# Patient Record
Sex: MALE | Race: WHITE | NOT HISPANIC OR LATINO | Employment: OTHER | URBAN - NONMETROPOLITAN AREA
[De-identification: names, ages, dates, MRNs, and addresses within clinical notes are randomized per-mention and may not be internally consistent; named-entity substitution may affect disease eponyms.]

---

## 2022-07-14 PROBLEM — I10 ESSENTIAL HYPERTENSION: Status: ACTIVE | Noted: 2022-07-14

## 2022-07-14 PROBLEM — R01.1 MURMUR, CARDIAC: Status: ACTIVE | Noted: 2022-07-14

## 2022-07-14 PROBLEM — K21.9 GASTROESOPHAGEAL REFLUX DISEASE WITHOUT ESOPHAGITIS: Status: ACTIVE | Noted: 2022-07-14

## 2022-07-14 PROBLEM — G47.33 OBSTRUCTIVE SLEEP APNEA: Status: ACTIVE | Noted: 2022-07-14

## 2022-07-14 PROBLEM — E78.5 DYSLIPIDEMIA: Status: ACTIVE | Noted: 2022-07-14

## 2022-08-11 PROBLEM — R35.0 URINARY FREQUENCY: Status: ACTIVE | Noted: 2022-08-11

## 2022-08-30 PROBLEM — H91.93 DECREASED HEARING OF BOTH EARS: Status: ACTIVE | Noted: 2022-08-30

## 2022-08-31 ENCOUNTER — APPOINTMENT (OUTPATIENT)
Dept: LAB | Facility: MEDICAL CENTER | Age: 67
End: 2022-08-31
Payer: COMMERCIAL

## 2022-08-31 DIAGNOSIS — Z12.11 SCREENING FOR COLON CANCER: ICD-10-CM

## 2022-08-31 LAB — HEMOCCULT STL QL IA: NEGATIVE

## 2022-08-31 PROCEDURE — G0328 FECAL BLOOD SCRN IMMUNOASSAY: HCPCS

## 2022-09-06 ENCOUNTER — PATIENT OUTREACH (OUTPATIENT)
Dept: FAMILY MEDICINE CLINIC | Facility: CLINIC | Age: 67
End: 2022-09-06

## 2022-09-06 ENCOUNTER — OFFICE VISIT (OUTPATIENT)
Dept: AUDIOLOGY | Facility: CLINIC | Age: 67
End: 2022-09-06
Payer: COMMERCIAL

## 2022-09-06 DIAGNOSIS — H90.3 SENSORY HEARING LOSS, BILATERAL: Primary | ICD-10-CM

## 2022-09-06 DIAGNOSIS — H91.93 DECREASED HEARING OF BOTH EARS: ICD-10-CM

## 2022-09-06 PROCEDURE — 92557 COMPREHENSIVE HEARING TEST: CPT | Performed by: AUDIOLOGIST-HEARING AID FITTER

## 2022-09-06 PROCEDURE — 92567 TYMPANOMETRY: CPT | Performed by: AUDIOLOGIST-HEARING AID FITTER

## 2022-09-06 NOTE — PROGRESS NOTES
HEARING EVALUATION    Name:  Kevan Maxwell  :  1955  Age:  78 y o  Date of Evaluation: 22     History: Known Hearing Loss binaurally  Reason for visit: Kevan Maxwell is being seen today at the request of Dr Markel Roberts for an evaluation of hearing  Patient reports he has a longstanding hearing loss  He currently wears O[employer]on More 1 miniRITE-R binaurally  EVALUATION:    Otoscopic Evaluation:   Right Ear: Clear and healthy ear canal and tympanic membrane   Left Ear: Clear and healthy ear canal and tympanic membrane    Tympanometry:   Right: Type A - normal middle ear pressure and compliance   Left: Type A - normal middle ear pressure and compliance    Audiogram Results:  Pure tone testing revealed a moderate sloping to profound sensorineural hearing loss bilaterally  SRT and PTA are in agreement indicating good test reliability  Word recognition scores were poor bilaterally  *see attached audiogram      RECOMMENDATIONS:  Annual hearing eval, Return to [facility]  for F/U and Copy to Patient/Caregiver    PATIENT EDUCATION:   Discussed results and recommendations with Pepper [place]  Questions were addressed and the patient was encouraged to contact our department should concerns arise        Iza Johnson   Clinical Audiologist

## 2022-09-06 NOTE — PROGRESS NOTES
Hearing Aid Visit:    Name:  Urban Murray  :  [id number]  Age:  78 y o  Date of Evaluation: 22     Urban Murray is being seen for a hearing aid visit  Patient is fit with [employer] More 1 miniRITE-R hearing aid(s)  Right serial number [id number]  Left serial number [id number]  Warranty date: unknown (Loss/Damage and repair)  Dome/Earmold: 3-85   : Right 10mm DB / Left 10mm DB    Patient purchased wax guards and domes today, 3 packs each  Paid $30 00         Recommendations:   Return PRN      Iza Berg   Clinical Audiologist

## 2022-09-06 NOTE — PROGRESS NOTES
CELINA CUEVAS reviewed chart  Patient was referred by PCP d/t [facility]  Patient reported having financial and transportation difficulties within the last year  It is documented that patient has hearing loss and wears hearing aides  CELINA CUEVAS outreached patient to introduce self and offer assistance  Patient is agreeable to outreach  Stated that he has an eye doctor appointment on 9/12 and needs transportation  Patient stated he is at the grocery store and scheduled time for call back  CELINA CUEVAS will call patient on 9/7 at [facility] outreached [facility] and patient has not applied for services

## 2022-09-07 ENCOUNTER — PATIENT OUTREACH (OUTPATIENT)
Dept: FAMILY MEDICINE CLINIC | Facility: CLINIC | Age: 67
End: 2022-09-07

## 2022-09-07 NOTE — PROGRESS NOTES
CELINA CM outreached patient at agreed upon time to discuss needs and offer assistance  Patient stated that he has Medicare and [employer] [address]  Stated he has a third card that is coming in the mail and spoke with Muriel Osman [phone]  Patient does not know what changes are occurring or what Miguel Gift works for  Patient stated that he is "sort of illiterate" and is having difficulty "getting the right information"  He shared that he has difficulty reading and struggles with "big words"  He stated that he has a card from [facility] to go to the doctor and to [facility]  He has used their service before  Provided phone number on card is ID# [id number] and phone number is [phone]  Stated he is paying a "small copay" and should not be paying anything  Stated he is waiting for his third insurance card for this to be addressed and will inform me of what that is when he receives it  He receives SNAP benefits  Everything is included in his rent - he does not pay any utility bills  Income is SSI - $1,001/month  Rent is $291/month  Has a DHS  named Elmirasinan Pena  Patient stated his only need right now is to address his transportation needs  He has a drivers license, but does not have a vehicle  Patient stated that he also has this number for [facility]: [phone]  This is the [facility] phone number  Patient may have MA  Called [facility] and patient is signed up for Senior Shared Ride program  Patient's name with program is Sandeep Meredith  Reviewed insurance card and patient's legal spelling of his name is [address], not [address] - will confirm this with patient  Called patient to discuss and he stated his name is spelled [address], as seen on his insurance card - updated in chart  He stated he will call to schedule ride with [facility] for his appointment on 9/12  SW CM will f/u within one week  SW Surveillance episode opened

## 2022-09-20 ENCOUNTER — TELEPHONE (OUTPATIENT)
Dept: FAMILY MEDICINE CLINIC | Facility: CLINIC | Age: 67
End: 2022-09-20

## 2022-09-20 DIAGNOSIS — S39.012A STRAIN OF LUMBAR REGION, INITIAL ENCOUNTER: Primary | ICD-10-CM

## 2022-09-20 RX ORDER — NAPROXEN 500 MG/1
500 TABLET ORAL 2 TIMES DAILY WITH MEALS
Qty: 20 TABLET | Refills: 0 | Status: SHIPPED | OUTPATIENT
Start: 2022-09-20 | End: 2022-09-29 | Stop reason: SDUPTHER

## 2022-09-20 RX ORDER — METHOCARBAMOL 500 MG/1
500 TABLET, FILM COATED ORAL 3 TIMES DAILY PRN
Qty: 20 TABLET | Refills: 0 | Status: SHIPPED | OUTPATIENT
Start: 2022-09-20

## 2022-09-20 NOTE — TELEPHONE ENCOUNTER
Pt has chronic back pain and c/o not being able to do anything or barely move for the past week due to the pain  Asking If you would prescribe him something?

## 2022-09-20 NOTE — TELEPHONE ENCOUNTER
I put in for naproxen and Robaxin for him to take as needed    Make appointment if symptoms continue or increase

## 2022-09-29 DIAGNOSIS — S39.012A STRAIN OF LUMBAR REGION, INITIAL ENCOUNTER: ICD-10-CM

## 2022-09-29 RX ORDER — NAPROXEN 500 MG/1
500 TABLET ORAL 2 TIMES DAILY WITH MEALS
Qty: 20 TABLET | Refills: 0 | Status: SHIPPED | OUTPATIENT
Start: 2022-09-29 | End: 2022-10-07 | Stop reason: SDUPTHER

## 2022-09-30 ENCOUNTER — PATIENT OUTREACH (OUTPATIENT)
Dept: FAMILY MEDICINE CLINIC | Facility: CLINIC | Age: 67
End: 2022-09-30

## 2022-09-30 NOTE — PROGRESS NOTES
CELINA CM called [facility] and spoke with Álvaro Robin provided information on their adult literacy program that is held at [facility] in [facility]  Placed call to patient to offer information on adult literacy program and discuss any needs  Patient stated that he is no longer interested in this program      He shared that he was able to get to his eye doctor appointment and has another one scheduled for 10/12  He shared that his eye doctor is going to assist with scheduling for his cataract removal  He shared that he has gained an understanding of what the process is  He shared that he feels supported by the staff in his building and knows he can go to them for support  SW Surveillance episode resolved and CELINA CM will remain available for any future needs

## 2022-10-07 DIAGNOSIS — S39.012A STRAIN OF LUMBAR REGION, INITIAL ENCOUNTER: ICD-10-CM

## 2022-10-07 RX ORDER — NAPROXEN 500 MG/1
500 TABLET ORAL 2 TIMES DAILY WITH MEALS
Qty: 20 TABLET | Refills: 0 | Status: SHIPPED | OUTPATIENT
Start: 2022-10-07 | End: 2022-10-24 | Stop reason: SDUPTHER

## 2022-10-24 DIAGNOSIS — S39.012A STRAIN OF LUMBAR REGION, INITIAL ENCOUNTER: ICD-10-CM

## 2022-10-24 RX ORDER — NAPROXEN 500 MG/1
500 TABLET ORAL 2 TIMES DAILY WITH MEALS
Qty: 20 TABLET | Refills: 0 | Status: SHIPPED | OUTPATIENT
Start: 2022-10-24

## 2022-11-07 ENCOUNTER — TELEPHONE (OUTPATIENT)
Dept: FAMILY MEDICINE CLINIC | Facility: CLINIC | Age: 67
End: 2022-11-07

## 2022-11-07 ENCOUNTER — APPOINTMENT (OUTPATIENT)
Dept: RADIOLOGY | Facility: MEDICAL CENTER | Age: 67
End: 2022-11-07

## 2022-11-07 DIAGNOSIS — J40 BRONCHITIS: Primary | ICD-10-CM

## 2022-11-07 DIAGNOSIS — J40 BRONCHITIS: ICD-10-CM

## 2022-11-07 RX ORDER — AMOXICILLIN AND CLAVULANATE POTASSIUM 875; 125 MG/1; MG/1
1 TABLET, FILM COATED ORAL EVERY 12 HOURS SCHEDULED
Qty: 14 TABLET | Refills: 0 | Status: SHIPPED | OUTPATIENT
Start: 2022-11-07 | End: 2022-11-14

## 2022-11-07 NOTE — TELEPHONE ENCOUNTER
Rx put in for Augmentin  Chest x-ray ordered  Get generic Mucinex DM over-the-counter  Push fluids  Make appointment or go to ER if symptoms continue or increase

## 2022-11-07 NOTE — TELEPHONE ENCOUNTER
CLINICAL PHARMACY NOTE - Medication Review    Second attempt made to reach patient by telephone for medication review. Spoke with patient - not currently available to review medications. Patient expresses interest in reviewing medications but does not know when will be the best time to do so - offered to send letter with contact info so she can call back at her convenience, patient agreeable. Will prepare letter and mail to patient.     Jeremy Rust PharmD  Clinical Pharmacy Specialist  O: [phone]  C: [address] [place] [address]  [phone], Option 7    =======================================================    For Pharmacy Admin Tracking Only  PHSO: Yes  Time Spent (min): 5 Feels like he has walking pneumonia  Said his chest is hurting like when he had this before  Would like a prescription sent to pharmacy and will get x-rays if you want him to  C/O heavy chest and slight cough since Friday

## 2022-11-23 ENCOUNTER — RA CDI HCC (OUTPATIENT)
Dept: OTHER | Facility: HOSPITAL | Age: 67
End: 2022-11-23

## 2022-11-23 NOTE — PROGRESS NOTES
[facility] [place] [facility]  coding opportunities       Chart reviewed, no opportunity found:   [address]        Patients Insurance     Medicare Insurance: [employer] Advantage

## 2022-11-30 ENCOUNTER — OFFICE VISIT (OUTPATIENT)
Dept: FAMILY MEDICINE CLINIC | Facility: CLINIC | Age: 67
End: 2022-11-30

## 2022-11-30 ENCOUNTER — OFFICE VISIT (OUTPATIENT)
Dept: UROLOGY | Facility: CLINIC | Age: 67
End: 2022-11-30

## 2022-11-30 VITALS
HEART RATE: 83 BPM | WEIGHT: 315 LBS | BODY MASS INDEX: 44.1 KG/M2 | HEIGHT: 71 IN | SYSTOLIC BLOOD PRESSURE: 152 MMHG | DIASTOLIC BLOOD PRESSURE: 86 MMHG | OXYGEN SATURATION: 96 %

## 2022-11-30 VITALS
HEIGHT: 71 IN | WEIGHT: 315 LBS | OXYGEN SATURATION: 95 % | TEMPERATURE: 97.8 F | HEART RATE: 84 BPM | DIASTOLIC BLOOD PRESSURE: 90 MMHG | SYSTOLIC BLOOD PRESSURE: 140 MMHG | BODY MASS INDEX: 44.1 KG/M2

## 2022-11-30 DIAGNOSIS — R73.9 HYPERGLYCEMIA: ICD-10-CM

## 2022-11-30 DIAGNOSIS — R35.0 URINARY FREQUENCY: Primary | ICD-10-CM

## 2022-11-30 DIAGNOSIS — I10 ESSENTIAL HYPERTENSION: Primary | ICD-10-CM

## 2022-11-30 DIAGNOSIS — R09.81 SINUS CONGESTION: ICD-10-CM

## 2022-11-30 DIAGNOSIS — R01.1 MURMUR, CARDIAC: ICD-10-CM

## 2022-11-30 LAB — POST-VOID RESIDUAL VOLUME, ML POC: 72 ML

## 2022-11-30 RX ORDER — TAMSULOSIN HYDROCHLORIDE 0.4 MG/1
0.4 CAPSULE ORAL
Qty: 90 CAPSULE | Refills: 3 | Status: SHIPPED | OUTPATIENT
Start: 2022-11-30

## 2022-11-30 RX ORDER — FLUTICASONE PROPIONATE 50 MCG
2 SPRAY, SUSPENSION (ML) NASAL DAILY
Qty: 16 G | Refills: 3 | Status: SHIPPED | OUTPATIENT
Start: 2022-11-30

## 2022-11-30 NOTE — PROGRESS NOTES
Name: Elie Martinez      : [id number]      MRN: [id number]  Encounter Provider: Deric Servin DO  Encounter Date: 2022   Encounter department: [address] [place] [address]   Blood pressure recheck was better, 140/90  I will refer him to Cardiology to evaluate his heart murmur  For his congestion, Rx for Flonase, and I suggested getting a room humidifier  He will call us if symptoms continue or increase  I will see him back in the next couple months or p r n  1  Essential hypertension  -     Ambulatory Referral to Cardiology; Future    2  Hyperglycemia    3  Murmur, cardiac  -     Ambulatory Referral to Cardiology; Future    4  Sinus congestion  -     fluticasone (FLONASE) 50 mcg/act nasal spray; 2 sprays into each nostril daily      Depression Screening and Follow-up Plan: Patient was screened for depression during today's encounter  They screened negative with a PHQ-2 score of 0  Subjective     Patient here today with sinus congestion  It has been very dry in his apartment  Denies any fever chills  His cold symptoms are better  Just feels sinus pressure  Patient was unable to get the echocardiogram due to transportation issues  Patient has been taking his other medications as prescribed  Sinus Problem  Associated symptoms include congestion and sinus pressure  Review of Systems   Constitutional: Negative  HENT: Positive for congestion and sinus pressure  Respiratory: Negative  Cardiovascular: Negative  Gastrointestinal: Negative  Genitourinary: Negative          Past Medical History:   Diagnosis Date   • GERD (gastroesophageal reflux disease)    • HL (hearing loss)    • Hypertension      Past Surgical History:   Procedure Laterality Date   • SHOULDER ARTHROSCOPY Right      Family History   Problem Relation Age of Onset   • Heart disease Mother    • Heart attack Mother    • Prostate cancer Father    • Parkinsonism Maternal Grandmother      Social History     Socioeconomic History   • Marital status: Single     Spouse name: None   • Number of children: None   • Years of education: None   • Highest education level: None   Occupational History   • None   Tobacco Use   • Smoking status: Never   • Smokeless tobacco: Never   Substance and Sexual Activity   • Alcohol use: Yes     Comment: rare   • Drug use: None   • Sexual activity: None   Other Topics Concern   • None   Social History Narrative   • None     Social Determinants of Health     Financial Resource Strain: High Risk   • Difficulty of Paying Living Expenses: Hard   Food Insecurity: Not on file   Transportation Needs: Unmet Transportation Needs   • Lack of Transportation (Medical):  Yes   • Lack of Transportation (Non-Medical): Yes   Physical Activity: Not on file   Stress: Not on file   Social Connections: Not on file   Intimate Partner Violence: Not on file   Housing Stability: Not on file     Current Outpatient Medications on File Prior to Visit   Medication Sig   • amLODIPine (NORVASC) 2 5 mg tablet Take 2 5 mg by mouth daily   • atorvastatin (LIPITOR) 20 mg tablet Take 20 mg by mouth daily   • losartan (COZAAR) 50 mg tablet Take 50 mg by mouth 2 (two) times a day   • methocarbamol (ROBAXIN) 500 mg tablet Take 1 tablet (500 mg total) by mouth 3 (three) times a day as needed for muscle spasms   • metoprolol succinate (TOPROL-XL) 25 mg 24 hr tablet Take 25 mg by mouth daily   • naproxen (Naprosyn) 500 mg tablet Take 1 tablet (500 mg total) by mouth 2 (two) times a day with meals   • Omega-3 Fatty Acids (Fish Oil) 1200 MG CAPS Take by mouth 2 (two) times a day   • pantoprazole (PROTONIX) 20 mg tablet Take 1 tablet (20 mg total) by mouth daily   • tamsulosin (FLOMAX) 0 4 mg Take 1 capsule (0 4 mg total) by mouth daily with dinner   • nystatin (MYCOSTATIN) powder Apply topically 2 (two) times a day (Patient not taking: Reported on 11/30/2022)     No Known Allergies  Immunization History   Administered Date(s) Administered   • COVID-19 Pfizer Vac BIVALENT Marshall-sucrose 12 Yr+ IM (BOOSTER ONLY) 09/27/2022   • INFLUENZA 08/11/2022   • Pneumococcal Conjugate Vaccine 20-valent (Pcv20), Polysace 06/30/2022   • Tdap 08/11/2022       Objective     /90 (BP Location: Left arm, Patient Position: Sitting, Cuff Size: Large)   Pulse 84   Temp 97 8 °F (36 6 °C)   Ht 5' 11" (1 803 m)   Wt (!) 151 kg (331 lb 12 8 oz)   SpO2 95%   BMI 46 28 kg/m²     Physical Exam  Vitals reviewed  Constitutional:       General: He is not in acute distress  Appearance: Normal appearance  He is well-developed  He is not ill-appearing, toxic-appearing or diaphoretic  HENT:      Head: Normocephalic and atraumatic  Nose: Congestion present  Eyes:      Conjunctiva/sclera: Conjunctivae normal    Cardiovascular:      Rate and Rhythm: Normal rate and regular rhythm  Heart sounds: Murmur heard  No friction rub  No gallop  Pulmonary:      Effort: Pulmonary effort is normal  No respiratory distress  Breath sounds: Normal breath sounds  No wheezing, rhonchi or rales  Musculoskeletal:      Right lower leg: No edema  Left lower leg: No edema  Neurological:      General: No focal deficit present  Mental Status: He is alert and oriented to person, place, and time  Psychiatric:         Mood and Affect: Mood normal          Behavior: Behavior normal          Thought Content:  Thought content normal          Judgment: Judgment normal        Familia Mis, DO

## 2022-11-30 NOTE — PROGRESS NOTES
11/30/2022    No chief complaint on file  Assessment and Plan    78 y o  male     1  BPH with LUTS  · Symptoms improved since starting tamsulosin  Continue with tamsulosin 0 4 mg HS   · PVR today 72 mL  · Follow-up in August 2023 for annual follow-up      History of Present Illness  Dago Butt is a 78 y o  male here for follow-up evaluation of BPH with LUTS  He was initially seen by me on 08/30/2022 for complaints of urinary frequency, weak urinary stream, sensation of incomplete bladder emptying, and nocturia 2-3 times per night  This had been worsening over the past several weeks  His PVR in the office was 108 mL  He did report issues with constipation as well  He was started on tamsulosin 0 4 mg HS and presents for follow-up  He reports a significant improvement in his lower urinary tract symptoms since starting tamsulosin  He will only experience nocturia upwards of 1 time per night and feels his urinary stream is much improved as well  His PVR today was 72 mL  Review of Systems   Constitutional: Negative for chills and fever  HENT: Negative for congestion and sore throat  Respiratory: Negative for cough and shortness of breath  Cardiovascular: Negative for chest pain and leg swelling  Gastrointestinal: Negative for abdominal pain, constipation, diarrhea, nausea and vomiting  Genitourinary: Negative for difficulty urinating, dysuria, flank pain, frequency, hematuria and urgency  Nocturia improved to 1 time per night   Musculoskeletal: Negative for back pain and gait problem  Skin: Negative for wound  Allergic/Immunologic: Negative for immunocompromised state  Neurological: Negative for dizziness, weakness and numbness  Hematological: Does not bruise/bleed easily  Vitals  Vitals:    11/30/22 1049   BP: 152/86   Pulse: 83   SpO2: 96%   Weight: (!) 149 kg (328 lb)   Height: 5' 11" (1 803 m)       Physical Exam  Vitals reviewed     Constitutional: General: He is not in acute distress  Appearance: Normal appearance  He is not ill-appearing or toxic-appearing  HENT:      Head: Normocephalic and atraumatic  Eyes:      General: No scleral icterus  Conjunctiva/sclera: Conjunctivae normal    Cardiovascular:      Rate and Rhythm: Normal rate  Pulmonary:      Effort: Pulmonary effort is normal  No respiratory distress  Abdominal:      Palpations: Abdomen is soft  Tenderness: There is no abdominal tenderness  There is no right CVA tenderness or left CVA tenderness  Hernia: No hernia is present  Musculoskeletal:      Cervical back: Normal range of motion  Right lower leg: No edema  Left lower leg: No edema  Skin:     General: Skin is warm and dry  Coloration: Skin is not jaundiced or pale  Neurological:      General: No focal deficit present  Mental Status: He is alert and oriented to person, place, and time  Mental status is at baseline  Gait: Gait normal    Psychiatric:         Mood and Affect: Mood normal          Behavior: Behavior normal          Thought Content:  Thought content normal          Judgment: Judgment normal          Past History  Past Medical History:   Diagnosis Date   • GERD (gastroesophageal reflux disease)    • HL (hearing loss)    • Hypertension      Social History     Socioeconomic History   • Marital status: Single     Spouse name: Not on file   • Number of children: Not on file   • Years of education: Not on file   • Highest education level: Not on file   Occupational History   • Not on file   Tobacco Use   • Smoking status: Never   • Smokeless tobacco: Never   Substance and Sexual Activity   • Alcohol use: Yes     Comment: rare   • Drug use: Not on file   • Sexual activity: Not on file   Other Topics Concern   • Not on file   Social History Narrative   • Not on file     Social Determinants of Health     Financial Resource Strain: High Risk   • Difficulty of Paying Living Expenses: Hard   Food Insecurity: Not on file   Transportation Needs: Unmet Transportation Needs   • Lack of Transportation (Medical): Yes   • Lack of Transportation (Non-Medical): Yes   Physical Activity: Not on file   Stress: Not on file   Social Connections: Not on file   Intimate Partner Violence: Not on file   Housing Stability: Not on file     Social History     Tobacco Use   Smoking Status Never   Smokeless Tobacco Never     Family History   Problem Relation Age of Onset   • Heart disease Mother    • Heart attack Mother    • Prostate cancer Father    • Parkinsonism Maternal Grandmother        The following portions of the patient's history were reviewed and updated as appropriate allergies, current medications, past medical history, past social history, past surgical history and problem list    Imaging:    Results  Recent Results (from the past 1 hour(s))   POCT Measure PVR    Collection Time: 11/30/22 10:59 AM   Result Value Ref Range    POST-VOID RESIDUAL VOLUME, ML POC 72 mL   ]  Lab Results   Component Value Date    PSA 0 7 08/11/2022     Lab Results   Component Value Date    CALCIUM 8 5 08/11/2022    K 4 2 08/11/2022    CO2 28 08/11/2022     08/11/2022    BUN 14 08/11/2022    CREATININE 0 74 08/11/2022     Lab Results   Component Value Date    WBC 7 04 08/11/2022    HGB 14 9 08/11/2022    HCT 43 3 08/11/2022    MCV 92 08/11/2022     08/11/2022       Please Note:  Voice dictation software has been used to create this document  There may be inadvertent transcriptions errors       Dilia Pacheco

## 2022-12-12 ENCOUNTER — OFFICE VISIT (OUTPATIENT)
Dept: CARDIOLOGY CLINIC | Facility: CLINIC | Age: 67
End: 2022-12-12

## 2022-12-12 VITALS
DIASTOLIC BLOOD PRESSURE: 98 MMHG | SYSTOLIC BLOOD PRESSURE: 170 MMHG | BODY MASS INDEX: 44.1 KG/M2 | HEIGHT: 71 IN | HEART RATE: 76 BPM | WEIGHT: 315 LBS

## 2022-12-12 DIAGNOSIS — G47.33 OSA ON CPAP: ICD-10-CM

## 2022-12-12 DIAGNOSIS — Z99.89 OSA ON CPAP: ICD-10-CM

## 2022-12-12 DIAGNOSIS — E66.01 CLASS 3 SEVERE OBESITY DUE TO EXCESS CALORIES WITH BODY MASS INDEX (BMI) OF 45.0 TO 49.9 IN ADULT, UNSPECIFIED WHETHER SERIOUS COMORBIDITY PRESENT (HCC): ICD-10-CM

## 2022-12-12 DIAGNOSIS — E78.5 HYPERLIPIDEMIA, UNSPECIFIED HYPERLIPIDEMIA TYPE: ICD-10-CM

## 2022-12-12 DIAGNOSIS — I10 ESSENTIAL HYPERTENSION: ICD-10-CM

## 2022-12-12 DIAGNOSIS — R01.1 MURMUR, CARDIAC: ICD-10-CM

## 2022-12-12 DIAGNOSIS — R06.09 DYSPNEA ON EXERTION: Primary | ICD-10-CM

## 2022-12-12 RX ORDER — AMLODIPINE BESYLATE 5 MG/1
5 TABLET ORAL DAILY
Qty: 30 TABLET | Refills: 6 | Status: SHIPPED | OUTPATIENT
Start: 2022-12-12
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
## 2022-12-30 ENCOUNTER — HOSPITAL ENCOUNTER (OUTPATIENT)
Dept: NUCLEAR MEDICINE | Facility: HOSPITAL | Age: 67
Discharge: HOME | End: 2022-12-30
Attending: INTERNAL MEDICINE

## 2022-12-30 ENCOUNTER — HOSPITAL ENCOUNTER (OUTPATIENT)
Dept: NON INVASIVE DIAGNOSTICS | Facility: HOSPITAL | Age: 67
Discharge: HOME | End: 2022-12-30

## 2022-12-30 VITALS
WEIGHT: 315 LBS | DIASTOLIC BLOOD PRESSURE: 90 MMHG | HEART RATE: 80 BPM | BODY MASS INDEX: 44.1 KG/M2 | HEIGHT: 71 IN | SYSTOLIC BLOOD PRESSURE: 140 MMHG

## 2022-12-30 DIAGNOSIS — R06.09 DYSPNEA ON EXERTION: ICD-10-CM

## 2022-12-30 DIAGNOSIS — R01.1 MURMUR, CARDIAC: ICD-10-CM

## 2022-12-30 PROBLEM — I35.0 MODERATE AORTIC STENOSIS: Status: ACTIVE | Noted: 2022-12-30

## 2022-12-30 LAB
AORTIC ROOT: 3.4 CM
AORTIC VALVE MEAN VELOCITY: 26.3 M/S
APICAL FOUR CHAMBER EJECTION FRACTION: 66 %
AV AREA BY CONTINUOUS VTI: 1.3 CM2
AV AREA PEAK VELOCITY: 1.2 CM2
AV LVOT MEAN GRADIENT: 3 MMHG
AV LVOT PEAK GRADIENT: 5 MMHG
AV MEAN GRADIENT: 30 MMHG
AV PEAK GRADIENT: 45 MMHG
AV VALVE AREA: 1.28 CM2
AV VELOCITY RATIO: 0.33
BASELINE ST DEPRESSION: 0 MM
DOP CALC AO PEAK VEL: 3.34 M/S
DOP CALC AO VTI: 67.52 CM
DOP CALC LVOT AREA: 3.46 CM2
DOP CALC LVOT DIAMETER: 2.1 CM
DOP CALC LVOT PEAK VEL VTI: 24.87 CM
DOP CALC LVOT PEAK VEL: 1.1 M/S
DOP CALC LVOT STROKE INDEX: 31.3 ML/M2
DOP CALC LVOT STROKE VOLUME: 86.1 CM3
E WAVE DECELERATION TIME: 230 MS
FRACTIONAL SHORTENING: 33 % (ref 28–44)
INTERVENTRICULAR SEPTUM IN DIASTOLE (PARASTERNAL SHORT AXIS VIEW): 1.8 CM
INTERVENTRICULAR SEPTUM: 1.3 CM (ref 0.6–1.1)
LEFT ATRIUM AREA SYSTOLE SINGLE PLANE A4C: 19.4 CM2
LEFT ATRIUM SIZE: 4.9 CM
LEFT INTERNAL DIMENSION IN SYSTOLE: 3.5 CM (ref 2.1–4)
LEFT VENTRICULAR INTERNAL DIMENSION IN DIASTOLE: 5.2 CM (ref 3.5–6)
LEFT VENTRICULAR POSTERIOR WALL IN END DIASTOLE: 1.4 CM
LEFT VENTRICULAR STROKE VOLUME: 80 ML
LVSV (TEICH): 80 ML
MAX HR PERCENT: 61 %
MAX HR: 95 BPM
MV E'TISSUE VEL-SEP: 11 CM/S
MV PEAK A VEL: 0.97 M/S
MV PEAK E VEL: 78 CM/S
MV STENOSIS PRESSURE HALF TIME: 67 MS
MV VALVE AREA P 1/2 METHOD: 3.28 CM2
NUC STRESS EJECTION FRACTION: 70 %
RA PRESSURE ESTIMATED: 8 MMHG
RATE PRESSURE PRODUCT: NORMAL
RIGHT ATRIUM AREA SYSTOLE A4C: 18.9 CM2
RIGHT VENTRICLE ID DIMENSION: 3.9 CM
RV PSP: 45 MMHG
SL CV LV EF: 65
SL CV PED ECHO LEFT VENTRICLE DIASTOLIC VOLUME (MOD BIPLANE) 2D: 131 ML
SL CV PED ECHO LEFT VENTRICLE SYSTOLIC VOLUME (MOD BIPLANE) 2D: 51 ML
SL CV REST NUCLEAR ISOTOPE DOSE: 16 MCI
SL CV STRESS NUCLEAR ISOTOPE DOSE: 48.3 MCI
STRESS BASELINE HR: 77 BPM
STRESS PEAK HR: 95 BPM
STRESS POST PEAK BP: 148 MMHG
STRESS ST DEPRESSION: 0 MM
STRESS/REST PERFUSION RATIO: 0.89
TR MAX PG: 37 MMHG
TR PEAK VELOCITY: 3 M/S
TRICUSPID ANNULAR PLANE SYSTOLIC EXCURSION: 2.1 CM
TRICUSPID VALVE PEAK REGURGITATION VELOCITY: 3.04 M/S

## 2022-12-30 RX ADMIN — REGADENOSON 0.4 MG: 0.08 INJECTION, SOLUTION INTRAVENOUS at 09:22

## 2023-01-09 ENCOUNTER — OFFICE VISIT (OUTPATIENT)
Dept: CARDIOLOGY CLINIC | Facility: CLINIC | Age: 68
End: 2023-01-09

## 2023-01-09 VITALS
HEART RATE: 80 BPM | BODY MASS INDEX: 44.1 KG/M2 | WEIGHT: 315 LBS | SYSTOLIC BLOOD PRESSURE: 150 MMHG | HEIGHT: 71 IN | DIASTOLIC BLOOD PRESSURE: 82 MMHG

## 2023-01-09 DIAGNOSIS — E78.5 DYSLIPIDEMIA: ICD-10-CM

## 2023-01-09 DIAGNOSIS — I35.0 MODERATE AORTIC STENOSIS: ICD-10-CM

## 2023-01-09 DIAGNOSIS — I35.0 AORTIC VALVE STENOSIS, ETIOLOGY OF CARDIAC VALVE DISEASE UNSPECIFIED: ICD-10-CM

## 2023-01-09 DIAGNOSIS — I10 ESSENTIAL HYPERTENSION: Primary | ICD-10-CM

## 2023-01-09 DIAGNOSIS — E66.01 CLASS 3 SEVERE OBESITY DUE TO EXCESS CALORIES WITH BODY MASS INDEX (BMI) OF 45.0 TO 49.9 IN ADULT, UNSPECIFIED WHETHER SERIOUS COMORBIDITY PRESENT (HCC): ICD-10-CM

## 2023-01-09 RX ORDER — HYDROCHLOROTHIAZIDE 12.5 MG/1
12.5 TABLET ORAL DAILY
Qty: 30 TABLET | Refills: 5 | Status: SHIPPED | OUTPATIENT
Start: 2023-01-09

## 2023-01-09 NOTE — PROGRESS NOTES
Cardiology Follow up    Rebecca Paz  [id number]  1955  [facility] Rupesh Valenzuela  [facility] [address]  [place] [id number]      1  Essential hypertension        2  Moderate aortic stenosis        3  Dyslipidemia        4  Class 3 severe obesity due to excess calories with body mass index (BMI) of 45 0 to 49 9 in adult, unspecified whether serious comorbidity present ([address] )        5  Aortic valve stenosis, etiology of cardiac valve disease unspecified            Discussion/Summary:  1  Dyspnea on exertion  2  Hypertension  3  Hyperlipidemia  4  Obesity  5    Moderate aortic stenosis      -Transthoracic echocardiogram 12/30/2022 showing left ventricular systolic function normal with estimated LVEF 65% with grade 1 diastolic dysfunction, normal right ventricular systolic function with aortic valve calcified with reduced mobility and moderate aortic stenosis AV peak velocity 3 34 m/s, AV mean gradient 30 mmHg, WHITNEY 1 28 cm²  -Pharmacologic nuclear stress test 12/30/2022 listing diaphragmatic attenuation with no perfusion defect suggestive of ischemia or infarction however resting hypertension was present  -As patient's blood pressures are elevated we will continue amlodipine 5 mg daily, losartan 50 mg twice daily, metoprolol succinate 25 mg daily and will add hydrochlorothiazide 12 5 mg daily to patient's regimen and will repeat BMP in 1 week to monitor renal function and electrolytes  -Patient counseled on dietary and lifestyle modifications including following a low-salt, low-fat, heart healthy diet with weight loss as goal BMI is less than 30 and sodium restriction to less than 1800 mg sodium daily along with continued compliance with CPAP therapy  -Patient will be seen in 3 months or sooner if necessary  -Patient counseled if he were to have any warning or alarm type symptoms he is to seek emergency medical care immediately  History of Present Illness:  -Patient is a 60-year-old male with hypertension, hyperlipidemia, obesity, obstructive sleep apnea compliant with CPAP therapy who initially presented to the office in December 2022 after referral from primary care physician for newly discovered cardiac murmur on exam   He has been previously seen by cardiologist approximately 6 years ago near [place] and have been recommended to undergo stress testing at that time but was unable to do so due to insurance issues   -He presents today for scheduled follow-up and denies any active chest pain, palpitations, shortness of breath, lower extremity edema  He states his blood pressures at home have been in the 800-334 mmHg systolic range with majority of readings in the 898-589 of Hg systolic range via wrist cuff at home      Patient Active Problem List   Diagnosis   • Essential hypertension   • Dyslipidemia   • Gastroesophageal reflux disease without esophagitis   • Obstructive sleep apnea   • Murmur, cardiac   • Urinary frequency   • Hyperglycemia   • Decreased hearing of both ears   • Moderate aortic stenosis     Past Medical History:   Diagnosis Date   • GERD (gastroesophageal reflux disease)    • HL (hearing loss)    • Hypertension      Social History     Socioeconomic History   • Marital status: Single     Spouse name: Not on file   • Number of children: Not on file   • Years of education: Not on file   • Highest education level: Not on file   Occupational History   • Not on file   Tobacco Use   • Smoking status: Never   • Smokeless tobacco: Never   Substance and Sexual Activity   • Alcohol use: Yes     Comment: rare   • Drug use: Not on file   • Sexual activity: Not on file   Other Topics Concern   • Not on file   Social History Narrative   • Not on file     Social Determinants of Health     Financial Resource Strain: High Risk   • Difficulty of Paying Living Expenses: Hard   Food Insecurity: Not on file   Transportation Needs: Unmet Transportation Needs   • Lack of Transportation (Medical):  Yes   • Lack of Transportation (Non-Medical): Yes   Physical Activity: Not on file   Stress: Not on file   Social Connections: Not on file   Intimate Partner Violence: Not on file   Housing Stability: Not on file      Family History   Problem Relation Age of Onset   • Heart disease Mother    • Heart attack Mother    • Prostate cancer Father    • Parkinsonism Maternal Grandmother      Past Surgical History:   Procedure Laterality Date   • SHOULDER ARTHROSCOPY Right        Current Outpatient Medications:   •  amLODIPine (NORVASC) 5 mg tablet, Take 1 tablet (5 mg total) by mouth daily, Disp: 30 tablet, Rfl: 6  •  atorvastatin (LIPITOR) 20 mg tablet, Take 20 mg by mouth daily, Disp: , Rfl:   •  fluticasone (FLONASE) 50 mcg/act nasal spray, 2 sprays into each nostril daily, Disp: 16 g, Rfl: 3  •  losartan (COZAAR) 50 mg tablet, Take 50 mg by mouth 2 (two) times a day, Disp: , Rfl:   •  methocarbamol (ROBAXIN) 500 mg tablet, Take 1 tablet (500 mg total) by mouth 3 (three) times a day as needed for muscle spasms, Disp: 20 tablet, Rfl: 0  •  metoprolol succinate (TOPROL-XL) 25 mg 24 hr tablet, Take 25 mg by mouth daily, Disp: , Rfl:   •  naproxen (Naprosyn) 500 mg tablet, Take 1 tablet (500 mg total) by mouth 2 (two) times a day with meals, Disp: 20 tablet, Rfl: 0  •  Omega-3 Fatty Acids (Fish Oil) 1200 MG CAPS, Take by mouth 2 (two) times a day, Disp: , Rfl:   •  pantoprazole (PROTONIX) 20 mg tablet, Take 1 tablet (20 mg total) by mouth daily, Disp: 90 tablet, Rfl: 3  •  tamsulosin (FLOMAX) 0 4 mg, Take 1 capsule (0 4 mg total) by mouth daily with dinner, Disp: 90 capsule, Rfl: 3  •  nystatin (MYCOSTATIN) powder, Apply topically 2 (two) times a day (Patient not taking: Reported on 11/30/2022), Disp: 60 g, Rfl: 3  No Known Allergies      Labs:  Hospital Outpatient Visit on 12/30/2022   Component Date Value   • Baseline HR 12/30/2022 77    • Stress peak HR 12/30/2022 95    • Post peak BP 12/30/2022 148 0    • Max HR Percent 12/30/2022 61    • Max HR 12/30/2022 95    • Rate Pressure Product 12/30/2022 14,060 0    • Rest Nuclear Isotope Dose 12/30/2022 16 00    • Stress Nuclear Isotope D* 12/30/2022 48 30    • Base ST Depresion (mm) 12/30/2022 0    • ST Depression (mm) 12/30/2022 0    • Stress/rest perfusion ra* 12/30/2022 0 89    • EF (%) 12/30/2022 70    Hospital Outpatient Visit on 12/30/2022   Component Date Value   • AV area peak ashkan 12/30/2022 1 2    • LA size 12/30/2022 4 9    • Aortic valve mean veloci* 12/30/2022 26 30    • Triscuspid Valve Regurgi* 12/30/2022 37 0    • Tricuspid valve peak reg* 12/30/2022 3 04    • LVPWd 12/30/2022 1 40    • MV E' Tissue Velocity Se* 12/30/2022 11    • Tricuspid annular plane * 12/30/2022 2 10    • TR Peak Ashkan 12/30/2022 3 0    • Right Ventricular Peak S* 12/30/2022 45 00    • IVSd 12/30/2022 4 37    • LV DIASTOLIC VOLUME (MOD* 47/78/5746 131    • LEFT VENTRICLE SYSTOLIC * 29/63/8172 51    • Left ventricular stroke * 12/30/2022 80 00    • A4C EF 12/30/2022 66    • LVIDd 12/30/2022 5 20    • IVS 12/30/2022 1 3    • LVIDS 12/30/2022 3 50    • FS 12/30/2022 33    • Ao root 12/30/2022 3 40    • RVID d 12/30/2022 3 9    • LVOT mn grad 12/30/2022 3 0    • AV area by cont VTI 12/30/2022 1 3    • Est  RA pres 12/30/2022 8 0    • AV mean gradient 12/30/2022 30    • AV LVOT peak gradient 12/30/2022 5    • MV valve area p 1/2 meth* 12/30/2022 3 28    • E wave deceleration time 12/30/2022 230    • LVOT diameter 12/30/2022 2 1    • LVOT peak ashkan 12/30/2022 1 1    • LVOT peak VTI 12/30/2022 24 87    • Aortic valve peak veloci* 12/30/2022 3 34    • Ao VTI 12/30/2022 67 52    • LVOT stroke volume 12/30/2022 86 10    • AV peak gradient 12/30/2022 45    • MV Peak E Ashkan 12/30/2022 78    • MV Peak A Ashkan 12/30/2022 0 97    • ADELA A4C 12/30/2022 19 4    • RAA A4C 12/30/2022 18 9    • MV stenosis pressure 1/2* 12/30/2022 67    • LVOT stroke volume index 12/30/2022 31 30    • LVSV, 2D 12/30/2022 80    • LVOT area 12/30/2022 3 46    • Dimensionless velociy in* 12/30/2022 0 33    • AV valve area 12/30/2022 1 28    • LV EF 12/30/2022 65    Office Visit on 11/30/2022   Component Date Value   • POST-VOID RESIDUAL VOLUM* 11/30/2022 72         Imaging: Echo complete w/ contrast if indicated    Result Date: 12/30/2022  Narrative: •  Left Ventricle: Left ventricular cavity size is normal  Wall thickness is mildly increased  There is mild concentric hypertrophy  The left ventricular ejection fraction is 65%  Systolic function is normal  Wall motion is normal  Diastolic function is mildly abnormal, consistent with grade I (abnormal) relaxation  •  Right Ventricle: Right ventricular cavity size is normal  Systolic function is normal  •  Left Atrium: The atrium is mildly dilated  •  Right Atrium: The atrium is mildly dilated  •  Aortic Valve: The aortic valve is trileaflet  The leaflets are mildly thickened  The leaflets are mildly calcified  There is moderately reduced mobility  There is moderate stenosis  The aortic valve peak velocity is 3 34 m/s  The aortic valve peak gradient is 45 mmHg  The aortic valve mean gradient is 30 mmHg  The dimensionless velocity index is 0 33  The aortic valve area is 1 28 cm2  The stroke volume index is 31 30 ml/m2  •  Tricuspid Valve: There is mild regurgitation  There is no evidence of stenosis  The estimated right ventricular systolic pressure is 98 12 mmHg  NM myocardial perfusion spect (rx stress and/or rest)    Result Date: 12/30/2022  Narrative: •  Stress ECG: No ST deviation is noted  The ECG was negative for ischemia  •  Perfusion: There are no perfusion defects suggestive of ischemia or infarction  Diaphragmatic attenuation with normal wall motion and thickening on gated imaging was present   •  Stress Function: Left ventricular function post-stress is normal  Post-stress ejection fraction is 70 %  •  Resting hypertension was present  Review of Systems:  Review of Systems   Constitutional: Negative for chills, diaphoresis, fatigue and fever  HENT: Negative for trouble swallowing and voice change  Eyes: Negative for pain  Respiratory: Negative for shortness of breath and wheezing  Cardiovascular: Negative for chest pain, palpitations and leg swelling  Gastrointestinal: Negative for abdominal pain, constipation, diarrhea, nausea and vomiting  Genitourinary: Negative for dysuria  Musculoskeletal: Positive for arthralgias  Negative for neck pain and neck stiffness  Skin: Negative for rash  Neurological: Negative for dizziness, syncope, light-headedness and headaches  Psychiatric/Behavioral: Negative for agitation and confusion  All other systems reviewed and are negative  Vitals:    01/09/23 1318   BP: 150/82   Pulse: 80   Weight: (!) 154 kg (339 lb)   Height: 5' 11" (1 803 m)     Vitals:    01/09/23 1318   Weight: (!) 154 kg (339 lb)     Height: 5' 11" (180 3 cm)     Physical Exam:  Physical Exam  Vitals reviewed  Constitutional:       General: He is not in acute distress  Appearance: He is obese  He is not diaphoretic  HENT:      Head: Normocephalic and atraumatic  Eyes:      General:         Right eye: No discharge  Left eye: No discharge  Neck:      Comments: Trachea midline, neck obese, difficult to assess JVD  Cardiovascular:      Rate and Rhythm: Normal rate and regular rhythm  Heart sounds: Murmur (KAREN) heard  Pulmonary:      Effort: Pulmonary effort is normal  No respiratory distress  Breath sounds: No wheezing  Chest:      Chest wall: No tenderness  Abdominal:      General: Bowel sounds are normal       Palpations: Abdomen is soft  Tenderness: There is no abdominal tenderness  There is no rebound  Musculoskeletal:      Right lower leg: No edema  Left lower leg: No edema     Skin:     General: Skin is warm and dry    Neurological:      Mental Status: He is alert  Comments: Awake, alert, able to answer questions appropriately, able to move extremities bilaterally     Psychiatric:         Mood and Affect: Mood normal          Behavior: Behavior normal

## 2023-01-16 ENCOUNTER — APPOINTMENT (OUTPATIENT)
Dept: LAB | Facility: MEDICAL CENTER | Age: 68
End: 2023-01-16

## 2023-01-16 DIAGNOSIS — I10 ESSENTIAL HYPERTENSION: ICD-10-CM

## 2023-01-16 LAB
ANION GAP SERPL CALCULATED.3IONS-SCNC: 6 MMOL/L (ref 4–13)
BUN SERPL-MCNC: 18 MG/DL (ref 5–25)
CALCIUM SERPL-MCNC: 9.5 MG/DL (ref 8.3–10.1)
CHLORIDE SERPL-SCNC: 105 MMOL/L (ref 96–108)
CO2 SERPL-SCNC: 26 MMOL/L (ref 21–32)
CREAT SERPL-MCNC: 0.85 MG/DL (ref 0.6–1.3)
GFR SERPL CREATININE-BSD FRML MDRD: 90 ML/MIN/1.73SQ M
GLUCOSE P FAST SERPL-MCNC: 122 MG/DL (ref 65–99)
POTASSIUM SERPL-SCNC: 4.1 MMOL/L (ref 3.5–5.3)
SODIUM SERPL-SCNC: 137 MMOL/L (ref 135–147)

## 2023-01-25 ENCOUNTER — DOCTOR'S OFFICE (OUTPATIENT)
Dept: URBAN - NONMETROPOLITAN AREA CLINIC 1 | Facility: CLINIC | Age: 68
Setting detail: OPHTHALMOLOGY
End: 2023-01-25
Payer: MEDICARE

## 2023-01-25 ENCOUNTER — RX ONLY (RX ONLY)
Age: 68
End: 2023-01-25

## 2023-01-25 DIAGNOSIS — H40.023: ICD-10-CM

## 2023-01-25 DIAGNOSIS — H35.3132: ICD-10-CM

## 2023-01-25 DIAGNOSIS — H43.813: ICD-10-CM

## 2023-01-25 DIAGNOSIS — H25.13: ICD-10-CM

## 2023-01-25 PROCEDURE — 92002 INTRM OPH EXAM NEW PATIENT: CPT | Performed by: OPHTHALMOLOGY

## 2023-01-25 PROCEDURE — 92134 CPTRZ OPH DX IMG PST SGM RTA: CPT | Performed by: OPHTHALMOLOGY

## 2023-01-25 ASSESSMENT — TONOMETRY
OD_IOP_MMHG: 19
OS_IOP_MMHG: 18

## 2023-01-25 ASSESSMENT — REFRACTION_CURRENTRX
OD_AXIS: 86
OD_SPHERE: -8.25
OS_ADD: +2.75
OS_AXIS: 101
OS_SPHERE: -8.50
OS_OVR_VA: 20/
OS_CYLINDER: -2.25
OD_OVR_VA: 20/
OD_CYLINDER: -2.00
OD_ADD: +2.75

## 2023-01-25 ASSESSMENT — CONFRONTATIONAL VISUAL FIELD TEST (CVF)
OD_FINDINGS: FULL
OS_FINDINGS: FULL

## 2023-01-25 ASSESSMENT — KERATOMETRY
OS_K2POWER_DIOPTERS: 43.75
OD_K2POWER_DIOPTERS: 45.50
OD_K1POWER_DIOPTERS: 43.50
OS_K1POWER_DIOPTERS: 42.50
OD_AXISANGLE_DEGREES: 08
OS_AXISANGLE_DEGREES: 19

## 2023-01-25 ASSESSMENT — SPHEQUIV_DERIVED: OS_SPHEQUIV: -11.125

## 2023-01-25 ASSESSMENT — REFRACTION_AUTOREFRACTION
OS_SPHERE: -10.25
OS_CYLINDER: -1.75
OS_AXIS: 111

## 2023-01-25 ASSESSMENT — VISUAL ACUITY
OS_BCVA: 20/150
OD_BCVA: 20/70

## 2023-01-25 ASSESSMENT — AXIALLENGTH_DERIVED: OS_AL: 29.11

## 2023-02-02 ENCOUNTER — OFFICE VISIT (OUTPATIENT)
Dept: AUDIOLOGY | Facility: CLINIC | Age: 68
End: 2023-02-02

## 2023-02-02 DIAGNOSIS — H90.3 SENSORY HEARING LOSS, BILATERAL: Primary | ICD-10-CM

## 2023-02-02 NOTE — PROGRESS NOTES
Progress Note    Name:  Pio Martinez  :  1955  Age:  78 y o  Date of Evaluation: 23     Patient is fit with [employer]on More 1 miniRITE-R hearing aid(s)  Right serial number [id number]  Left serial number [id number]  Warranty date: unknown (Loss/Damage and repair)  Dome/Earmold: 3-85   : Right 10mm DB / Left 10mm DB    Laura Bautista is having intermittent issues with his hearing aids and   Could not find problem at today's appointment, recommended bringing  in  Scheduled follow up       Iza Haskins , CCC-A  Clinical Audiologist

## 2023-02-14 ENCOUNTER — OFFICE VISIT (OUTPATIENT)
Dept: AUDIOLOGY | Facility: CLINIC | Age: 68
End: 2023-02-14

## 2023-02-14 DIAGNOSIS — H90.3 SENSORY HEARING LOSS, BILATERAL: Primary | ICD-10-CM

## 2023-02-14 NOTE — PROGRESS NOTES
Progress Note    Name:  Lili Whiteside  :  1955  Age:  78 y o  Date of Evaluation: 23     Patient picked up 3 packs of domes and 3 packs of filters for $30 00       Iza Chandler , CCC-A  Clinical Audiologist

## 2023-02-22 ENCOUNTER — DOCTOR'S OFFICE (OUTPATIENT)
Dept: URBAN - NONMETROPOLITAN AREA CLINIC 1 | Facility: CLINIC | Age: 68
Setting detail: OPHTHALMOLOGY
End: 2023-02-22
Payer: MEDICARE

## 2023-02-22 ENCOUNTER — RX ONLY (RX ONLY)
Age: 68
End: 2023-02-22

## 2023-02-22 ENCOUNTER — RA CDI HCC (OUTPATIENT)
Dept: OTHER | Facility: HOSPITAL | Age: 68
End: 2023-02-22

## 2023-02-22 DIAGNOSIS — H25.13: ICD-10-CM

## 2023-02-22 DIAGNOSIS — H40.023: ICD-10-CM

## 2023-02-22 PROCEDURE — 99213 OFFICE O/P EST LOW 20 MIN: CPT | Performed by: OPHTHALMOLOGY

## 2023-02-22 ASSESSMENT — REFRACTION_CURRENTRX
OS_OVR_VA: 20/
OS_SPHERE: -8.00
OD_ADD: +3.00
OS_CYLINDER: -2.50
OS_AXIS: 101
OS_ADD: +3.00
OD_AXIS: 089
OD_OVR_VA: 20/
OD_SPHERE: -8.00
OD_CYLINDER: -2.25

## 2023-02-22 ASSESSMENT — KERATOMETRY
OS_K2POWER_DIOPTERS: 43.75
OS_AXISANGLE_DEGREES: 029
OD_AXISANGLE_DEGREES: 173
OD_K2POWER_DIOPTERS: 44.25
OD_K1POWER_DIOPTERS: 43.50
OS_K1POWER_DIOPTERS: 42.25

## 2023-02-22 ASSESSMENT — CONFRONTATIONAL VISUAL FIELD TEST (CVF)
OS_FINDINGS: FULL
OD_FINDINGS: FULL

## 2023-02-22 ASSESSMENT — AXIALLENGTH_DERIVED: OS_AL: 20.75

## 2023-02-22 ASSESSMENT — REFRACTION_AUTOREFRACTION
OS_CYLINDER: -2.50
OS_SPHERE: +10.00
OS_AXIS: 104

## 2023-02-22 ASSESSMENT — SPHEQUIV_DERIVED: OS_SPHEQUIV: 8.75

## 2023-02-22 ASSESSMENT — VISUAL ACUITY
OS_BCVA: 20/150
OD_BCVA: 20/70-2

## 2023-02-22 NOTE — PROGRESS NOTES
[facility]  coding opportunities       Chart reviewed, no opportunity found:   [address]        Patients Insurance     Medicare Insurance: [employer] Advantage

## 2023-02-28 ENCOUNTER — TELEPHONE (OUTPATIENT)
Dept: FAMILY MEDICINE CLINIC | Facility: CLINIC | Age: 68
End: 2023-02-28

## 2023-02-28 ENCOUNTER — CONSULT (OUTPATIENT)
Dept: FAMILY MEDICINE CLINIC | Facility: CLINIC | Age: 68
End: 2023-02-28

## 2023-02-28 VITALS
HEART RATE: 98 BPM | HEIGHT: 71 IN | TEMPERATURE: 97.4 F | BODY MASS INDEX: 44.1 KG/M2 | OXYGEN SATURATION: 96 % | SYSTOLIC BLOOD PRESSURE: 154 MMHG | DIASTOLIC BLOOD PRESSURE: 86 MMHG | WEIGHT: 315 LBS

## 2023-02-28 DIAGNOSIS — R73.9 HYPERGLYCEMIA: ICD-10-CM

## 2023-02-28 DIAGNOSIS — G47.33 OBSTRUCTIVE SLEEP APNEA: Primary | ICD-10-CM

## 2023-02-28 DIAGNOSIS — Z01.818 PRE-OP EXAMINATION: Primary | ICD-10-CM

## 2023-02-28 DIAGNOSIS — I10 ESSENTIAL HYPERTENSION: ICD-10-CM

## 2023-02-28 DIAGNOSIS — E78.5 DYSLIPIDEMIA: ICD-10-CM

## 2023-02-28 DIAGNOSIS — H25.9 AGE-RELATED CATARACT OF BOTH EYES, UNSPECIFIED AGE-RELATED CATARACT TYPE: ICD-10-CM

## 2023-02-28 DIAGNOSIS — G47.33 OBSTRUCTIVE SLEEP APNEA: ICD-10-CM

## 2023-02-28 DIAGNOSIS — I35.0 MODERATE AORTIC STENOSIS: ICD-10-CM

## 2023-02-28 RX ORDER — OFLOXACIN 3 MG/ML
SOLUTION/ DROPS OPHTHALMIC
COMMUNITY
Start: 2023-02-22

## 2023-02-28 RX ORDER — PREDNISOLONE ACETATE 10 MG/ML
SUSPENSION/ DROPS OPHTHALMIC
COMMUNITY
Start: 2023-02-22 | End: 2023-02-28

## 2023-02-28 RX ORDER — KETOROLAC TROMETHAMINE 5 MG/ML
SOLUTION OPHTHALMIC
COMMUNITY
Start: 2023-02-22

## 2023-02-28 RX ORDER — AMLODIPINE BESYLATE 5 MG/1
5 TABLET ORAL DAILY
Qty: 90 TABLET | Refills: 3 | Status: SHIPPED | OUTPATIENT
Start: 2023-02-28

## 2023-02-28 RX ORDER — BRIMONIDINE TARTRATE 2 MG/ML
SOLUTION/ DROPS OPHTHALMIC
COMMUNITY
Start: 2023-02-19

## 2023-02-28 NOTE — PROGRESS NOTES
Subjective:     Agustin White is a 78 y o  male who presents to the office today for a preoperative consultation at the request of surgeon Dr Deborah Grimes who plans on performing L cataract removal on March 14, and R cataract removal on March 27  This consultation is requested for the specific conditions prompting preoperative evaluation (i e  because of potential affect on operative risk): HTN, Moderate aortic stenosis, JM  Planned anesthesia: local and IV sedation  The patient has the following known anesthesia issues: none  Patients bleeding risk: no recent abnormal bleeding  The following portions of the patient's history were reviewed and updated as appropriate:   He  has a past medical history of GERD (gastroesophageal reflux disease), HL (hearing loss), and Hypertension  He   Patient Active Problem List    Diagnosis Date Noted   • Moderate aortic stenosis 12/30/2022   • Decreased hearing of both ears 08/30/2022   • Urinary frequency 08/11/2022   • Hyperglycemia 08/11/2022   • Essential hypertension 07/14/2022   • Dyslipidemia 07/14/2022   • Gastroesophageal reflux disease without esophagitis 07/14/2022   • Obstructive sleep apnea 07/14/2022   • Murmur, cardiac 07/14/2022     He  has a past surgical history that includes Shoulder arthroscopy (Right)  His family history includes Heart attack in his mother; Heart disease in his mother; Parkinsonism in his maternal grandmother; Prostate cancer in his father  He  reports that he has never smoked  He has never used smokeless tobacco  He reports current alcohol use  No history on file for drug use    Current Outpatient Medications   Medication Sig Dispense Refill   • amLODIPine (NORVASC) 5 mg tablet Take 1 tablet (5 mg total) by mouth daily 30 tablet 6   • atorvastatin (LIPITOR) 20 mg tablet Take 20 mg by mouth daily     • brimonidine tartrate 0 2 % ophthalmic solution INSTILL 1 DROP INTO EACH EYE TWICE DAILY     • fluticasone (FLONASE) 50 mcg/act nasal spray 2 sprays into each nostril daily 16 g 3   • hydrochlorothiazide (HYDRODIURIL) 12 5 mg tablet Take 1 tablet (12 5 mg total) by mouth daily 30 tablet 5   • ketorolac (ACULAR) 0 5 % ophthalmic solution instill 1 four times a day into both eyes (LEFT EYE 03/11 AND RT EYE 03/24)     • losartan (COZAAR) 50 mg tablet Take 50 mg by mouth 2 (two) times a day     • methocarbamol (ROBAXIN) 500 mg tablet Take 1 tablet (500 mg total) by mouth 3 (three) times a day as needed for muscle spasms 20 tablet 0   • metoprolol succinate (TOPROL-XL) 25 mg 24 hr tablet Take 25 mg by mouth daily     • naproxen (Naprosyn) 500 mg tablet Take 1 tablet (500 mg total) by mouth 2 (two) times a day with meals 20 tablet 0   • ofloxacin (OCUFLOX) 0 3 % ophthalmic solution instill 1 drop four times a day into both eyes STARTING AFTER SURGERY     • Omega-3 Fatty Acids (Fish Oil) 1200 MG CAPS Take by mouth 2 (two) times a day     • pantoprazole (PROTONIX) 20 mg tablet Take 1 tablet (20 mg total) by mouth daily 90 tablet 3   • tamsulosin (FLOMAX) 0 4 mg Take 1 capsule (0 4 mg total) by mouth daily with dinner 90 capsule 3     No current facility-administered medications for this visit       Current Outpatient Medications on File Prior to Visit   Medication Sig   • amLODIPine (NORVASC) 5 mg tablet Take 1 tablet (5 mg total) by mouth daily   • atorvastatin (LIPITOR) 20 mg tablet Take 20 mg by mouth daily   • brimonidine tartrate 0 2 % ophthalmic solution INSTILL 1 DROP INTO EACH EYE TWICE DAILY   • fluticasone (FLONASE) 50 mcg/act nasal spray 2 sprays into each nostril daily   • hydrochlorothiazide (HYDRODIURIL) 12 5 mg tablet Take 1 tablet (12 5 mg total) by mouth daily   • ketorolac (ACULAR) 0 5 % ophthalmic solution instill 1 four times a day into both eyes (LEFT EYE 03/11 AND RT EYE 03/24)   • losartan (COZAAR) 50 mg tablet Take 50 mg by mouth 2 (two) times a day   • methocarbamol (ROBAXIN) 500 mg tablet Take 1 tablet (500 mg total) by mouth 3 (three) times a day as needed for muscle spasms   • metoprolol succinate (TOPROL-XL) 25 mg 24 hr tablet Take 25 mg by mouth daily   • naproxen (Naprosyn) 500 mg tablet Take 1 tablet (500 mg total) by mouth 2 (two) times a day with meals   • ofloxacin (OCUFLOX) 0 3 % ophthalmic solution instill 1 drop four times a day into both eyes STARTING AFTER SURGERY   • Omega-3 Fatty Acids (Fish Oil) 1200 MG CAPS Take by mouth 2 (two) times a day   • pantoprazole (PROTONIX) 20 mg tablet Take 1 tablet (20 mg total) by mouth daily   • tamsulosin (FLOMAX) 0 4 mg Take 1 capsule (0 4 mg total) by mouth daily with dinner   • [DISCONTINUED] nystatin (MYCOSTATIN) powder Apply topically 2 (two) times a day (Patient not taking: Reported on 11/30/2022)   • [DISCONTINUED] prednisoLONE acetate (PRED FORTE) 1 % ophthalmic suspension instill 1 drop into both eyes four times a day AFTER SURGERY (Patient not taking: Reported on 2/28/2023)     No current facility-administered medications on file prior to visit  He has No Known Allergies       Review of Systems  Pertinent items are noted in HPI  Objective:  Physical Exam  Vitals reviewed  Constitutional:       General: He is not in acute distress  Appearance: Normal appearance  He is well-developed  He is not ill-appearing, toxic-appearing or diaphoretic  HENT:      Head: Normocephalic and atraumatic  Eyes:      Conjunctiva/sclera: Conjunctivae normal    Cardiovascular:      Rate and Rhythm: Normal rate and regular rhythm  Heart sounds: Normal heart sounds  No murmur heard  No friction rub  No gallop  Pulmonary:      Effort: Pulmonary effort is normal  No respiratory distress  Breath sounds: Normal breath sounds  No wheezing, rhonchi or rales  Musculoskeletal:      Right lower leg: No edema  Left lower leg: No edema  Neurological:      General: No focal deficit present  Mental Status: He is alert and oriented to person, place, and time     Psychiatric: Mood and Affect: Mood normal          Behavior: Behavior normal          Thought Content:  Thought content normal          Judgment: Judgment normal          Cardiographics  ECG: normal sinus rhythm, no blocks or conduction defects, no ischemic changes  Echocardiogram: Moderate aortic stenosis with normal EF of 65%    Imaging  Chest x-ray: not done     Lab Review   Appointment on 01/16/2023   Component Date Value   • Sodium 01/16/2023 137    • Potassium 01/16/2023 4 1    • Chloride 01/16/2023 105    • CO2 01/16/2023 26    • ANION GAP 01/16/2023 6    • BUN 01/16/2023 18    • Creatinine 01/16/2023 0 85    • Glucose, Fasting 01/16/2023 122 (H)    • Calcium 01/16/2023 9 5    • eGFR 01/16/2023 90    Hospital Outpatient Visit on 12/30/2022   Component Date Value   • Baseline HR 12/30/2022 77    • Stress peak HR 12/30/2022 95    • Post peak BP 12/30/2022 148 0    • Max HR Percent 12/30/2022 61    • Max HR 12/30/2022 95    • Rate Pressure Product 12/30/2022 14,060 0    • Rest Nuclear Isotope Dose 12/30/2022 16 00    • Stress Nuclear Isotope D* 12/30/2022 48 30    • Base ST Depresion (mm) 12/30/2022 0    • ST Depression (mm) 12/30/2022 0    • Stress/rest perfusion ra* 12/30/2022 0 89    • EF (%) 12/30/2022 70    Hospital Outpatient Visit on 12/30/2022   Component Date Value   • AV area peak ashkan 12/30/2022 1 2    • LA size 12/30/2022 4 9    • Aortic valve mean veloci* 12/30/2022 26 30    • Triscuspid Valve Regurgi* 12/30/2022 37 0    • Tricuspid valve peak reg* 12/30/2022 3 04    • LVPWd 12/30/2022 1 40    • MV E' Tissue Velocity Se* 12/30/2022 11    • Tricuspid annular plane * 12/30/2022 2 10    • TR Peak Ashkan 12/30/2022 3 0    • Right Ventricular Peak S* 12/30/2022 45 00    • IVSd 12/30/2022 5 12    • LV DIASTOLIC VOLUME (MOD* 54/38/4713 131    • LEFT VENTRICLE SYSTOLIC * 21/49/8487 51    • Left ventricular stroke * 12/30/2022 80 00    • A4C EF 12/30/2022 66    • LVIDd 12/30/2022 5 20    • IVS 12/30/2022 1 3    • LVIDS 12/30/2022 3 50    • FS 12/30/2022 33    • Ao root 12/30/2022 3 40    • RVID d 12/30/2022 3 9    • LVOT mn grad 12/30/2022 3 0    • AV area by cont VTI 12/30/2022 1 3    • Est  RA pres 12/30/2022 8 0    • AV mean gradient 12/30/2022 30    • AV LVOT peak gradient 12/30/2022 5    • MV valve area p 1/2 meth* 12/30/2022 3 28    • E wave deceleration time 12/30/2022 230    • LVOT diameter 12/30/2022 2 1    • LVOT peak ashkan 12/30/2022 1 1    • LVOT peak VTI 12/30/2022 24 87    • Aortic valve peak veloci* 12/30/2022 3 34    • Ao VTI 12/30/2022 67 52    • LVOT stroke volume 12/30/2022 86 10    • AV peak gradient 12/30/2022 45    • MV Peak E Ashkan 12/30/2022 78    • MV Peak A Ashkan 12/30/2022 0 97    • ADELA A4C 12/30/2022 19 4    • RAA A4C 12/30/2022 18 9    • MV stenosis pressure 1/2* 12/30/2022 67    • LVOT stroke volume index 12/30/2022 31 30    • LVSV, 2D 12/30/2022 80    • LVOT area 12/30/2022 3 46    • DVI 12/30/2022 0 33    • AV valve area 12/30/2022 1 28    • LV EF 12/30/2022 65         Assessment:     78 y o  male with planned surgery as above  Known risk factors for perioperative complications: Chronic pulmonary disease        Current medications which may produce withdrawal symptoms if withheld perioperatively: none      Plan:  Medically cleared for L cataract removal on 3/14/2023, R cataract removal on 3/27/2023, by doctor Kim Born  F/U here in 2-3 months   1  Preoperative workup as follows none  2  Change in medication regimen before surgery: none, continue medication regimen including morning of surgery, with sip of water    3  Other measures: none

## 2023-03-01 LAB

## 2023-03-13 ENCOUNTER — TELEPHONE (OUTPATIENT)
Dept: FAMILY MEDICINE CLINIC | Facility: CLINIC | Age: 68
End: 2023-03-13

## 2023-03-14 ENCOUNTER — TELEPHONE (OUTPATIENT)
Dept: FAMILY MEDICINE CLINIC | Facility: CLINIC | Age: 68
End: 2023-03-14

## 2023-03-14 NOTE — TELEPHONE ENCOUNTER
You filled out paper work for DME supplies for c-pap  They need the most recent office note  Per medicare guidelines they need to mention that he is still using the machine and doing well with it  Can yo addend the notes? The paper work is at my desk to fax with it

## 2023-03-31 ENCOUNTER — CONSULT (OUTPATIENT)
Dept: FAMILY MEDICINE CLINIC | Facility: CLINIC | Age: 68
End: 2023-03-31

## 2023-03-31 VITALS
HEIGHT: 71 IN | TEMPERATURE: 97.5 F | HEART RATE: 83 BPM | WEIGHT: 315 LBS | DIASTOLIC BLOOD PRESSURE: 92 MMHG | SYSTOLIC BLOOD PRESSURE: 146 MMHG | OXYGEN SATURATION: 94 % | BODY MASS INDEX: 44.1 KG/M2

## 2023-03-31 DIAGNOSIS — R73.9 HYPERGLYCEMIA: ICD-10-CM

## 2023-03-31 DIAGNOSIS — I10 ESSENTIAL HYPERTENSION: ICD-10-CM

## 2023-03-31 DIAGNOSIS — G47.33 OBSTRUCTIVE SLEEP APNEA: ICD-10-CM

## 2023-03-31 DIAGNOSIS — H25.9 AGE-RELATED CATARACT OF BOTH EYES, UNSPECIFIED AGE-RELATED CATARACT TYPE: ICD-10-CM

## 2023-03-31 DIAGNOSIS — I35.0 MODERATE AORTIC STENOSIS: ICD-10-CM

## 2023-03-31 DIAGNOSIS — Z01.818 PRE-OP EXAMINATION: Primary | ICD-10-CM

## 2023-03-31 NOTE — PROGRESS NOTES
Subjective:     Vale Leal is a 78 y o  male who presents to the office today for a preoperative consultation at the request of surgeon Dr Dominique Plaza who plans on performing L cataract removal on April 11, R cataract removal on April 24  This consultation is requested for the specific conditions prompting preoperative evaluation (i e  because of potential affect on operative risk): HTN, Moderate aortic stenosis, JM  Planned anesthesia: local  The patient has the following known anesthesia issues: none  Patients bleeding risk: no recent abnormal bleeding  The following portions of the patient's history were reviewed and updated as appropriate:   He  has a past medical history of GERD (gastroesophageal reflux disease), HL (hearing loss), and Hypertension  He   Patient Active Problem List    Diagnosis Date Noted   • Moderate aortic stenosis 12/30/2022   • Decreased hearing of both ears 08/30/2022   • Urinary frequency 08/11/2022   • Hyperglycemia 08/11/2022   • Essential hypertension 07/14/2022   • Dyslipidemia 07/14/2022   • Gastroesophageal reflux disease without esophagitis 07/14/2022   • Obstructive sleep apnea 07/14/2022   • Murmur, cardiac 07/14/2022     He  has a past surgical history that includes Shoulder arthroscopy (Right)  His family history includes Heart attack in his mother; Heart disease in his mother; Parkinsonism in his maternal grandmother; Prostate cancer in his father  He  reports that he has never smoked  He has never used smokeless tobacco  He reports current alcohol use  No history on file for drug use    Current Outpatient Medications   Medication Sig Dispense Refill   • amLODIPine (NORVASC) 5 mg tablet Take 1 tablet (5 mg total) by mouth daily 90 tablet 3   • atorvastatin (LIPITOR) 20 mg tablet Take 20 mg by mouth daily     • brimonidine tartrate 0 2 % ophthalmic solution INSTILL 1 DROP INTO EACH EYE TWICE DAILY     • fluticasone (FLONASE) 50 mcg/act nasal spray 2 sprays into each nostril daily 16 g 3   • hydrochlorothiazide (HYDRODIURIL) 12 5 mg tablet Take 1 tablet (12 5 mg total) by mouth daily 30 tablet 5   • ketorolac (ACULAR) 0 5 % ophthalmic solution instill 1 four times a day into both eyes (LEFT EYE 03/11 AND RT EYE 03/24)     • losartan (COZAAR) 50 mg tablet Take 50 mg by mouth 2 (two) times a day     • methocarbamol (ROBAXIN) 500 mg tablet Take 1 tablet (500 mg total) by mouth 3 (three) times a day as needed for muscle spasms 20 tablet 0   • metoprolol succinate (TOPROL-XL) 25 mg 24 hr tablet Take 25 mg by mouth daily     • naproxen (Naprosyn) 500 mg tablet Take 1 tablet (500 mg total) by mouth 2 (two) times a day with meals 20 tablet 0   • ofloxacin (OCUFLOX) 0 3 % ophthalmic solution instill 1 drop four times a day into both eyes STARTING AFTER SURGERY     • Omega-3 Fatty Acids (Fish Oil) 1200 MG CAPS Take by mouth 2 (two) times a day     • pantoprazole (PROTONIX) 20 mg tablet Take 1 tablet (20 mg total) by mouth daily 90 tablet 3   • tamsulosin (FLOMAX) 0 4 mg Take 1 capsule (0 4 mg total) by mouth daily with dinner 90 capsule 3     No current facility-administered medications for this visit       Current Outpatient Medications on File Prior to Visit   Medication Sig   • amLODIPine (NORVASC) 5 mg tablet Take 1 tablet (5 mg total) by mouth daily   • atorvastatin (LIPITOR) 20 mg tablet Take 20 mg by mouth daily   • brimonidine tartrate 0 2 % ophthalmic solution INSTILL 1 DROP INTO EACH EYE TWICE DAILY   • fluticasone (FLONASE) 50 mcg/act nasal spray 2 sprays into each nostril daily   • hydrochlorothiazide (HYDRODIURIL) 12 5 mg tablet Take 1 tablet (12 5 mg total) by mouth daily   • ketorolac (ACULAR) 0 5 % ophthalmic solution instill 1 four times a day into both eyes (LEFT EYE 03/11 AND RT EYE 03/24)   • losartan (COZAAR) 50 mg tablet Take 50 mg by mouth 2 (two) times a day   • methocarbamol (ROBAXIN) 500 mg tablet Take 1 tablet (500 mg total) by mouth 3 (three) times a day as needed for muscle spasms   • metoprolol succinate (TOPROL-XL) 25 mg 24 hr tablet Take 25 mg by mouth daily   • naproxen (Naprosyn) 500 mg tablet Take 1 tablet (500 mg total) by mouth 2 (two) times a day with meals   • ofloxacin (OCUFLOX) 0 3 % ophthalmic solution instill 1 drop four times a day into both eyes STARTING AFTER SURGERY   • Omega-3 Fatty Acids (Fish Oil) 1200 MG CAPS Take by mouth 2 (two) times a day   • pantoprazole (PROTONIX) 20 mg tablet Take 1 tablet (20 mg total) by mouth daily   • tamsulosin (FLOMAX) 0 4 mg Take 1 capsule (0 4 mg total) by mouth daily with dinner     No current facility-administered medications on file prior to visit  He has No Known Allergies       Review of Systems  Pertinent items are noted in HPI  Objective:  Physical Exam  Vitals reviewed  Constitutional:       General: He is not in acute distress  Appearance: Normal appearance  He is well-developed  He is not ill-appearing, toxic-appearing or diaphoretic  HENT:      Head: Normocephalic and atraumatic  Eyes:      Conjunctiva/sclera: Conjunctivae normal    Cardiovascular:      Rate and Rhythm: Normal rate and regular rhythm  Heart sounds: Normal heart sounds  No murmur heard  No friction rub  No gallop  Pulmonary:      Effort: Pulmonary effort is normal  No respiratory distress  Breath sounds: Normal breath sounds  No wheezing, rhonchi or rales  Musculoskeletal:      Right lower leg: No edema  Left lower leg: No edema  Neurological:      General: No focal deficit present  Mental Status: He is alert and oriented to person, place, and time  Psychiatric:         Mood and Affect: Mood normal          Behavior: Behavior normal          Thought Content:  Thought content normal          Judgment: Judgment normal          Cardiographics  ECG: normal sinus rhythm, no blocks or conduction defects, no ischemic changes  Echocardiogram: not done    Imaging  Chest x-ray: not needed     Lab Review   Orders Only on 02/28/2023   Component Date Value   • Supplier Name 02/28/2023 [employer] [facility]    • Supplier Phone Number 02/28/2023 [phone]    • Order Status 02/28/2023 Completed    • Delivery Request Date 02/28/2023 02/28/2023    • Date Delivered  02/28/2023 02/28/2023    • Item Description 02/28/2023 PAP Accessory    • Item Description 02/28/2023 PAP Mask, Full Face, Fit Upon Setup, N/A, 1 per 3 months    • Item Description 02/28/2023 Humidifier Water Chamber, 1 per 6 months    • Item Description 02/28/2023 PAP Headgear, 1 per 6 months    • Item Description 02/28/2023 PAP Chinstrap, 1 per 6 months    • Item Description 02/28/2023 PAP Humidifier, Heated    • Item Description 02/28/2023 Standard PAP Tubing, Non-Heated, 1 per 3 months    • Item Description 02/28/2023 Disposable PAP Filter, 2 per 1 month    • Item Description 02/28/2023 Non-Disposable PAP Filter, 1 per 6 months    • Item Description 02/28/2023 PAP Mask Interface Cushion, Full Face, 1 per 1 month         Assessment:     78 y o  male with planned surgery as above  Known risk factors for perioperative complications: Chronic pulmonary disease        Current medications which may produce withdrawal symptoms if withheld perioperatively: none      Plan:  Medically cleared for cataract surgery on 4/11/2023 and 4/24/2023  F/U here as scheduled or PRN   1  Preoperative workup as follows none  2  Change in medication regimen before surgery: none, continue medication regimen including morning of surgery, with sip of water    3  Other measures: none

## 2023-04-11 ENCOUNTER — AMBUL SURGICAL CARE (OUTPATIENT)
Dept: URBAN - NONMETROPOLITAN AREA SURGERY 1 | Facility: SURGERY | Age: 68
Setting detail: OPHTHALMOLOGY
End: 2023-04-11
Payer: MEDICARE

## 2023-04-11 DIAGNOSIS — H25.12: ICD-10-CM

## 2023-04-11 DIAGNOSIS — H25.042: ICD-10-CM

## 2023-04-11 PROCEDURE — G8918 PT W/O PREOP ORDER IV AB PRO: HCPCS | Performed by: OPHTHALMOLOGY

## 2023-04-11 PROCEDURE — G8907 PT DOC NO EVENTS ON DISCHARG: HCPCS | Performed by: OPHTHALMOLOGY

## 2023-04-11 PROCEDURE — 66984 XCAPSL CTRC RMVL W/O ECP: CPT | Performed by: OPHTHALMOLOGY

## 2023-04-12 ENCOUNTER — RX ONLY (RX ONLY)
Age: 68
End: 2023-04-12

## 2023-04-12 ENCOUNTER — DOCTOR'S OFFICE (OUTPATIENT)
Dept: URBAN - NONMETROPOLITAN AREA CLINIC 1 | Facility: CLINIC | Age: 68
Setting detail: OPHTHALMOLOGY
End: 2023-04-12
Payer: MEDICARE

## 2023-04-12 DIAGNOSIS — H25.11: ICD-10-CM

## 2023-04-12 DIAGNOSIS — Z96.1: ICD-10-CM

## 2023-04-12 PROCEDURE — 99024 POSTOP FOLLOW-UP VISIT: CPT | Performed by: OPHTHALMOLOGY

## 2023-04-12 PROCEDURE — 92136 OPHTHALMIC BIOMETRY: CPT | Performed by: OPHTHALMOLOGY

## 2023-04-12 ASSESSMENT — REFRACTION_CURRENTRX
OD_SPHERE: -8.00
OS_CYLINDER: -2.50
OS_OVR_VA: 20/
OD_OVR_VA: 20/
OD_CYLINDER: -2.25
OS_SPHERE: -8.00
OD_ADD: +3.00
OD_AXIS: 089
OS_AXIS: 101
OS_ADD: +3.00

## 2023-04-12 ASSESSMENT — REFRACTION_AUTOREFRACTION
OS_CYLINDER: -1.25
OD_CYLINDER: -1.50
OD_SPHERE: -13.25
OD_AXIS: 081
OS_SPHERE: +0.50
OS_AXIS: 123

## 2023-04-12 ASSESSMENT — KERATOMETRY
OD_K1POWER_DIOPTERS: 42.75
OS_AXISANGLE_DEGREES: 051
OS_K1POWER_DIOPTERS: 42.75
OD_K2POWER_DIOPTERS: 43.75
OS_K2POWER_DIOPTERS: 44.25
OD_AXISANGLE_DEGREES: 013

## 2023-04-12 ASSESSMENT — AXIALLENGTH_DERIVED
OS_AL: 23.6407
OD_AL: 30.84

## 2023-04-12 ASSESSMENT — SPHEQUIV_DERIVED
OD_SPHEQUIV: -14
OS_SPHEQUIV: -0.125

## 2023-04-12 ASSESSMENT — VISUAL ACUITY
OD_BCVA: 20/60-1
OS_BCVA: 20/400

## 2023-04-24 ENCOUNTER — AMBUL SURGICAL CARE (OUTPATIENT)
Dept: URBAN - NONMETROPOLITAN AREA SURGERY 1 | Facility: SURGERY | Age: 68
Setting detail: OPHTHALMOLOGY
End: 2023-04-24
Payer: MEDICARE

## 2023-04-24 DIAGNOSIS — H25.11: ICD-10-CM

## 2023-04-24 DIAGNOSIS — H25.041: ICD-10-CM

## 2023-04-24 PROCEDURE — 66984 XCAPSL CTRC RMVL W/O ECP: CPT | Performed by: OPHTHALMOLOGY

## 2023-04-24 PROCEDURE — G8918 PT W/O PREOP ORDER IV AB PRO: HCPCS | Performed by: OPHTHALMOLOGY

## 2023-04-24 PROCEDURE — G8907 PT DOC NO EVENTS ON DISCHARG: HCPCS | Performed by: OPHTHALMOLOGY

## 2023-04-25 ENCOUNTER — DOCTOR'S OFFICE (OUTPATIENT)
Dept: URBAN - NONMETROPOLITAN AREA CLINIC 1 | Facility: CLINIC | Age: 68
Setting detail: OPHTHALMOLOGY
End: 2023-04-25
Payer: MEDICARE

## 2023-04-25 DIAGNOSIS — Z96.1: ICD-10-CM

## 2023-04-25 PROBLEM — H25.11 CATARACT NUCLEAR SCLEROSIS;  , RIGHT EYE: Status: RESOLVED | Noted: 2023-04-12 | Resolved: 2023-04-25

## 2023-04-25 PROCEDURE — 99024 POSTOP FOLLOW-UP VISIT: CPT | Performed by: OPHTHALMOLOGY

## 2023-04-25 ASSESSMENT — REFRACTION_CURRENTRX
OS_SPHERE: -8.00
OD_CYLINDER: -2.25
OS_CYLINDER: -2.50
OS_AXIS: 101
OS_OVR_VA: 20/
OD_ADD: +3.00
OD_OVR_VA: 20/
OD_SPHERE: -8.00
OS_ADD: +3.00
OD_AXIS: 089

## 2023-04-25 ASSESSMENT — AXIALLENGTH_DERIVED
OS_AL: 23.4522
OD_AL: 23.6379

## 2023-04-25 ASSESSMENT — REFRACTION_AUTOREFRACTION
OD_AXIS: 020
OD_CYLINDER: -1.00
OS_SPHERE: +1.00
OS_CYLINDER: -1.75
OS_AXIS: 109
OD_SPHERE: +0.50

## 2023-04-25 ASSESSMENT — KERATOMETRY
OD_K1POWER_DIOPTERS: 43.00
OD_AXISANGLE_DEGREES: 135
OD_K2POWER_DIOPTERS: 43.75
OS_K2POWER_DIOPTERS: 44.00
OS_AXISANGLE_DEGREES: 37
OS_K1POWER_DIOPTERS: 43.50

## 2023-04-25 ASSESSMENT — SPHEQUIV_DERIVED
OD_SPHEQUIV: 0
OS_SPHEQUIV: 0.125

## 2023-04-25 ASSESSMENT — VISUAL ACUITY
OS_BCVA: 20/40
OD_BCVA: 20/40

## 2023-04-27 ENCOUNTER — OFFICE VISIT (OUTPATIENT)
Dept: FAMILY MEDICINE CLINIC | Facility: CLINIC | Age: 68
End: 2023-04-27

## 2023-04-27 VITALS
OXYGEN SATURATION: 94 % | HEART RATE: 78 BPM | TEMPERATURE: 97.2 F | BODY MASS INDEX: 44.1 KG/M2 | DIASTOLIC BLOOD PRESSURE: 80 MMHG | SYSTOLIC BLOOD PRESSURE: 144 MMHG | WEIGHT: 315 LBS | HEIGHT: 71 IN

## 2023-04-27 DIAGNOSIS — Z12.5 PROSTATE CANCER SCREENING: ICD-10-CM

## 2023-04-27 DIAGNOSIS — E78.5 DYSLIPIDEMIA: ICD-10-CM

## 2023-04-27 DIAGNOSIS — R73.9 HYPERGLYCEMIA: ICD-10-CM

## 2023-04-27 DIAGNOSIS — I10 ESSENTIAL HYPERTENSION: Primary | ICD-10-CM

## 2023-04-27 DIAGNOSIS — E66.01 MORBID OBESITY WITH BMI OF 45.0-49.9, ADULT (HCC): ICD-10-CM

## 2023-04-27 DIAGNOSIS — H91.93 DECREASED HEARING OF BOTH EARS: ICD-10-CM

## 2023-04-27 RX ORDER — METOPROLOL SUCCINATE 25 MG/1
25 TABLET, EXTENDED RELEASE ORAL DAILY
Qty: 90 TABLET | Refills: 1 | Status: SHIPPED | OUTPATIENT
Start: 2023-04-27

## 2023-04-27 NOTE — PATIENT INSTRUCTIONS
Heart Healthy Diet   AMBULATORY CARE:   A heart healthy diet  is an eating plan low in unhealthy fats and sodium (salt)  The plan is high in healthy fats and fiber  A heart healthy diet helps improve your cholesterol levels and lowers your risk for heart disease and stroke  A dietitian will teach you how to read and understand food labels  Heart healthy diet guidelines to follow:   • Choose foods that contain healthy fats:      ? Unsaturated fats  include monounsaturated and polyunsaturated fats  Unsaturated fat is found in foods such as soybean, canola, olive, corn, and safflower oils  It is also found in soft tub margarine that is made with liquid vegetable oil  ? Omega-3 fat  is found in certain fish, such as salmon, tuna, and trout, and in walnuts and flaxseed  Eat fish high in omega-3 fats at least 2 times a week  • Limit or do not have unhealthy fats:      ? Cholesterol  is found in animal foods, such as eggs and lobster, and in dairy products made from whole milk  Limit cholesterol to less than 200 mg each day  ? Saturated fat  is found in meats, such as goyal and hamburger  It is also found in chicken or turkey skin, whole milk, and butter  Limit saturated fat to less than 7% of your total daily calories  ? Trans fat  is found in packaged foods, such as potato chips and cookies  It is also in hard margarine, some fried foods, and shortening  Do not eat foods that contain trans fats  • Get 20 to 30 grams of fiber each day  Fruits, vegetables, whole-grain foods, and legumes (cooked beans) are good sources of fiber  • Limit sodium as directed  You may be told to limit sodium, such as to 2,000 mg or less each day  Choose low-sodium or no-salt-added foods  Add little or no salt to food you prepare  Use herbs and spices in place of salt         Include the following in your heart healthy plan:  Ask your dietitian or healthcare provider how many servings to have each day from the following food groups:  • Grains:      ? Whole-wheat breads, cereals, and pastas, and brown rice    ? Low-fat, low-sodium crackers and chips    • Vegetables:      ? Broccoli, green beans, green peas, and spinach    ? Collards, kale, and lima beans    ? Carrots, sweet potatoes, tomatoes, and peppers    ? Canned vegetables with no salt added    • Fruits:      ? Bananas, peaches, pears, and pineapple    ? Grapes, raisins, and dates    ? Oranges, tangerines, grapefruit, orange juice, and grapefruit juice    ? Apricots, mangoes, melons, and papaya    ? Raspberries and strawberries    ? Canned fruit with no added sugar    • Low-fat dairy:      ? Nonfat (skim) milk, 1% milk, and low-fat almond, cashew, or soy milks fortified with calcium    ? Low-fat cheese, regular or frozen yogurt, and cottage cheese    • Meats and proteins:      ? Lean cuts of beef and pork (loin, leg, round), skinless chicken and turkey    ? Legumes, soy products, egg whites, or nuts    Limit or do not include the following in your heart healthy plan:   • Foods and liquids that contain unhealthy fats and oils:      ? Whole or 2% milk, cream cheese, sour cream, or cheese    ? High-fat cuts of beef (T-bone steaks, ribs), chicken or turkey with skin, and organ meats such as liver    ? Butter, stick margarine, shortening, and cooking oils such as coconut or palm oil    • Foods and liquids high in sodium:      ? Packaged foods, such as frozen dinners, cookies, macaroni and cheese, and cereals with more than 300 mg of sodium per serving    ? Vegetables with added sodium, such as instant potatoes, vegetables with added sauces, or regular canned vegetables    ? Cured or smoked meats, such as hot dogs, goyal, and sausage    ? High-sodium ketchup, barbecue sauce, salad dressing, pickles, olives, soy sauce, or miso    • Foods and liquids high in sugar:      ? Candy, cake, cookies, pies, or doughnuts    ?  Soft drinks (soda), sports drinks, or sweetened tea    ? Canned or dry mixes for cakes, soups, sauces, or gravies    Other healthy heart guidelines:   • Do not smoke  Nicotine and other chemicals in cigarettes and cigars can cause lung and heart damage  Ask your healthcare provider for information if you currently smoke and need help to quit  E-cigarettes or smokeless tobacco still contain nicotine  Talk to your provider before you use these products  • Limit or do not drink alcohol as directed  Alcohol can damage your heart and raise your blood pressure  Your healthcare provider may give you specific daily and weekly limits  The general recommended limit is 1 drink a day for women 21 or older and for men 72 or older  Do not have more than 3 drinks within 24 hours or 7 within a week  The recommended limit is 2 drinks a day for men 24to 59years of age  Do not have more than 4 drinks within 24 hours or 14 within a week  A drink of alcohol is 12 ounces of beer, 5 ounces of wine, or 1½ ounces of liquor  • Maintain a healthy weight  Extra body weight makes your heart work harder  Ask your provider what a healthy weight is for you  He or she can help you create a safe weight loss plan, if needed  • Exercise regularly  Exercise can help you maintain a healthy weight and improve your blood pressure and cholesterol levels  Regular exercise can also decrease your risk for heart problems  Ask your provider about the best exercise plan for you  Do not start an exercise program without asking your provider  Follow up with your doctor or cardiologist as directed:  Write down your questions so you remember to ask them during your visits  © Copyright [employer] 2022 Information is for End User's use only and may not be sold, redistributed or otherwise used for commercial purposes  The above information is an  only  It is not intended as medical advice for individual conditions or treatments   Talk to your doctor, nurse or pharmacist before following any medical regimen to see if it is safe and effective for you

## 2023-04-27 NOTE — PROGRESS NOTES
Name: Yolis Greenfield      :       MRN: [id number]  Encounter Provider: Warren Nguyen DO  Encounter Date: 2023   Encounter department: [address] [place] [address] Road     1  Essential hypertension  -     TSH, 3rd generation with Free T4 reflex  -     CBC and differential    2  Morbid obesity with BMI of 45 0-49 9, adult (HCC)  -     TSH, 3rd generation with Free T4 reflex    3  Dyslipidemia  -     Comprehensive metabolic panel  -     Lipid Panel with Direct LDL reflex    4  Hyperglycemia  -     Hemoglobin A1C    5  Prostate cancer screening  -     PSA, Total Screen    6  Decreased hearing of both ears    BMI Counseling: Body mass index is 47 48 kg/m²  The BMI is above normal  Nutrition recommendations include decreasing portion sizes, encouraging healthy choices of fruits and vegetables, decreasing fast food intake, consuming healthier snacks, limiting drinks that contain sugar, moderation in carbohydrate intake, increasing intake of lean protein, reducing intake of saturated and trans fat and reducing intake of cholesterol  No pharmacotherapy was ordered  Rationale for BMI follow-up plan is due to patient being overweight or obese  Subjective     Patient here today for follow-up  Patient denies any chest pain or shortness of breath  Patient just had his cataracts done, and doing well with that  Tolerating medications well  Just saw cardiology  Review of Systems   Constitutional: Negative  Respiratory: Negative  Cardiovascular: Negative  Gastrointestinal: Negative  Genitourinary: Negative          Past Medical History:   Diagnosis Date   • GERD (gastroesophageal reflux disease)    • HL (hearing loss)    • Hypertension      Past Surgical History:   Procedure Laterality Date   • SHOULDER ARTHROSCOPY Right      Family History   Problem Relation Age of Onset   • Heart disease Mother    • Heart attack Mother    • Prostate cancer Father    • Parkinsonism Maternal Grandmother      Social History     Socioeconomic History   • Marital status: Single     Spouse name: None   • Number of children: None   • Years of education: None   • Highest education level: None   Occupational History   • None   Tobacco Use   • Smoking status: Never   • Smokeless tobacco: Never   Substance and Sexual Activity   • Alcohol use: Yes     Comment: rare   • Drug use: None   • Sexual activity: None   Other Topics Concern   • None   Social History Narrative   • None     Social Determinants of Health     Financial Resource Strain: High Risk   • Difficulty of Paying Living Expenses: Hard   Food Insecurity: Not on file   Transportation Needs: Unmet Transportation Needs   • Lack of Transportation (Medical):  Yes   • Lack of Transportation (Non-Medical): Yes   Physical Activity: Not on file   Stress: Not on file   Social Connections: Not on file   Intimate Partner Violence: Not on file   Housing Stability: Not on file     Current Outpatient Medications on File Prior to Visit   Medication Sig   • amLODIPine (NORVASC) 5 mg tablet Take 1 tablet (5 mg total) by mouth daily   • atorvastatin (LIPITOR) 20 mg tablet Take 20 mg by mouth daily   • brimonidine tartrate 0 2 % ophthalmic solution INSTILL 1 DROP INTO EACH EYE TWICE DAILY   • fluticasone (FLONASE) 50 mcg/act nasal spray 2 sprays into each nostril daily   • hydrochlorothiazide (HYDRODIURIL) 12 5 mg tablet Take 1 tablet (12 5 mg total) by mouth daily   • ketorolac (ACULAR) 0 5 % ophthalmic solution instill 1 four times a day into both eyes (LEFT EYE 03/11 AND RT EYE 03/24)   • losartan (COZAAR) 50 mg tablet Take 1 tablet (50 mg total) by mouth 2 (two) times a day   • metoprolol succinate (TOPROL-XL) 25 mg 24 hr tablet Take 25 mg by mouth daily   • naproxen (Naprosyn) 500 mg tablet Take 1 tablet (500 mg total) by mouth 2 (two) times a day with meals   • ofloxacin (OCUFLOX) 0 3 % ophthalmic solution instill 1 drop four times a day into both eyes STARTING AFTER "SURGERY   • Omega-3 Fatty Acids (Fish Oil) 1200 MG CAPS Take by mouth 2 (two) times a day   • pantoprazole (PROTONIX) 20 mg tablet Take 1 tablet (20 mg total) by mouth daily   • tamsulosin (FLOMAX) 0 4 mg Take 1 capsule (0 4 mg total) by mouth daily with dinner   • methocarbamol (ROBAXIN) 500 mg tablet Take 1 tablet (500 mg total) by mouth 3 (three) times a day as needed for muscle spasms (Patient not taking: Reported on 4/27/2023)     No Known Allergies  Immunization History   Administered Date(s) Administered   • COVID-19 Pfizer Vac BIVALENT [place]-sucrose 12 Yr+ IM (BOOSTER ONLY) 09/27/2022   • INFLUENZA 08/11/2022   • Pneumococcal Conjugate Vaccine 20-valent (Pcv20), Polysace 06/30/2022   • Tdap 08/11/2022       Objective     /80   Pulse 78   Temp (!) 97 2 °F (36 2 °C)   Ht 5' 11\" (1 803 m)   Wt (!) 154 kg (340 lb 6 4 oz)   SpO2 94%   BMI 47 48 kg/m²     Physical Exam  Vitals reviewed  Constitutional:       General: He is not in acute distress  Appearance: Normal appearance  He is well-developed  He is not ill-appearing, toxic-appearing or diaphoretic  HENT:      Head: Normocephalic and atraumatic  Eyes:      Conjunctiva/sclera: Conjunctivae normal    Cardiovascular:      Rate and Rhythm: Normal rate and regular rhythm  Heart sounds: Normal heart sounds  No murmur heard  No friction rub  No gallop  Pulmonary:      Effort: Pulmonary effort is normal  No respiratory distress  Breath sounds: Normal breath sounds  No wheezing, rhonchi or rales  Musculoskeletal:      Right lower leg: No edema  Left lower leg: No edema  Neurological:      General: No focal deficit present  Mental Status: He is alert and oriented to person, place, and time  Psychiatric:         Mood and Affect: Mood normal          Behavior: Behavior normal          Thought Content: Thought content normal          Judgment: Judgment normal        Warren Nguyen DO  BMI Counseling:  Body mass index is " 47 48 kg/m²  The BMI is above normal  Nutrition recommendations include reducing portion sizes, decreasing overall calorie intake, 3-5 servings of fruits/vegetables daily, reducing fast food intake, consuming healthier snacks, decreasing soda and/or juice intake, moderation in carbohydrate intake, increasing intake of lean protein, reducing intake of saturated fat and trans fat and reducing intake of cholesterol

## 2023-05-01 ENCOUNTER — APPOINTMENT (OUTPATIENT)
Dept: LAB | Facility: MEDICAL CENTER | Age: 68
End: 2023-05-01

## 2023-05-01 DIAGNOSIS — I10 ESSENTIAL HYPERTENSION: Primary | ICD-10-CM

## 2023-05-01 LAB
ALBUMIN SERPL BCP-MCNC: 3.9 G/DL (ref 3.5–5)
ALP SERPL-CCNC: 72 U/L (ref 46–116)
ALT SERPL W P-5'-P-CCNC: 55 U/L (ref 12–78)
ANION GAP SERPL CALCULATED.3IONS-SCNC: 3 MMOL/L (ref 4–13)
AST SERPL W P-5'-P-CCNC: 32 U/L (ref 5–45)
BASOPHILS # BLD AUTO: 0.03 THOUSANDS/ΜL (ref 0–0.1)
BASOPHILS NFR BLD AUTO: 1 % (ref 0–1)
BILIRUB SERPL-MCNC: 0.5 MG/DL (ref 0.2–1)
BUN SERPL-MCNC: 16 MG/DL (ref 5–25)
CALCIUM SERPL-MCNC: 9.1 MG/DL (ref 8.3–10.1)
CHLORIDE SERPL-SCNC: 103 MMOL/L (ref 96–108)
CHOLEST SERPL-MCNC: 178 MG/DL
CO2 SERPL-SCNC: 29 MMOL/L (ref 21–32)
CREAT SERPL-MCNC: 0.86 MG/DL (ref 0.6–1.3)
EOSINOPHIL # BLD AUTO: 0.05 THOUSAND/ΜL (ref 0–0.61)
EOSINOPHIL NFR BLD AUTO: 1 % (ref 0–6)
ERYTHROCYTE [DISTWIDTH] IN BLOOD BY AUTOMATED COUNT: 13.1 % (ref 11.6–15.1)
GFR SERPL CREATININE-BSD FRML MDRD: 89 ML/MIN/1.73SQ M
GLUCOSE P FAST SERPL-MCNC: 113 MG/DL (ref 65–99)
HCT VFR BLD AUTO: 43.6 % (ref 36.5–49.3)
HDLC SERPL-MCNC: 36 MG/DL
HGB BLD-MCNC: 14.9 G/DL (ref 12–17)
IMM GRANULOCYTES # BLD AUTO: 0.02 THOUSAND/UL (ref 0–0.2)
IMM GRANULOCYTES NFR BLD AUTO: 0 % (ref 0–2)
LDLC SERPL CALC-MCNC: 92 MG/DL (ref 0–100)
LYMPHOCYTES # BLD AUTO: 1.98 THOUSANDS/ΜL (ref 0.6–4.47)
LYMPHOCYTES NFR BLD AUTO: 31 % (ref 14–44)
MCH RBC QN AUTO: 31.7 PG (ref 26.8–34.3)
MCHC RBC AUTO-ENTMCNC: 34.2 G/DL (ref 31.4–37.4)
MCV RBC AUTO: 93 FL (ref 82–98)
MONOCYTES # BLD AUTO: 0.4 THOUSAND/ΜL (ref 0.17–1.22)
MONOCYTES NFR BLD AUTO: 6 % (ref 4–12)
NEUTROPHILS # BLD AUTO: 3.99 THOUSANDS/ΜL (ref 1.85–7.62)
NEUTS SEG NFR BLD AUTO: 61 % (ref 43–75)
NRBC BLD AUTO-RTO: 0 /100 WBCS
PLATELET # BLD AUTO: 283 THOUSANDS/UL (ref 149–390)
PMV BLD AUTO: 10.3 FL (ref 8.9–12.7)
POTASSIUM SERPL-SCNC: 4.2 MMOL/L (ref 3.5–5.3)
PROT SERPL-MCNC: 7.4 G/DL (ref 6.4–8.4)
PSA SERPL-MCNC: 1.8 NG/ML (ref 0–4)
RBC # BLD AUTO: 4.7 MILLION/UL (ref 3.88–5.62)
SODIUM SERPL-SCNC: 135 MMOL/L (ref 135–147)
TRIGL SERPL-MCNC: 248 MG/DL
TSH SERPL DL<=0.05 MIU/L-ACNC: 2.51 UIU/ML (ref 0.45–4.5)
WBC # BLD AUTO: 6.47 THOUSAND/UL (ref 4.31–10.16)

## 2023-05-01 RX ORDER — ATORVASTATIN CALCIUM 20 MG/1
20 TABLET, FILM COATED ORAL DAILY
Qty: 90 TABLET | Refills: 1 | Status: SHIPPED | OUTPATIENT
Start: 2023-05-01

## 2023-05-03 LAB
EST. AVERAGE GLUCOSE BLD GHB EST-MCNC: 128 MG/DL
HBA1C MFR BLD: 6.1 %

## 2023-05-05 ENCOUNTER — DOCTOR'S OFFICE (OUTPATIENT)
Dept: URBAN - NONMETROPOLITAN AREA CLINIC 1 | Facility: CLINIC | Age: 68
Setting detail: OPHTHALMOLOGY
End: 2023-05-05
Payer: MEDICARE

## 2023-05-05 DIAGNOSIS — H40.023: ICD-10-CM

## 2023-05-05 DIAGNOSIS — Z96.1: ICD-10-CM

## 2023-05-05 PROCEDURE — 99024 POSTOP FOLLOW-UP VISIT: CPT | Performed by: OPHTHALMOLOGY

## 2023-05-05 ASSESSMENT — KERATOMETRY
OD_K1POWER_DIOPTERS: 43.75
OD_AXISANGLE_DEGREES: 003
OS_K1POWER_DIOPTERS: 43.00
OD_K2POWER_DIOPTERS: 44.25
OS_K2POWER_DIOPTERS: 44.00
OS_AXISANGLE_DEGREES: 040

## 2023-05-05 ASSESSMENT — REFRACTION_CURRENTRX
OD_CYLINDER: -2.25
OD_AXIS: 089
OS_ADD: +3.00
OS_OVR_VA: 20/
OD_OVR_VA: 20/
OS_SPHERE: -8.00
OS_CYLINDER: -2.50
OS_AXIS: 101
OD_ADD: +3.00
OD_SPHERE: -8.00

## 2023-05-05 ASSESSMENT — REFRACTION_AUTOREFRACTION
OD_SPHERE: +0.50
OS_CYLINDER: -1.75
OS_AXIS: 109
OD_AXIS: 095
OD_CYLINDER: -1.25
OS_SPHERE: +1.00

## 2023-05-05 ASSESSMENT — VISUAL ACUITY
OD_BCVA: 20/50+1
OS_BCVA: 20/40

## 2023-05-05 ASSESSMENT — AXIALLENGTH_DERIVED
OS_AL: 23.5433
OD_AL: 23.4577

## 2023-05-05 ASSESSMENT — SPHEQUIV_DERIVED
OD_SPHEQUIV: -0.125
OS_SPHEQUIV: 0.125

## 2023-05-22 DIAGNOSIS — R09.81 SINUS CONGESTION: ICD-10-CM

## 2023-05-22 RX ORDER — FLUTICASONE PROPIONATE 50 MCG
2 SPRAY, SUSPENSION (ML) NASAL DAILY
Qty: 16 G | Refills: 3 | Status: SHIPPED | OUTPATIENT
Start: 2023-05-22

## 2023-06-06 ENCOUNTER — OFFICE VISIT (OUTPATIENT)
Dept: AUDIOLOGY | Facility: CLINIC | Age: 68
End: 2023-06-06
Payer: COMMERCIAL

## 2023-06-06 DIAGNOSIS — H90.3 SENSORY HEARING LOSS, BILATERAL: Primary | ICD-10-CM

## 2023-06-06 NOTE — PROGRESS NOTES
Progress Note    Name:  Aiyana Poster  :  1955  Age:  80 y o  Date of Evaluation: 23     Patient picked up 5 packs of wax guards and 1 pack of domes   Paid $30 00    Iza Gaemz , CCC-A  Clinical Audiologist

## 2023-06-28 ENCOUNTER — OPTICAL OFFICE (OUTPATIENT)
Dept: URBAN - NONMETROPOLITAN AREA CLINIC 4 | Facility: CLINIC | Age: 68
Setting detail: OPHTHALMOLOGY
End: 2023-06-28
Payer: COMMERCIAL

## 2023-06-28 ENCOUNTER — DOCTOR'S OFFICE (OUTPATIENT)
Dept: URBAN - NONMETROPOLITAN AREA CLINIC 1 | Facility: CLINIC | Age: 68
Setting detail: OPHTHALMOLOGY
End: 2023-06-28
Payer: COMMERCIAL

## 2023-06-28 DIAGNOSIS — H52.223: ICD-10-CM

## 2023-06-28 DIAGNOSIS — H52.4: ICD-10-CM

## 2023-06-28 PROCEDURE — V2020 VISION SVCS FRAMES PURCHASES: HCPCS | Performed by: OPTOMETRIST

## 2023-06-28 PROCEDURE — V2744 TINT PHOTOCHROMATIC LENS/ES: HCPCS | Performed by: OPTOMETRIST

## 2023-06-28 PROCEDURE — 92012 INTRM OPH EXAM EST PATIENT: CPT | Performed by: OPTOMETRIST

## 2023-06-28 PROCEDURE — V2750 ANTI-REFLECTIVE COATING: HCPCS | Performed by: OPTOMETRIST

## 2023-06-28 PROCEDURE — V2203 LENS SPHCYL BIFOCAL 4.00D/.1: HCPCS | Performed by: OPTOMETRIST

## 2023-06-28 PROCEDURE — V2784 LENS POLYCARB OR EQUAL: HCPCS | Performed by: OPTOMETRIST

## 2023-06-28 PROCEDURE — V2799 MISC VISION ITEM OR SERVICE: HCPCS | Performed by: OPTOMETRIST

## 2023-06-28 ASSESSMENT — REFRACTION_CURRENTRX
OS_AXIS: 101
OD_CYLINDER: -2.25
OD_SPHERE: -8.00
OS_ADD: +3.00
OD_OVR_VA: 20/
OS_OVR_VA: 20/
OD_ADD: +3.00
OS_SPHERE: -8.00
OD_AXIS: 089
OS_CYLINDER: -2.50

## 2023-06-28 ASSESSMENT — REFRACTION_MANIFEST
OD_CYLINDER: -1.25
OS_AXIS: 105
OD_SPHERE: +0.75
OS_SPHERE: +1.00
OD_AXIS: 090
OS_CYLINDER: -1.75
OS_VA1: 20/40
OS_VA2: 20/40
OD_VA1: 20/30-2
OS_ADD: +2.50
OD_VA2: 20/30-2
OD_ADD: +2.50

## 2023-06-28 ASSESSMENT — AXIALLENGTH_DERIVED
OS_AL: 23.5433
OD_AL: 23.3618
OD_AL: 23.1255
OS_AL: 23.5433

## 2023-06-28 ASSESSMENT — REFRACTION_AUTOREFRACTION
OD_AXIS: 089
OD_CYLINDER: -2.50
OS_AXIS: 109
OD_SPHERE: +2.00
OS_CYLINDER: -1.75
OS_SPHERE: +1.00

## 2023-06-28 ASSESSMENT — KERATOMETRY
OD_AXISANGLE_DEGREES: 003
OD_K2POWER_DIOPTERS: 44.25
OS_K1POWER_DIOPTERS: 43.00
OS_K2POWER_DIOPTERS: 44.00
OD_K1POWER_DIOPTERS: 43.75
OS_AXISANGLE_DEGREES: 040

## 2023-06-28 ASSESSMENT — VISUAL ACUITY
OD_BCVA: 20/60-1
OS_BCVA: 20/40-1

## 2023-06-28 ASSESSMENT — SPHEQUIV_DERIVED
OD_SPHEQUIV: 0.125
OD_SPHEQUIV: 0.75
OS_SPHEQUIV: 0.125
OS_SPHEQUIV: 0.125

## 2023-07-18 ENCOUNTER — HOSPITAL ENCOUNTER (EMERGENCY)
Facility: HOSPITAL | Age: 68
Discharge: HOME | End: 2023-07-18
Attending: EMERGENCY MEDICINE
Payer: COMMERCIAL

## 2023-07-18 ENCOUNTER — APPOINTMENT (EMERGENCY)
Dept: CT IMAGING | Facility: HOSPITAL | Age: 68
End: 2023-07-18
Payer: COMMERCIAL

## 2023-07-18 ENCOUNTER — OFFICE VISIT (OUTPATIENT)
Dept: URGENT CARE | Facility: MEDICAL CENTER | Age: 68
End: 2023-07-18
Payer: COMMERCIAL

## 2023-07-18 VITALS
DIASTOLIC BLOOD PRESSURE: 71 MMHG | SYSTOLIC BLOOD PRESSURE: 122 MMHG | HEART RATE: 73 BPM | RESPIRATION RATE: 22 BRPM | OXYGEN SATURATION: 92 % | TEMPERATURE: 97.7 F

## 2023-07-18 VITALS
SYSTOLIC BLOOD PRESSURE: 130 MMHG | RESPIRATION RATE: 20 BRPM | HEART RATE: 87 BPM | OXYGEN SATURATION: 95 % | DIASTOLIC BLOOD PRESSURE: 72 MMHG | TEMPERATURE: 98.4 F

## 2023-07-18 DIAGNOSIS — R42 LIGHTHEADED: ICD-10-CM

## 2023-07-18 DIAGNOSIS — M54.2 NECK PAIN: ICD-10-CM

## 2023-07-18 DIAGNOSIS — R42 DIZZINESS: Primary | ICD-10-CM

## 2023-07-18 DIAGNOSIS — M54.2 NECK PAIN: Primary | ICD-10-CM

## 2023-07-18 LAB
ANION GAP SERPL CALCULATED.3IONS-SCNC: 8 MMOL/L
ATRIAL RATE: 81 BPM
BASOPHILS # BLD AUTO: 0.04 THOUSANDS/ÂΜL (ref 0–0.1)
BASOPHILS NFR BLD AUTO: 1 % (ref 0–1)
BUN SERPL-MCNC: 14 MG/DL (ref 5–25)
CALCIUM SERPL-MCNC: 9.5 MG/DL (ref 8.4–10.2)
CHLORIDE SERPL-SCNC: 102 MMOL/L (ref 96–108)
CO2 SERPL-SCNC: 27 MMOL/L (ref 21–32)
CREAT SERPL-MCNC: 0.72 MG/DL (ref 0.6–1.3)
EOSINOPHIL # BLD AUTO: 0.01 THOUSAND/ÂΜL (ref 0–0.61)
EOSINOPHIL NFR BLD AUTO: 0 % (ref 0–6)
ERYTHROCYTE [DISTWIDTH] IN BLOOD BY AUTOMATED COUNT: 12.6 % (ref 11.6–15.1)
GFR SERPL CREATININE-BSD FRML MDRD: 96 ML/MIN/1.73SQ M
GLUCOSE SERPL-MCNC: 122 MG/DL (ref 65–140)
HCT VFR BLD AUTO: 42.2 % (ref 36.5–49.3)
HGB BLD-MCNC: 14.7 G/DL (ref 12–17)
IMM GRANULOCYTES # BLD AUTO: 0.01 THOUSAND/UL (ref 0–0.2)
IMM GRANULOCYTES NFR BLD AUTO: 0 % (ref 0–2)
LYMPHOCYTES # BLD AUTO: 1.19 THOUSANDS/ÂΜL (ref 0.6–4.47)
LYMPHOCYTES NFR BLD AUTO: 18 % (ref 14–44)
MCH RBC QN AUTO: 31.3 PG (ref 26.8–34.3)
MCHC RBC AUTO-ENTMCNC: 34.8 G/DL (ref 31.4–37.4)
MCV RBC AUTO: 90 FL (ref 82–98)
MONOCYTES # BLD AUTO: 0.4 THOUSAND/ÂΜL (ref 0.17–1.22)
MONOCYTES NFR BLD AUTO: 6 % (ref 4–12)
NEUTROPHILS # BLD AUTO: 4.91 THOUSANDS/ÂΜL (ref 1.85–7.62)
NEUTS SEG NFR BLD AUTO: 75 % (ref 43–75)
NRBC BLD AUTO-RTO: 0 /100 WBCS
P AXIS: 63 DEGREES
PLATELET # BLD AUTO: 261 THOUSANDS/UL (ref 149–390)
PMV BLD AUTO: 9.4 FL (ref 8.9–12.7)
POTASSIUM SERPL-SCNC: 4 MMOL/L (ref 3.5–5.3)
PR INTERVAL: 168 MS
QRS AXIS: 63 DEGREES
QRSD INTERVAL: 104 MS
QT INTERVAL: 366 MS
QTC INTERVAL: 425 MS
RBC # BLD AUTO: 4.69 MILLION/UL (ref 3.88–5.62)
SODIUM SERPL-SCNC: 137 MMOL/L (ref 135–147)
T WAVE AXIS: 63 DEGREES
VENTRICULAR RATE: 81 BPM
WBC # BLD AUTO: 6.56 THOUSAND/UL (ref 4.31–10.16)

## 2023-07-18 PROCEDURE — 93005 ELECTROCARDIOGRAM TRACING: CPT | Performed by: PHYSICIAN ASSISTANT

## 2023-07-18 PROCEDURE — 93005 ELECTROCARDIOGRAM TRACING: CPT

## 2023-07-18 PROCEDURE — G0463 HOSPITAL OUTPT CLINIC VISIT: HCPCS | Performed by: PHYSICIAN ASSISTANT

## 2023-07-18 PROCEDURE — 70450 CT HEAD/BRAIN W/O DYE: CPT

## 2023-07-18 PROCEDURE — 99213 OFFICE O/P EST LOW 20 MIN: CPT | Performed by: PHYSICIAN ASSISTANT

## 2023-07-18 PROCEDURE — 85025 COMPLETE CBC W/AUTO DIFF WBC: CPT | Performed by: PHYSICIAN ASSISTANT

## 2023-07-18 PROCEDURE — 80048 BASIC METABOLIC PNL TOTAL CA: CPT | Performed by: PHYSICIAN ASSISTANT

## 2023-07-18 PROCEDURE — 72125 CT NECK SPINE W/O DYE: CPT

## 2023-07-18 PROCEDURE — G1004 CDSM NDSC: HCPCS

## 2023-07-18 PROCEDURE — 93010 ELECTROCARDIOGRAM REPORT: CPT | Performed by: INTERNAL MEDICINE

## 2023-07-18 PROCEDURE — 36415 COLL VENOUS BLD VENIPUNCTURE: CPT | Performed by: PHYSICIAN ASSISTANT

## 2023-07-18 PROCEDURE — 82948 REAGENT STRIP/BLOOD GLUCOSE: CPT | Performed by: PHYSICIAN ASSISTANT

## 2023-07-18 RX ORDER — MELOXICAM 15 MG/1
15 TABLET ORAL DAILY
Qty: 30 TABLET | Refills: 0 | Status: SHIPPED | OUTPATIENT
Start: 2023-07-18 | End: 2023-08-17

## 2023-07-18 RX ORDER — GABAPENTIN 300 MG/1
300 CAPSULE ORAL 2 TIMES DAILY
Qty: 60 CAPSULE | Refills: 0 | Status: SHIPPED | OUTPATIENT
Start: 2023-07-18

## 2023-07-18 NOTE — ED PROVIDER NOTES
History  Chief Complaint   Patient presents with   • Dizziness     States he has a pinched nerve in his neck, and started taking his medication again, has not taken it in about a year, has been having dizziness for the past week. This is a 81-year-old male sent in by urgent care via EMS for evaluation of neck pain dizziness paresthesias. He states he has had neck pain for many years. He has been seen by a specialist in [place] where he used to live, had recommended nonsurgical intervention for his neck abnormalities which she states he believes was explained to him as a straightening of his cervical spine. He states he was on medications for this he presents a bottle of gabapentin which she states he only has been taking very sporadically secondary to the fact that he believes the medication is too old. He also complains of dizziness over the last 7 days which is a novel feature for him. No headache. He also states that he has upper extremity paresthesias related to his neck pain this is somewhat of an acute on chronic presentation for this as well. He denies any other systemic symptoms. Prior to Admission Medications   Prescriptions Last Dose Informant Patient Reported? Taking?    Omega-3 Fatty Acids (Fish Oil) 1200 MG CAPS  Self Yes No   Sig: Take by mouth 2 (two) times a day   amLODIPine (NORVASC) 5 mg tablet   No No   Sig: Take 1 tablet (5 mg total) by mouth daily   atorvastatin (LIPITOR) 20 mg tablet   No No   Sig: Take 1 tablet (20 mg total) by mouth daily   brimonidine tartrate 0.2 % ophthalmic solution   Yes No   Sig: INSTILL 1 DROP INTO EACH EYE TWICE DAILY   fluticasone (FLONASE) 50 mcg/act nasal spray   No No   Si sprays into each nostril daily   hydrochlorothiazide (HYDRODIURIL) 12.5 mg tablet   No No   Sig: Take 1 tablet (12.5 mg total) by mouth daily   ketorolac (ACULAR) 0.5 % ophthalmic solution   Yes No   Sig: instill 1 four times a day into both eyes (LEFT EYE  AND RT EYE 03/24)   losartan (COZAAR) 50 mg tablet   No No   Sig: Take 1 tablet (50 mg total) by mouth 2 (two) times a day   methocarbamol (ROBAXIN) 500 mg tablet   No No   Sig: Take 1 tablet (500 mg total) by mouth 3 (three) times a day as needed for muscle spasms   Patient not taking: Reported on 4/27/2023   metoprolol succinate (TOPROL-XL) 25 mg 24 hr tablet   No No   Sig: Take 1 tablet (25 mg total) by mouth daily   naproxen (Naprosyn) 500 mg tablet   No No   Sig: Take 1 tablet (500 mg total) by mouth 2 (two) times a day with meals   Patient not taking: Reported on 7/18/2023   ofloxacin (OCUFLOX) 0.3 % ophthalmic solution   Yes No   Sig: instill 1 drop four times a day into both eyes STARTING AFTER SURGERY   pantoprazole (PROTONIX) 20 mg tablet  Self No No   Sig: Take 1 tablet (20 mg total) by mouth daily   tamsulosin (FLOMAX) 0.4 mg   No No   Sig: Take 1 capsule (0.4 mg total) by mouth daily with dinner      Facility-Administered Medications: None       Past Medical History:   Diagnosis Date   • GERD (gastroesophageal reflux disease)    • HL (hearing loss)    • Hypertension        Past Surgical History:   Procedure Laterality Date   • SHOULDER ARTHROSCOPY Right        Family History   Problem Relation Age of Onset   • Heart disease Mother    • Heart attack Mother    • Prostate cancer Father    • Parkinsonism Maternal Grandmother      I have reviewed and agree with the history as documented. E-Cigarette/Vaping   • E-Cigarette Use Never User      E-Cigarette/Vaping Substances   • Nicotine No    • THC No    • CBD No    • Flavoring No      Social History     Tobacco Use   • Smoking status: Never     Passive exposure: Never   • Smokeless tobacco: Never   Vaping Use   • Vaping Use: Never used   Substance Use Topics   • Alcohol use: Yes     Comment: rare   • Drug use: Never       Review of Systems   Constitutional: Negative.   Negative for activity change, appetite change, chills, diaphoresis, fatigue, fever and unexpected weight change. HENT: Negative. Negative for sore throat, trouble swallowing and voice change. Eyes: Negative. Respiratory: Negative. Negative for cough, chest tightness, shortness of breath and wheezing. Cardiovascular: Negative. Negative for chest pain, palpitations and leg swelling. Gastrointestinal: Negative. Negative for abdominal pain, blood in stool, nausea and vomiting. Endocrine: Negative. Genitourinary: Negative. Negative for flank pain and hematuria. Musculoskeletal: Positive for neck pain. Negative for arthralgias, back pain, gait problem, joint swelling, myalgias and neck stiffness. Skin: Negative. Negative for rash and wound. Allergic/Immunologic: Negative. Neurological: Positive for numbness and headaches. Negative for dizziness, seizures, syncope, weakness and light-headedness. Hematological: Negative. Psychiatric/Behavioral: Negative. All other systems reviewed and are negative. Physical Exam  Physical Exam  Vitals reviewed. Constitutional:       General: He is not in acute distress. Appearance: He is well-developed. He is obese. He is not ill-appearing, toxic-appearing or diaphoretic. HENT:      Right Ear: External ear normal. No swelling. Tympanic membrane is not bulging. Left Ear: External ear normal. No swelling. Tympanic membrane is not bulging. Nose: Nose normal.      Mouth/Throat:      Pharynx: No oropharyngeal exudate. Eyes:      General: Lids are normal.      Conjunctiva/sclera: Conjunctivae normal.      Pupils: Pupils are equal, round, and reactive to light. Neck:      Thyroid: No thyromegaly. Vascular: No JVD. Trachea: No tracheal deviation. Cardiovascular:      Rate and Rhythm: Normal rate and regular rhythm. Pulses: Normal pulses. Heart sounds: Normal heart sounds. No murmur heard. No friction rub. No gallop.    Pulmonary:      Effort: Pulmonary effort is normal. No respiratory distress. Breath sounds: Normal breath sounds. No stridor. No wheezing or rales. Chest:      Chest wall: No tenderness. Abdominal:      General: Bowel sounds are normal. There is no distension. Palpations: Abdomen is soft. There is no mass. Tenderness: There is no abdominal tenderness. There is no guarding or rebound. Negative signs include Woodruff's sign. Hernia: No hernia is present. Musculoskeletal:         General: Tenderness present. Normal range of motion. Cervical back: Normal range of motion and neck supple. No edema. Normal range of motion. Comments: Neck tenderness   Lymphadenopathy:      Cervical: No cervical adenopathy. Skin:     General: Skin is warm and dry. Capillary Refill: Capillary refill takes less than 2 seconds. Coloration: Skin is not pale. Findings: No erythema or rash. Neurological:      Mental Status: He is alert and oriented to person, place, and time. GCS: GCS eye subscore is 4. GCS verbal subscore is 5. GCS motor subscore is 6. Cranial Nerves: No cranial nerve deficit. Sensory: No sensory deficit. Deep Tendon Reflexes: Reflexes are normal and symmetric. Psychiatric:         Mood and Affect: Mood normal.         Speech: Speech normal.         Behavior: Behavior normal.         Thought Content:  Thought content normal.         Judgment: Judgment normal.         Vital Signs  ED Triage Vitals   Temperature Pulse Respirations Blood Pressure SpO2   07/18/23 1033 07/18/23 1026 07/18/23 1026 07/18/23 1026 07/18/23 1026   97.7 °F (36.5 °C) 84 18 (!) 181/93 93 %      Temp Source Heart Rate Source Patient Position - Orthostatic VS BP Location FiO2 (%)   07/18/23 1033 07/18/23 1026 07/18/23 1026 07/18/23 1026 --   Temporal Monitor Lying Left arm       Pain Score       --                  Vitals:    07/18/23 1026 07/18/23 1100   BP: (!) 181/93 122/71   Pulse: 84 74   Patient Position - Orthostatic VS: Lying          Visual Acuity      ED Medications  Medications - No data to display    Diagnostic Studies  Results Reviewed     Procedure Component Value Units Date/Time    Basic metabolic panel [[id number]] Collected: 07/18/23 1036    Lab Status: Final result Specimen: Blood from Arm, Right Updated: 07/18/23 1100     Sodium 137 mmol/L      Potassium 4.0 mmol/L      Chloride 102 mmol/L      CO2 27 mmol/L      ANION GAP 8 mmol/L      BUN 14 mg/dL      Creatinine 0.72 mg/dL      Glucose 122 mg/dL      Calcium 9.5 mg/dL      eGFR 96 ml/min/1.73sq m     Narrative:      [place] guidelines for Chronic Kidney Disease (CKD):   •  Stage 1 with normal or high GFR (GFR > 90 mL/min/1.73 square meters)  •  Stage 2 Mild CKD (GFR = 60-89 mL/min/1.73 square meters)  •  Stage 3A Moderate CKD (GFR = 45-59 mL/min/1.73 square meters)  •  Stage 3B Moderate CKD (GFR = 30-44 mL/min/1.73 square meters)  •  Stage 4 Severe CKD (GFR = 15-29 mL/min/1.73 square meters)  •  Stage 5 End Stage CKD (GFR <15 mL/min/1.73 square meters)  Note: GFR calculation is accurate only with a steady state creatinine    CBC and differential [[id number]] Collected: 07/18/23 1036    Lab Status: Final result Specimen: Blood from Arm, Right Updated: 07/18/23 1044     WBC 6.56 Thousand/uL      RBC 4.69 Million/uL      Hemoglobin 14.7 g/dL      Hematocrit 42.2 %      MCV 90 fL      MCH 31.3 pg      MCHC 34.8 g/dL      RDW 12.6 %      MPV 9.4 fL      Platelets 940 Thousands/uL      nRBC 0 /100 WBCs      Neutrophils Relative 75 %      Immat GRANS % 0 %      Lymphocytes Relative 18 %      Monocytes Relative 6 %      Eosinophils Relative 0 %      Basophils Relative 1 %      Neutrophils Absolute 4.91 Thousands/µL      Immature Grans Absolute 0.01 Thousand/uL      Lymphocytes Absolute 1.19 Thousands/µL      Monocytes Absolute 0.40 Thousand/µL      Eosinophils Absolute 0.01 Thousand/µL      Basophils Absolute 0.04 Thousands/µL                  CT head without contrast Final Result by Wesley Uribe MD (07/18 1138)      No acute intracranial abnormality. Workstation performed: [id number]         CT spine cervical without contrast   Final Result by Wesley Uribe MD (07/18 1140)      No cervical spine fracture or traumatic malalignment. Workstation performed: [id number]                    Procedures  Procedures     EKG there I did review within normal limits actually EKG also completed here by staff reveals a normal sinus rhythm with 79 bpm no ectopy no ST elevation or depression. No T wave inversion normal axis normal QRS complex normal conduction. ED Course  ED Course as of 07/18/23 1230   Tue Jul 18, 2023   1047 CBC reviewed within normal limits   1051 SpO2: 93 %   1051 Respirations: 18   1051 Pulse: 84   1051 Temperature: 97.7 °F (36.5 °C)   1051 Blood Pressure(!): 181/93  Vs reviewed, mild HTN   1103 Sodium: 137   1103 eGFR: 96   metabolic ambrocio reviewed within normal limits   1103 Awaiting CT interpretations likely discharge home   1142 IMPRESSION:     No cervical spine fracture or traumatic malalignment.      1143 IMPRESSION:     No acute intracranial abnormality.                                  SBIRT 20yo+    Flowsheet Row Most Recent Value   Initial Alcohol Screen: US AUDIT-C     1. How often do you have a drink containing alcohol? 0 Filed at: 07/18/2023 1035   2. How many drinks containing alcohol do you have on a typical day you are drinking? 0 Filed at: 07/18/2023 1035   3a. Male UNDER 65: How often do you have five or more drinks on one occasion? 0 Filed at: 07/18/2023 1035   3b. FEMALE Any Age, or MALE 65+: How often do you have 4 or more drinks on one occassion? 0 Filed at: 07/18/2023 1035   Audit-C Score 0 Filed at: 07/18/2023 1035   SANTA: How many times in the past year have you. .. Used an illegal drug or used a prescription medication for non-medical reasons?  Never Filed at: 07/18/2023 1035                    Medical Decision Making  59-year-old male presents via EMS from an urgent care for evaluation of neck pain dizziness. See HPI for further  information regarding the symptomatology, he will be worked up for acute intracranial abnormality, acute cervical abnormality such as disc herniation or traumatic malalignment. He be worked up for acute kidney injury or metabolic derangement in the setting of dizziness. CT scan of head and cervical spine without acute abnormalities, laboratory values within reasonable limits, will discharge home refill gabapentin and meloxicam follow-up with primary care referral sent to comprehensive spine program.    Amount and/or Complexity of Data Reviewed  Labs: ordered. Decision-making details documented in ED Course. Radiology: ordered. Risk  Prescription drug management. Disposition  Final diagnoses:   Dizziness   Neck pain     Time reflects when diagnosis was documented in both MDM as applicable and the Disposition within this note     Time User Action Codes Description Comment    7/18/2023 11:43 AM Torri GANDARA Add [R42] Dizziness     7/18/2023 11:43 AM Francia Johnson Add [M54.2] Neck pain       ED Disposition     ED Disposition   Discharge    Condition   Stable    Date/Time   Tue Jul 18, 2023 11:43 AM    Comment   Keenan Young discharge to home/self care. Follow-up Information     Follow up With Specialties Details Why Contact Info    Hilda Thompson DO Family Medicine Schedule an appointment as soon as possible for a visit  As needed [address] [place] [address]  [phone]            Patient's Medications   Discharge Prescriptions    GABAPENTIN (NEURONTIN) 300 MG CAPSULE    Take 1 capsule (300 mg total) by mouth 2 (two) times a day For post-herpetic neuralgia: Take 1 tablet on day 1,  Then take 2 tablets on day 2, Then take 3 tablets on day 3 and every day after that as instructed by your doctor.        Start Date: 7/18/2023 End Date: --       Order Dose: 300 mg       Quantity: 60 capsule    Refills: 0    MELOXICAM (MOBIC) 15 MG TABLET    Take 1 tablet (15 mg total) by mouth daily       Start Date: 7/18/2023 End Date: 8/17/2023       Order Dose: 15 mg       Quantity: 30 tablet    Refills: 0           PDMP Review     None          ED Provider  Electronically Signed by           Kristopher Alvarez PA-C  07/18/23 1427

## 2023-07-18 NOTE — PROGRESS NOTES
[facility]        NAME: Yusra Causey is a 80 y.o. male  : 1955    MRN: [id number]  DATE: 2023  TIME: 9:57 AM    Assessment and Plan   Neck pain [M54.2]  1. Neck pain        2. Lightheaded          EKG: NSR, No acute ST/T wave abnormalities. EMS called for transport to the ED. Patient Instructions       EMS to the ED. Chief Complaint     Chief Complaint   Patient presents with   • Neck Pain     Pt. Has a pinched nerve in neck and was supposed to have surgery, pt. Was told he does not have aq curve in his neck and it causes issues, pt. Is on gabapentin for pain, pt. Did not have surgery because of the risk         History of Present Illness       Patient is a 81 y/o/m presenting to [facility] with neck pain and lightheaded. Patient reports lightheaded sensation began 1 week ago. Patient often feels lightheaded with both sitting and standing. Pt does feel lightheaded at this time. Patient denies any chest pain, pt does feel short of breath at times. Patient also reports acute on chronic neck pain 2/2 pinches nerve. Pain returned 2-3 weeks ago. Patient denies any new injury. Patient did previously take Gabapentin in the past for this pain. Patient took Tylenol a few days ago which did improve pain some but not since. Dizziness  This is a new problem. The current episode started in the past 7 days. The problem occurs constantly. The problem has been gradually worsening. Pertinent negatives include no abdominal pain, arthralgias, chills, coughing, rash, sore throat or vomiting. The symptoms are aggravated by standing and walking. Review of Systems   Review of Systems   Constitutional: Negative for chills. HENT: Negative for ear pain and sore throat. Eyes: Negative for pain and visual disturbance. Respiratory: Negative for cough and shortness of breath. Cardiovascular: Negative for palpitations. Gastrointestinal: Negative for abdominal pain and vomiting. Genitourinary: Negative for dysuria and hematuria. Musculoskeletal: Negative for arthralgias and back pain. Skin: Negative for color change and rash. Neurological: Positive for dizziness and light-headedness. Negative for seizures and syncope. All other systems reviewed and are negative.         Current Medications       Current Outpatient Medications:   •  amLODIPine (NORVASC) 5 mg tablet, Take 1 tablet (5 mg total) by mouth daily, Disp: 90 tablet, Rfl: 3  •  atorvastatin (LIPITOR) 20 mg tablet, Take 1 tablet (20 mg total) by mouth daily, Disp: 90 tablet, Rfl: 1  •  brimonidine tartrate 0.2 % ophthalmic solution, INSTILL 1 DROP INTO EACH EYE TWICE DAILY, Disp: , Rfl:   •  fluticasone (FLONASE) 50 mcg/act nasal spray, 2 sprays into each nostril daily, Disp: 16 g, Rfl: 3  •  hydrochlorothiazide (HYDRODIURIL) 12.5 mg tablet, Take 1 tablet (12.5 mg total) by mouth daily, Disp: 90 tablet, Rfl: 3  •  ketorolac (ACULAR) 0.5 % ophthalmic solution, instill 1 four times a day into both eyes (LEFT EYE 03/11 AND RT EYE 03/24), Disp: , Rfl:   •  losartan (COZAAR) 50 mg tablet, Take 1 tablet (50 mg total) by mouth 2 (two) times a day, Disp: 180 tablet, Rfl: 3  •  metoprolol succinate (TOPROL-XL) 25 mg 24 hr tablet, Take 1 tablet (25 mg total) by mouth daily, Disp: 90 tablet, Rfl: 1  •  ofloxacin (OCUFLOX) 0.3 % ophthalmic solution, instill 1 drop four times a day into both eyes STARTING AFTER SURGERY, Disp: , Rfl:   •  Omega-3 Fatty Acids (Fish Oil) 1200 MG CAPS, Take by mouth 2 (two) times a day, Disp: , Rfl:   •  pantoprazole (PROTONIX) 20 mg tablet, Take 1 tablet (20 mg total) by mouth daily, Disp: 90 tablet, Rfl: 3  •  tamsulosin (FLOMAX) 0.4 mg, Take 1 capsule (0.4 mg total) by mouth daily with dinner, Disp: 90 capsule, Rfl: 3  •  methocarbamol (ROBAXIN) 500 mg tablet, Take 1 tablet (500 mg total) by mouth 3 (three) times a day as needed for muscle spasms (Patient not taking: Reported on 4/27/2023), Disp: 20 tablet, Rfl: 0  •  naproxen (Naprosyn) 500 mg tablet, Take 1 tablet (500 mg total) by mouth 2 (two) times a day with meals (Patient not taking: Reported on 7/18/2023), Disp: 20 tablet, Rfl: 0    Current Allergies     Allergies as of 07/18/2023   • (No Known Allergies)            The following portions of the patient's history were reviewed and updated as appropriate: allergies, current medications, past family history, past medical history, past social history, past surgical history and problem list.     Past Medical History:   Diagnosis Date   • GERD (gastroesophageal reflux disease)    • HL (hearing loss)    • Hypertension        Past Surgical History:   Procedure Laterality Date   • SHOULDER ARTHROSCOPY Right        Family History   Problem Relation Age of Onset   • Heart disease Mother    • Heart attack Mother    • Prostate cancer Father    • Parkinsonism Maternal Grandmother          Medications have been verified. Objective   /72   Pulse 87   Temp 98.4 °F (36.9 °C)   Resp 20   SpO2 95%   No LMP for male patient. Physical Exam     Physical Exam  Constitutional:       Appearance: Normal appearance. He is normal weight. HENT:      Head: Normocephalic and atraumatic. Nose: Nose normal.      Mouth/Throat:      Mouth: Mucous membranes are moist.   Eyes:      Extraocular Movements: Extraocular movements intact. Conjunctiva/sclera: Conjunctivae normal.      Pupils: Pupils are equal, round, and reactive to light. Cardiovascular:      Rate and Rhythm: Normal rate and regular rhythm. Heart sounds: No murmur heard. No friction rub. No gallop. Pulmonary:      Effort: Pulmonary effort is normal.      Breath sounds: No wheezing, rhonchi or rales. Musculoskeletal:         General: Normal range of motion. Cervical back: Normal range of motion and neck supple. Skin:     General: Skin is warm and dry. Neurological:      General: No focal deficit present.       Mental Status: He is alert and oriented to person, place, and time.    Psychiatric:         Mood and Affect: Mood normal.         Behavior: Behavior normal.

## 2023-07-19 ENCOUNTER — TELEPHONE (OUTPATIENT)
Dept: PHYSICAL THERAPY | Facility: OTHER | Age: 68
End: 2023-07-19

## 2023-07-19 LAB — GLUCOSE SERPL-MCNC: 131 MG/DL (ref 65–140)

## 2023-07-19 NOTE — TELEPHONE ENCOUNTER
Nurse reached out to discuss recent referral entered for  [facility].  Male answered the call. This RN identified self, program and reason for the call. Call was disconnected by other party at that time. Nurse confirmed [facility]SP recommendation was listed on the patients AVS.  This is the first attempt to contact the patient. Referral deferred for a f/u attempt per protocol.

## 2023-07-20 LAB
ATRIAL RATE: 79 BPM
P AXIS: 73 DEGREES
PR INTERVAL: 164 MS
QRS AXIS: 72 DEGREES
QRSD INTERVAL: 98 MS
QT INTERVAL: 372 MS
QTC INTERVAL: 426 MS
T WAVE AXIS: 63 DEGREES
VENTRICULAR RATE: 79 BPM

## 2023-07-20 PROCEDURE — 93010 ELECTROCARDIOGRAM REPORT: CPT | Performed by: INTERNAL MEDICINE

## 2023-07-24 NOTE — TELEPHONE ENCOUNTER
Call placed to the patient pr [facility] referral.    V/M left for patient to call back. Phone number and hours of business provided. This is the 2nd attempt to reach  The patient. Will defer referral per protocol.

## 2023-07-31 DIAGNOSIS — K21.9 GASTROESOPHAGEAL REFLUX DISEASE WITHOUT ESOPHAGITIS: ICD-10-CM

## 2023-07-31 RX ORDER — PANTOPRAZOLE SODIUM 20 MG/1
20 TABLET, DELAYED RELEASE ORAL DAILY
Qty: 90 TABLET | Refills: 3 | Status: SHIPPED | OUTPATIENT
Start: 2023-07-31

## 2023-08-03 ENCOUNTER — OFFICE VISIT (OUTPATIENT)
Dept: FAMILY MEDICINE CLINIC | Facility: CLINIC | Age: 68
End: 2023-08-03
Payer: COMMERCIAL

## 2023-08-03 VITALS
BODY MASS INDEX: 44.1 KG/M2 | OXYGEN SATURATION: 96 % | DIASTOLIC BLOOD PRESSURE: 76 MMHG | HEART RATE: 89 BPM | WEIGHT: 315 LBS | TEMPERATURE: 97 F | SYSTOLIC BLOOD PRESSURE: 128 MMHG | HEIGHT: 71 IN

## 2023-08-03 DIAGNOSIS — M54.12 CERVICAL RADICULOPATHY: Primary | ICD-10-CM

## 2023-08-03 DIAGNOSIS — M54.2 NECK PAIN: ICD-10-CM

## 2023-08-03 PROCEDURE — 99213 OFFICE O/P EST LOW 20 MIN: CPT | Performed by: FAMILY MEDICINE

## 2023-08-03 RX ORDER — GABAPENTIN 300 MG/1
300 CAPSULE ORAL 3 TIMES DAILY
Qty: 90 CAPSULE | Refills: 1 | Status: SHIPPED | OUTPATIENT
Start: 2023-08-03

## 2023-08-03 NOTE — PROGRESS NOTES
Name: Iván Martinez      : [birth year]      MRN: [id number]  Encounter Provider: Douglas Manley DO  Encounter Date: 8/3/2023   Encounter department: [address]   1. Cervical radiculopathy  -     MRI cervical spine wo contrast; Future; Expected date: 2023  -     Ambulatory Referral to Pain Management; Future    2. Neck pain  -     gabapentin (Neurontin) 300 mg capsule; Take 1 capsule (300 mg total) by mouth 3 (three) times a day    Needs MRI of neck to assess the reasoning for his bilateral hand numbness and weakness. He will continue the gabapentin and meloxicam.  I referred to pain medicine ASAP. I told him if it gets more severe, he should call or go to the ER. Follow-up in 2 to 3 weeks or as needed       Subjective     Patient here today for follow-up. Patient was seen in the ER about 3 weeks ago. Started with bilateral hand numbness and weakness. The numbness is all day long. He even has decreased  strength. Patient states in the past had neck pain when he was in [place], but never had radiculopathy symptoms. The ER started him on gabapentin and meloxicam.  He states its been helping a little bit. Patient denies any recent trauma. Review of Systems   Constitutional: Negative. Respiratory: Negative. Cardiovascular: Negative. Gastrointestinal: Negative. Genitourinary: Negative. Musculoskeletal: Positive for neck pain. Neurological: Positive for numbness (B/L hands).        Past Medical History:   Diagnosis Date   • GERD (gastroesophageal reflux disease)    • HL (hearing loss)    • Hypertension      Past Surgical History:   Procedure Laterality Date   • SHOULDER ARTHROSCOPY Right      Family History   Problem Relation Age of Onset   • Heart disease Mother    • Heart attack Mother    • Prostate cancer Father    • Parkinsonism Maternal Grandmother      Social History     Socioeconomic History   • Marital status: Single     Spouse name: None   • Number of children: None   • Years of education: None   • Highest education level: None   Occupational History   • None   Tobacco Use   • Smoking status: Never     Passive exposure: Never   • Smokeless tobacco: Never   Vaping Use   • Vaping Use: Never used   Substance and Sexual Activity   • Alcohol use: Yes     Comment: rare   • Drug use: Never   • Sexual activity: Not Currently   Other Topics Concern   • None   Social History Narrative   • None     Social Determinants of Health     Financial Resource Strain: High Risk (8/30/2022)    Overall Financial Resource Strain (CARDIA)    • Difficulty of Paying Living Expenses: Hard   Food Insecurity: Not on file   Transportation Needs: Unmet Transportation Needs (8/30/2022)    PRAPARE - Transportation    • Lack of Transportation (Medical):  Yes    • Lack of Transportation (Non-Medical): Yes   Physical Activity: Not on file   Stress: Not on file   Social Connections: Not on file   Intimate Partner Violence: Not on file   Housing Stability: Not on file     Current Outpatient Medications on File Prior to Visit   Medication Sig   • amLODIPine (NORVASC) 5 mg tablet Take 1 tablet (5 mg total) by mouth daily   • atorvastatin (LIPITOR) 20 mg tablet Take 1 tablet (20 mg total) by mouth daily   • brimonidine tartrate 0.2 % ophthalmic solution INSTILL 1 DROP INTO EACH EYE TWICE DAILY   • fluticasone (FLONASE) 50 mcg/act nasal spray 2 sprays into each nostril daily   • hydrochlorothiazide (HYDRODIURIL) 12.5 mg tablet Take 1 tablet (12.5 mg total) by mouth daily   • losartan (COZAAR) 50 mg tablet Take 1 tablet (50 mg total) by mouth 2 (two) times a day   • meloxicam (Mobic) 15 mg tablet Take 1 tablet (15 mg total) by mouth daily   • methocarbamol (ROBAXIN) 500 mg tablet Take 1 tablet (500 mg total) by mouth 3 (three) times a day as needed for muscle spasms   • metoprolol succinate (TOPROL-XL) 25 mg 24 hr tablet Take 1 tablet (25 mg total) by mouth daily   • Omega-3 Fatty Acids (Fish Oil) 1200 MG CAPS Take by mouth 2 (two) times a day   • pantoprazole (PROTONIX) 20 mg tablet Take 1 tablet (20 mg total) by mouth daily   • tamsulosin (FLOMAX) 0.4 mg Take 1 capsule (0.4 mg total) by mouth daily with dinner   • [DISCONTINUED] gabapentin (Neurontin) 300 mg capsule Take 1 capsule (300 mg total) by mouth 2 (two) times a day For post-herpetic neuralgia: Take 1 tablet on day 1,  Then take 2 tablets on day 2, Then take 3 tablets on day 3 and every day after that as instructed by your doctor. • ketorolac (ACULAR) 0.5 % ophthalmic solution instill 1 four times a day into both eyes (LEFT EYE 03/11 AND RT EYE 03/24) (Patient not taking: Reported on 8/3/2023)   • ofloxacin (OCUFLOX) 0.3 % ophthalmic solution instill 1 drop four times a day into both eyes STARTING AFTER SURGERY (Patient not taking: Reported on 8/3/2023)   • [DISCONTINUED] naproxen (Naprosyn) 500 mg tablet Take 1 tablet (500 mg total) by mouth 2 (two) times a day with meals (Patient not taking: Reported on 7/18/2023)     No Known Allergies  Immunization History   Administered Date(s) Administered   • COVID-19 Pfizer Vac BIVALENT Marshall-sucrose 12 Yr+ IM (BOOSTER ONLY) 09/27/2022   • INFLUENZA 08/11/2022   • Pneumococcal Conjugate Vaccine 20-valent (Pcv20), Polysace 06/30/2022   • Tdap 08/11/2022       Objective     /76   Pulse 89   Temp (!) 97 °F (36.1 °C)   Ht 5' 11" (1.803 m)   Wt (!) 153 kg (338 lb 3.2 oz)   SpO2 96%   BMI 47.17 kg/m²     Physical Exam  Vitals reviewed. Constitutional:       General: He is not in acute distress. Appearance: Normal appearance. He is well-developed. He is not ill-appearing, toxic-appearing or diaphoretic. HENT:      Head: Normocephalic and atraumatic. Eyes:      Conjunctiva/sclera: Conjunctivae normal.   Cardiovascular:      Rate and Rhythm: Normal rate and regular rhythm. Heart sounds: Normal heart sounds. No murmur heard. No friction rub. No gallop.    Pulmonary: Effort: Pulmonary effort is normal. No respiratory distress. Breath sounds: Normal breath sounds. No wheezing, rhonchi or rales. Musculoskeletal:      Right lower leg: No edema. Left lower leg: No edema. Neurological:      General: No focal deficit present. Mental Status: He is alert and oriented to person, place, and time. Psychiatric:         Mood and Affect: Mood normal.         Behavior: Behavior normal.         Thought Content:  Thought content normal.         Judgment: Judgment normal.       Pierre Mak, DO

## 2023-08-10 ENCOUNTER — HOSPITAL ENCOUNTER (OUTPATIENT)
Dept: MRI IMAGING | Facility: HOSPITAL | Age: 68
Discharge: HOME | End: 2023-08-10
Payer: COMMERCIAL

## 2023-08-10 DIAGNOSIS — M54.12 CERVICAL RADICULOPATHY: ICD-10-CM

## 2023-08-10 PROCEDURE — 72141 MRI NECK SPINE W/O DYE: CPT

## 2023-08-10 PROCEDURE — G1004 CDSM NDSC: HCPCS

## 2023-08-18 ENCOUNTER — OFFICE VISIT (OUTPATIENT)
Dept: FAMILY MEDICINE CLINIC | Facility: CLINIC | Age: 68
End: 2023-08-18
Payer: COMMERCIAL

## 2023-08-18 VITALS
BODY MASS INDEX: 44.1 KG/M2 | OXYGEN SATURATION: 98 % | SYSTOLIC BLOOD PRESSURE: 122 MMHG | HEART RATE: 81 BPM | WEIGHT: 315 LBS | DIASTOLIC BLOOD PRESSURE: 70 MMHG | HEIGHT: 71 IN

## 2023-08-18 DIAGNOSIS — M54.12 CERVICAL RADICULOPATHY: Primary | ICD-10-CM

## 2023-08-18 PROBLEM — R01.1 MURMUR, CARDIAC: Status: RESOLVED | Noted: 2022-07-14 | Resolved: 2023-08-18

## 2023-08-18 PROCEDURE — 99213 OFFICE O/P EST LOW 20 MIN: CPT | Performed by: FAMILY MEDICINE

## 2023-08-18 NOTE — PROGRESS NOTES
Name: Amadeo Laughlin      :       MRN: [id number]  Encounter Provider: Pierre Mak DO  Encounter Date: 2023   Encounter department: [address]   I reviewed the MRI with the patient. I will start him in physical therapy. Continue gabapentin. Follow-up with pain management in October as scheduled. Follow-up with cardiology in October. Follow-up here in 3 months or as needed. 1. Cervical radiculopathy  -     Ambulatory Referral to Physical Therapy; Future           Subjective     Patient here today for follow-up on his cervical radiculopathy. Patient still gets numbness, especially in his thumbs bilaterally. It is worse with some positions. Gabapentin helps just a little bit. He occasionally drops things. MRI showed some arthritis and some mild foraminal narrowing at C3-C4 on the left. Review of Systems   Constitutional: Negative. Respiratory: Negative. Cardiovascular: Negative. Gastrointestinal: Negative. Genitourinary: Negative.         Past Medical History:   Diagnosis Date   • GERD (gastroesophageal reflux disease)    • HL (hearing loss)    • Hypertension      Past Surgical History:   Procedure Laterality Date   • SHOULDER ARTHROSCOPY Right      Family History   Problem Relation Age of Onset   • Heart disease Mother    • Heart attack Mother    • Prostate cancer Father    • Parkinsonism Maternal Grandmother      Social History     Socioeconomic History   • Marital status: Single     Spouse name: None   • Number of children: None   • Years of education: None   • Highest education level: None   Occupational History   • None   Tobacco Use   • Smoking status: Never     Passive exposure: Never   • Smokeless tobacco: Never   Vaping Use   • Vaping Use: Never used   Substance and Sexual Activity   • Alcohol use: Yes     Comment: rare   • Drug use: Never   • Sexual activity: Not Currently   Other Topics Concern   • None   Social History Narrative   • None     Social Determinants of Health     Financial Resource Strain: High Risk (8/30/2022)    Overall Financial Resource Strain (CARDIA)    • Difficulty of Paying Living Expenses: Hard   Food Insecurity: Not on file   Transportation Needs: Unmet Transportation Needs (8/30/2022)    PRAPARE - Transportation    • Lack of Transportation (Medical):  Yes    • Lack of Transportation (Non-Medical): Yes   Physical Activity: Not on file   Stress: Not on file   Social Connections: Not on file   Intimate Partner Violence: Not on file   Housing Stability: Not on file     Current Outpatient Medications on File Prior to Visit   Medication Sig   • amLODIPine (NORVASC) 5 mg tablet Take 1 tablet (5 mg total) by mouth daily   • atorvastatin (LIPITOR) 20 mg tablet Take 1 tablet (20 mg total) by mouth daily   • fluticasone (FLONASE) 50 mcg/act nasal spray 2 sprays into each nostril daily   • gabapentin (Neurontin) 300 mg capsule Take 1 capsule (300 mg total) by mouth 3 (three) times a day   • hydrochlorothiazide (HYDRODIURIL) 12.5 mg tablet Take 1 tablet (12.5 mg total) by mouth daily   • losartan (COZAAR) 50 mg tablet Take 1 tablet (50 mg total) by mouth 2 (two) times a day   • methocarbamol (ROBAXIN) 500 mg tablet Take 1 tablet (500 mg total) by mouth 3 (three) times a day as needed for muscle spasms   • metoprolol succinate (TOPROL-XL) 25 mg 24 hr tablet Take 1 tablet (25 mg total) by mouth daily   • Omega-3 Fatty Acids (Fish Oil) 1200 MG CAPS Take by mouth 2 (two) times a day   • pantoprazole (PROTONIX) 20 mg tablet Take 1 tablet (20 mg total) by mouth daily   • tamsulosin (FLOMAX) 0.4 mg Take 1 capsule (0.4 mg total) by mouth daily with dinner   • brimonidine tartrate 0.2 % ophthalmic solution INSTILL 1 DROP INTO EACH EYE TWICE DAILY   • meloxicam (Mobic) 15 mg tablet Take 1 tablet (15 mg total) by mouth daily   • [DISCONTINUED] ketorolac (ACULAR) 0.5 % ophthalmic solution instill 1 four times a day into both eyes (LEFT EYE 03/11 AND RT EYE 03/24) (Patient not taking: Reported on 8/3/2023)   • [DISCONTINUED] ofloxacin (OCUFLOX) 0.3 % ophthalmic solution instill 1 drop four times a day into both eyes STARTING AFTER SURGERY (Patient not taking: Reported on 8/3/2023)     No Known Allergies  Immunization History   Administered Date(s) Administered   • COVID-19 Pfizer Vac BIVALENT Marshall-sucrose 12 Yr+ IM (BOOSTER ONLY) 09/27/2022   • INFLUENZA 08/11/2022   • Pneumococcal Conjugate Vaccine 20-valent (Pcv20), Polysace 06/30/2022   • Tdap 08/11/2022       Objective     /70   Pulse 81   Ht 5' 11" (1.803 m)   Wt (!) 155 kg (341 lb 6.4 oz)   SpO2 98%   BMI 47.62 kg/m²     Physical Exam  Vitals reviewed. Constitutional:       General: He is not in acute distress. Appearance: Normal appearance. He is well-developed. He is not ill-appearing, toxic-appearing or diaphoretic. HENT:      Head: Normocephalic and atraumatic. Eyes:      Conjunctiva/sclera: Conjunctivae normal.   Cardiovascular:      Rate and Rhythm: Normal rate and regular rhythm. Heart sounds: Normal heart sounds. No murmur heard. No friction rub. No gallop. Pulmonary:      Effort: Pulmonary effort is normal. No respiratory distress. Breath sounds: Normal breath sounds. No wheezing, rhonchi or rales. Musculoskeletal:      Right lower leg: No edema. Left lower leg: No edema. Neurological:      General: No focal deficit present. Mental Status: He is alert and oriented to person, place, and time. Psychiatric:         Mood and Affect: Mood normal.         Behavior: Behavior normal.         Thought Content:  Thought content normal.         Judgment: Judgment normal.       Seabron Mass, DO

## 2023-08-25 DIAGNOSIS — I10 ESSENTIAL HYPERTENSION: ICD-10-CM

## 2023-08-25 RX ORDER — ATORVASTATIN CALCIUM 20 MG/1
20 TABLET, FILM COATED ORAL DAILY
Qty: 90 TABLET | Refills: 1 | Status: SHIPPED | OUTPATIENT
Start: 2023-08-25

## 2023-09-06 ENCOUNTER — EVALUATION (OUTPATIENT)
Dept: PHYSICAL THERAPY | Facility: CLINIC | Age: 68
End: 2023-09-06
Payer: COMMERCIAL

## 2023-09-06 DIAGNOSIS — M54.12 CERVICAL RADICULOPATHY: Primary | ICD-10-CM

## 2023-09-06 PROCEDURE — 97161 PT EVAL LOW COMPLEX 20 MIN: CPT | Performed by: PHYSICAL THERAPIST

## 2023-09-06 PROCEDURE — 97112 NEUROMUSCULAR REEDUCATION: CPT | Performed by: PHYSICAL THERAPIST

## 2023-09-06 PROCEDURE — 97110 THERAPEUTIC EXERCISES: CPT | Performed by: PHYSICAL THERAPIST

## 2023-09-06 PROCEDURE — 97535 SELF CARE MNGMENT TRAINING: CPT | Performed by: PHYSICAL THERAPIST

## 2023-09-06 NOTE — PROGRESS NOTES
PT Evaluation     Today's date: 2023  Patient name: Chaz Bear  : [birth year]  MRN: [id number]  Referring provider: Mary Vaz DO  Dx:   Encounter Diagnosis     ICD-10-CM    1. Cervical radiculopathy  M54.12 Ambulatory Referral to Physical Therapy                     Assessment  Understanding of Dx/Px/POC: good   Prognosis: good    Goals  STGs: To be complete within 4 weeks  - Decrease/centralize pain to < 2/10 at worst  - Increase cervical ROM to WNL  - postural and cervical strength to > 4+/5  - Improve postural awareness capacity to > 60min before deficit    LTGs: To be complete within 6 weeks  - Able to sit for any extended amount of time without pain or limitation for increased safety and functional capacity with ADLs and home-related duty  - Able to read in a cervical flexed position for any extended amount of time without pain or limitation for increased safety and functional capacity with ADLs and and home-related duty  - Able to complete all lifting/carrying activity without pain or limitation for increased safety and functional capacity with ADLs and home-related duty  - Able to complete transfers repetitively without pain or limitation for increased safety and functional capacity with ADLs and home-related duty    Plan  Planned therapy interventions: manual therapy, neuromuscular re-education, patient education, self care, therapeutic activities, therapeutic exercise, home exercise program and postural training  Frequency: 2x week  Duration in weeks: 6       Pt is a 80 y.o. male with chronic cervical pain and more recent onset of bilateral UE radicular sx into fingers who presents with functional deficits including decreased capacity with sitting, lifting/carrying, transfers, and reading.  Upon completion of today's initial evaluation, Ean's sx remain consistent with posterior cervical derangement and facet joint dysfunction secondary to postural dysfunction and improper functional movement mechanics. Pt will benefit from skilled physical therapy to address current deficits. Subjective Evaluation    Patient Goals  Patient goals for therapy: increased strength, decreased pain and increased motion    Pain  Current pain ratin  At best pain ratin  At worst pain ratin  Location: Cervical         Pt reports he has been dealing with chronic cervical pain for nearly 4 years. Reports his sx have continued to worsen and now more recently radiating into bilateral UEs down to fingers. Pt reports sx have continued to negatively effect his overall safety and functional capacity with ADLs and home-related duty.         Objective Pain level ranges 2-8/10  AROM: Cervical extension 50% decreased; Cervical extension 50% decreased  Strength: Cervical and postural strength 3+/5; Bilateral UE strength 5/5  Postural awareness: Poor (rounded shoulders, forward ead)  Repeated movement testing: (+) for posterior Cervical derangement and facet joint dysfunction  FOTO: 52; GOAL: 62  Unable sit without pain and limitation  Unable to complete end range neck movements without pain and limitation  Unable to complete lifting/carrying activity without pain and limitation  Unable to look downward to read without pain and limitation  Unable to sit at a computer reading for > 15min             Precautions: None at this time    Daily Treatment Diary    HEP: Handout provided and discussed      Manuals 23      ART       PROM/Stretch       IASTM       STM/Triggerpoint       JM              Neuro Re-Ed       CS Retraction 2x10      UT Stretching 4x20''      Levator Stretch 4x20''      Pec doorway stretch       AROM No $       No $ into [employer]                     Ther Ex       CS Ext with towel 2x10             Scap Retract  This session     Scap Depression  This session     TB No $  This session     TB Row  This session     T-Band LTP                     Ther Activity       UBE: Retro              Gait Training                     Modalities       MHP  Start with this session seated     CP [id number]      US/Stim

## 2023-09-12 ENCOUNTER — OFFICE VISIT (OUTPATIENT)
Dept: PHYSICAL THERAPY | Facility: CLINIC | Age: 68
End: 2023-09-12
Payer: COMMERCIAL

## 2023-09-12 DIAGNOSIS — M54.12 CERVICAL RADICULOPATHY: Primary | ICD-10-CM

## 2023-09-12 PROCEDURE — 97110 THERAPEUTIC EXERCISES: CPT

## 2023-09-12 PROCEDURE — 97112 NEUROMUSCULAR REEDUCATION: CPT

## 2023-09-12 NOTE — PROGRESS NOTES
Daily Note     Today's date: 2023  Patient name: Rosailna Brown  : 19  MRN: [id number]  Referring provider: Audrey Ponce DO  Dx:   Encounter Diagnosis     ICD-10-CM    1. Cervical radiculopathy  M54.12                      Subjective: Pt reports eager to cont with exercises to improve overall sx. Objective: See treatment diary below      Assessment: Tolerated treatment well. Patient exhibited good technique with therapeutic exercises. Pt mary program well with min c/o pain. Pt requires cueing to decreased shoulder hiking with exercises. Pt doing well overall. Plan: Continue per plan of care.       Precautions: None at this time    Daily Treatment Diary    HEP: Handout provided and discussed      Manuals 23     ART       PROM/Stretch       IASTM       STM/Triggerpoint       JM              Neuro Re-Ed       CS Retraction 2x10 2/10     UT Stretching 4x20'' 4/20"     Levator Stretch 4x20'' 4/20"      Pec doorway stretch       AROM No $  2/10     No $ into [employer]                     Ther Ex       CS Ext with towel 2x10 2/10            Scap Retract  2/10     Scap Depression  2/10     TB No $  L2 2/10     TB Row  L3 2/10     T-Band LTP                     Ther Activity       UBE: Retro              Gait Training                     Modalities       MHP  Seated 10' pre     CP [id number]' Seated 10' post     US/Stim

## 2023-09-18 DIAGNOSIS — R09.81 SINUS CONGESTION: ICD-10-CM

## 2023-09-18 RX ORDER — FLUTICASONE PROPIONATE 50 MCG
2 SPRAY, SUSPENSION (ML) NASAL DAILY
Qty: 16 G | Refills: 3 | Status: SHIPPED | OUTPATIENT
Start: 2023-09-18

## 2023-09-19 ENCOUNTER — OFFICE VISIT (OUTPATIENT)
Dept: PHYSICAL THERAPY | Facility: CLINIC | Age: 68
End: 2023-09-19
Payer: COMMERCIAL

## 2023-09-19 DIAGNOSIS — M54.12 CERVICAL RADICULOPATHY: Primary | ICD-10-CM

## 2023-09-19 PROCEDURE — 97112 NEUROMUSCULAR REEDUCATION: CPT

## 2023-09-19 PROCEDURE — 97110 THERAPEUTIC EXERCISES: CPT

## 2023-09-19 NOTE — PROGRESS NOTES
Daily Note     Today's date: 2023  Patient name: Adeline Aragon  :   MRN: [id number]  Referring provider: Bell Lange DO  Dx:   Encounter Diagnosis     ICD-10-CM    1. Cervical radiculopathy  M54.12                      Subjective: Pt reports he is doing well. Pleased with HEP and increased reps. Feels improved sx. Objective: See treatment diary below      Assessment: Tolerated treatment well. Patient demonstrated fatigue post treatment and exhibited good technique with therapeutic exercises. Pt ed on cont HEP and given Tband. Pt mary well with min c/o sx. Plan: Continue per plan of care.       Precautions: None at this time    Daily Treatment Diary    HEP: Handout provided and discussed      Manuals 23    ART       PROM/Stretch       IASTM       STM/Triggerpoint       JM              Neuro Re-Ed       CS Retraction 2x10 2/10 2/10    UT Stretching 4x20'' 4/20" 4/20"     Levator Stretch 4x20'' 4/20"  20"     Pec doorway stretch       AROM No $  2/10 2/10    No $ into [employer]                     Ther Ex       CS Ext with towel 2x10 2/10 2/10           Scap Retract  2/10 2/10    Scap Depression  2/10 2/10    TB No $  L2 2/10 L2 2/10    TB Row  L3 2/10 L3 2/10    T-Band LTP   L2 2/10                  Ther Activity       UBE: Retro              Gait Training                     Modalities       MHP  Seated 10' pre seated 10' pre    CP [id number]' Seated 10' post     US/Stim

## 2023-09-26 ENCOUNTER — OFFICE VISIT (OUTPATIENT)
Dept: PHYSICAL THERAPY | Facility: CLINIC | Age: 68
End: 2023-09-26
Payer: COMMERCIAL

## 2023-09-26 DIAGNOSIS — M54.12 CERVICAL RADICULOPATHY: Primary | ICD-10-CM

## 2023-09-26 PROCEDURE — 97112 NEUROMUSCULAR REEDUCATION: CPT

## 2023-09-26 PROCEDURE — 97110 THERAPEUTIC EXERCISES: CPT

## 2023-09-26 NOTE — PROGRESS NOTES
Daily Note     Today's date: 2023  Patient name: Ashley Langford  :   MRN: [id number]  Referring provider: Rogelio Magdaleno DO  Dx:   Encounter Diagnosis     ICD-10-CM    1. Cervical radiculopathy  M54.12           Start Time: 1019          Subjective: Patient states that he has improved sensation in his hands 2* to PT. He states that ADLS are slightly improved. At end of session patient mentioned pain 2* to lordosis during standing. Objective: See treatment diary below. Pull downs progressed to GTB today with good tolerance. Assessment: Session initiated in seated with MHP to c-s. Good performance of program with mild cueing for chin tuck during b/l ER with resistance. Patient educated on the connection between L-S neutral, T-S neutral and C-S neutral. Patient cued for core activation and pelvic stability to improve comfort of standing exercise. Tolerated treatment well. Patient demonstrated fatigue post treatment and would benefit from continued PT      Plan: Continue per plan of care.       Precautions: None at this time    Daily Treatment Diary    HEP: Handout provided and discussed      Manuals 23    ART        PROM/Stretch        IASTM        STM/Triggerpoint        JM                Neuro Re-Ed        CS Retraction 2x10 2/10 2/10 2/10    UT Stretching 4x20'' 420" 20"  420"     Levator Stretch 4x20'' 4/20"  20"  "    Pec doorway stretch        AROM No $  2/10 2/10 2/10    No $ into [employer]    NV                    Ther Ex        CS Ext with towel 2x10 2/10 2/10 2/10            Scap Retract  10 10 2/10    Scap Depression  10 2/10 2/10    TB No $  L2 2/10 L2 2/10 L2 2/10    TB Row  L3 2/10 L3 2/10 L3 2/10    T-Band LTP   L2 2/10 L3 2/10                    Ther Activity        UBE: Retro                Gait Training                        Modalities        MHP  Seated 10' pre seated 10' pre seated 10' pre    CP [id number]' Seated 10' post US/Stim

## 2023-10-02 ENCOUNTER — RX ONLY (RX ONLY)
Age: 68
End: 2023-10-02

## 2023-10-02 ENCOUNTER — DOCTOR'S OFFICE (OUTPATIENT)
Dept: URBAN - NONMETROPOLITAN AREA CLINIC 1 | Facility: CLINIC | Age: 68
Setting detail: OPHTHALMOLOGY
End: 2023-10-02
Payer: COMMERCIAL

## 2023-10-02 DIAGNOSIS — H26.493: ICD-10-CM

## 2023-10-02 DIAGNOSIS — H43.813: ICD-10-CM

## 2023-10-02 DIAGNOSIS — H40.023: ICD-10-CM

## 2023-10-02 DIAGNOSIS — H35.3132: ICD-10-CM

## 2023-10-02 PROCEDURE — 99214 OFFICE O/P EST MOD 30 MIN: CPT | Performed by: OPHTHALMOLOGY

## 2023-10-02 PROCEDURE — 92133 CPTRZD OPH DX IMG PST SGM ON: CPT | Performed by: OPHTHALMOLOGY

## 2023-10-02 PROCEDURE — 76514 ECHO EXAM OF EYE THICKNESS: CPT | Performed by: OPHTHALMOLOGY

## 2023-10-02 ASSESSMENT — TONOMETRY
OS_IOP_MMHG: 12
OD_IOP_MMHG: 10

## 2023-10-02 ASSESSMENT — CONFRONTATIONAL VISUAL FIELD TEST (CVF)
OS_FINDINGS: FULL
OD_FINDINGS: FULL

## 2023-10-02 ASSESSMENT — PACHYMETRY
OS_CT_CORRECTION: 0
OD_CT_CORRECTION: 0
OD_CT_UM: 541
OS_CT_UM: 541

## 2023-10-03 ENCOUNTER — OFFICE VISIT (OUTPATIENT)
Dept: PHYSICAL THERAPY | Facility: CLINIC | Age: 68
End: 2023-10-03
Payer: COMMERCIAL

## 2023-10-03 DIAGNOSIS — M54.12 CERVICAL RADICULOPATHY: Primary | ICD-10-CM

## 2023-10-03 PROBLEM — H26.493 POSTERIOR CAPSULAR OPACIFICATION: Status: ACTIVE | Noted: 2023-10-02

## 2023-10-03 PROCEDURE — 97112 NEUROMUSCULAR REEDUCATION: CPT

## 2023-10-03 PROCEDURE — 97110 THERAPEUTIC EXERCISES: CPT

## 2023-10-03 ASSESSMENT — REFRACTION_MANIFEST
OD_VA2: 20/30-2
OS_ADD: +2.50
OD_VA1: 20/30-2
OD_SPHERE: +0.75
OS_SPHERE: +1.00
OD_ADD: +2.50
OS_VA1: 20/40
OS_AXIS: 105
OS_VA2: 20/40
OD_CYLINDER: -1.25
OD_AXIS: 090
OS_CYLINDER: -1.75

## 2023-10-03 ASSESSMENT — KERATOMETRY
OD_AXISANGLE_DEGREES: 029
OD_K1POWER_DIOPTERS: 43.25
OS_K1POWER_DIOPTERS: 41.25
OS_AXISANGLE_DEGREES: 035
OS_K2POWER_DIOPTERS: 44.50
OD_K2POWER_DIOPTERS: 44.50

## 2023-10-03 ASSESSMENT — REFRACTION_CURRENTRX
OD_ADD: +3.00
OS_AXIS: 101
OS_SPHERE: -8.00
OD_SPHERE: -8.00
OD_AXIS: 089
OS_OVR_VA: 20/
OD_CYLINDER: -2.25
OD_OVR_VA: 20/
OS_ADD: +3.00
OS_CYLINDER: -2.50

## 2023-10-03 ASSESSMENT — AXIALLENGTH_DERIVED
OS_AL: 23.7742
OS_AL: 23.7742
OD_AL: 23.4069
OD_AL: 23.3591

## 2023-10-03 ASSESSMENT — REFRACTION_AUTOREFRACTION
OD_AXIS: 089
OS_CYLINDER: -1.75
OS_AXIS: 109
OD_SPHERE: +0.75
OD_CYLINDER: -1.00
OS_SPHERE: +1.00

## 2023-10-03 ASSESSMENT — SPHEQUIV_DERIVED
OD_SPHEQUIV: 0.125
OS_SPHEQUIV: 0.125
OD_SPHEQUIV: 0.25
OS_SPHEQUIV: 0.125

## 2023-10-03 ASSESSMENT — VISUAL ACUITY
OD_BCVA: 20/40-2
OS_BCVA: 20/40-2

## 2023-10-03 NOTE — PROGRESS NOTES
Daily Note     Today's date: 10/3/2023  Patient name: May Forman  :   MRN: [id number]  Referring provider: Unknown LatinDO  Dx:   Encounter Diagnosis     ICD-10-CM    1. Cervical radiculopathy  M54.12                      Subjective: Pt reports having improved N/T in both hands. Reports having MRI and will see MD.       Objective: See treatment diary below      Assessment: Tolerated treatment well. Patient demonstrated fatigue post treatment and exhibited good technique with therapeutic exercises. Pt cont to require cueing to address shoulder hiking with scapular strength program. Pt with improved sx regarding radicular sx. Plan: Continue per plan of care.       Precautions: None at this time    Daily Treatment Diary    HEP: Handout provided and discussed      Manuals 9/6/23 9/12 9/19 9/26 10/3   ART        PROM/Stretch        IASTM        STM/Triggerpoint        JM                Neuro Re-Ed        CS Retraction 2x10 2/10 2/10 2/10 2/10   UT Stretching 4x20'' " "  "  "    Levator Stretch 4x20'' 4/20"  20"  20" 20"   Pec doorway stretch        AROM No $  2/10 2/10 2/10 2/10   No $ into [employer]    NV 2/10                   Ther Ex        CS Ext with towel 2x10 2/10 2/10 2/10 2/10           Scap Retract  2/10 2/10 2/10 2/10   Scap Depression  2/10 2/10 2/10 2/10   TB No $  L2 2/10 L2 2/10 L2 2/10 L2 2/10   TB Row  L3 2/10 L3 2/10 L3 2/10 L3 2/10   T-Band LTP   L2 2/10 L3 2/10 L3 2/10                   Ther Activity        UBE: Retro                Gait Training                        Modalities        MHP  Seated 10' pre seated 10' pre seated 10' pre seated 10'   CP [id number]' Seated 10' post      US/Stim

## 2023-10-06 ENCOUNTER — CONSULT (OUTPATIENT)
Dept: PAIN MEDICINE | Facility: CLINIC | Age: 68
End: 2023-10-06
Payer: COMMERCIAL

## 2023-10-06 VITALS
RESPIRATION RATE: 18 BRPM | HEIGHT: 71 IN | WEIGHT: 315 LBS | BODY MASS INDEX: 44.1 KG/M2 | HEART RATE: 80 BPM | SYSTOLIC BLOOD PRESSURE: 147 MMHG | DIASTOLIC BLOOD PRESSURE: 75 MMHG

## 2023-10-06 DIAGNOSIS — M54.12 CERVICAL RADICULOPATHY: ICD-10-CM

## 2023-10-06 DIAGNOSIS — G56.03 BILATERAL CARPAL TUNNEL SYNDROME: ICD-10-CM

## 2023-10-06 DIAGNOSIS — M47.812 CERVICAL SPONDYLOSIS: Primary | ICD-10-CM

## 2023-10-06 PROCEDURE — 99204 OFFICE O/P NEW MOD 45 MIN: CPT | Performed by: ANESTHESIOLOGY

## 2023-10-06 NOTE — PATIENT INSTRUCTIONS
Carpal Tunnel Syndrome   AMBULATORY CARE:   Carpal tunnel syndrome (CTS)  is a condition that causes pressure to build in the carpal tunnel. The carpal tunnel is a small area between bones and tissues in your wrist. Swelling in this area puts pressure on the median nerve. The median nerve controls muscles and feeling in the hand. Common signs and symptoms:   Dull, sharp, or shooting pain in your hand    Numb, tingling, or burning feeling in your thumb and first, middle, and ring fingers    Arm pain or tingling that may move up your arm toward your shoulder    Weakness in your hand    Swelling in your hand    Not being able to control how your hand moves, or not being able to use your fingers easily    Seek care immediately if:   You suddenly lose feeling in your hand or fingers and you cannot move them. Your hand suddenly changes color. Call your doctor if:   Your symptoms get worse. Your hand and fingers are so weak that you cannot grab, squeeze, or lift items. You have questions or concerns about your condition or care. Treatment  may not be needed. If your symptoms continue or are severe, you may need any of the following:  NSAIDs  help decrease swelling and pain or fever. This medicine is available with or without a doctor's order. NSAIDs can cause stomach bleeding or kidney problems in certain people. If you take blood thinner medicine, always ask your healthcare provider if NSAIDs are safe for you. Always read the medicine label and follow directions. Steroid injections  may help decrease pain and swelling. Steroid medicine is injected into the carpal tunnel. Transcutaneous electric nerve stimulation (TENS)  uses mild electrical impulses to help decrease your wrist pain.     Surgery  called decompression may be used to take pressure off of the median nerve in your wrist.    Manage your symptoms:   Apply ice to your wrist.  Ice helps decrease swelling and pain in your wrist. Ice may also help prevent tissue damage. Use an ice pack, or put crushed ice in a plastic bag. Cover the ice pack with a towel. Place it on your wrist for 15 to 20 minutes every hour, or as directed. Rest your hands. Let your hands rest for a short time between repetitive motions, such as typing. If you feel pain, stop what you are doing and gently massage your wrist and hand. Go to physical and occupational therapy, if directed. Physical therapists will show you ways to exercise and strengthen your wrist. Occupational therapists will show you safe ways to use your wrist while you do your usual activities. Use a wrist splint as directed. A splint will keep your wrist straight or in a slightly bent position. A wrist splint decreases pressure on the median nerve by letting your wrist rest. You may need to wear the splint for up to 8 weeks. You may need to wear it at night. Follow up with your doctor as directed:  Write down your questions so you remember to ask them during your visits. © Copyright [employer] 2023 Information is for End User's use only and may not be sold, redistributed or otherwise used for commercial purposes. The above information is an  only. It is not intended as medical advice for individual conditions or treatments. Talk to your doctor, nurse or pharmacist before following any medical regimen to see if it is safe and effective for you.

## 2023-10-06 NOTE — PROGRESS NOTES
Assessment:  1. Cervical spondylosis    2. Cervical radiculopathy    3. Bilateral carpal tunnel syndrome        Plan:  Patient is a 62-year-old male complains of neck pain and bilateral arm pain with chronic patient secondary to cervical radiculopathy presents to office for initial consultation. MRI cervical spine shows C3-4 mild left foraminal mild left foraminal narrowing at C3-C4. Pt presentation does not match with cervical radiculopathy however from physical exam pts shows signs of carpal tunnels syndrome. We will need diagnostic information to determine severity of carpal tunnel syndrome. 1.  We will order an EMG of bilateral upper extremities to rule out carpal tunnel syndrome and cervical radiculopathy  2. We will provide pt with referral to Ortho for carpal tunnels syndrome      History of Present Illness: The patient is a 80 y.o. male who presents for consultation in regards to Neck Pain and Hand Pain (BILATERAL). Symptoms have been present for 4 years. Symptoms began without any precipitating injury or trauma. Pain is reported to be 9 on the numeric rating scale. Symptoms are felt constantly and worst in the no typical pattern. Symptoms are characterized as burning, numbing and tingling. Symptoms are associated with bilateral arm weakness. Aggravating factors include lying down and turning the head. Relieving factors include exercise and relaxation. No change in symptoms with kneeling, standing, bending, leaning forward, leaning bckward, sitting, walking, coughing/sneezing and bowel movements. Treatments that have been helpful include physical therapy and home exercise. Medications to relieve symptoms include Mobic which provides relief and gabapentin which does not provide relief. .    Review of Systems:    Review of Systems   Musculoskeletal: Positive for neck pain. All other systems reviewed and are negative.           Past Medical History:   Diagnosis Date   • GERD (gastroesophageal reflux disease)    • HL (hearing loss)    • Hypertension        Past Surgical History:   Procedure Laterality Date   • SHOULDER ARTHROSCOPY Right        Family History   Problem Relation Age of Onset   • Heart disease Mother    • Heart attack Mother    • Prostate cancer Father    • Parkinsonism Maternal Grandmother        Social History     Occupational History   • Not on file   Tobacco Use   • Smoking status: Never     Passive exposure: Never   • Smokeless tobacco: Never   Vaping Use   • Vaping Use: Never used   Substance and Sexual Activity   • Alcohol use: Yes     Comment: rare   • Drug use: Never   • Sexual activity: Not Currently         Current Outpatient Medications:   •  amLODIPine (NORVASC) 5 mg tablet, Take 1 tablet (5 mg total) by mouth daily, Disp: 90 tablet, Rfl: 3  •  atorvastatin (LIPITOR) 20 mg tablet, Take 1 tablet (20 mg total) by mouth daily, Disp: 90 tablet, Rfl: 1  •  brimonidine tartrate 0.2 % ophthalmic solution, INSTILL 1 DROP INTO EACH EYE TWICE DAILY, Disp: , Rfl:   •  fluticasone (FLONASE) 50 mcg/act nasal spray, 2 sprays into each nostril daily, Disp: 16 g, Rfl: 3  •  gabapentin (Neurontin) 300 mg capsule, Take 1 capsule (300 mg total) by mouth 3 (three) times a day, Disp: 90 capsule, Rfl: 1  •  hydrochlorothiazide (HYDRODIURIL) 12.5 mg tablet, Take 1 tablet (12.5 mg total) by mouth daily, Disp: 90 tablet, Rfl: 3  •  losartan (COZAAR) 50 mg tablet, Take 1 tablet (50 mg total) by mouth 2 (two) times a day, Disp: 180 tablet, Rfl: 3  •  meloxicam (Mobic) 15 mg tablet, Take 1 tablet (15 mg total) by mouth daily, Disp: 30 tablet, Rfl: 0  •  methocarbamol (ROBAXIN) 500 mg tablet, Take 1 tablet (500 mg total) by mouth 3 (three) times a day as needed for muscle spasms, Disp: 20 tablet, Rfl: 0  •  metoprolol succinate (TOPROL-XL) 25 mg 24 hr tablet, Take 1 tablet (25 mg total) by mouth daily, Disp: 90 tablet, Rfl: 1  •  Omega-3 Fatty Acids (Fish Oil) 1200 MG CAPS, Take by mouth 2 (two) times a day, Disp: , Rfl:   •  pantoprazole (PROTONIX) 20 mg tablet, Take 1 tablet (20 mg total) by mouth daily, Disp: 90 tablet, Rfl: 3  •  tamsulosin (FLOMAX) 0.4 mg, Take 1 capsule (0.4 mg total) by mouth daily with dinner, Disp: 90 capsule, Rfl: 3    No Known Allergies    Physical Exam:    /75   Pulse 80   Resp 18   Ht 5' 11" (1.803 m)   Wt (!) 153 kg (338 lb 6.4 oz)   BMI 47.20 kg/m²     Constitutional: normal, well developed, well nourished, alert, in no distress and non-toxic and no overt pain behavior. and obese  Eyes: anicteric  HEENT: grossly intact  Neck: supple, symmetric, trachea midline and no masses   Pulmonary:even and unlabored  Cardiovascular:No edema or pitting edema present  Skin:Normal without rashes or lesions and well hydrated  Psychiatric:Mood and affect appropriate  Neurologic:Cranial Nerves II-XII grossly intact  Musculoskeletal:normal     Cervical Spine examination demonstrates. Decreased ROM secondary to pain with lateral rotation to the left/right and bending to the left/right, in addition to neck flexion. 5/5 upper extremity strength in all muscle groups bilaterally; 4 out of 5  strength bilaterally. Negative Spurling's maneuver to the b/l Ue, sensitivity to light touch intact b/l Ue. Imaging    Study Result    Narrative & Impression   MRI CERVICAL SPINE WITHOUT CONTRAST     INDICATION: M54.12: Radiculopathy, cervical region. Bilateral arm and hand numbness.  weakness.     COMPARISON:  None.     TECHNIQUE:  Multiplanar, multisequence imaging of the cervical spine was performed. .        IMAGE QUALITY:  Diagnostic     FINDINGS:     ALIGNMENT: Slight reversal of the cervical lordosis apex C3.     MARROW SIGNAL:  Normal marrow signal is identified within the visualized bony structures.   No discrete marrow lesion.     CERVICAL AND VISUALIZED THORACIC CORD:  Normal signal within the visualized cord.     PREVERTEBRAL AND PARASPINAL SOFT TISSUES:  Normal.     VISUALIZED POSTERIOR FOSSA:  The visualized posterior fossa demonstrates no abnormal signal.     CERVICAL DISC SPACES:     C2-C3: No significant central or foraminal narrowing.     C3-C4: Marginal osteophyte on the left contributes to mild left foraminal narrowing.     C4-C5: No central or foraminal narrowing.     C5-C6: No central or foraminal narrowing.     C6-C7:  Normal.     C7-T1:  Normal.     UPPER THORACIC DISC SPACES:  Normal.     OTHER FINDINGS:  None.     IMPRESSION:     Degenerative changes are seen particularly at C3-4 on the left where marginal osteophyte contributes to left foraminal narrowing.                      No orders to display       No orders of the defined types were placed in this encounter.

## 2023-10-10 ENCOUNTER — OFFICE VISIT (OUTPATIENT)
Dept: PHYSICAL THERAPY | Facility: CLINIC | Age: 68
End: 2023-10-10
Payer: COMMERCIAL

## 2023-10-10 DIAGNOSIS — M54.12 CERVICAL RADICULOPATHY: Primary | ICD-10-CM

## 2023-10-10 PROCEDURE — 97110 THERAPEUTIC EXERCISES: CPT

## 2023-10-10 PROCEDURE — 97112 NEUROMUSCULAR REEDUCATION: CPT

## 2023-10-10 NOTE — PROGRESS NOTES
PT Discharge    Today's date: 10/10/2023  Patient name: Rosario Mo  : [id number]  MRN: [id number]  Referring provider: Gifty Lacy DO  Dx:   Encounter Diagnosis     ICD-10-CM    1. Cervical radiculopathy  M54.12           Start Time:   Stop Time: 1140  Total time in clinic (min): 55 minutes    Assessment  Understanding of Dx/Px/POC: good   Prognosis: good    Goals  STGs: To be complete within 4 weeks  - Decrease/centralize pain to < 2/10 at worst  - Increase cervical ROM to WNL  - postural and cervical strength to > 4+/5  - Improve postural awareness capacity to > 60min before deficit    LTGs: To be complete within 6 weeks  - Able to sit for any extended amount of time without pain or limitation for increased safety and functional capacity with ADLs and home-related duty  - Able to read in a cervical flexed position for any extended amount of time without pain or limitation for increased safety and functional capacity with ADLs and and home-related duty  - Able to complete all lifting/carrying activity without pain or limitation for increased safety and functional capacity with ADLs and home-related duty  - Able to complete transfers repetitively without pain or limitation for increased safety and functional capacity with ADLs and home-related duty    Plan  Planned therapy interventions: manual therapy, neuromuscular re-education, patient education, self care, therapeutic activities, therapeutic exercise, home exercise program and postural training  Frequency: 2x week  Duration in weeks: 6       Pt is a 80 y.o. male with chronic cervical pain and more recent onset of bilateral UE radicular sx into fingers who presents with functional deficits including decreased capacity with sitting, lifting/carrying, transfers, and reading.  Upon completion of today's initial evaluation, Ean's sx remain consistent with posterior cervical derangement and facet joint dysfunction secondary to postural dysfunction and improper functional movement mechanics. Pt will benefit from skilled physical therapy to address current deficits. Subjective Evaluation    Patient Goals  Patient goals for therapy: increased strength, decreased pain and increased motion    Pain  Current pain ratin  At best pain ratin  At worst pain ratin  Location: Cervical         Pt reports he has been dealing with chronic cervical pain for nearly 4 years. Reports his sx have continued to worsen and now more recently radiating into bilateral UEs down to fingers. Pt reports sx have continued to negatively effect his overall safety and functional capacity with ADLs and home-related duty.         Objective Pain level ranges 2-8/10  AROM: Cervical extension 50% decreased; Cervical extension 50% decreased  Strength: Cervical and postural strength 3+/5; Bilateral UE strength 5/5  Postural awareness: Poor (rounded shoulders, forward ead)  Repeated movement testing: (+) for posterior Cervical derangement and facet joint dysfunction  FOTO: 52; GOAL: 62  Unable sit without pain and limitation  Unable to complete end range neck movements without pain and limitation  Unable to complete lifting/carrying activity without pain and limitation  Unable to look downward to read without pain and limitation  Unable to sit at a computer reading for > 15min             Precautions: None at this time    Daily Treatment Diary    HEP: Handout provided and discussed      Manuals 23      ART       PROM/Stretch       IASTM       STM/Triggerpoint       JM              Neuro Re-Ed       CS Retraction 2x10      UT Stretching 4x20''      Levator Stretch 4x20''      Pec doorway stretch       AROM No $       No $ into [employer]                     Ther Ex       CS Ext with towel 2x10             Scap Retract  This session     Scap Depression  This session     TB No $  This session     TB Row  This session     T-Band LTP                     Ther Activity       UBE: Retro Gait Training                     Modalities       MHP  Start with this session seated     CP [id number]'      US/Stim

## 2023-10-10 NOTE — PROGRESS NOTES
Daily Note     Today's date: 10/10/2023  Patient name: Sammie Box  :   MRN: [id number]  Referring provider: José Miguel Rainey DO  Dx:   Encounter Diagnosis     ICD-10-CM    1. Cervical radiculopathy  M54.12                      Subjective: Pt reports seeing MD and will get further evaluation of carpal tunnel sx. Pt reports CS is overall doing very well and pleased with progress. Pt will DC today to HEP with CS. Objective: See treatment diary below      Assessment: Tolerated treatment well. Patient exhibited good technique with therapeutic exercises. Pt has made cont progress with CS as he cont to improve with strength and mobility. Pt will DC today to HEP. Plan: DC to HEP.       Precautions: None at this time    Daily Treatment Diary    HEP: Handout provided and discussed      Manuals 10/10 9/12 9/19 9/26 10/3   ART        PROM/Stretch        IASTM        STM/Triggerpoint        JM                Neuro Re-Ed        CS Retraction 2x10 2/10 2/10 2/10 2/10   UT Stretching 4x20'' 20" "  "  "    Levator Stretch 4x20'' 420"  20"  20" 20"   Pec doorway stretch        AROM No $ 2/10  2/10 2/10 2/10 2/10   No $ into [employer] /10   NV 2/10                   Ther Ex        CS Ext with towel 2x10 2/10 2/10 2/10 2/10           Scap Retract /10 2/10 2/10 2/10 2/10   Scap Depression /10 2/10 2/10 2/10 2/10   TB No $ L2 2/10 L2 2/10 L2 2/10 L2 2/10 L2 2/10   TB Row L3 2/10 L3 2/10 L3 2/10 L3 2/10 L3 2/10   T-Band LTP L3 2/10  L2 2/10 L3 2/10 L3 2/10   1/2 foam TS ext seated 20x               Ther Activity        UBE: Retro                Gait Training                        Modalities        MHP seated 19; Seated 10' pre seated 10' pre seated 10' pre seated 10'   CP  Seated 10' post      US/Stim

## 2023-10-12 ENCOUNTER — TELEPHONE (OUTPATIENT)
Dept: PAIN MEDICINE | Facility: CLINIC | Age: 68
End: 2023-10-12

## 2023-10-12 ENCOUNTER — TELEPHONE (OUTPATIENT)
Dept: FAMILY MEDICINE CLINIC | Facility: CLINIC | Age: 68
End: 2023-10-12

## 2023-10-12 NOTE — TELEPHONE ENCOUNTER
Pt is calling as he needs an EMG test and he has no public transportation and everyone who does them are not in the area and is asking for your advice. Is there anywhere close to [facility] that does EMG's?

## 2023-10-17 ENCOUNTER — OFFICE VISIT (OUTPATIENT)
Dept: UROLOGY | Facility: CLINIC | Age: 68
End: 2023-10-17
Payer: COMMERCIAL

## 2023-10-17 ENCOUNTER — APPOINTMENT (OUTPATIENT)
Dept: PHYSICAL THERAPY | Facility: CLINIC | Age: 68
End: 2023-10-17
Payer: COMMERCIAL

## 2023-10-17 VITALS
BODY MASS INDEX: 47.14 KG/M2 | SYSTOLIC BLOOD PRESSURE: 130 MMHG | HEART RATE: 70 BPM | DIASTOLIC BLOOD PRESSURE: 72 MMHG | WEIGHT: 315 LBS

## 2023-10-17 DIAGNOSIS — N40.1 BENIGN PROSTATIC HYPERPLASIA WITH NOCTURIA: Primary | ICD-10-CM

## 2023-10-17 DIAGNOSIS — I10 ESSENTIAL HYPERTENSION: ICD-10-CM

## 2023-10-17 DIAGNOSIS — R35.0 URINARY FREQUENCY: ICD-10-CM

## 2023-10-17 DIAGNOSIS — R97.20 RISING PSA LEVEL: ICD-10-CM

## 2023-10-17 DIAGNOSIS — R35.1 BENIGN PROSTATIC HYPERPLASIA WITH NOCTURIA: Primary | ICD-10-CM

## 2023-10-17 PROCEDURE — 99213 OFFICE O/P EST LOW 20 MIN: CPT

## 2023-10-17 RX ORDER — METOPROLOL SUCCINATE 25 MG/1
25 TABLET, EXTENDED RELEASE ORAL DAILY
Qty: 90 TABLET | Refills: 1 | Status: SHIPPED | OUTPATIENT
Start: 2023-10-17

## 2023-10-17 RX ORDER — TAMSULOSIN HYDROCHLORIDE 0.4 MG/1
0.4 CAPSULE ORAL
Qty: 90 CAPSULE | Refills: 3 | Status: SHIPPED | OUTPATIENT
Start: 2023-10-17

## 2023-10-17 NOTE — PROGRESS NOTES
10/17/2023    No chief complaint on file. Assessment and Plan    80 y.o. male     1. BPH with LUTS  Stable symptoms on Flomax 0.4 mg HS. Follow-up 1 year    2. Prostate cancer screening  Positive family history of prostate cancer in his father around late 63's to early 66's. PSA trend  1.8 (5/1/2023)  0.7 (8/11/2022)  VINCENT benign today  Due to 1st degree relative with prostate cancer and PSA doubling over the past year I am recommending that we recheck is PSA again in 1 week. I reviewed measures to avoid prior to having this done. I will call him with those results. Follow-up 1 year pending      Subjective:    He presents today reporting doing well since the last time I saw him. He continues to do well with Flomax 0.4 mg daily. He is emptying his bladder well and denies significant nocturia. On average 1-2 times per night. Improved from prior baseline. History of Present Illness  Martha Mitchell is a 80 y.o. male here for annual follow-up evaluation of BPH and prostate cancer screening. He was initially seen by me on 08/30/2022 for complaints of urinary frequency, weak urinary stream, sensation of incomplete bladder emptying, and nocturia 2-3 times per night. This had been worsening over the past several weeks. His PVR in the office was 108 mL. He did report issues with constipation as well. He was started on tamsulosin 0.4 mg HS and presents for follow-up. Seen in follow-up on 11/30/2022 and reported significant improvement in his lower urinary tract symptoms since starting tamsulosin. He will only experience nocturia upwards of 1 time per night and feels his urinary stream is much improved as well. His PVR was 72 mL. Prostate cancer screening: Current PSA 1.8 as of 5/1/2023 previously 0.7 on 8/11/2022. He does have a positive family history of prostate cancer in his father. VINCENT was benign last year. Review of Systems   Constitutional:  Negative for chills and fever. HENT:  Negative for congestion and sore throat. Respiratory:  Negative for cough and shortness of breath. Cardiovascular:  Negative for chest pain and leg swelling. Gastrointestinal:  Negative for abdominal pain, constipation and diarrhea. Genitourinary:  Negative for difficulty urinating, dysuria, frequency, hematuria and urgency. Nocturia   Musculoskeletal:  Negative for back pain and gait problem. Skin:  Negative for wound. Allergic/Immunologic: Negative for immunocompromised state. Hematological:  Does not bruise/bleed easily. Vitals  Vitals:    10/17/23 1404   BP: 130/72   Pulse: 70   Weight: (!) 153 kg (338 lb)       Physical Exam  Vitals reviewed. Constitutional:       General: He is not in acute distress. Appearance: Normal appearance. He is not ill-appearing or toxic-appearing. HENT:      Head: Normocephalic and atraumatic. Eyes:      General: No scleral icterus. Conjunctiva/sclera: Conjunctivae normal.   Cardiovascular:      Rate and Rhythm: Normal rate. Pulmonary:      Effort: Pulmonary effort is normal. No respiratory distress. Abdominal:      Tenderness: There is no right CVA tenderness or left CVA tenderness. Hernia: No hernia is present. Musculoskeletal:      Cervical back: Normal range of motion. Right lower leg: No edema. Left lower leg: No edema. Skin:     General: Skin is warm and dry. Coloration: Skin is not jaundiced or pale. Neurological:      General: No focal deficit present. Mental Status: He is alert and oriented to person, place, and time. Mental status is at baseline. Gait: Gait normal.   Psychiatric:         Mood and Affect: Mood normal.         Behavior: Behavior normal.         Thought Content:  Thought content normal.         Judgment: Judgment normal.         Past History  Past Medical History:   Diagnosis Date    GERD (gastroesophageal reflux disease)     HL (hearing loss)     Hypertension Social History     Socioeconomic History    Marital status: Single     Spouse name: None    Number of children: None    Years of education: None    Highest education level: None   Occupational History    None   Tobacco Use    Smoking status: Never     Passive exposure: Never    Smokeless tobacco: Never   Vaping Use    Vaping Use: Never used   Substance and Sexual Activity    Alcohol use: Yes     Comment: rare    Drug use: Never    Sexual activity: Not Currently   Other Topics Concern    None   Social History Narrative    None     Social Determinants of Health     Financial Resource Strain: High Risk (8/30/2022)    Overall Financial Resource Strain (CARDIA)     Difficulty of Paying Living Expenses: Hard   Food Insecurity: Not on file   Transportation Needs: Unmet Transportation Needs (8/30/2022)    PRAPARE - Transportation     Lack of Transportation (Medical): Yes     Lack of Transportation (Non-Medical): Yes   Physical Activity: Not on file   Stress: Not on file   Social Connections: Not on file   Intimate Partner Violence: Not on file   Housing Stability: Not on file     Social History     Tobacco Use   Smoking Status Never    Passive exposure: Never   Smokeless Tobacco Never     Family History   Problem Relation Age of Onset    Heart disease Mother     Heart attack Mother     Prostate cancer Father     Parkinsonism Maternal Grandmother        The following portions of the patient's history were reviewed and updated as appropriate allergies, current medications, past medical history, past social history, past surgical history and problem list    Imaging:    Results  No results found for this or any previous visit (from the past 1 hour(s)).]  Lab Results   Component Value Date    PSA 1.8 05/01/2023    PSA 0.7 08/11/2022     Lab Results   Component Value Date    CALCIUM 9.5 07/18/2023    K 4.0 07/18/2023    CO2 27 07/18/2023     07/18/2023    BUN 14 07/18/2023    CREATININE 0.72 07/18/2023     Lab Results Component Value Date    WBC 6.56 07/18/2023    HGB 14.7 07/18/2023    HCT 42.2 07/18/2023    MCV 90 07/18/2023     07/18/2023       Please Note:  Voice dictation software has been used to create this document. There may be inadvertent transcriptions errors.      ESTELA Gerber 10/17/23

## 2023-10-20 ENCOUNTER — OFFICE VISIT (OUTPATIENT)
Dept: CARDIOLOGY CLINIC | Facility: CLINIC | Age: 68
End: 2023-10-20
Payer: COMMERCIAL

## 2023-10-20 VITALS
OXYGEN SATURATION: 96 % | RESPIRATION RATE: 18 BRPM | WEIGHT: 315 LBS | DIASTOLIC BLOOD PRESSURE: 76 MMHG | HEART RATE: 82 BPM | HEIGHT: 70 IN | BODY MASS INDEX: 45.1 KG/M2 | SYSTOLIC BLOOD PRESSURE: 138 MMHG

## 2023-10-20 DIAGNOSIS — E78.5 DYSLIPIDEMIA: ICD-10-CM

## 2023-10-20 DIAGNOSIS — E66.01 CLASS 3 SEVERE OBESITY DUE TO EXCESS CALORIES WITH BODY MASS INDEX (BMI) OF 45.0 TO 49.9 IN ADULT, UNSPECIFIED WHETHER SERIOUS COMORBIDITY PRESENT (HCC): ICD-10-CM

## 2023-10-20 DIAGNOSIS — G47.33 OBSTRUCTIVE SLEEP APNEA: ICD-10-CM

## 2023-10-20 DIAGNOSIS — I35.0 MODERATE AORTIC STENOSIS: Primary | ICD-10-CM

## 2023-10-20 DIAGNOSIS — I10 ESSENTIAL HYPERTENSION: ICD-10-CM

## 2023-10-20 PROCEDURE — 99214 OFFICE O/P EST MOD 30 MIN: CPT | Performed by: INTERNAL MEDICINE

## 2023-10-20 RX ORDER — NAPROXEN 500 MG/1
500 TABLET ORAL 2 TIMES DAILY WITH MEALS
COMMUNITY

## 2023-10-20 RX ORDER — NYSTATIN 100000 [USP'U]/G
POWDER TOPICAL 4 TIMES DAILY
COMMUNITY

## 2023-10-20 NOTE — PROGRESS NOTES
Cardiology Follow up    Rosario Mo  [id number]  1955  [facility] [place]  [facility] [place]  [address]   [place] [id number]      1. Moderate aortic stenosis  Echo complete w/ contrast if indicated      2. Essential hypertension        3. Obstructive sleep apnea  Ambulatory Referral to Weight Management      4. Dyslipidemia  Comprehensive metabolic panel    Lipid Panel with Direct LDL reflex      5. Class 3 severe obesity due to excess calories with body mass index (BMI) of 45.0 to 49.9 in adult, unspecified whether serious comorbidity present ([address])            Discussion/Summary:  1. Dyspnea on exertion-improving  2. Moderate aortic stenosis  3. Hypertension  4. Hyperlipidemia  5. Obesity  6.   Obstructive sleep apnea compliant with CPAP therapy    -Transthoracic echocardiogram 12/30/2022 showing left-ventricular systolic function normal estimated LVEF 65% with grade 1 diastolic dysfunction, normal right ventricular systolic function with aortic valve with reduced mobility moderate aortic stenosis  -Pharmacologic nuclear stress test 12/30/2022 showing diaphragmatic attenuation but no diagnostic evidence of ischemia or infarction  -As patient notes blood pressures at home are well controlled continue amlodipine 5 mg daily, losartan 50 mg twice daily, metoprolol succinate 25 mg daily and hydrochlorothiazide 12.5 mg daily  -Patient will continue atorvastatin 20 mg daily  -Patient counseled on dietary and lifestyle modifications including following a low-salt, low-fat, heart healthy diet with sodium restriction to less than 1800 mg of sodium daily and fluid restriction to less than 2000 mL of fluid daily along with weight reduction with goal BMI less than 30  -Patient counseled to continue compliance with CPAP therapy  -We will check transthoracic echocardiogram in 6 months prior to next office visit  -We will check CMP and fasting lipid panel prior to next office visit and uptitrate medical therapy as patient tolerates  -Patient counseled if he were to have any warning or alarm type symptoms he is to seek emergency medical care immediately.  -After discussion with patient he wishes to be seen and evaluated by weight management team will give referral for assistance with weight loss. History of Present Illness:  -Patient is a 13-year-old male with hypertension, hyperlipidemia, obesity, obstructive sleep apnea compliant with CPAP therapy who initially presented to the office in December 2022 after referral from primary care physician for newly discovered murmur. He had been previously seen by cardiology approximately 6 years prior to that in [place] and had been recommended to undergo stress testing but that time was unable to undergo secondary to insurance issues.  -He presents to the office today for scheduled follow-up and denies any chest pain, palpitations, lightheadedness or dizziness, loss of consciousness, shortness of breath, lower extreme edema, orthopnea or bendopnea. He notes his blood pressures at home are well controlled at this time and denies any significant symptoms.     Patient Active Problem List   Diagnosis    Essential hypertension    Dyslipidemia    Gastroesophageal reflux disease without esophagitis    Obstructive sleep apnea    Urinary frequency    Hyperglycemia    Decreased hearing of both ears    Moderate aortic stenosis    Cervical radiculopathy    Cervical spondylosis    Bilateral carpal tunnel syndrome     Past Medical History:   Diagnosis Date    GERD (gastroesophageal reflux disease)     HL (hearing loss)     Hypertension      Social History     Socioeconomic History    Marital status: Single     Spouse name: Not on file    Number of children: Not on file    Years of education: Not on file    Highest education level: Not on file   Occupational History    Not on file   Tobacco Use    Smoking status: Never     Passive exposure: Never    Smokeless tobacco: Never   Vaping Use    Vaping Use: Never used   Substance and Sexual Activity    Alcohol use: Yes     Comment: rare    Drug use: Never    Sexual activity: Not Currently   Other Topics Concern    Not on file   Social History Narrative    Not on file     Social Determinants of Health     Financial Resource Strain: High Risk (8/30/2022)    Overall Financial Resource Strain (CARDIA)     Difficulty of Paying Living Expenses: Hard   Food Insecurity: Not on file   Transportation Needs: Unmet Transportation Needs (8/30/2022)    PRAPARE - Transportation     Lack of Transportation (Medical): Yes     Lack of Transportation (Non-Medical): Yes   Physical Activity: Not on file   Stress: Not on file   Social Connections: Not on file   Intimate Partner Violence: Not on file   Housing Stability: Not on file      Family History   Problem Relation Age of Onset    Heart disease Mother     Heart attack Mother     Prostate cancer Father     Parkinsonism Maternal Grandmother      Past Surgical History:   Procedure Laterality Date    SHOULDER ARTHROSCOPY Right        Current Outpatient Medications:     amLODIPine (NORVASC) 5 mg tablet, Take 1 tablet (5 mg total) by mouth daily, Disp: 90 tablet, Rfl: 3    atorvastatin (LIPITOR) 20 mg tablet, Take 1 tablet (20 mg total) by mouth daily, Disp: 90 tablet, Rfl: 1    brimonidine tartrate 0.2 % ophthalmic solution, INSTILL 1 DROP INTO EACH EYE TWICE DAILY, Disp: , Rfl:     fluticasone (FLONASE) 50 mcg/act nasal spray, 2 sprays into each nostril daily, Disp: 16 g, Rfl: 3    hydrochlorothiazide (HYDRODIURIL) 12.5 mg tablet, Take 1 tablet (12.5 mg total) by mouth daily, Disp: 90 tablet, Rfl: 3    losartan (COZAAR) 50 mg tablet, Take 1 tablet (50 mg total) by mouth 2 (two) times a day, Disp: 180 tablet, Rfl: 3    methocarbamol (ROBAXIN) 500 mg tablet, Take 1 tablet (500 mg total) by mouth 3 (three) times a day as needed for muscle spasms, Disp: 20 tablet, Rfl: 0    metoprolol succinate (TOPROL-XL) 25 mg 24 hr tablet, Take 1 tablet (25 mg total) by mouth daily, Disp: 90 tablet, Rfl: 1    naproxen (NAPROSYN) 500 mg tablet, Take 500 mg by mouth 2 (two) times a day with meals, Disp: , Rfl:     nystatin (MYCOSTATIN) powder, Apply topically 4 (four) times a day, Disp: , Rfl:     Omega-3 Fatty Acids (Fish Oil) 1200 MG CAPS, Take by mouth 2 (two) times a day, Disp: , Rfl:     pantoprazole (PROTONIX) 20 mg tablet, Take 1 tablet (20 mg total) by mouth daily, Disp: 90 tablet, Rfl: 3    gabapentin (Neurontin) 300 mg capsule, Take 1 capsule (300 mg total) by mouth 3 (three) times a day (Patient not taking: Reported on 10/20/2023), Disp: 90 capsule, Rfl: 1    meloxicam (Mobic) 15 mg tablet, Take 1 tablet (15 mg total) by mouth daily, Disp: 30 tablet, Rfl: 0    tamsulosin (FLOMAX) 0.4 mg, Take 1 capsule (0.4 mg total) by mouth daily with dinner (Patient not taking: Reported on 10/20/2023), Disp: 90 capsule, Rfl: 3  No Known Allergies      Labs:  No visits with results within 2 Month(s) from this visit.    Latest known visit with results is:   Admission on 07/18/2023, Discharged on 07/18/2023   Component Date Value    WBC 07/18/2023 6.56     RBC 07/18/2023 4.69     Hemoglobin 07/18/2023 14.7     Hematocrit 07/18/2023 42.2     MCV 07/18/2023 90     MCH 07/18/2023 31.3     MCHC 07/18/2023 34.8     RDW 07/18/2023 12.6     MPV 07/18/2023 9.4     Platelets 44/60/5831 261     nRBC 07/18/2023 0     Neutrophils Relative 07/18/2023 75     Immat GRANS % 07/18/2023 0     Lymphocytes Relative 07/18/2023 18     Monocytes Relative 07/18/2023 6     Eosinophils Relative 07/18/2023 0     Basophils Relative 07/18/2023 1     Neutrophils Absolute 07/18/2023 4.91     Immature Grans Absolute 07/18/2023 0.01     Lymphocytes Absolute 07/18/2023 1.19     Monocytes Absolute 07/18/2023 0.40     Eosinophils Absolute 07/18/2023 0.01     Basophils Absolute 07/18/2023 0.04     Sodium 07/18/2023 137     Potassium 07/18/2023 4.0     Chloride 07/18/2023 102     CO2 07/18/2023 27     ANION GAP 07/18/2023 8     BUN 07/18/2023 14     Creatinine 07/18/2023 0.72     Glucose 07/18/2023 122     Calcium 07/18/2023 9.5     eGFR 07/18/2023 96     Ventricular Rate 07/18/2023 79     Atrial Rate 07/18/2023 79     OK Interval 07/18/2023 164     QRSD Interval 07/18/2023 98     QT Interval 07/18/2023 372     QTC Interval 07/18/2023 426     P Axis 07/18/2023 73     QRS Axis 07/18/2023 72     T Wave [place] 07/18/2023 63         Imaging: No results found. Review of Systems:  Review of Systems   Constitutional:  Negative for chills, diaphoresis, fatigue and fever. HENT:  Negative for trouble swallowing and voice change. Eyes:  Negative for pain and redness. Respiratory:  Negative for shortness of breath and wheezing. Cardiovascular:  Negative for chest pain, palpitations and leg swelling. Gastrointestinal:  Negative for abdominal pain, constipation, diarrhea, nausea and vomiting. Genitourinary:  Negative for dysuria. Musculoskeletal:  Positive for arthralgias. Negative for neck pain and neck stiffness. Skin:  Negative for rash. Neurological:  Negative for dizziness, syncope, light-headedness and headaches. Psychiatric/Behavioral:  Negative for agitation and confusion. All other systems reviewed and are negative. Vitals:    10/20/23 0847   BP: 138/76   BP Location: Left arm   Patient Position: Sitting   Cuff Size: Large   Pulse: 82   Resp: 18   SpO2: 96%   Weight: (!) 155 kg (341 lb)   Height: 5' 9.69" (1.77 m)     Vitals:    10/20/23 0847   Weight: (!) 155 kg (341 lb)     Height: 5' 9.69" (177 cm)     Physical Exam:  Physical Exam  Vitals reviewed. Constitutional:       General: He is not in acute distress. Appearance: He is obese. He is not diaphoretic. HENT:      Head: Normocephalic and atraumatic. Eyes:      General:         Right eye: No discharge. Left eye: No discharge. Neck:      Comments: Trachea midline, neck obese, difficult assess JVD  Cardiovascular:      Rate and Rhythm: Normal rate and regular rhythm. Heart sounds: Murmur (KAREN) heard. Pulmonary:      Effort: Pulmonary effort is normal. No respiratory distress. Breath sounds: No wheezing. Chest:      Chest wall: No tenderness. Abdominal:      General: Bowel sounds are normal.      Palpations: Abdomen is soft. Tenderness: There is no abdominal tenderness. There is no rebound. Musculoskeletal:      Right lower leg: Edema (trace) present. Left lower leg: Edema (trace) present. Skin:     General: Skin is warm and dry. Neurological:      Mental Status: He is alert. Comments: Awake, alert, able to answer questions appropriately, able to move extremities bilaterally.    Psychiatric:         Mood and Affect: Mood normal.         Behavior: Behavior normal.

## 2023-10-23 ENCOUNTER — LAB (OUTPATIENT)
Dept: LAB | Facility: MEDICAL CENTER | Age: 68
End: 2023-10-23
Payer: COMMERCIAL

## 2023-10-23 DIAGNOSIS — R97.20 RISING PSA LEVEL: ICD-10-CM

## 2023-10-23 PROCEDURE — 36415 COLL VENOUS BLD VENIPUNCTURE: CPT

## 2023-10-23 PROCEDURE — 84154 ASSAY OF PSA FREE: CPT

## 2023-10-23 PROCEDURE — 84153 ASSAY OF PSA TOTAL: CPT

## 2023-10-24 ENCOUNTER — APPOINTMENT (OUTPATIENT)
Dept: PHYSICAL THERAPY | Facility: CLINIC | Age: 68
End: 2023-10-24
Payer: COMMERCIAL

## 2023-10-24 ENCOUNTER — TELEPHONE (OUTPATIENT)
Dept: UROLOGY | Facility: CLINIC | Age: 68
End: 2023-10-24

## 2023-10-24 DIAGNOSIS — Z12.5 SCREENING FOR PROSTATE CANCER: Primary | ICD-10-CM

## 2023-10-24 LAB
PSA FREE MFR SERPL: 24.3 %
PSA FREE SERPL-MCNC: 0.17 NG/ML
PSA SERPL-MCNC: 0.7 NG/ML (ref 0–4)

## 2023-10-24 NOTE — RESULT ENCOUNTER NOTE
Please let patient know that his repeat PSA is normal at baseline of 0.7. Unclear as to why he had an acute elevation in his PSA to 1.8 however as PSA remains normal he can follow-up in 1 year with a PSA.

## 2023-10-24 NOTE — TELEPHONE ENCOUNTER
Called patient and left a detailed vm, per communication consent. Informed the pt that his repeat PSA is normal at baseline of 0.7. Patient was informed that as his PSA remains normal he can follow-up in 1 year with a PSA ptv. Left office number in case of any questions.

## 2023-10-24 NOTE — TELEPHONE ENCOUNTER
----- Message from [address] sent at 10/24/2023  8:33 AM EDT -----  Please let patient know that his repeat PSA is normal at baseline of 0.7. Unclear as to why he had an acute elevation in his PSA to 1.8 however as PSA remains normal he can follow-up in 1 year with a PSA.

## 2023-10-31 ENCOUNTER — APPOINTMENT (OUTPATIENT)
Dept: PHYSICAL THERAPY | Facility: CLINIC | Age: 68
End: 2023-10-31
Payer: COMMERCIAL

## 2023-11-17 ENCOUNTER — AMBUL SURGICAL CARE (OUTPATIENT)
Dept: URBAN - NONMETROPOLITAN AREA SURGERY 1 | Facility: SURGERY | Age: 68
Setting detail: OPHTHALMOLOGY
End: 2023-11-17
Payer: COMMERCIAL

## 2023-11-17 DIAGNOSIS — H26.492: ICD-10-CM

## 2023-11-17 PROCEDURE — G8907 PT DOC NO EVENTS ON DISCHARG: HCPCS | Performed by: OPHTHALMOLOGY

## 2023-11-17 PROCEDURE — G8918 PT W/O PREOP ORDER IV AB PRO: HCPCS | Performed by: OPHTHALMOLOGY

## 2023-11-17 PROCEDURE — 66821 AFTER CATARACT LASER SURGERY: CPT | Mod: LT | Performed by: OPHTHALMOLOGY

## 2023-11-20 ENCOUNTER — RA CDI HCC (OUTPATIENT)
Dept: OTHER | Facility: HOSPITAL | Age: 68
End: 2023-11-20

## 2023-11-20 NOTE — PROGRESS NOTES
[address] [facility] coding opportunities       Chart reviewed, no opportunity found: [id number]        Patients Insurance     Medicare Insurance: [facility]

## 2023-11-29 ENCOUNTER — OFFICE VISIT (OUTPATIENT)
Dept: FAMILY MEDICINE CLINIC | Facility: CLINIC | Age: 68
End: 2023-11-29
Payer: COMMERCIAL

## 2023-11-29 VITALS
BODY MASS INDEX: 45.1 KG/M2 | DIASTOLIC BLOOD PRESSURE: 86 MMHG | WEIGHT: 315 LBS | HEART RATE: 80 BPM | OXYGEN SATURATION: 97 % | HEIGHT: 70 IN | SYSTOLIC BLOOD PRESSURE: 140 MMHG | TEMPERATURE: 97.7 F

## 2023-11-29 DIAGNOSIS — I10 ESSENTIAL HYPERTENSION: ICD-10-CM

## 2023-11-29 DIAGNOSIS — R73.9 HYPERGLYCEMIA: ICD-10-CM

## 2023-11-29 DIAGNOSIS — Z12.11 SCREENING FOR COLON CANCER: ICD-10-CM

## 2023-11-29 DIAGNOSIS — Z00.00 MEDICARE ANNUAL WELLNESS VISIT, SUBSEQUENT: Primary | ICD-10-CM

## 2023-11-29 PROCEDURE — G0439 PPPS, SUBSEQ VISIT: HCPCS | Performed by: FAMILY MEDICINE

## 2023-11-29 NOTE — PATIENT INSTRUCTIONS
Medicare Preventive Visit Patient Instructions  Thank you for completing your Welcome to Medicare Visit or Medicare Annual Wellness Visit today. Your next wellness visit will be due in one year (11/29/2024). The screening/preventive services that you may require over the next 5-10 years are detailed below. Some tests may not apply to you based off risk factors and/or age. Screening tests ordered at today's visit but not completed yet may show as past due. Also, please note that scanned in results may not display below. Preventive Screenings:  Service Recommendations Previous Testing/Comments   Colorectal Cancer Screening  Colonoscopy    Fecal Occult Blood Test (FOBT)/Fecal Immunochemical Test (FIT)  Fecal DNA/Cologuard Test  Flexible Sigmoidoscopy Age: 43-73 years old   Colonoscopy: every 10 years (May be performed more frequently if at higher risk)  OR  FOBT/FIT: every 1 year  OR  Cologuard: every 3 years  OR  Sigmoidoscopy: every 5 years  Screening may be recommended earlier than age 39 if at higher risk for colorectal cancer. Also, an individualized decision between you and your healthcare provider will decide whether screening between the ages of 77-80 would be appropriate.  Colonoscopy: Not on file  FOBT/FIT: 08/31/2022  Cologuard: Not on file  Sigmoidoscopy: Not on file          Prostate Cancer Screening Individualized decision between patient and health care provider in men between ages of 53-66   Medicare will cover every 12 months beginning on the day after your 50th birthday PSA: 0.7 ng/mL     Screening Current     Hepatitis C Screening Once for adults born between 1945 and 1965  More frequently in patients at high risk for Hepatitis C Hep C Antibody: Not on file        Diabetes Screening 1-2 times per year if you're at risk for diabetes or have pre-diabetes Fasting glucose: 113 mg/dL (5/1/2023)  A1C: 6.1 % (5/1/2023)  Screening Current   Cholesterol Screening Once every 5 years if you don't have a lipid disorder. May order more often based on risk factors. Lipid panel: 05/01/2023  Screening Not Indicated  History Lipid Disorder      Other Preventive Screenings Covered by Medicare:  Abdominal Aortic Aneurysm (AAA) Screening: covered once if your at risk. You're considered to be at risk if you have a family history of AAA or a male between the age of 70-76 who smoking at least 100 cigarettes in your lifetime. Lung Cancer Screening: covers low dose CT scan once per year if you meet all of the following conditions: (1) Age 48-67; (2) No signs or symptoms of lung cancer; (3) Current smoker or have quit smoking within the last 15 years; (4) You have a tobacco smoking history of at least 20 pack years (packs per day x number of years you smoked); (5) You get a written order from a healthcare provider. Glaucoma Screening: covered annually if you're considered high risk: (1) You have diabetes OR (2) Family history of glaucoma OR (3)  aged 48 and older OR (3)  American aged 72 and older  Osteoporosis Screening: covered every 2 years if you meet one of the following conditions: (1) Have a vertebral abnormality; (2) On glucocorticoid therapy for more than 3 months; (3) Have primary hyperparathyroidism; (4) On osteoporosis medications and need to assess response to drug therapy. HIV Screening: covered annually if you're between the age of 14-79. Also covered annually if you are younger than 13 and older than 72 with risk factors for HIV infection. For pregnant patients, it is covered up to 3 times per pregnancy.     Immunizations:  Immunization Recommendations   Influenza Vaccine Annual influenza vaccination during flu season is recommended for all persons aged >= 6 months who do not have contraindications   Pneumococcal Vaccine   * Pneumococcal conjugate vaccine = PCV13 (Prevnar 13), PCV15 (Vaxneuvance), PCV20 (Prevnar 20)  * Pneumococcal polysaccharide vaccine = PPSV23 (Pneumovax) Adults 19-63 yo with certain risk factors or if 69+ yo  If never received any pneumonia vaccine: recommend Prevnar 20 (PCV20)  Give PCV20 if previously received 1 dose of PCV13 or PPSV23   Hepatitis B Vaccine 3 dose series if at intermediate or high risk (ex: diabetes, end stage renal disease, liver disease)   Respiratory syncytial virus (RSV) Vaccine - COVERED BY MEDICARE PART D  * RSVPreF3 (Arexvy) CDC recommends that adults 61years of age and older may receive a single dose of RSV vaccine using shared clinical decision-making (SCDM)   Tetanus (Td) Vaccine - COST NOT COVERED BY MEDICARE PART B Following completion of primary series, a booster dose should be given every 10 years to maintain immunity against tetanus. Td may also be given as tetanus wound prophylaxis. Tdap Vaccine - COST NOT COVERED BY MEDICARE PART B Recommended at least once for all adults. For pregnant patients, recommended with each pregnancy. Shingles Vaccine (Shingrix) - COST NOT COVERED BY MEDICARE PART B  2 shot series recommended in those 19 years and older who have or will have weakened immune systems or those 50 years and older     Health Maintenance Due:      Topic Date Due   • Hepatitis C Screening  Never done   • Colorectal Cancer Screening  08/31/2023     Immunizations Due:      Topic Date Due   • COVID-19 Vaccine (2 - Pfizer series) 01/27/2023     Advance Directives   What are advance directives? Advance directives are legal documents that state your wishes and plans for medical care. These plans are made ahead of time in case you lose your ability to make decisions for yourself. Advance directives can apply to any medical decision, such as the treatments you want, and if you want to donate organs. What are the types of advance directives? There are many types of advance directives, and each state has rules about how to use them. You may choose a combination of any of the following:  Living will:   This is a written record of the treatment you want. You can also choose which treatments you do not want, which to limit, and which to stop at a certain time. This includes surgery, medicine, IV fluid, and tube feedings. Durable power of  for healthcare [facility] [place]): This is a written record that states who you want to make healthcare choices for you when you are unable to make them for yourself. This person, called a proxy, is usually a family member or a friend. You may choose more than 1 proxy. Do not resuscitate (DNR) order:  A DNR order is used in case your heart stops beating or you stop breathing. It is a request not to have certain forms of treatment, such as CPR. A DNR order may be included in other types of advance directives. Medical directive: This covers the care that you want if you are in a coma, near death, or unable to make decisions for yourself. You can list the treatments you want for each condition. Treatment may include pain medicine, surgery, blood transfusions, dialysis, IV or tube feedings, and a ventilator (breathing machine). Values history: This document has questions about your views, beliefs, and how you feel and think about life. This information can help others choose the care that you would choose. Why are advance directives important? An advance directive helps you control your care. Although spoken wishes may be used, it is better to have your wishes written down. Spoken wishes can be misunderstood, or not followed. Treatments may be given even if you do not want them. An advance directive may make it easier for your family to make difficult choices about your care. Weight Management   Why it is important to manage your weight:  Being overweight increases your risk of health conditions such as heart disease, high blood pressure, type 2 diabetes, and certain types of cancer. It can also increase your risk for osteoarthritis, sleep apnea, and other respiratory problems. Aim for a slow, steady weight loss.  Even a small amount of weight loss can lower your risk of health problems. How to lose weight safely:  A safe and healthy way to lose weight is to eat fewer calories and get regular exercise. You can lose up about 1 pound a week by decreasing the number of calories you eat by 500 calories each day. Healthy meal plan for weight management:  A healthy meal plan includes a variety of foods, contains fewer calories, and helps you stay healthy. A healthy meal plan includes the following:  Eat whole-grain foods more often. A healthy meal plan should contain fiber. Fiber is the part of grains, fruits, and vegetables that is not broken down by your body. Whole-grain foods are healthy and provide extra fiber in your diet. Some examples of whole-grain foods are whole-wheat breads and pastas, oatmeal, brown rice, and bulgur. Eat a variety of vegetables every day. Include dark, leafy greens such as spinach, kale, flex greens, and mustard greens. Eat yellow and orange vegetables such as carrots, sweet potatoes, and winter squash. Eat a variety of fruits every day. Choose fresh or canned fruit (canned in its own juice or light syrup) instead of juice. Fruit juice has very little or no fiber. Eat low-fat dairy foods. Drink fat-free (skim) milk or 1% milk. Eat fat-free yogurt and low-fat cottage cheese. Try low-fat cheeses such as mozzarella and other reduced-fat cheeses. Choose meat and other protein foods that are low in fat. Choose beans or other legumes such as split peas or lentils. Choose fish, skinless poultry (chicken or turkey), or lean cuts of red meat (beef or pork). Before you cook meat or poultry, cut off any visible fat. Use less fat and oil. Try baking foods instead of frying them. Add less fat, such as margarine, sour cream, regular salad dressing and mayonnaise to foods. Eat fewer high-fat foods. Some examples of high-fat foods include french fries, doughnuts, ice cream, and cakes. Eat fewer sweets. Limit foods and drinks that are high in sugar. This includes candy, cookies, regular soda, and sweetened drinks. Exercise:  Exercise at least 30 minutes per day on most days of the week. Some examples of exercise include walking, biking, dancing, and swimming. You can also fit in more physical activity by taking the stairs instead of the elevator or parking farther away from stores. Ask your healthcare provider about the best exercise plan for you. © Copyright [address] 2018 Information is for End User's use only and may not be sold, redistributed or otherwise used for commercial purposes. All illustrations and images included in CareNotes® are the copyrighted property of [employer] or [facility] Preventive Visit Patient Instructions  Thank you for completing your Welcome to Medicare Visit or Medicare Annual Wellness Visit today. Your next wellness visit will be due in one year (11/29/2024). The screening/preventive services that you may require over the next 5-10 years are detailed below. Some tests may not apply to you based off risk factors and/or age. Screening tests ordered at today's visit but not completed yet may show as past due. Also, please note that scanned in results may not display below. Preventive Screenings:  Service Recommendations Previous Testing/Comments   Colorectal Cancer Screening  Colonoscopy    Fecal Occult Blood Test (FOBT)/Fecal Immunochemical Test (FIT)  Fecal DNA/Cologuard Test  Flexible Sigmoidoscopy Age: 43-73 years old   Colonoscopy: every 10 years (May be performed more frequently if at higher risk)  OR  FOBT/FIT: every 1 year  OR  Cologuard: every 3 years  OR  Sigmoidoscopy: every 5 years  Screening may be recommended earlier than age 39 if at higher risk for colorectal cancer. Also, an individualized decision between you and your healthcare provider will decide whether screening between the ages of 77-80 would be appropriate.  Colonoscopy: Not on file  FOBT/FIT: 08/31/2022  Cologuard: Not on file  Sigmoidoscopy: Not on file          Prostate Cancer Screening Individualized decision between patient and health care provider in men between ages of 53-66   Medicare will cover every 12 months beginning on the day after your 50th birthday PSA: 0.7 ng/mL     Screening Current     Hepatitis C Screening Once for adults born between 1945 and 1965  More frequently in patients at high risk for Hepatitis C Hep C Antibody: Not on file        Diabetes Screening 1-2 times per year if you're at risk for diabetes or have pre-diabetes Fasting glucose: 113 mg/dL (5/1/2023)  A1C: 6.1 % (5/1/2023)  Screening Current   Cholesterol Screening Once every 5 years if you don't have a lipid disorder. May order more often based on risk factors. Lipid panel: 05/01/2023  Screening Not Indicated  History Lipid Disorder      Other Preventive Screenings Covered by Medicare:  Abdominal Aortic Aneurysm (AAA) Screening: covered once if your at risk. You're considered to be at risk if you have a family history of AAA or a male between the age of 70-76 who smoking at least 100 cigarettes in your lifetime. Lung Cancer Screening: covers low dose CT scan once per year if you meet all of the following conditions: (1) Age 48-67; (2) No signs or symptoms of lung cancer; (3) Current smoker or have quit smoking within the last 15 years; (4) You have a tobacco smoking history of at least 20 pack years (packs per day x number of years you smoked); (5) You get a written order from a healthcare provider.   Glaucoma Screening: covered annually if you're considered high risk: (1) You have diabetes OR (2) Family history of glaucoma OR (3)  aged 48 and older OR (3)  American aged 72 and older  Osteoporosis Screening: covered every 2 years if you meet one of the following conditions: (1) Have a vertebral abnormality; (2) On glucocorticoid therapy for more than 3 months; (3) Have primary hyperparathyroidism; (4) On osteoporosis medications and need to assess response to drug therapy. HIV Screening: covered annually if you're between the age of 14-79. Also covered annually if you are younger than 13 and older than 72 with risk factors for HIV infection. For pregnant patients, it is covered up to 3 times per pregnancy. Immunizations:  Immunization Recommendations   Influenza Vaccine Annual influenza vaccination during flu season is recommended for all persons aged >= 6 months who do not have contraindications   Pneumococcal Vaccine   * Pneumococcal conjugate vaccine = PCV13 (Prevnar 13), PCV15 (Vaxneuvance), PCV20 (Prevnar 20)  * Pneumococcal polysaccharide vaccine = PPSV23 (Pneumovax) Adults 35-17 yo with certain risk factors or if 69+ yo  If never received any pneumonia vaccine: recommend Prevnar 20 (PCV20)  Give PCV20 if previously received 1 dose of PCV13 or PPSV23   Hepatitis B Vaccine 3 dose series if at intermediate or high risk (ex: diabetes, end stage renal disease, liver disease)   Respiratory syncytial virus (RSV) Vaccine - COVERED BY MEDICARE PART D  * RSVPreF3 (Arexvy) CDC recommends that adults 61years of age and older may receive a single dose of RSV vaccine using shared clinical decision-making (SCDM)   Tetanus (Td) Vaccine - COST NOT COVERED BY MEDICARE PART B Following completion of primary series, a booster dose should be given every 10 years to maintain immunity against tetanus. Td may also be given as tetanus wound prophylaxis. Tdap Vaccine - COST NOT COVERED BY MEDICARE PART B Recommended at least once for all adults. For pregnant patients, recommended with each pregnancy.    Shingles Vaccine (Shingrix) - COST NOT COVERED BY MEDICARE PART B  2 shot series recommended in those 19 years and older who have or will have weakened immune systems or those 50 years and older     Health Maintenance Due:      Topic Date Due   • Hepatitis C Screening  Never done   • Colorectal Cancer Screening  08/31/2023     Immunizations Due:      Topic Date Due   • COVID-19 Vaccine (2 - Pfizer series) 01/27/2023     Advance Directives   What are advance directives? Advance directives are legal documents that state your wishes and plans for medical care. These plans are made ahead of time in case you lose your ability to make decisions for yourself. Advance directives can apply to any medical decision, such as the treatments you want, and if you want to donate organs. What are the types of advance directives? There are many types of advance directives, and each state has rules about how to use them. You may choose a combination of any of the following:  Living will: This is a written record of the treatment you want. You can also choose which treatments you do not want, which to limit, and which to stop at a certain time. This includes surgery, medicine, IV fluid, and tube feedings. Durable power of  for healthcare [facility]): This is a written record that states who you want to make healthcare choices for you when you are unable to make them for yourself. This person, called a proxy, is usually a family member or a friend. You may choose more than 1 proxy. Do not resuscitate (DNR) order:  A DNR order is used in case your heart stops beating or you stop breathing. It is a request not to have certain forms of treatment, such as CPR. A DNR order may be included in other types of advance directives. Medical directive: This covers the care that you want if you are in a coma, near death, or unable to make decisions for yourself. You can list the treatments you want for each condition. Treatment may include pain medicine, surgery, blood transfusions, dialysis, IV or tube feedings, and a ventilator (breathing machine). Values history: This document has questions about your views, beliefs, and how you feel and think about life.  This information can help others choose the care that you would choose. Why are advance directives important? An advance directive helps you control your care. Although spoken wishes may be used, it is better to have your wishes written down. Spoken wishes can be misunderstood, or not followed. Treatments may be given even if you do not want them. An advance directive may make it easier for your family to make difficult choices about your care. Weight Management   Why it is important to manage your weight:  Being overweight increases your risk of health conditions such as heart disease, high blood pressure, type 2 diabetes, and certain types of cancer. It can also increase your risk for osteoarthritis, sleep apnea, and other respiratory problems. Aim for a slow, steady weight loss. Even a small amount of weight loss can lower your risk of health problems. How to lose weight safely:  A safe and healthy way to lose weight is to eat fewer calories and get regular exercise. You can lose up about 1 pound a week by decreasing the number of calories you eat by 500 calories each day. Healthy meal plan for weight management:  A healthy meal plan includes a variety of foods, contains fewer calories, and helps you stay healthy. A healthy meal plan includes the following:  Eat whole-grain foods more often. A healthy meal plan should contain fiber. Fiber is the part of grains, fruits, and vegetables that is not broken down by your body. Whole-grain foods are healthy and provide extra fiber in your diet. Some examples of whole-grain foods are whole-wheat breads and pastas, oatmeal, brown rice, and bulgur. Eat a variety of vegetables every day. Include dark, leafy greens such as spinach, kale, flex greens, and mustard greens. Eat yellow and orange vegetables such as carrots, sweet potatoes, and winter squash. Eat a variety of fruits every day. Choose fresh or canned fruit (canned in its own juice or light syrup) instead of juice. Fruit juice has very little or no fiber.   Eat low-fat dairy foods. Drink fat-free (skim) milk or 1% milk. Eat fat-free yogurt and low-fat cottage cheese. Try low-fat cheeses such as mozzarella and other reduced-fat cheeses. Choose meat and other protein foods that are low in fat. Choose beans or other legumes such as split peas or lentils. Choose fish, skinless poultry (chicken or turkey), or lean cuts of red meat (beef or pork). Before you cook meat or poultry, cut off any visible fat. Use less fat and oil. Try baking foods instead of frying them. Add less fat, such as margarine, sour cream, regular salad dressing and mayonnaise to foods. Eat fewer high-fat foods. Some examples of high-fat foods include french fries, doughnuts, ice cream, and cakes. Eat fewer sweets. Limit foods and drinks that are high in sugar. This includes candy, cookies, regular soda, and sweetened drinks. Exercise:  Exercise at least 30 minutes per day on most days of the week. Some examples of exercise include walking, biking, dancing, and swimming. You can also fit in more physical activity by taking the stairs instead of the elevator or parking farther away from stores. Ask your healthcare provider about the best exercise plan for you. © Copyright [employer] 2018 Information is for End User's use only and may not be sold, redistributed or otherwise used for commercial purposes.  All illustrations and images included in CareNotes® are the copyrighted property of [employer] or  [place]

## 2023-11-29 NOTE — PROGRESS NOTES
Assessment and Plan:   Postnasal drip. He will continue the Flonase. Told him to get Mucinex and get a room humidifier. He will call us if his symptoms continue or increase. I ordered blood work for him to do prior to his next visit in 4 to 5 months. Problem List Items Addressed This Visit        Cardiovascular and Mediastinum    Essential hypertension    Relevant Orders    TSH, 3rd generation with Free T4 reflex       Other    Hyperglycemia    Relevant Orders    Hemoglobin A1C    TSH, 3rd generation with Free T4 reflex   Other Visit Diagnoses     Medicare annual wellness visit, subsequent    -  Primary    Screening for colon cancer        Relevant Orders    Cologuard          Depression Screening and Follow-up Plan: Patient was screened for depression during today's encounter. They screened negative with a PHQ-2 score of 0. Preventive health issues were discussed with patient, and age appropriate screening tests were ordered as noted in patient's After Visit Summary. Personalized health advice and appropriate referrals for health education or preventive services given if needed, as noted in patient's After Visit Summary. History of Present Illness:     Patient presents for a Medicare Wellness Visit    Medicare well visit. Having PND, giving him a mild sore throat. No fever or chills. Denies any chest pain or shortness of breath. Patient Care Team:  Santos Duggan DO as PCP - General (Family Medicine)  Marj Flores MD (Pain Medicine)     Review of Systems:     Review of Systems   Constitutional: Negative. HENT:  Positive for congestion, postnasal drip and sore throat (mild). Respiratory: Negative. Cardiovascular: Negative. Gastrointestinal: Negative. Genitourinary: Negative.          Problem List:     Patient Active Problem List   Diagnosis   • Essential hypertension   • Dyslipidemia   • Gastroesophageal reflux disease without esophagitis   • Obstructive sleep apnea • Urinary frequency   • Hyperglycemia   • Decreased hearing of both ears   • Moderate aortic stenosis   • Cervical radiculopathy   • Cervical spondylosis   • Bilateral carpal tunnel syndrome      Past Medical and Surgical History:     Past Medical History:   Diagnosis Date   • GERD (gastroesophageal reflux disease)    • HL (hearing loss)    • Hypertension      Past Surgical History:   Procedure Laterality Date   • SHOULDER ARTHROSCOPY Right       Family History:     Family History   Problem Relation Age of Onset   • Heart disease Mother    • Heart attack Mother    • Prostate cancer Father    • Parkinsonism Maternal Grandmother       Social History:     Social History     Socioeconomic History   • Marital status: Single     Spouse name: None   • Number of children: None   • Years of education: None   • Highest education level: None   Occupational History   • None   Tobacco Use   • Smoking status: Never     Passive exposure: Never   • Smokeless tobacco: Never   Vaping Use   • Vaping Use: Never used   Substance and Sexual Activity   • Alcohol use: Yes     Comment: rare   • Drug use: Never   • Sexual activity: Not Currently   Other Topics Concern   • None   Social History Narrative   • None     Social Determinants of Health     Financial Resource Strain: Low Risk  (11/29/2023)    Overall Financial Resource Strain (CARDIA)    • Difficulty of Paying Living Expenses: Not hard at all   Food Insecurity: Not on file   Transportation Needs: No Transportation Needs (11/29/2023)    PRAPARE - Transportation    • Lack of Transportation (Medical): No    • Lack of Transportation (Non-Medical):  No   Physical Activity: Not on file   Stress: Not on file   Social Connections: Not on file   Intimate Partner Violence: Not on file   Housing Stability: Not on file      Medications and Allergies:     Current Outpatient Medications   Medication Sig Dispense Refill   • amLODIPine (NORVASC) 5 mg tablet Take 1 tablet (5 mg total) by mouth daily 90 tablet 3   • atorvastatin (LIPITOR) 20 mg tablet Take 1 tablet (20 mg total) by mouth daily 90 tablet 1   • brimonidine tartrate 0.2 % ophthalmic solution INSTILL 1 DROP INTO EACH EYE TWICE DAILY     • fluticasone (FLONASE) 50 mcg/act nasal spray 2 sprays into each nostril daily 16 g 3   • hydrochlorothiazide (HYDRODIURIL) 12.5 mg tablet Take 1 tablet (12.5 mg total) by mouth daily 90 tablet 3   • losartan (COZAAR) 50 mg tablet Take 1 tablet (50 mg total) by mouth 2 (two) times a day 180 tablet 3   • meloxicam (Mobic) 15 mg tablet Take 1 tablet (15 mg total) by mouth daily 30 tablet 0   • metoprolol succinate (TOPROL-XL) 25 mg 24 hr tablet Take 1 tablet (25 mg total) by mouth daily 90 tablet 1   • Omega-3 Fatty Acids (Fish Oil) 1200 MG CAPS Take by mouth 2 (two) times a day     • pantoprazole (PROTONIX) 20 mg tablet Take 1 tablet (20 mg total) by mouth daily 90 tablet 3   • naproxen (NAPROSYN) 500 mg tablet Take 500 mg by mouth 2 (two) times a day with meals (Patient not taking: Reported on 11/29/2023)       No current facility-administered medications for this visit. No Known Allergies   Immunizations:     Immunization History   Administered Date(s) Administered   • COVID-19 Pfizer Vac BIVALENT Marshall-sucrose 12 Yr+ IM 09/27/2022   • INFLUENZA 08/11/2022, 08/21/2023   • Pneumococcal Conjugate Vaccine 20-valent (Pcv20), Polysace 06/30/2022   • Tdap 08/11/2022      Health Maintenance:         Topic Date Due   • Hepatitis C Screening  Never done   • Colorectal Cancer Screening  08/31/2023         Topic Date Due   • COVID-19 Vaccine (2 - Pfizer series) 01/27/2023      Medicare Screening Tests and Risk Assessments:     Yue Caro is here for his Subsequent Wellness visit. Last Medicare Wellness visit information reviewed, patient interviewed and updates made to the record today. Health Risk Assessment:   Patient rates overall health as good. Patient feels that their physical health rating is much better. Patient is satisfied with their life. Eyesight was rated as same. Hearing was rated as same. Patient feels that their emotional and mental health rating is much better. Patients states they are never, rarely angry. Patient states they are sometimes unusually tired/fatigued. Pain experienced in the last 7 days has been some. Patient's pain rating has been 4/10. Patient states that he has experienced no weight loss or gain in last 6 months. Depression Screening:   PHQ-2 Score: 0      Fall Risk Screening: In the past year, patient has experienced: no history of falling in past year      Home Safety:  Patient does not have trouble with stairs inside or outside of their home. Patient has working smoke alarms and has working carbon monoxide detector. Home safety hazards include: none. Nutrition:   Current diet is Low Carb. Medications:   Patient is currently taking over-the-counter supplements. OTC medications include: see medication list. Patient is able to manage medications. Activities of Daily Living (ADLs)/Instrumental Activities of Daily Living (IADLs):   Walk and transfer into and out of bed and chair?: Yes  Dress and groom yourself?: Yes    Bathe or shower yourself?: Yes    Feed yourself?  Yes  Do your laundry/housekeeping?: Yes  Manage your money, pay your bills and track your expenses?: Yes  Make your own meals?: Yes    Do your own shopping?: Yes    Previous Hospitalizations:   Any hospitalizations or ED visits within the last 12 months?: Yes    How many hospitalizations have you had in the last year?: 1-2    Advance Care Planning:   Living will: No    Durable POA for healthcare: No    Advanced directive: No    Advanced directive counseling given: Yes      Cognitive Screening:   Provider or family/friend/caregiver concerned regarding cognition?: No    PREVENTIVE SCREENINGS      Cardiovascular Screening:    General: Screening Not Indicated and History Lipid Disorder      Diabetes Screening: General: Screening Current      Prostate Cancer Screening:    General: Screening Current      Abdominal Aortic Aneurysm (AAA) Screening:    Risk factors include: age between 70-77 yo        Lung Cancer Screening:     General: Screening Not Indicated    Screening, Brief Intervention, and Referral to Treatment (SBIRT)    Screening  Typical number of drinks in a day: 0  Typical number of drinks in a week: 0  Interpretation: Low risk drinking behavior. Single Item Drug Screening:  How often have you used an illegal drug (including marijuana) or a prescription medication for non-medical reasons in the past year? never    Single Item Drug Screen Score: 0  Interpretation: Negative screen for possible drug use disorder    No results found. Physical Exam:     /86   Pulse 80   Temp 97.7 °F (36.5 °C)   Ht 5' 9.69" (1.77 m)   Wt (!) 153 kg (338 lb)   SpO2 97%   BMI 48.93 kg/m²     Physical Exam  Vitals reviewed. Constitutional:       General: He is not in acute distress. Appearance: Normal appearance. He is well-developed. He is not diaphoretic. HENT:      Head: Normocephalic and atraumatic. Right Ear: External ear normal.      Left Ear: External ear normal.      Nose: Congestion present. Mouth/Throat:      Comments: Clear PND  Eyes:      Conjunctiva/sclera: Conjunctivae normal.   Cardiovascular:      Rate and Rhythm: Normal rate and regular rhythm. Heart sounds: Normal heart sounds. No murmur heard. No friction rub. No gallop. Pulmonary:      Effort: Pulmonary effort is normal. No respiratory distress. Breath sounds: Normal breath sounds. No wheezing, rhonchi or rales. Musculoskeletal:      Right lower leg: No edema. Left lower leg: No edema. Neurological:      General: No focal deficit present. Mental Status: He is alert and oriented to person, place, and time.    Psychiatric:         Mood and Affect: Mood normal.         Behavior: Behavior normal. Thought Content:  Thought content normal.         Judgment: Judgment normal.          Helen Mccallum, DO

## 2023-12-01 ENCOUNTER — AMBUL SURGICAL CARE (OUTPATIENT)
Dept: URBAN - NONMETROPOLITAN AREA SURGERY 1 | Facility: SURGERY | Age: 68
Setting detail: OPHTHALMOLOGY
End: 2023-12-01
Payer: COMMERCIAL

## 2023-12-01 DIAGNOSIS — H26.491: ICD-10-CM

## 2023-12-01 PROCEDURE — G8907 PT DOC NO EVENTS ON DISCHARG: HCPCS | Performed by: OPHTHALMOLOGY

## 2023-12-01 PROCEDURE — 66821 AFTER CATARACT LASER SURGERY: CPT | Mod: 79,RT | Performed by: OPHTHALMOLOGY

## 2023-12-01 PROCEDURE — G8918 PT W/O PREOP ORDER IV AB PRO: HCPCS | Performed by: OPHTHALMOLOGY

## 2023-12-01 PROCEDURE — 66821 AFTER CATARACT LASER SURGERY: CPT | Mod: RT | Performed by: OPHTHALMOLOGY

## 2023-12-16 ENCOUNTER — HOSPITAL ENCOUNTER (EMERGENCY)
Facility: HOSPITAL | Age: 68
Discharge: HOME | End: 2023-12-16
Attending: EMERGENCY MEDICINE | Admitting: EMERGENCY MEDICINE
Payer: COMMERCIAL

## 2023-12-16 ENCOUNTER — APPOINTMENT (EMERGENCY)
Dept: RADIOLOGY | Facility: HOSPITAL | Age: 68
End: 2023-12-16
Payer: COMMERCIAL

## 2023-12-16 VITALS
HEART RATE: 82 BPM | DIASTOLIC BLOOD PRESSURE: 84 MMHG | TEMPERATURE: 97.3 F | BODY MASS INDEX: 47.14 KG/M2 | RESPIRATION RATE: 22 BRPM | SYSTOLIC BLOOD PRESSURE: 140 MMHG | HEIGHT: 71 IN | OXYGEN SATURATION: 94 %

## 2023-12-16 DIAGNOSIS — J40 BRONCHITIS: Primary | ICD-10-CM

## 2023-12-16 LAB
ALBUMIN SERPL BCP-MCNC: 4.1 G/DL (ref 3.5–5)
ALP SERPL-CCNC: 56 U/L (ref 34–104)
ALT SERPL W P-5'-P-CCNC: 30 U/L (ref 7–52)
ANION GAP SERPL CALCULATED.3IONS-SCNC: 8 MMOL/L
AST SERPL W P-5'-P-CCNC: 18 U/L (ref 13–39)
ATRIAL RATE: 88 BPM
BASOPHILS # BLD AUTO: 0.02 THOUSANDS/ÂΜL (ref 0–0.1)
BASOPHILS NFR BLD AUTO: 0 % (ref 0–1)
BILIRUB SERPL-MCNC: 0.47 MG/DL (ref 0.2–1)
BUN SERPL-MCNC: 15 MG/DL (ref 5–25)
CALCIUM SERPL-MCNC: 9.3 MG/DL (ref 8.4–10.2)
CARDIAC TROPONIN I PNL SERPL HS: 12 NG/L
CHLORIDE SERPL-SCNC: 102 MMOL/L (ref 96–108)
CO2 SERPL-SCNC: 28 MMOL/L (ref 21–32)
CREAT SERPL-MCNC: 0.7 MG/DL (ref 0.6–1.3)
EOSINOPHIL # BLD AUTO: 0.03 THOUSAND/ÂΜL (ref 0–0.61)
EOSINOPHIL NFR BLD AUTO: 1 % (ref 0–6)
ERYTHROCYTE [DISTWIDTH] IN BLOOD BY AUTOMATED COUNT: 12.8 % (ref 11.6–15.1)
FLUAV RNA RESP QL NAA+PROBE: NEGATIVE
FLUBV RNA RESP QL NAA+PROBE: NEGATIVE
GFR SERPL CREATININE-BSD FRML MDRD: 97 ML/MIN/1.73SQ M
GLUCOSE SERPL-MCNC: 106 MG/DL (ref 65–140)
HCT VFR BLD AUTO: 40.7 % (ref 36.5–49.3)
HGB BLD-MCNC: 13.8 G/DL (ref 12–17)
IMM GRANULOCYTES # BLD AUTO: 0.02 THOUSAND/UL (ref 0–0.2)
IMM GRANULOCYTES NFR BLD AUTO: 0 % (ref 0–2)
LYMPHOCYTES # BLD AUTO: 0.89 THOUSANDS/ÂΜL (ref 0.6–4.47)
LYMPHOCYTES NFR BLD AUTO: 14 % (ref 14–44)
MCH RBC QN AUTO: 30.4 PG (ref 26.8–34.3)
MCHC RBC AUTO-ENTMCNC: 33.9 G/DL (ref 31.4–37.4)
MCV RBC AUTO: 90 FL (ref 82–98)
MONOCYTES # BLD AUTO: 0.4 THOUSAND/ÂΜL (ref 0.17–1.22)
MONOCYTES NFR BLD AUTO: 6 % (ref 4–12)
NEUTROPHILS # BLD AUTO: 4.88 THOUSANDS/ÂΜL (ref 1.85–7.62)
NEUTS SEG NFR BLD AUTO: 79 % (ref 43–75)
NRBC BLD AUTO-RTO: 0 /100 WBCS
P AXIS: 69 DEGREES
PLATELET # BLD AUTO: 232 THOUSANDS/UL (ref 149–390)
PMV BLD AUTO: 9.2 FL (ref 8.9–12.7)
POTASSIUM SERPL-SCNC: 3.9 MMOL/L (ref 3.5–5.3)
PR INTERVAL: 164 MS
PROT SERPL-MCNC: 6.7 G/DL (ref 6.4–8.4)
QRS AXIS: 70 DEGREES
QRSD INTERVAL: 100 MS
QT INTERVAL: 370 MS
QTC INTERVAL: 447 MS
RBC # BLD AUTO: 4.54 MILLION/UL (ref 3.88–5.62)
RSV RNA RESP QL NAA+PROBE: NEGATIVE
SARS-COV-2 RNA RESP QL NAA+PROBE: NEGATIVE
SODIUM SERPL-SCNC: 138 MMOL/L (ref 135–147)
T WAVE AXIS: 67 DEGREES
VENTRICULAR RATE: 88 BPM
WBC # BLD AUTO: 6.24 THOUSAND/UL (ref 4.31–10.16)

## 2023-12-16 PROCEDURE — 84484 ASSAY OF TROPONIN QUANT: CPT | Performed by: EMERGENCY MEDICINE

## 2023-12-16 PROCEDURE — 36415 COLL VENOUS BLD VENIPUNCTURE: CPT | Performed by: EMERGENCY MEDICINE

## 2023-12-16 PROCEDURE — 93005 ELECTROCARDIOGRAM TRACING: CPT

## 2023-12-16 PROCEDURE — 71046 X-RAY EXAM CHEST 2 VIEWS: CPT

## 2023-12-16 PROCEDURE — 85025 COMPLETE CBC W/AUTO DIFF WBC: CPT | Performed by: EMERGENCY MEDICINE

## 2023-12-16 PROCEDURE — 99285 EMERGENCY DEPT VISIT HI MDM: CPT | Performed by: EMERGENCY MEDICINE

## 2023-12-16 PROCEDURE — 96374 THER/PROPH/DIAG INJ IV PUSH: CPT

## 2023-12-16 PROCEDURE — 99285 EMERGENCY DEPT VISIT HI MDM: CPT

## 2023-12-16 PROCEDURE — 80053 COMPREHEN METABOLIC PANEL: CPT | Performed by: EMERGENCY MEDICINE

## 2023-12-16 PROCEDURE — 0241U HB NFCT DS VIR RESP RNA 4 TRGT: CPT | Performed by: EMERGENCY MEDICINE

## 2023-12-16 RX ORDER — AZITHROMYCIN 250 MG/1
TABLET, FILM COATED ORAL
Qty: 6 TABLET | Refills: 0 | Status: SHIPPED | OUTPATIENT
Start: 2023-12-16 | End: 2023-12-20

## 2023-12-16 RX ORDER — KETOROLAC TROMETHAMINE 30 MG/ML
15 INJECTION, SOLUTION INTRAMUSCULAR; INTRAVENOUS ONCE
Status: COMPLETED | OUTPATIENT
Start: 2023-12-16 | End: 2023-12-16

## 2023-12-16 RX ORDER — DEXAMETHASONE 4 MG/1
10 TABLET ORAL ONCE
Status: COMPLETED | OUTPATIENT
Start: 2023-12-16 | End: 2023-12-16

## 2023-12-16 RX ADMIN — DEXAMETHASONE 10 MG: 4 TABLET ORAL at 16:01

## 2023-12-16 RX ADMIN — KETOROLAC TROMETHAMINE 15 MG: 30 INJECTION, SOLUTION INTRAMUSCULAR at 16:02

## 2023-12-16 NOTE — DISCHARGE INSTRUCTIONS
Please call your primary care doctor to make a follow up appointment within 2-3 days.     Please take motrin 400 mg every 6 hours as needed for throat pain.

## 2023-12-16 NOTE — ED PROVIDER NOTES
History  Chief Complaint   Patient presents with    Shortness of Breath     Shortness of breath that started today. Chest congestion for the past couple of days.      HPI    68-year-old male with past medical history of hypertension and obesity who presents for evaluation of shortness of breath.  Patient states he has had chest congestion for the past few days.  He states he started to feel short of breath today which was worse with exertion.  Denies orthopnea.  Denies fevers or chills.  He states he has had slightly decreased appetite but has still been drinking liquids.  Denies increasing leg pain or leg swelling.  Denies abdominal pain, nausea, vomiting, or diarrhea.    Prior to Admission Medications   Prescriptions Last Dose Informant Patient Reported? Taking?   Omega-3 Fatty Acids (Fish Oil) 1200 MG CAPS  Self Yes No   Sig: Take by mouth 2 (two) times a day   amLODIPine (NORVASC) 5 mg tablet  Self No No   Sig: Take 1 tablet (5 mg total) by mouth daily   atorvastatin (LIPITOR) 20 mg tablet  Self No No   Sig: Take 1 tablet (20 mg total) by mouth daily   brimonidine tartrate 0.2 % ophthalmic solution  Self Yes No   Sig: INSTILL 1 DROP INTO EACH EYE TWICE DAILY   fluticasone (FLONASE) 50 mcg/act nasal spray  Self No No   Si sprays into each nostril daily   hydrochlorothiazide (HYDRODIURIL) 12.5 mg tablet  Self No No   Sig: Take 1 tablet (12.5 mg total) by mouth daily   losartan (COZAAR) 50 mg tablet  Self No No   Sig: Take 1 tablet (50 mg total) by mouth 2 (two) times a day   meloxicam (Mobic) 15 mg tablet   No No   Sig: Take 1 tablet (15 mg total) by mouth daily   metoprolol succinate (TOPROL-XL) 25 mg 24 hr tablet  Self No No   Sig: Take 1 tablet (25 mg total) by mouth daily   naproxen (NAPROSYN) 500 mg tablet   Yes No   Sig: Take 500 mg by mouth 2 (two) times a day with meals   Patient not taking: Reported on 2023   pantoprazole (PROTONIX) 20 mg tablet  Self No No   Sig: Take 1 tablet (20 mg total) by  mouth daily      Facility-Administered Medications: None       Past Medical History:   Diagnosis Date    GERD (gastroesophageal reflux disease)     HL (hearing loss)     Hypertension        Past Surgical History:   Procedure Laterality Date    SHOULDER ARTHROSCOPY Right        Family History   Problem Relation Age of Onset    Heart disease Mother     Heart attack Mother     Prostate cancer Father     Parkinsonism Maternal Grandmother      I have reviewed and agree with the history as documented.    E-Cigarette/Vaping    E-Cigarette Use Never User      E-Cigarette/Vaping Substances    Nicotine No     THC No     CBD No     Flavoring No      Social History     Tobacco Use    Smoking status: Never     Passive exposure: Never    Smokeless tobacco: Never   Vaping Use    Vaping status: Never Used   Substance Use Topics    Alcohol use: Yes     Comment: rare    Drug use: Never       Review of Systems   Constitutional:  Positive for appetite change. Negative for chills and fever.   HENT:  Negative for congestion, rhinorrhea and sore throat.    Respiratory:  Positive for cough and shortness of breath.    Cardiovascular:  Negative for chest pain and leg swelling.   Gastrointestinal:  Negative for abdominal pain, constipation, diarrhea, nausea and vomiting.   Genitourinary:  Negative for dysuria, frequency, hematuria and urgency.   Musculoskeletal:  Negative for arthralgias and myalgias.   Skin:  Negative for rash.   Neurological:  Negative for dizziness, weakness, light-headedness, numbness and headaches.   All other systems reviewed and are negative.      Physical Exam  Physical Exam  Vitals and nursing note reviewed.   Constitutional:       General: He is not in acute distress.     Appearance: Normal appearance. He is well-developed and normal weight. He is not ill-appearing, toxic-appearing or diaphoretic.   HENT:      Head: Normocephalic and atraumatic.      Right Ear: External ear normal.      Left Ear: External ear  normal.      Nose: Nose normal.      Mouth/Throat:      Mouth: Mucous membranes are moist.      Pharynx: Oropharynx is clear.   Eyes:      Extraocular Movements: Extraocular movements intact.      Conjunctiva/sclera: Conjunctivae normal.   Cardiovascular:      Rate and Rhythm: Normal rate and regular rhythm.      Pulses: Normal pulses.      Heart sounds: Normal heart sounds. No murmur heard.     No friction rub. No gallop.   Pulmonary:      Effort: Pulmonary effort is normal. No respiratory distress.      Breath sounds: Normal breath sounds. No decreased breath sounds, wheezing, rhonchi or rales.   Abdominal:      General: There is no distension.      Palpations: Abdomen is soft.      Tenderness: There is no abdominal tenderness. There is no guarding or rebound.   Musculoskeletal:         General: No tenderness.      Cervical back: Neck supple.      Right lower leg: No tenderness. No edema.      Left lower leg: No tenderness. No edema.   Skin:     General: Skin is warm and dry.      Coloration: Skin is not pale.      Findings: No rash.   Neurological:      General: No focal deficit present.      Mental Status: He is alert and oriented to person, place, and time.      Cranial Nerves: No cranial nerve deficit.      Sensory: No sensory deficit.      Motor: No weakness.   Psychiatric:         Mood and Affect: Mood normal.         Behavior: Behavior normal.         Vital Signs  ED Triage Vitals [12/16/23 1318]   Temperature Pulse Respirations Blood Pressure SpO2   (!) 97.3 °F (36.3 °C) 86 22 (!) 184/88 94 %      Temp Source Heart Rate Source Patient Position - Orthostatic VS BP Location FiO2 (%)   Temporal Monitor Sitting Right arm --      Pain Score       No Pain           Vitals:    12/16/23 1318 12/16/23 1500   BP: (!) 184/88 140/84   Pulse: 86 82   Patient Position - Orthostatic VS: Sitting Sitting         Visual Acuity      ED Medications  Medications   ketorolac (TORADOL) injection 15 mg (15 mg Intravenous Given  12/16/23 1602)   dexamethasone (DECADRON) tablet 10 mg (10 mg Oral Given 12/16/23 1601)       Diagnostic Studies  Results Reviewed       Procedure Component Value Units Date/Time    COVID/FLU/RSV [[id number]]  (Normal) Collected: 12/16/23 1339    Lab Status: Final result Specimen: Nares from Nose Updated: 12/16/23 1429     SARS-CoV-2 Negative     INFLUENZA A PCR Negative     INFLUENZA B PCR Negative     RSV PCR Negative    Narrative:      FOR PEDIATRIC PATIENTS - copy/paste COVID Guidelines URL to browser: [email or web address]    SARS-CoV-2 assay is a Nucleic Acid Amplification assay intended for the  qualitative detection of nucleic acid from SARS-CoV-2 in nasopharyngeal  swabs. Results are for the presumptive identification of SARS-CoV-2 RNA.    Positive results are indicative of infection with SARS-CoV-2, the virus  causing COVID-19, but do not rule out bacterial infection or co-infection  with other viruses. Laboratories within the [place] and its  territories are required to report all positive results to the appropriate  public health authorities. Negative results do not preclude SARS-CoV-2  infection and should not be used as the sole basis for treatment or other  patient management decisions. Negative results must be combined with  clinical observations, patient history, and epidemiological information.  This test has not been FDA cleared or approved.    This test has been authorized by FDA under an Emergency Use Authorization  (EUA). This test is only authorized for the duration of time the  declaration that circumstances exist justifying the authorization of the  emergency use of an in vitro diagnostic tests for detection of SARS-CoV-2  virus and/or diagnosis of COVID-19 infection under section 564(b)(1) of  the Act, 21 U.S.C. 360bbb-3(b)(1), unless the authorization is terminated  or revoked sooner. The test has been validated but independent  review by FDA  and CLIA is pending.    Test performed using [facility] GeneXpert: This RT-PCR assay targets N2,  a region unique to SARS-CoV-2. A conserved region in the E-gene was chosen  for pan-Sarbecovirus detection which includes SARS-CoV-2.    According to CMS-2020-01-R, this platform meets the definition of high-throughput technology.    HS Troponin 0hr (reflex protocol) [[id number]]  (Normal) Collected: 12/16/23 1339    Lab Status: Final result Specimen: Blood from Arm, Left Updated: 12/16/23 1409     hs TnI 0hr 12 ng/L     Comprehensive metabolic panel [[id number]] Collected: 12/16/23 1339    Lab Status: Final result Specimen: Blood from Arm, Left Updated: 12/16/23 1404     Sodium 138 mmol/L      Potassium 3.9 mmol/L      Chloride 102 mmol/L      CO2 28 mmol/L      ANION GAP 8 mmol/L      BUN 15 mg/dL      Creatinine 0.70 mg/dL      Glucose 106 mg/dL      Calcium 9.3 mg/dL      AST 18 U/L      ALT 30 U/L      Alkaline Phosphatase 56 U/L      Total Protein 6.7 g/dL      Albumin 4.1 g/dL      Total Bilirubin 0.47 mg/dL      eGFR 97 ml/min/1.73sq m     Narrative:      National Kidney Disease Foundation guidelines for Chronic Kidney Disease (CKD):     Stage 1 with normal or high GFR (GFR > 90 mL/min/1.73 square meters)    Stage 2 Mild CKD (GFR = 60-89 mL/min/1.73 square meters)    Stage 3A Moderate CKD (GFR = 45-59 mL/min/1.73 square meters)    Stage 3B Moderate CKD (GFR = 30-44 mL/min/1.73 square meters)    Stage 4 Severe CKD (GFR = 15-29 mL/min/1.73 square meters)    Stage 5 End Stage CKD (GFR <15 mL/min/1.73 square meters)  Note: GFR calculation is accurate only with a steady state creatinine    CBC and differential [[id number]]  (Abnormal) Collected: 12/16/23 1339    Lab Status: Final result Specimen: Blood from Arm, Left Updated: 12/16/23 1346     WBC 6.24 Thousand/uL      RBC 4.54 Million/uL      Hemoglobin 13.8 g/dL      Hematocrit 40.7 %      MCV 90 fL      MCH 30.4 pg      MCHC 33.9 g/dL      RDW 12.8 %       MPV 9.2 fL      Platelets 232 Thousands/uL      nRBC 0 /100 WBCs      Neutrophils Relative 79 %      Immat GRANS % 0 %      Lymphocytes Relative 14 %      Monocytes Relative 6 %      Eosinophils Relative 1 %      Basophils Relative 0 %      Neutrophils Absolute 4.88 Thousands/µL      Immature Grans Absolute 0.02 Thousand/uL      Lymphocytes Absolute 0.89 Thousands/µL      Monocytes Absolute 0.40 Thousand/µL      Eosinophils Absolute 0.03 Thousand/µL      Basophils Absolute 0.02 Thousands/µL                    XR chest 2 views   Final Result by Dain Rowan MD (12/17 0839)      No radiographic evidence of acute intrathoracic process.                  Workstation performed: [id number]                    Procedures  ECG 12 Lead Documentation Only    Date/Time: 12/16/2023 3:47 PM    Performed by: Rubina Dejesus MD  Authorized by: Rubina Dejesus MD    Indications / Diagnosis:  Shortness of breath           ED Course                               SBIRT 22yo+      Flowsheet Row Most Recent Value   Initial Alcohol Screen: US AUDIT-C     1. How often do you have a drink containing alcohol? 0 Filed at: 12/16/2023 1533   2. How many drinks containing alcohol do you have on a typical day you are drinking?  0 Filed at: 12/16/2023 1533   3a. Male UNDER 65: How often do you have five or more drinks on one occasion? 0 Filed at: 12/16/2023 1533   3b. FEMALE Any Age, or MALE 65+: How often do you have 4 or more drinks on one occassion? 0 Filed at: 12/16/2023 1533   Audit-C Score 0 Filed at: 12/16/2023 1533   SANTA: How many times in the past year have you...    Used an illegal drug or used a prescription medication for non-medical reasons? Never Filed at: 12/16/2023 1533                      Medical Decision Making  Amount and/or Complexity of Data Reviewed  Labs: ordered.  Radiology: ordered.    Risk  Prescription drug management.      68-year-old male with past medical history of hypertension and obesity who  presents for evaluation of shortness of breath for one day, also with chest congestion for a few days, patient is overall well-appearing, no acute distress.  Oxygen saturation 94% in room air.  Breathing comfortably without increased work of breathing.  Differential diagnosis includes: viral syndrome, pneumonia, ACS, arrhythmia, electrolyte abnormality, dehydration.   Will obtain CBC to evaluate for anemia, CMP to evaluate for metabolic abnormality, EKG and troponin to evaluate for ACS or arrhythmia.  Will obtain chest x-ray to evaluate for pneumonia.  Will obtain viral swab.  Will treat symptomatically and reassess.  Reviewed labs, no marked abnormalities.  Reviewed and interpreted chest x-ray, no evidence of pneumonia or other acute cardiopulmonary disease.  Reviewed and interpreted EKG, shows normal sinus rhythm without acute ischemic changes.  Reassessed patient, he is able to ambulate without significant dyspnea.  Discussed with patient that this is likely viral syndrome.  Will have patient follow-up with his primary care doctor.  Discussed with patient strict return precautions.  Patient expressed understanding and was agreeable for discharge.       Disposition  Final diagnoses:   Bronchitis     Time reflects when diagnosis was documented in both MDM as applicable and the Disposition within this note       Time User Action Codes Description Comment    12/16/2023  3:44 PM Rubina Dejesus Add [J40] Bronchitis           ED Disposition       ED Disposition   Discharge    Condition   Stable    Date/Time   Sat Dec 16, 2023 2582    Comment   Ean Young discharge to home/self care.                   Follow-up Information       Follow up With Specialties Details Why Contact Info    Myke Bates,  Family Medicine Schedule an appointment as soon as possible for a visit   [address] [facility] [address]  [phone]              Discharge Medication List as of 12/16/2023  3:45 PM        START taking  these medications    Details   azithromycin (ZITHROMAX) 250 mg tablet Take 2 tablets today then 1 tablet daily x 4 days, Normal           CONTINUE these medications which have NOT CHANGED    Details   amLODIPine (NORVASC) 5 mg tablet Take 1 tablet (5 mg total) by mouth daily, Starting Tue 2/28/2023, Normal      atorvastatin (LIPITOR) 20 mg tablet Take 1 tablet (20 mg total) by mouth daily, Starting Fri 8/25/2023, Normal      brimonidine tartrate 0.2 % ophthalmic solution INSTILL 1 DROP INTO EACH EYE TWICE DAILY, Historical Med      fluticasone (FLONASE) 50 mcg/act nasal spray 2 sprays into each nostril daily, Starting Mon 9/18/2023, Normal      hydrochlorothiazide (HYDRODIURIL) 12.5 mg tablet Take 1 tablet (12.5 mg total) by mouth daily, Starting Mon 4/17/2023, Normal      losartan (COZAAR) 50 mg tablet Take 1 tablet (50 mg total) by mouth 2 (two) times a day, Starting Mon 4/17/2023, Normal      meloxicam (Mobic) 15 mg tablet Take 1 tablet (15 mg total) by mouth daily, Starting Tue 7/18/2023, Until Wed 11/29/2023, Normal      metoprolol succinate (TOPROL-XL) 25 mg 24 hr tablet Take 1 tablet (25 mg total) by mouth daily, Starting Tue 10/17/2023, Normal      naproxen (NAPROSYN) 500 mg tablet Take 500 mg by mouth 2 (two) times a day with meals, Historical Med      Omega-3 Fatty Acids (Fish Oil) 1200 MG CAPS Take by mouth 2 (two) times a day, Historical Med      pantoprazole (PROTONIX) 20 mg tablet Take 1 tablet (20 mg total) by mouth daily, Starting Mon 7/31/2023, Normal             No discharge procedures on file.    PDMP Review       None            ED Provider  Electronically Signed by             Rubina Dejesus MD  12/19/23 6837

## 2023-12-16 NOTE — ED NOTES
Transport home set up with round trip, pick scheduled for 1930 with ubaldo Burton RN  12/16/23 1523

## 2023-12-19 ENCOUNTER — DOCTOR'S OFFICE (OUTPATIENT)
Dept: URBAN - NONMETROPOLITAN AREA CLINIC 1 | Facility: CLINIC | Age: 68
Setting detail: OPHTHALMOLOGY
End: 2023-12-19
Payer: COMMERCIAL

## 2023-12-19 DIAGNOSIS — H26.492: ICD-10-CM

## 2023-12-19 DIAGNOSIS — H35.3132: ICD-10-CM

## 2023-12-19 DIAGNOSIS — Z96.1: ICD-10-CM

## 2023-12-19 DIAGNOSIS — H40.023: ICD-10-CM

## 2023-12-19 DIAGNOSIS — H43.813: ICD-10-CM

## 2023-12-19 DIAGNOSIS — H26.491: ICD-10-CM

## 2023-12-19 LAB
ATRIAL RATE: 88 BPM
P AXIS: 69 DEGREES
PR INTERVAL: 164 MS
QRS AXIS: 70 DEGREES
QRSD INTERVAL: 100 MS
QT INTERVAL: 370 MS
QTC INTERVAL: 447 MS
T WAVE AXIS: 67 DEGREES
VENTRICULAR RATE: 88 BPM

## 2023-12-19 PROCEDURE — 99024 POSTOP FOLLOW-UP VISIT: CPT | Performed by: OPHTHALMOLOGY

## 2023-12-19 PROCEDURE — 92134 CPTRZ OPH DX IMG PST SGM RTA: CPT | Performed by: OPHTHALMOLOGY

## 2023-12-19 ASSESSMENT — REFRACTION_AUTOREFRACTION
OS_AXIS: 105
OD_CYLINDER: -2.00
OD_AXIS: 083
OS_CYLINDER: -2.00
OS_SPHERE: +0.50
OD_SPHERE: +1.50

## 2023-12-19 ASSESSMENT — REFRACTION_CURRENTRX
OS_SPHERE: -8.00
OS_CYLINDER: -2.50
OS_OVR_VA: 20/
OD_OVR_VA: 20/
OS_AXIS: 101
OD_ADD: +3.00
OD_CYLINDER: -2.25
OD_SPHERE: -8.00
OS_ADD: +3.00
OD_AXIS: 089

## 2023-12-19 ASSESSMENT — CONFRONTATIONAL VISUAL FIELD TEST (CVF)
OD_FINDINGS: FULL
OS_FINDINGS: FULL

## 2023-12-19 ASSESSMENT — SPHEQUIV_DERIVED
OD_SPHEQUIV: 0.125
OS_SPHEQUIV: 0.125
OS_SPHEQUIV: -0.5
OD_SPHEQUIV: 0.5

## 2023-12-19 ASSESSMENT — REFRACTION_MANIFEST
OS_CYLINDER: -1.75
OS_ADD: +2.50
OS_VA2: 20/40
OD_VA2: 20/30-2
OD_SPHERE: +0.75
OS_AXIS: 105
OD_AXIS: 090
OS_VA1: 20/40
OS_SPHERE: +1.00
OD_CYLINDER: -1.25
OD_ADD: +2.50
OD_VA1: 20/30-2

## 2023-12-20 ENCOUNTER — OFFICE VISIT (OUTPATIENT)
Dept: FAMILY MEDICINE CLINIC | Facility: CLINIC | Age: 68
End: 2023-12-20
Payer: COMMERCIAL

## 2023-12-20 VITALS
WEIGHT: 315 LBS | BODY MASS INDEX: 44.1 KG/M2 | DIASTOLIC BLOOD PRESSURE: 70 MMHG | TEMPERATURE: 98.4 F | HEIGHT: 71 IN | OXYGEN SATURATION: 96 % | HEART RATE: 94 BPM | SYSTOLIC BLOOD PRESSURE: 118 MMHG

## 2023-12-20 DIAGNOSIS — J40 BRONCHITIS: Primary | ICD-10-CM

## 2023-12-20 PROCEDURE — 99213 OFFICE O/P EST LOW 20 MIN: CPT | Performed by: FAMILY MEDICINE

## 2023-12-20 NOTE — PROGRESS NOTES
Name: Ean Young      : 1955      MRN: [id number]  Encounter Provider: Myke Bates DO  Encounter Date: 2023   Encounter department: [facility]    Assessment & Plan   He will finish his Zithromax and continue the Mucinex.  Push fluids.  Call us if symptoms continue or increase.  Follow-up here as scheduled or as needed  1. Bronchitis           Subjective     Patient here today for follow-up from an ER visit over the weekend for congestion.  They gave him a Z-Cuco and told him to get Mucinex, which she has been taking.  He has been feeling better.  Starting to bring up mucus.  No fever or chills.  No shortness of breath.  He does have a room humidifier.      Review of Systems   Constitutional: Negative.    Respiratory: Negative.     Cardiovascular: Negative.    Gastrointestinal: Negative.    Genitourinary: Negative.        Past Medical History:   Diagnosis Date   • GERD (gastroesophageal reflux disease)    • HL (hearing loss)    • Hypertension      Past Surgical History:   Procedure Laterality Date   • SHOULDER ARTHROSCOPY Right      Family History   Problem Relation Age of Onset   • Heart disease Mother    • Heart attack Mother    • Prostate cancer Father    • Parkinsonism Maternal Grandmother      Social History     Socioeconomic History   • Marital status: Single     Spouse name: None   • Number of children: None   • Years of education: None   • Highest education level: None   Occupational History   • None   Tobacco Use   • Smoking status: Never     Passive exposure: Never   • Smokeless tobacco: Never   Vaping Use   • Vaping status: Never Used   Substance and Sexual Activity   • Alcohol use: Yes     Comment: rare   • Drug use: Never   • Sexual activity: Not Currently   Other Topics Concern   • None   Social History Narrative   • None     Social Determinants of Health     Financial Resource Strain: Low Risk  (2023)    Overall Financial Resource Strain (CARDIA)    •  Difficulty of Paying Living Expenses: Not hard at all   Food Insecurity: Not on file   Transportation Needs: No Transportation Needs (11/29/2023)    PRAPARE - Transportation    • Lack of Transportation (Medical): No    • Lack of Transportation (Non-Medical): No   Physical Activity: Not on file   Stress: Not on file   Social Connections: Not on file   Intimate Partner Violence: Not on file   Housing Stability: Not on file     Current Outpatient Medications on File Prior to Visit   Medication Sig   • amLODIPine (NORVASC) 5 mg tablet Take 1 tablet (5 mg total) by mouth daily   • atorvastatin (LIPITOR) 20 mg tablet Take 1 tablet (20 mg total) by mouth daily   • azithromycin (ZITHROMAX) 250 mg tablet Take 2 tablets today then 1 tablet daily x 4 days   • brimonidine tartrate 0.2 % ophthalmic solution INSTILL 1 DROP INTO EACH EYE TWICE DAILY   • fluticasone (FLONASE) 50 mcg/act nasal spray 2 sprays into each nostril daily   • hydrochlorothiazide (HYDRODIURIL) 12.5 mg tablet Take 1 tablet (12.5 mg total) by mouth daily   • losartan (COZAAR) 50 mg tablet Take 1 tablet (50 mg total) by mouth 2 (two) times a day   • meloxicam (Mobic) 15 mg tablet Take 1 tablet (15 mg total) by mouth daily   • metoprolol succinate (TOPROL-XL) 25 mg 24 hr tablet Take 1 tablet (25 mg total) by mouth daily   • Omega-3 Fatty Acids (Fish Oil) 1200 MG CAPS Take by mouth 2 (two) times a day   • pantoprazole (PROTONIX) 20 mg tablet Take 1 tablet (20 mg total) by mouth daily   • naproxen (NAPROSYN) 500 mg tablet Take 500 mg by mouth 2 (two) times a day with meals (Patient not taking: Reported on 11/29/2023)     No Known Allergies  Immunization History   Administered Date(s) Administered   • COVID-19 Pfizer Vac BIVALENT Marshall-sucrose 12 Yr+ IM 09/27/2022   • INFLUENZA 08/11/2022, 08/21/2023   • Pneumococcal Conjugate Vaccine 20-valent (Pcv20), Polysace 06/30/2022   • Tdap 08/11/2022       Objective     /70   Pulse 94   Temp 98.4 °F (36.9 °C)   Ht  "5' 11\" (1.803 m)   Wt (!) 155 kg (341 lb)   SpO2 96%   BMI 47.56 kg/m²     Physical Exam  Vitals reviewed.   Constitutional:       General: He is not in acute distress.     Appearance: Normal appearance. He is well-developed. He is not ill-appearing, toxic-appearing or diaphoretic.   HENT:      Head: Normocephalic and atraumatic.   Eyes:      Conjunctiva/sclera: Conjunctivae normal.   Cardiovascular:      Rate and Rhythm: Normal rate and regular rhythm.      Heart sounds: Normal heart sounds. No murmur heard.     No friction rub. No gallop.   Pulmonary:      Effort: Pulmonary effort is normal. No respiratory distress.      Breath sounds: Normal breath sounds. No wheezing, rhonchi or rales.   Musculoskeletal:      Right lower leg: No edema.      Left lower leg: No edema.   Neurological:      General: No focal deficit present.      Mental Status: He is alert and oriented to person, place, and time.   Psychiatric:         Mood and Affect: Mood normal.         Behavior: Behavior normal.         Thought Content: Thought content normal.         Judgment: Judgment normal.       Myke Bates, DO    "

## 2024-01-05 ENCOUNTER — OFFICE VISIT (OUTPATIENT)
Dept: AUDIOLOGY | Facility: CLINIC | Age: 69
End: 2024-01-05
Payer: COMMERCIAL

## 2024-01-05 DIAGNOSIS — H90.3 SENSORY HEARING LOSS, BILATERAL: Primary | ICD-10-CM

## 2024-01-05 NOTE — PROGRESS NOTES
Progress Note    Name:  Ean Young  :  1955  Age:  68 y.o.  MRN:  [id number]  Date of Evaluation: 24     Patient is fit with O[employer]on More 1 miniRITE-R hearing aid(s).   Right serial number [id number]. Left serial number [id number].   Warranty date: unknown (Loss/Damage and repair).   Dome/Earmold: 3-85   : Right 10mm DB / Left 10mm DB      Patient picked up four packs of wax guards and four packs of 10mm DB domes, paid $40.     Jennifer Ha, CCC-A  Clinical Audiologist  [facility] [place]

## 2024-01-08 DIAGNOSIS — R09.81 SINUS CONGESTION: ICD-10-CM

## 2024-01-08 RX ORDER — FLUTICASONE PROPIONATE 50 MCG
2 SPRAY, SUSPENSION (ML) NASAL DAILY
Qty: 16 G | Refills: 3 | Status: SHIPPED | OUTPATIENT
Start: 2024-01-08

## 2024-01-25 LAB — COLOGUARD RESULT REPORTABLE: NEGATIVE

## 2024-02-16 DIAGNOSIS — I10 ESSENTIAL HYPERTENSION: ICD-10-CM

## 2024-02-16 RX ORDER — AMLODIPINE BESYLATE 5 MG/1
5 TABLET ORAL DAILY
Qty: 90 TABLET | Refills: 3 | Status: SHIPPED | OUTPATIENT
Start: 2024-02-16

## 2024-02-26 ENCOUNTER — TELEPHONE (OUTPATIENT)
Dept: UROLOGY | Facility: CLINIC | Age: 69
End: 2024-02-26

## 2024-02-26 DIAGNOSIS — R35.1 BENIGN PROSTATIC HYPERPLASIA WITH NOCTURIA: Primary | ICD-10-CM

## 2024-02-26 DIAGNOSIS — N40.1 BENIGN PROSTATIC HYPERPLASIA WITH NOCTURIA: Primary | ICD-10-CM

## 2024-02-26 RX ORDER — TAMSULOSIN HYDROCHLORIDE 0.4 MG/1
0.4 CAPSULE ORAL
Qty: 90 CAPSULE | Refills: 3 | Status: SHIPPED | OUTPATIENT
Start: 2024-02-26

## 2024-03-24 DIAGNOSIS — I10 ESSENTIAL HYPERTENSION: ICD-10-CM

## 2024-03-25 RX ORDER — HYDROCHLOROTHIAZIDE 12.5 MG/1
12.5 TABLET ORAL DAILY
Qty: 90 TABLET | Refills: 3 | Status: SHIPPED | OUTPATIENT
Start: 2024-03-25

## 2024-04-01 DIAGNOSIS — I10 ESSENTIAL HYPERTENSION: ICD-10-CM

## 2024-04-01 RX ORDER — LOSARTAN POTASSIUM 50 MG/1
50 TABLET ORAL 2 TIMES DAILY
Qty: 180 TABLET | Refills: 3 | Status: SHIPPED | OUTPATIENT
Start: 2024-04-01

## 2024-04-08 ENCOUNTER — APPOINTMENT (OUTPATIENT)
Dept: LAB | Facility: MEDICAL CENTER | Age: 69
End: 2024-04-08
Payer: COMMERCIAL

## 2024-04-08 DIAGNOSIS — E78.5 DYSLIPIDEMIA: ICD-10-CM

## 2024-04-08 LAB
ALBUMIN SERPL BCP-MCNC: 4 G/DL (ref 3.5–5)
ALP SERPL-CCNC: 64 U/L (ref 34–104)
ALT SERPL W P-5'-P-CCNC: 33 U/L (ref 7–52)
ANION GAP SERPL CALCULATED.3IONS-SCNC: 7 MMOL/L (ref 4–13)
AST SERPL W P-5'-P-CCNC: 22 U/L (ref 13–39)
BILIRUB SERPL-MCNC: 0.55 MG/DL (ref 0.2–1)
BUN SERPL-MCNC: 18 MG/DL (ref 5–25)
CALCIUM SERPL-MCNC: 8.7 MG/DL (ref 8.4–10.2)
CHLORIDE SERPL-SCNC: 100 MMOL/L (ref 96–108)
CHOLEST SERPL-MCNC: 169 MG/DL
CO2 SERPL-SCNC: 31 MMOL/L (ref 21–32)
CREAT SERPL-MCNC: 0.67 MG/DL (ref 0.6–1.3)
EST. AVERAGE GLUCOSE BLD GHB EST-MCNC: 174 MG/DL
GFR SERPL CREATININE-BSD FRML MDRD: 99 ML/MIN/1.73SQ M
GLUCOSE P FAST SERPL-MCNC: 151 MG/DL (ref 65–99)
HBA1C MFR BLD: 7.7 %
HDLC SERPL-MCNC: 34 MG/DL
LDLC SERPL CALC-MCNC: 82 MG/DL (ref 0–100)
POTASSIUM SERPL-SCNC: 4 MMOL/L (ref 3.5–5.3)
PROT SERPL-MCNC: 6.6 G/DL (ref 6.4–8.4)
SODIUM SERPL-SCNC: 138 MMOL/L (ref 135–147)
TRIGL SERPL-MCNC: 267 MG/DL
TSH SERPL DL<=0.05 MIU/L-ACNC: 2.73 UIU/ML (ref 0.45–4.5)

## 2024-04-08 PROCEDURE — 36415 COLL VENOUS BLD VENIPUNCTURE: CPT

## 2024-04-08 PROCEDURE — 80053 COMPREHEN METABOLIC PANEL: CPT

## 2024-04-08 PROCEDURE — 80061 LIPID PANEL: CPT

## 2024-04-09 ENCOUNTER — TELEPHONE (OUTPATIENT)
Dept: CARDIOLOGY CLINIC | Facility: CLINIC | Age: 69
End: 2024-04-09

## 2024-04-09 ENCOUNTER — TELEPHONE (OUTPATIENT)
Age: 69
End: 2024-04-09

## 2024-04-09 DIAGNOSIS — E11.9 TYPE 2 DIABETES MELLITUS WITHOUT COMPLICATION, WITHOUT LONG-TERM CURRENT USE OF INSULIN (HCC): Primary | ICD-10-CM

## 2024-04-09 DIAGNOSIS — I10 ESSENTIAL HYPERTENSION: ICD-10-CM

## 2024-04-09 RX ORDER — METOPROLOL SUCCINATE 25 MG/1
25 TABLET, EXTENDED RELEASE ORAL DAILY
Qty: 90 TABLET | Refills: 1 | Status: SHIPPED | OUTPATIENT
Start: 2024-04-09

## 2024-04-09 NOTE — TELEPHONE ENCOUNTER
Called patient from recall list to schedule a follow up appt with Dr Whitehead.  I left a message on his phone to call the office to set up a n appt

## 2024-04-09 NOTE — TELEPHONE ENCOUNTER
Ean called to set up his next office visit but he wanted me to let you know he had bloodwork done for Dr Bates and he is now a diabetic.

## 2024-04-15 DIAGNOSIS — I10 ESSENTIAL HYPERTENSION: ICD-10-CM

## 2024-04-15 RX ORDER — ATORVASTATIN CALCIUM 20 MG/1
20 TABLET, FILM COATED ORAL DAILY
Qty: 90 TABLET | Refills: 1 | Status: SHIPPED | OUTPATIENT
Start: 2024-04-15

## 2024-04-24 DIAGNOSIS — R09.81 SINUS CONGESTION: ICD-10-CM

## 2024-04-24 RX ORDER — FLUTICASONE PROPIONATE 50 MCG
2 SPRAY, SUSPENSION (ML) NASAL DAILY
Qty: 16 G | Refills: 5 | Status: SHIPPED | OUTPATIENT
Start: 2024-04-24

## 2024-04-29 ENCOUNTER — DOCTOR'S OFFICE (OUTPATIENT)
Dept: URBAN - NONMETROPOLITAN AREA CLINIC 1 | Facility: CLINIC | Age: 69
Setting detail: OPHTHALMOLOGY
End: 2024-04-29
Payer: COMMERCIAL

## 2024-04-29 DIAGNOSIS — H52.223: ICD-10-CM

## 2024-04-29 DIAGNOSIS — H52.4: ICD-10-CM

## 2024-04-29 PROCEDURE — 92012 INTRM OPH EXAM EST PATIENT: CPT | Performed by: OPTOMETRIST

## 2024-04-29 PROCEDURE — 92015 DETERMINE REFRACTIVE STATE: CPT | Performed by: OPTOMETRIST

## 2024-05-03 ENCOUNTER — OPTICAL OFFICE (OUTPATIENT)
Dept: URBAN - NONMETROPOLITAN AREA CLINIC 4 | Facility: CLINIC | Age: 69
Setting detail: OPHTHALMOLOGY
End: 2024-05-03
Payer: COMMERCIAL

## 2024-05-03 DIAGNOSIS — H52.223: ICD-10-CM

## 2024-05-03 PROCEDURE — V2784 LENS POLYCARB OR EQUAL: HCPCS | Mod: LT | Performed by: OPTOMETRIST

## 2024-05-03 PROCEDURE — V2744 TINT PHOTOCHROMATIC LENS/ES: HCPCS | Mod: LT | Performed by: OPTOMETRIST

## 2024-05-03 PROCEDURE — V2799 MISC VISION ITEM OR SERVICE: HCPCS | Performed by: OPTOMETRIST

## 2024-05-03 PROCEDURE — V2203 LENS SPHCYL BIFOCAL 4.00D/.1: HCPCS | Performed by: OPTOMETRIST

## 2024-05-03 PROCEDURE — V2750 ANTI-REFLECTIVE COATING: HCPCS | Performed by: OPTOMETRIST

## 2024-05-03 PROCEDURE — V2744 TINT PHOTOCHROMATIC LENS/ES: HCPCS | Performed by: OPTOMETRIST

## 2024-05-03 PROCEDURE — V2203 LENS SPHCYL BIFOCAL 4.00D/.1: HCPCS | Mod: LT | Performed by: OPTOMETRIST

## 2024-05-03 PROCEDURE — V2750 ANTI-REFLECTIVE COATING: HCPCS | Mod: LT | Performed by: OPTOMETRIST

## 2024-05-03 PROCEDURE — V2020 VISION SVCS FRAMES PURCHASES: HCPCS | Performed by: OPTOMETRIST

## 2024-05-03 PROCEDURE — V2784 LENS POLYCARB OR EQUAL: HCPCS | Performed by: OPTOMETRIST

## 2024-05-03 PROCEDURE — V2799 MISC VISION ITEM OR SERVICE: HCPCS | Mod: LT | Performed by: OPTOMETRIST

## 2024-05-13 ENCOUNTER — OFFICE VISIT (OUTPATIENT)
Dept: FAMILY MEDICINE CLINIC | Facility: CLINIC | Age: 69
End: 2024-05-13
Payer: COMMERCIAL

## 2024-05-13 VITALS
WEIGHT: 315 LBS | HEIGHT: 71 IN | DIASTOLIC BLOOD PRESSURE: 84 MMHG | TEMPERATURE: 98 F | BODY MASS INDEX: 44.1 KG/M2 | HEART RATE: 91 BPM | OXYGEN SATURATION: 95 % | SYSTOLIC BLOOD PRESSURE: 148 MMHG

## 2024-05-13 DIAGNOSIS — G47.33 OBSTRUCTIVE SLEEP APNEA: ICD-10-CM

## 2024-05-13 DIAGNOSIS — I35.0 MODERATE AORTIC STENOSIS: ICD-10-CM

## 2024-05-13 DIAGNOSIS — I10 ESSENTIAL HYPERTENSION: ICD-10-CM

## 2024-05-13 DIAGNOSIS — E11.9 TYPE 2 DIABETES MELLITUS WITHOUT COMPLICATION, WITHOUT LONG-TERM CURRENT USE OF INSULIN (HCC): Primary | ICD-10-CM

## 2024-05-13 PROBLEM — K21.9 LARYNGOPHARYNGEAL REFLUX (LPR): Status: ACTIVE | Noted: 2024-05-03

## 2024-05-13 LAB
LEFT EYE DIABETIC RETINOPATHY: ABNORMAL
LEFT EYE IMAGE QUALITY: ABNORMAL
LEFT EYE MACULAR EDEMA: ABNORMAL
LEFT EYE OTHER RETINOPATHY: ABNORMAL
RIGHT EYE DIABETIC RETINOPATHY: ABNORMAL
RIGHT EYE IMAGE QUALITY: ABNORMAL
RIGHT EYE MACULAR EDEMA: ABNORMAL
RIGHT EYE OTHER RETINOPATHY: ABNORMAL
SEVERITY (EYE EXAM): ABNORMAL

## 2024-05-13 PROCEDURE — G2211 COMPLEX E/M VISIT ADD ON: HCPCS | Performed by: FAMILY MEDICINE

## 2024-05-13 PROCEDURE — 99214 OFFICE O/P EST MOD 30 MIN: CPT | Performed by: FAMILY MEDICINE

## 2024-05-13 RX ORDER — FAMOTIDINE 20 MG/1
TABLET, FILM COATED ORAL
COMMUNITY

## 2024-05-13 RX ORDER — AZELASTINE 1 MG/ML
2 SPRAY, METERED NASAL 2 TIMES DAILY
COMMUNITY
Start: 2024-04-18

## 2024-05-13 NOTE — PROGRESS NOTES
Name: Ean Young      : 1955      MRN: [id number]  Encounter Provider: Myke Bates DO  Encounter Date: 2024   Encounter department: [facility]    Assessment & Plan   Oral to diabetic education.  Continue to watch diet.  Follow-up with specialist as scheduled.  CPAP supplies ordered.  Follow-up in 4 months or as needed.  1. Type 2 diabetes mellitus without complication, without long-term current use of insulin ([facility])  -     [facility] Diabetic eye exam  -     Ambulatory referral to Diabetic Education - use to refer for diabetes group classes, individual diabetes education, medical nutrition therapy, device training; Future; Expected date: 2024    2. Essential hypertension    3. Moderate aortic stenosis    4. Obstructive sleep apnea        Depression Screening and Follow-up Plan: Patient was screened for depression during today's encounter. They screened negative with a PHQ-2 score of 0.        Subjective     Patient here today for follow up. Recent labs shows he is diabetic. Tolerating Jardiance well. Interested in diabetic education. Is going to follow up with ENT tomorrow and will be getting an echocardiogram and following up with cardiology in .  Interested in diabetic classes for his diabetes.  Also needs resupply for his CPAP machine.      Review of Systems   Constitutional: Negative.    Respiratory: Negative.     Cardiovascular: Negative.    Gastrointestinal: Negative.    Genitourinary: Negative.        Past Medical History:   Diagnosis Date   • GERD (gastroesophageal reflux disease)    • HL (hearing loss)    • Hypertension      Past Surgical History:   Procedure Laterality Date   • SHOULDER ARTHROSCOPY Right      Family History   Problem Relation Age of Onset   • Heart disease Mother    • Heart attack Mother    • Prostate cancer Father    • Parkinsonism Maternal Grandmother      Social History     Socioeconomic History   • Marital status: Single     Spouse name: None   •  Number of children: None   • Years of education: None   • Highest education level: None   Occupational History   • None   Tobacco Use   • Smoking status: Never     Passive exposure: Never   • Smokeless tobacco: Never   Vaping Use   • Vaping status: Never Used   Substance and Sexual Activity   • Alcohol use: Yes     Comment: rare   • Drug use: Never   • Sexual activity: Not Currently   Other Topics Concern   • None   Social History Narrative   • None     Social Determinants of Health     Financial Resource Strain: Low Risk  (11/29/2023)    Overall Financial Resource Strain (CARDIA)    • Difficulty of Paying Living Expenses: Not hard at all   Food Insecurity: Not on file   Transportation Needs: No Transportation Needs (11/29/2023)    PRAPARE - Transportation    • Lack of Transportation (Medical): No    • Lack of Transportation (Non-Medical): No   Physical Activity: Not on file   Stress: Not on file   Social Connections: Not on file   Intimate Partner Violence: Not on file   Housing Stability: Not on file     Current Outpatient Medications on File Prior to Visit   Medication Sig   • amLODIPine (NORVASC) 5 mg tablet Take 1 tablet (5 mg total) by mouth daily   • atorvastatin (LIPITOR) 20 mg tablet Take 1 tablet (20 mg total) by mouth daily   • azelastine (ASTELIN) 0.1 % nasal spray 2 sprays into each nostril 2 (two) times a day   • brimonidine tartrate 0.2 % ophthalmic solution INSTILL 1 DROP INTO EACH EYE TWICE DAILY   • Empagliflozin (JARDIANCE) 10 MG TABS tablet Take 1 tablet (10 mg total) by mouth daily   • famotidine (PEPCID) 20 mg tablet take 1 tablet by mouth every morning and BEFORE BEDTIME   • hydroCHLOROthiazide 12.5 mg tablet take 1 tablet by mouth once daily   • losartan (COZAAR) 50 mg tablet Take 1 tablet (50 mg total) by mouth 2 (two) times a day   • meloxicam (Mobic) 15 mg tablet Take 1 tablet (15 mg total) by mouth daily   • metoprolol succinate (TOPROL-XL) 25 mg 24 hr tablet Take 1 tablet (25 mg total)  "by mouth daily   • Omega-3 Fatty Acids (Fish Oil) 1200 MG CAPS Take by mouth 2 (two) times a day   • pantoprazole (PROTONIX) 20 mg tablet Take 1 tablet (20 mg total) by mouth daily   • tamsulosin (FLOMAX) 0.4 mg Take 1 capsule (0.4 mg total) by mouth daily with dinner   • [DISCONTINUED] fluticasone (FLONASE) 50 mcg/act nasal spray 2 sprays into each nostril daily (Patient not taking: Reported on 5/13/2024)   • [DISCONTINUED] naproxen (NAPROSYN) 500 mg tablet Take 500 mg by mouth 2 (two) times a day with meals (Patient not taking: Reported on 11/29/2023)     No Known Allergies  Immunization History   Administered Date(s) Administered   • COVID-19 PFIZER VACCINE 0.3 ML IM 03/06/2021, 03/27/2021   • COVID-19 Pfizer Vac BIVALENT Marshall-sucrose 12 Yr+ IM 09/27/2022   • INFLUENZA 08/11/2022, 08/21/2023   • Pneumococcal Conjugate Vaccine 20-valent (Pcv20), Polysace 06/30/2022   • Tdap 08/11/2022       Objective     /84   Pulse 91   Temp 98 °F (36.7 °C)   Ht 5' 11\" (1.803 m)   Wt (!) 154 kg (339 lb 12.8 oz)   SpO2 95%   BMI 47.39 kg/m²     Physical Exam  Vitals reviewed.   Constitutional:       General: He is not in acute distress.     Appearance: Normal appearance. He is well-developed. He is not ill-appearing, toxic-appearing or diaphoretic.   HENT:      Head: Normocephalic and atraumatic.   Eyes:      Conjunctiva/sclera: Conjunctivae normal.   Cardiovascular:      Rate and Rhythm: Normal rate and regular rhythm.      Pulses: no weak pulses.           Dorsalis pedis pulses are 2+ on the right side and 2+ on the left side.        Posterior tibial pulses are 2+ on the right side and 2+ on the left side.      Heart sounds: Normal heart sounds. No murmur heard.     No friction rub. No gallop.   Pulmonary:      Effort: Pulmonary effort is normal. No respiratory distress.      Breath sounds: Normal breath sounds. No wheezing, rhonchi or rales.   Musculoskeletal:      Right lower leg: No edema.      Left lower leg: No " edema.   Feet:      Right foot:      Skin integrity: No ulcer, skin breakdown, erythema, warmth, callus or dry skin.      Left foot:      Skin integrity: No ulcer, skin breakdown, erythema, warmth, callus or dry skin.   Neurological:      General: No focal deficit present.      Mental Status: He is alert and oriented to person, place, and time.   Psychiatric:         Mood and Affect: Mood normal.         Behavior: Behavior normal.         Thought Content: Thought content normal.         Judgment: Judgment normal.     Diabetic Foot Exam    Patient's shoes and socks removed.    Right Foot/Ankle   Right Foot Inspection  Skin Exam: skin normal and skin intact. No dry skin, no warmth, no callus, no erythema, no maceration, no abnormal color, no pre-ulcer, no ulcer and no callus.     Sensory   Monofilament testing: intact    Vascular  The right DP pulse is 2+. The right PT pulse is 2+.     Left Foot/Ankle  Left Foot Inspection  Skin Exam: skin normal and skin intact. No dry skin, no warmth, no erythema, no maceration, normal color, no pre-ulcer, no ulcer and no callus.     Sensory   Monofilament testing: intact    Vascular  The left DP pulse is 2+. The left PT pulse is 2+.     Assign Risk Category  No deformity present  No loss of protective sensation  No weak pulses  Risk: 0    Myke Bates DO

## 2024-05-14 LAB
DME PARACHUTE DELIVERY DATE ACTUAL: NORMAL
DME PARACHUTE DELIVERY DATE REQUESTED: NORMAL
DME PARACHUTE ITEM DESCRIPTION: NORMAL
DME PARACHUTE ORDER STATUS: NORMAL
DME PARACHUTE SUPPLIER NAME: NORMAL
DME PARACHUTE SUPPLIER PHONE: NORMAL

## 2024-05-23 ENCOUNTER — HOSPITAL ENCOUNTER (OUTPATIENT)
Dept: NON INVASIVE DIAGNOSTICS | Facility: CLINIC | Age: 69
Discharge: HOME | End: 2024-05-23

## 2024-05-23 DIAGNOSIS — I35.0 MODERATE AORTIC STENOSIS: ICD-10-CM

## 2024-05-24 ENCOUNTER — HOSPITAL ENCOUNTER (OUTPATIENT)
Dept: NON INVASIVE DIAGNOSTICS | Facility: HOSPITAL | Age: 69
Discharge: HOME | End: 2024-05-24
Attending: INTERNAL MEDICINE
Payer: COMMERCIAL

## 2024-05-24 VITALS
BODY MASS INDEX: 44.1 KG/M2 | DIASTOLIC BLOOD PRESSURE: 84 MMHG | SYSTOLIC BLOOD PRESSURE: 148 MMHG | HEIGHT: 71 IN | HEART RATE: 86 BPM | WEIGHT: 315 LBS

## 2024-05-24 LAB
AORTIC ROOT: 3.7 CM
AORTIC VALVE MEAN VELOCITY: 25.8 M/S
APICAL FOUR CHAMBER EJECTION FRACTION: 62 %
AV AREA BY CONTINUOUS VTI: 0.9 CM2
AV AREA PEAK VELOCITY: 1 CM2
AV LVOT MEAN GRADIENT: 2 MMHG
AV LVOT PEAK GRADIENT: 4 MMHG
AV MEAN GRADIENT: 29 MMHG
AV PEAK GRADIENT: 49 MMHG
AV VALVE AREA: 1.09 CM2
AV VELOCITY RATIO: 0.29
BSA FOR ECHO PROCEDURE: 2.64 M2
DOP CALC AO PEAK VEL: 3.5 M/S
DOP CALC AO VTI: 85.84 CM
DOP CALC LVOT AREA: 4.15 CM2
DOP CALC LVOT CARDIAC INDEX: 2.11 L/MIN/M2
DOP CALC LVOT CARDIAC OUTPUT: 5.57 L/MIN
DOP CALC LVOT DIAMETER: 2.3 CM
DOP CALC LVOT PEAK VEL VTI: 22.56 CM
DOP CALC LVOT PEAK VEL: 1 M/S
DOP CALC LVOT STROKE INDEX: 29.5 ML/M2
DOP CALC LVOT STROKE VOLUME: 93.68 CM3
E WAVE DECELERATION TIME: 241 MS
E/A RATIO: 0.72
FRACTIONAL SHORTENING: 37 (ref 28–44)
INTERVENTRICULAR SEPTUM IN DIASTOLE (PARASTERNAL SHORT AXIS VIEW): 1.4 CM
INTERVENTRICULAR SEPTUM: 1.4 CM (ref 0.6–1.1)
LAAS-AP2: 23.5 CM2
LAAS-AP4: 22.4 CM2
LEFT ATRIUM SIZE: 4.6 CM
LEFT ATRIUM VOLUME (MOD BIPLANE): 75 ML
LEFT ATRIUM VOLUME INDEX (MOD BIPLANE): 28.4 ML/M2
LEFT INTERNAL DIMENSION IN SYSTOLE: 3.3 CM (ref 2.1–4)
LEFT VENTRICULAR INTERNAL DIMENSION IN DIASTOLE: 5.2 CM (ref 3.5–6)
LEFT VENTRICULAR POSTERIOR WALL IN END DIASTOLE: 1.3 CM
LEFT VENTRICULAR STROKE VOLUME: 82 ML
LVSV (TEICH): 82 ML
MV E'TISSUE VEL-LAT: 9 CM/S
MV E'TISSUE VEL-SEP: 7 CM/S
MV PEAK A VEL: 0.87 M/S
MV PEAK E VEL: 63 CM/S
MV STENOSIS PRESSURE HALF TIME: 70 MS
MV VALVE AREA P 1/2 METHOD: 3.14
PULM VEIN S/D RATIO: 0.88
PV PEAK D VEL: 0.17 M/S
PV PEAK S VEL: 0.15 M/S
RIGHT ATRIUM AREA SYSTOLE A4C: 21.4 CM2
RIGHT VENTRICLE ID DIMENSION: 4 CM
SL CV LEFT ATRIUM LENGTH A2C: 5.5 CM
SL CV LV EF: 60
SL CV PED ECHO LEFT VENTRICLE DIASTOLIC VOLUME (MOD BIPLANE) 2D: 127 ML
SL CV PED ECHO LEFT VENTRICLE SYSTOLIC VOLUME (MOD BIPLANE) 2D: 45 ML
TRICUSPID ANNULAR PLANE SYSTOLIC EXCURSION: 3 CM
TRICUSPID VALVE PEAK E WAVE VELOCITY: 0.12 M/S
TRICUSPID VALVE PEAK REGURGITATION VELOCITY: 1.69 M/S

## 2024-05-24 PROCEDURE — 93306 TTE W/DOPPLER COMPLETE: CPT | Performed by: INTERNAL MEDICINE

## 2024-05-24 PROCEDURE — 93306 TTE W/DOPPLER COMPLETE: CPT

## 2024-06-03 ENCOUNTER — OFFICE VISIT (OUTPATIENT)
Dept: CARDIOLOGY CLINIC | Facility: CLINIC | Age: 69
End: 2024-06-03
Payer: COMMERCIAL

## 2024-06-03 ENCOUNTER — APPOINTMENT (OUTPATIENT)
Dept: LAB | Facility: MEDICAL CENTER | Age: 69
End: 2024-06-03
Payer: COMMERCIAL

## 2024-06-03 VITALS
BODY MASS INDEX: 45.1 KG/M2 | HEART RATE: 80 BPM | OXYGEN SATURATION: 95 % | WEIGHT: 315 LBS | HEIGHT: 70 IN | DIASTOLIC BLOOD PRESSURE: 84 MMHG | SYSTOLIC BLOOD PRESSURE: 134 MMHG | RESPIRATION RATE: 18 BRPM

## 2024-06-03 DIAGNOSIS — Z01.810 PREOP CARDIOVASCULAR EXAM: ICD-10-CM

## 2024-06-03 DIAGNOSIS — E78.5 DYSLIPIDEMIA: ICD-10-CM

## 2024-06-03 DIAGNOSIS — Z01.818 PREOP EXAMINATION: ICD-10-CM

## 2024-06-03 DIAGNOSIS — E66.01 CLASS 3 SEVERE OBESITY DUE TO EXCESS CALORIES WITH BODY MASS INDEX (BMI) OF 45.0 TO 49.9 IN ADULT, UNSPECIFIED WHETHER SERIOUS COMORBIDITY PRESENT (HCC): ICD-10-CM

## 2024-06-03 DIAGNOSIS — G47.33 OBSTRUCTIVE SLEEP APNEA: ICD-10-CM

## 2024-06-03 DIAGNOSIS — I35.0 MODERATE AORTIC STENOSIS: Primary | ICD-10-CM

## 2024-06-03 DIAGNOSIS — I10 ESSENTIAL HYPERTENSION: ICD-10-CM

## 2024-06-03 LAB
APTT PPP: 34 SECONDS (ref 23–37)
ERYTHROCYTE [DISTWIDTH] IN BLOOD BY AUTOMATED COUNT: 13.2 % (ref 11.6–15.1)
HCT VFR BLD AUTO: 42.2 % (ref 36.5–49.3)
HGB BLD-MCNC: 14.5 G/DL (ref 12–17)
INR PPP: 1.04 (ref 0.84–1.19)
MCH RBC QN AUTO: 31.1 PG (ref 26.8–34.3)
MCHC RBC AUTO-ENTMCNC: 34.4 G/DL (ref 31.4–37.4)
MCV RBC AUTO: 91 FL (ref 82–98)
PLATELET # BLD AUTO: 284 THOUSANDS/UL (ref 149–390)
PMV BLD AUTO: 10.4 FL (ref 8.9–12.7)
PROTHROMBIN TIME: 13.5 SECONDS (ref 11.6–14.5)
RBC # BLD AUTO: 4.66 MILLION/UL (ref 3.88–5.62)
WBC # BLD AUTO: 6.8 THOUSAND/UL (ref 4.31–10.16)

## 2024-06-03 PROCEDURE — 85027 COMPLETE CBC AUTOMATED: CPT

## 2024-06-03 PROCEDURE — 36415 COLL VENOUS BLD VENIPUNCTURE: CPT

## 2024-06-03 PROCEDURE — 99214 OFFICE O/P EST MOD 30 MIN: CPT | Performed by: INTERNAL MEDICINE

## 2024-06-03 PROCEDURE — 93000 ELECTROCARDIOGRAM COMPLETE: CPT | Performed by: INTERNAL MEDICINE

## 2024-06-03 PROCEDURE — 85730 THROMBOPLASTIN TIME PARTIAL: CPT

## 2024-06-03 PROCEDURE — 85610 PROTHROMBIN TIME: CPT

## 2024-06-03 RX ORDER — PANTOPRAZOLE SODIUM 40 MG/1
40 TABLET, DELAYED RELEASE ORAL EVERY MORNING
COMMUNITY
Start: 2024-03-20

## 2024-06-03 RX ORDER — ATORVASTATIN CALCIUM 40 MG/1
40 TABLET, FILM COATED ORAL DAILY
Qty: 90 TABLET | Refills: 2 | Status: SHIPPED | OUTPATIENT
Start: 2024-06-03

## 2024-06-03 NOTE — PROGRESS NOTES
Cardiology Follow up    Ean Young  [id number]  1955  [facility] [address] [address]      1. Moderate aortic stenosis        2. Essential hypertension  atorvastatin (LIPITOR) 40 mg tablet      3. Dyslipidemia  Comprehensive metabolic panel    Lipid Panel with Direct LDL reflex      4. Obstructive sleep apnea        5. Class 3 severe obesity due to excess calories with body mass index (BMI) of 45.0 to 49.9 in adult, unspecified whether serious comorbidity present (HCC)        6. Preop cardiovascular exam  POCT ECG          Discussion/Summary:  1.  Moderate aortic stenosis  2.  Hypertension  3.  Hyperlipidemia  4.  Obesity  5.  Obstructive sleep apnea compliant with CPAP therapy  6.  Perioperative risk stratification    -ECG performed in the office today shows sinus rhythm heart rate 82 bpm  -Pharmacologic nuclear stress test 12/30/2022 showing diaphragmatic attenuation but no diagnostic evidence of ischemia or infarction  -Transthoracic echocardiogram 5/24/2024 showing left-ventricular systolic function normal estimated LVEF 60% with grade 1 diastolic dysfunction, normal right ventricular systolic function with mildly dilated left and right atrium with moderate aortic stenosis  -Lipid panel 4/8/2024 showing total cholesterol 169, triglyceride 267, HDL 34, LDL 82  -Patient will continue amlodipine 5 mg daily, losartan 50 mg twice daily, metoprolol succinate 25 mg daily, hydrochlorothiazide 12.5 mg daily  -Patient currently on Jardiance 10 mg daily by primary care physician  -Patient will increase atorvastatin to 40 mg daily  -Patient counseled on dietary and lifestyle modifications including following a low-salt, low-fat, heart healthy diet with sodium restriction to less than 1800 mg of sodium daily and fluid restriction to less than 2000 mL of fluid daily  along with weight reduction with goal BMI less than 29 and continued compliance with CPAP therapy  -According to revised cardiac risk index patient is intermediate risk for planned operative procedure.  Patient fully understands and is accepting of these risks and wishes to proceed with laryngoscopy, esophagoscopy and bronchoscopy as planned.  In that setting along with unrevealing stress testing and no significant symptoms I saw   Understand that  -I will see patient in 6 months or sooner if necessary  -Will repeat CMP and fasting lipid panel prior to next office visit  -Patient counseled if he were to have any warning or alarm type symptoms he is to seek emergency medical care immediately.      History of Present Illness:  -Patient is a 69-year-old male with hypertension, hyperlipidemia, obesity, obstructive sleep apnea compliant with CPAP therapy who initially presented to the office in December 2022 after referral from primary care physician for newly discovered murmur.  Patient has been seen by cardiology several years prior to that in [place] and has been recommended to undergo stress testing at that time but was unable to do secondary to insurance issues.  He presents to the office today for scheduled follow-up.  -Currently in the office today patient denies any chest pain, palpitations, lightness or dizziness, loss of consciousness, shortness of breath, lower extremity edema, orthopnea or bendopnea.  He notes blood pressures at home are well-controlled and he is increasing his physical activity.    Patient Active Problem List   Diagnosis    Essential hypertension    Dyslipidemia    Gastroesophageal reflux disease without esophagitis    Obstructive sleep apnea    Urinary frequency    Type 2 diabetes mellitus without complication, without long-term current use of insulin (HCC)    Decreased hearing of both ears    Moderate aortic stenosis    Cervical radiculopathy    Cervical spondylosis    Bilateral  carpal tunnel syndrome    Laryngopharyngeal reflux (LPR)     Past Medical History:   Diagnosis Date    Dyslipidemia     GERD (gastroesophageal reflux disease)     HL (hearing loss)     Hypertension     Moderate aortic stenosis     Obstructive sleep apnea      Social History     Socioeconomic History    Marital status: Single     Spouse name: Not on file    Number of children: Not on file    Years of education: Not on file    Highest education level: Not on file   Occupational History    Not on file   Tobacco Use    Smoking status: Never     Passive exposure: Never    Smokeless tobacco: Never   Vaping Use    Vaping status: Never Used   Substance and Sexual Activity    Alcohol use: Yes     Comment: rare    Drug use: Never    Sexual activity: Not Currently   Other Topics Concern    Not on file   Social History Narrative    Not on file     Social Determinants of Health     Financial Resource Strain: Low Risk  (11/29/2023)    Overall Financial Resource Strain (CARDIA)     Difficulty of Paying Living Expenses: Not hard at all   Food Insecurity: Not on file   Transportation Needs: No Transportation Needs (11/29/2023)    PRAPARE - Transportation     Lack of Transportation (Medical): No     Lack of Transportation (Non-Medical): No   Physical Activity: Not on file   Stress: Not on file   Social Connections: Not on file   Intimate Partner Violence: Not on file   Housing Stability: Not on file      Family History   Problem Relation Age of Onset    Heart disease Mother     Heart attack Mother     Prostate cancer Father     Parkinsonism Maternal Grandmother      Past Surgical History:   Procedure Laterality Date    SHOULDER ARTHROSCOPY Right        Current Outpatient Medications:     amLODIPine (NORVASC) 5 mg tablet, Take 1 tablet (5 mg total) by mouth daily, Disp: 90 tablet, Rfl: 3    azelastine (ASTELIN) 0.1 % nasal spray, 2 sprays into each nostril 2 (two) times a day, Disp: , Rfl:     brimonidine tartrate 0.2 % ophthalmic  solution, INSTILL 1 DROP INTO EACH EYE TWICE DAILY, Disp: , Rfl:     Empagliflozin (JARDIANCE) 10 MG TABS tablet, Take 1 tablet (10 mg total) by mouth daily, Disp: 30 tablet, Rfl: 5    famotidine (PEPCID) 20 mg tablet, take 1 tablet by mouth every morning and BEFORE BEDTIME, Disp: , Rfl:     hydroCHLOROthiazide 12.5 mg tablet, take 1 tablet by mouth once daily, Disp: 90 tablet, Rfl: 3    losartan (COZAAR) 50 mg tablet, Take 1 tablet (50 mg total) by mouth 2 (two) times a day, Disp: 180 tablet, Rfl: 3    metoprolol succinate (TOPROL-XL) 25 mg 24 hr tablet, Take 1 tablet (25 mg total) by mouth daily, Disp: 90 tablet, Rfl: 1    Omega-3 Fatty Acids (Fish Oil) 1200 MG CAPS, Take by mouth 2 (two) times a day, Disp: , Rfl:     pantoprazole (PROTONIX) 20 mg tablet, Take 1 tablet (20 mg total) by mouth daily, Disp: 90 tablet, Rfl: 3    tamsulosin (FLOMAX) 0.4 mg, Take 1 capsule (0.4 mg total) by mouth daily with dinner, Disp: 90 capsule, Rfl: 3    atorvastatin (LIPITOR) 40 mg tablet, Take 1 tablet (40 mg total) by mouth daily, Disp: 90 tablet, Rfl: 2    meloxicam (Mobic) 15 mg tablet, Take 1 tablet (15 mg total) by mouth daily, Disp: 30 tablet, Rfl: 0    pantoprazole (PROTONIX) 40 mg tablet, Take 40 mg by mouth every morning (Patient not taking: Reported on 6/3/2024), Disp: , Rfl:   No Known Allergies      Labs:not applicable     Imaging: Echo complete w/ contrast if indicated    Result Date: 5/24/2024  Narrative:   Left Ventricle: Left ventricular cavity size is normal. Wall thickness is mildly increased. There is concentric remodeling. The left ventricular ejection fraction is 60% by visual estimation. Systolic function is normal. Although no diagnostic regional wall motion abnormality was identified, this possibility cannot be completely excluded on the basis of this study. Diastolic function is mildly abnormal, consistent with grade I (abnormal) relaxation.   Right Ventricle: Right ventricular cavity size is normal.  Systolic function is normal.   Left Atrium: The atrium is mildly dilated.   Right Atrium: The atrium is mildly dilated.   Aortic Valve: The aortic valve is trileaflet. The leaflets are moderately thickened. The leaflets are moderately calcified. There is moderately reduced mobility. There is moderate stenosis. The aortic valve mean gradient is 29 mmHg. The dimensionless velocity index is 0.29. The aortic valve area is 1.09 cm2.     CT soft tissue neck w contrast    Result Date: 5/8/2024  Narrative: CT NECK WITH CONTRAST History: Hypertrophy of the tonsils. Comparison: None. Technique:  CT of the neck was performed following administration of 100 mL of Omnipaque 350 intravenous contrast. Multiplanar reformats were performed. CT examination performed with dose lowering protocol in accordance with ALARA. Findings: Brain: The visualized portion of the brain is unremarkable. Orbits: The visualized portion of the orbits is unremarkable. Paranasal sinuses: There is complete opacification of the right maxillary sinus, with periosteal thickening, compatible with chronic sinusitis. Polyps versus mucous retention cysts are noted within a left anterior ethmoid air cell and left maxillary sinus. There is bilateral sebastián bullosa. Mastoids / Middle ear: Clear. Dentition: There is no periapical lucency. Nasopharynx: Unremarkable. Salivary glands: The parotid and submandibular glands are unremarkable. Oral cavity/ Oropharynx: There is a 1.9 x 1.9 cm at the left base of tongue, resulting in partial effacement of the left vallecula.. Hypopharynx: Unremarkable. Larynx: Unremarkable. Thyroid gland: Unremarkable. Lymph nodes: There are no significantly enlarged or abnormal lymph nodes within the neck.     Cervical spine and Thoracic Inlet: No aggressive osseous lesion. Blood vessels: The great vessels of the neck are patent. Lung Apices: Clear.    Impression: Impression: A 1.9 cm enhancing lesion at the left base of tongue is  "nonspecific, which may represent hypertrophied lingual tonsil, although underlying mass lesion is difficult to exclude. Correlation with direct visualization and possible tissue sampling is recommended. Workstation:[id number]      Review of Systems:  Review of Systems   Constitutional:  Negative for chills, diaphoresis, fatigue and fever.   HENT:  Positive for sore throat. Negative for trouble swallowing and voice change.    Eyes:  Negative for pain and redness.   Respiratory:  Negative for shortness of breath and wheezing.    Cardiovascular:  Negative for chest pain, palpitations and leg swelling.   Gastrointestinal:  Negative for abdominal pain, constipation, diarrhea, nausea and vomiting.   Genitourinary:  Negative for dysuria.   Musculoskeletal:  Positive for arthralgias. Negative for neck pain and neck stiffness.   Skin:  Negative for rash.   Neurological:  Negative for dizziness, syncope, light-headedness and headaches.   Psychiatric/Behavioral:  Negative for agitation and confusion.    All other systems reviewed and are negative.        Vitals:    06/03/24 0810   BP: 134/84   BP Location: Left arm   Patient Position: Sitting   Cuff Size: Large   Pulse: 80   Resp: 18   SpO2: 95%   Weight: (!) 153 kg (337 lb)   Height: 5' 10.28\" (1.785 m)     Vitals:    06/03/24 0810   Weight: (!) 153 kg (337 lb)     Height: 5' 10.28\" (178.5 cm)     Physical Exam:   Physical Exam  Vitals reviewed.   Constitutional:       General: He is not in acute distress.     Appearance: He is obese. He is not diaphoretic.   HENT:      Head: Normocephalic and atraumatic.   Eyes:      General:         Right eye: No discharge.         Left eye: No discharge.   Neck:      Comments: Trachea midline, neck obese, difficult to assess JVD  Cardiovascular:      Rate and Rhythm: Normal rate and regular rhythm.      Heart sounds: Murmur (KAREN) heard.      No friction rub.   Pulmonary:      Effort: Pulmonary effort is normal. No respiratory distress.      " Breath sounds: No wheezing.   Chest:      Chest wall: No tenderness.   Abdominal:      General: Bowel sounds are normal.      Palpations: Abdomen is soft.      Tenderness: There is no abdominal tenderness. There is no rebound.   Musculoskeletal:      Right lower leg: No edema.      Left lower leg: No edema.   Skin:     General: Skin is warm and dry.   Neurological:      Mental Status: He is alert.      Comments: Awake, alert, able to answer questions appropriately, able to move extremities bilaterally.   Psychiatric:         Mood and Affect: Mood normal.         Behavior: Behavior normal.

## 2024-06-07 ENCOUNTER — TELEPHONE (OUTPATIENT)
Age: 69
End: 2024-06-07

## 2024-06-07 LAB
DME PARACHUTE DELIVERY DATE REQUESTED: NORMAL
DME PARACHUTE ITEM DESCRIPTION: NORMAL
DME PARACHUTE ORDER STATUS: NORMAL
DME PARACHUTE SUPPLIER NAME: NORMAL
DME PARACHUTE SUPPLIER PHONE: NORMAL

## 2024-06-07 NOTE — TELEPHONE ENCOUNTER
Pt called regarding his PAP mask order that was placed on 5/13 by DO Bates. Pt states that it has been almost a month since this was placed and that he saw DO Bates but he still has not received the order nor a call from supplier. Pt would like to know if there is any way that DO Bates can follow up. Pt states that usually he will receive a call after DO Bates calls the supplier but he still has not and his current mask is wearing out.     Please advise   Thank you

## 2024-07-11 DIAGNOSIS — K21.9 GASTROESOPHAGEAL REFLUX DISEASE WITHOUT ESOPHAGITIS: ICD-10-CM

## 2024-07-11 RX ORDER — PANTOPRAZOLE SODIUM 20 MG/1
20 TABLET, DELAYED RELEASE ORAL DAILY
Qty: 90 TABLET | Refills: 1 | Status: SHIPPED | OUTPATIENT
Start: 2024-07-11

## 2024-08-05 ENCOUNTER — TELEPHONE (OUTPATIENT)
Dept: DENTISTRY | Facility: CLINIC | Age: 69
End: 2024-08-05

## 2024-08-05 ENCOUNTER — OFFICE VISIT (OUTPATIENT)
Dept: URGENT CARE | Facility: MEDICAL CENTER | Age: 69
End: 2024-08-05
Payer: COMMERCIAL

## 2024-08-05 VITALS
RESPIRATION RATE: 18 BRPM | DIASTOLIC BLOOD PRESSURE: 87 MMHG | OXYGEN SATURATION: 96 % | HEART RATE: 88 BPM | SYSTOLIC BLOOD PRESSURE: 139 MMHG | TEMPERATURE: 97.8 F

## 2024-08-05 DIAGNOSIS — K08.89 PAIN, DENTAL: ICD-10-CM

## 2024-08-05 DIAGNOSIS — K04.7 DENTAL INFECTION: Primary | ICD-10-CM

## 2024-08-05 PROCEDURE — G0463 HOSPITAL OUTPT CLINIC VISIT: HCPCS

## 2024-08-05 PROCEDURE — 99213 OFFICE O/P EST LOW 20 MIN: CPT

## 2024-08-05 RX ORDER — AMOXICILLIN 500 MG/1
500 CAPSULE ORAL EVERY 12 HOURS SCHEDULED
Qty: 20 CAPSULE | Refills: 0 | Status: SHIPPED | OUTPATIENT
Start: 2024-08-05 | End: 2024-08-12

## 2024-08-05 NOTE — PROGRESS NOTES
[facility]        NAME: Ean Young is a 69 y.o. male  : 1955    MRN: [id number]  DATE: 2024  TIME: 1:19 PM    Assessment and Plan   Dental infection [K04.7]  1. Dental infection  amoxicillin (AMOXIL) 500 mg capsule      2. Pain, dental              Patient Instructions       Follow up with PCP in 3-5 days.  Proceed to  ER if symptoms worsen.    If tests are performed, our office will contact you with results only if changes need to made to the care plan discussed with you at the visit. You can review your full results on [facility].    Chief Complaint     Chief Complaint   Patient presents with   • Dental Pain     Left lower         History of Present Illness       Patient here with left sided facial pain/dental pain. Called dentist but has new patient appt 2025- was placed on cx/waitlist in the interim. Past has been worsening throughout the day. Has been having trouble wearing hearing aides due to the pain. Has also recently had change in PCP due to Dr. Bates leaving the practice. He has not had any fevers or chills that he can speak of. He has been being cautious with eating/chewing. He has been taking OTC orajel which only lasts about 30 minutes.         Review of Systems   Review of Systems   Constitutional:  Negative for appetite change, chills, fatigue and fever.   HENT:  Positive for dental problem and hearing loss. Negative for drooling, postnasal drip, rhinorrhea, sinus pressure, sinus pain, sneezing, sore throat and trouble swallowing.         Chronic hearing loss     Respiratory:  Negative for cough, chest tightness and shortness of breath.    Gastrointestinal:  Negative for abdominal pain, constipation, diarrhea, nausea and vomiting.   Skin:  Negative for color change and rash.   Neurological:  Negative for dizziness, light-headedness and headaches.         Current Medications       Current Outpatient Medications:   •  amLODIPine (NORVASC) 5 mg  tablet, Take 1 tablet (5 mg total) by mouth daily, Disp: 90 tablet, Rfl: 3  •  amoxicillin (AMOXIL) 500 mg capsule, Take 1 capsule (500 mg total) by mouth every 12 (twelve) hours for 10 days, Disp: 20 capsule, Rfl: 0  •  atorvastatin (LIPITOR) 40 mg tablet, Take 1 tablet (40 mg total) by mouth daily, Disp: 90 tablet, Rfl: 2  •  azelastine (ASTELIN) 0.1 % nasal spray, 2 sprays into each nostril 2 (two) times a day, Disp: , Rfl:   •  brimonidine tartrate 0.2 % ophthalmic solution, INSTILL 1 DROP INTO EACH EYE TWICE DAILY, Disp: , Rfl:   •  Empagliflozin (JARDIANCE) 10 MG TABS tablet, Take 1 tablet (10 mg total) by mouth daily, Disp: 30 tablet, Rfl: 5  •  famotidine (PEPCID) 20 mg tablet, take 1 tablet by mouth every morning and BEFORE BEDTIME, Disp: , Rfl:   •  hydroCHLOROthiazide 12.5 mg tablet, take 1 tablet by mouth once daily, Disp: 90 tablet, Rfl: 3  •  losartan (COZAAR) 50 mg tablet, Take 1 tablet (50 mg total) by mouth 2 (two) times a day, Disp: 180 tablet, Rfl: 3  •  meloxicam (Mobic) 15 mg tablet, Take 1 tablet (15 mg total) by mouth daily, Disp: 30 tablet, Rfl: 0  •  metoprolol succinate (TOPROL-XL) 25 mg 24 hr tablet, Take 1 tablet (25 mg total) by mouth daily, Disp: 90 tablet, Rfl: 1  •  Omega-3 Fatty Acids (Fish Oil) 1200 MG CAPS, Take by mouth 2 (two) times a day, Disp: , Rfl:   •  pantoprazole (PROTONIX) 20 mg tablet, Take 1 tablet (20 mg total) by mouth daily, Disp: 90 tablet, Rfl: 1  •  pantoprazole (PROTONIX) 40 mg tablet, Take 40 mg by mouth every morning, Disp: , Rfl:   •  tamsulosin (FLOMAX) 0.4 mg, Take 1 capsule (0.4 mg total) by mouth daily with dinner, Disp: 90 capsule, Rfl: 3    Current Allergies     Allergies as of 08/05/2024   • (No Known Allergies)            The following portions of the patient's history were reviewed and updated as appropriate: allergies, current medications, past family history, past medical history, past social history, past surgical history and problem list.     Past  Medical History:   Diagnosis Date   • Dyslipidemia    • GERD (gastroesophageal reflux disease)    • HL (hearing loss)    • Hypertension    • Moderate aortic stenosis    • Obstructive sleep apnea        Past Surgical History:   Procedure Laterality Date   • SHOULDER ARTHROSCOPY Right    • TONGUE SURGERY  07/2024       Family History   Problem Relation Age of Onset   • Heart disease Mother    • Heart attack Mother    • Prostate cancer Father    • Parkinsonism Maternal Grandmother          Medications have been verified.        Objective   /87   Pulse 88   Temp 97.8 °F (36.6 °C)   Resp 18   SpO2 96%        Physical Exam     Physical Exam  Vitals and nursing note reviewed.   Constitutional:       General: He is awake.      Appearance: Normal appearance. He is well-developed. He is obese. He is not ill-appearing.   HENT:      Head: Normocephalic and atraumatic.      Jaw: Tenderness present.        Nose: No congestion or rhinorrhea.      Mouth/Throat:      Lips: Pink.      Mouth: Mucous membranes are moist.      Dentition: Abnormal dentition. Dental tenderness, gingival swelling and dental caries present.      Tongue: No lesions. Tongue does not deviate from midline.      Palate: No mass and lesions.     Cardiovascular:      Rate and Rhythm: Normal rate and regular rhythm.      Pulses: Normal pulses.      Heart sounds: Normal heart sounds.   Pulmonary:      Effort: Pulmonary effort is normal.      Breath sounds: Normal breath sounds.   Skin:     General: Skin is warm and dry.      Capillary Refill: Capillary refill takes less than 2 seconds.      Findings: No rash.   Neurological:      General: No focal deficit present.      Mental Status: He is alert. Mental status is at baseline.   Psychiatric:         Mood and Affect: Mood normal.         Behavior: Behavior is cooperative.

## 2024-08-05 NOTE — PATIENT INSTRUCTIONS
You may take over the counter Tylenol (Acetaminophen) and/or Motrin (Ibuprofen) as needed, as directed on packaging. Be sure to get plenty of rest, and drinking fluids to remain hydrated.     Please follow up with your primary provider in the next several days. Should you have any worsening of symptoms, or lack of improvement please be re-evaluated. If needed for significant concerns, consider 911 or ER evaluation.     [facility]  Doctor: Matt Camejo DMD  Location: [address] [place] [address]. [id number]  Phone number: [phone]

## 2024-08-09 ENCOUNTER — OFFICE VISIT (OUTPATIENT)
Dept: FAMILY MEDICINE CLINIC | Facility: CLINIC | Age: 69
End: 2024-08-09
Payer: COMMERCIAL

## 2024-08-09 VITALS
SYSTOLIC BLOOD PRESSURE: 126 MMHG | HEIGHT: 71 IN | WEIGHT: 315 LBS | OXYGEN SATURATION: 94 % | TEMPERATURE: 98.7 F | BODY MASS INDEX: 44.1 KG/M2 | HEART RATE: 88 BPM | DIASTOLIC BLOOD PRESSURE: 82 MMHG

## 2024-08-09 DIAGNOSIS — E11.9 TYPE 2 DIABETES MELLITUS WITHOUT COMPLICATION, WITHOUT LONG-TERM CURRENT USE OF INSULIN (HCC): Primary | ICD-10-CM

## 2024-08-09 DIAGNOSIS — K02.9 PAIN DUE TO DENTAL CARIES: ICD-10-CM

## 2024-08-09 DIAGNOSIS — I35.0 MODERATE AORTIC STENOSIS: ICD-10-CM

## 2024-08-09 DIAGNOSIS — R35.0 URINARY FREQUENCY: ICD-10-CM

## 2024-08-09 DIAGNOSIS — H91.93 DECREASED HEARING OF BOTH EARS: ICD-10-CM

## 2024-08-09 DIAGNOSIS — E78.5 DYSLIPIDEMIA: ICD-10-CM

## 2024-08-09 DIAGNOSIS — K21.9 LARYNGOPHARYNGEAL REFLUX (LPR): ICD-10-CM

## 2024-08-09 DIAGNOSIS — G47.33 OBSTRUCTIVE SLEEP APNEA: ICD-10-CM

## 2024-08-09 DIAGNOSIS — I10 ESSENTIAL HYPERTENSION: ICD-10-CM

## 2024-08-09 DIAGNOSIS — J30.9 ALLERGIC RHINITIS, UNSPECIFIED SEASONALITY, UNSPECIFIED TRIGGER: Primary | ICD-10-CM

## 2024-08-09 LAB — SL AMB POCT HEMOGLOBIN AIC: 7 (ref ?–6.5)

## 2024-08-09 PROCEDURE — 99214 OFFICE O/P EST MOD 30 MIN: CPT | Performed by: FAMILY MEDICINE

## 2024-08-09 PROCEDURE — 83036 HEMOGLOBIN GLYCOSYLATED A1C: CPT | Performed by: FAMILY MEDICINE

## 2024-08-09 RX ORDER — AZELASTINE 1 MG/ML
2 SPRAY, METERED NASAL 2 TIMES DAILY
Qty: 1 ML | Refills: 1 | Status: SHIPPED | OUTPATIENT
Start: 2024-08-09

## 2024-08-09 RX ORDER — CHLORHEXIDINE GLUCONATE ORAL RINSE 1.2 MG/ML
SOLUTION DENTAL
COMMUNITY
Start: 2024-06-29

## 2024-08-09 NOTE — PROGRESS NOTES
Ambulatory Visit  Name: Ean Young      : 1955      MRN: [id number]  Encounter Provider: Timmy Chapman DO  Encounter Date: 2024   Encounter department: [facility]    Assessment & Plan   1. Type 2 diabetes mellitus without complication, without long-term current use of insulin ([facility])  Comments:  Hemoglobin A1c is improved to 7.0.  Will continue current regimen and dietary modification.  Orders:  -     POCT hemoglobin A1c  -     IRIS Diabetic eye exam  2. Essential hypertension  Comments:  Blood pressure is adequately controlled.  Will continue current medication regimen and dietary modification  3. Moderate aortic stenosis  Comments:  Patient is followed by cardiology.  Blood pressure is adequately controlled.  Reducing all risk factors.  4. Obstructive sleep apnea  Comments:  Currently stable.  Continue current treatment.  Discussed weight loss.  5. Laryngopharyngeal reflux (LPR)  Comments:  Patient is followed by nose and throat.  Patient recently had biopsy of lesions in the oral cavity which were benign.  Continue current medication regimen  6. Dyslipidemia  Comments:  .  Managed by cardiology.  Continue to follow and monitor.  Continue current treatment.  7. Urinary frequency  Comments:  Patient sees urologist who orders his PSA level.  Past PSA levels have been within normal limits.  8. Decreased hearing of both ears  Comments:  Patient has bilateral hearing aids  9. Patient is currently trying to find a dentist.  Comments:  Patient is currently on amoxicillin which was given to him from urgent care.  This seems to be helping.       History of Present Illness     Dental Pain   Pertinent negatives include no fever.       Review of Systems   Constitutional:  Negative for chills and fever.   HENT:  Positive for dental problem. Negative for ear pain and sore throat.    Eyes:  Negative for pain and visual disturbance.   Respiratory:  Negative for cough and shortness of breath.   "  Cardiovascular:  Negative for chest pain and palpitations.   Gastrointestinal:  Negative for abdominal pain and vomiting.   Genitourinary:  Negative for dysuria and hematuria.   Musculoskeletal:  Negative for arthralgias and back pain.   Skin:  Negative for color change and rash.   Neurological:  Negative for seizures and syncope.   All other systems reviewed and are negative.      Objective     /82   Pulse 88   Temp 98.7 °F (37.1 °C)   Ht 5' 11\" (1.803 m)   Wt (!) 151 kg (332 lb 12.8 oz)   SpO2 94%   BMI 46.42 kg/m²     Physical Exam  Vitals and nursing note reviewed.   Constitutional:       General: He is not in acute distress.     Appearance: Normal appearance. He is well-developed.   HENT:      Head: Normocephalic and atraumatic.      Right Ear: Tympanic membrane normal.      Left Ear: Tympanic membrane normal.      Mouth/Throat:      Mouth: Mucous membranes are moist.      Pharynx: Oropharynx is clear.      Comments: Dental caries  Eyes:      Extraocular Movements: Extraocular movements intact.      Conjunctiva/sclera: Conjunctivae normal.      Pupils: Pupils are equal, round, and reactive to light.   Cardiovascular:      Rate and Rhythm: Normal rate and regular rhythm.      Heart sounds: Murmur (2/6 rosa RSB) heard.      No friction rub.   Pulmonary:      Effort: Pulmonary effort is normal. No respiratory distress.      Breath sounds: Normal breath sounds.   Abdominal:      General: Bowel sounds are normal.      Palpations: Abdomen is soft.      Tenderness: There is no abdominal tenderness. There is no guarding or rebound.      Hernia: No hernia is present.   Musculoskeletal:         General: No swelling.      Cervical back: Neck supple.   Skin:     General: Skin is warm and dry.      Capillary Refill: Capillary refill takes less than 2 seconds.   Neurological:      General: No focal deficit present.      Mental Status: He is alert and oriented to person, place, and time.      Motor: No weakness. "      Gait: Gait normal.   Psychiatric:         Mood and Affect: Mood normal.         Behavior: Behavior normal.         Thought Content: Thought content normal.       Administrative Statements   I have spent a total time of 40 minutes in caring for this patient on the day of the visit/encounter including Importance of tx compliance.

## 2024-08-12 ENCOUNTER — OFFICE VISIT (OUTPATIENT)
Dept: DENTISTRY | Facility: CLINIC | Age: 69
End: 2024-08-12
Payer: COMMERCIAL

## 2024-08-12 DIAGNOSIS — K02.9 DENTAL CARIES: Primary | ICD-10-CM

## 2024-08-12 PROCEDURE — D0220 INTRAORAL - PERIAPICAL FIRST RADIOGRAPHIC IMAGE: HCPCS | Performed by: STUDENT IN AN ORGANIZED HEALTH CARE EDUCATION/TRAINING PROGRAM

## 2024-08-12 PROCEDURE — D0140 LIMITED ORAL EVALUATION - PROBLEM FOCUSED: HCPCS | Performed by: STUDENT IN AN ORGANIZED HEALTH CARE EDUCATION/TRAINING PROGRAM

## 2024-08-12 PROCEDURE — D0274 BITEWINGS - 4 RADIOGRAPHIC IMAGES: HCPCS | Performed by: STUDENT IN AN ORGANIZED HEALTH CARE EDUCATION/TRAINING PROGRAM

## 2024-08-12 RX ORDER — AMOXICILLIN 500 MG/1
500 CAPSULE ORAL EVERY 8 HOURS SCHEDULED
Qty: 21 CAPSULE | Refills: 0 | Status: SHIPPED | OUTPATIENT
Start: 2024-08-12 | End: 2024-08-19

## 2024-08-12 NOTE — PROGRESS NOTES
Pt presents for limited exam  PMH reviewed, no changes    CC: pain on the lower left, was swollen   Extraoral exam: no extraoral swelling  Intraoral Exam:  no intraoral swelling. #21 and #19 broken. No pain to palpation or percussion.   Sensitivity to percussion on #21    Xrays Taken: KATYA #21 and PA #21     Pt Referred to [facility] For ext of #21 and for endo #21    Pt understands that we are unaware of coverage under his dental plan.

## 2024-08-13 ENCOUNTER — PATIENT OUTREACH (OUTPATIENT)
Dept: CASE MANAGEMENT | Facility: OTHER | Age: 69
End: 2024-08-13

## 2024-08-13 NOTE — PROGRESS NOTES
CELINA CEUVAS received referral for patient from Matt Camejo DMD for Dental caries. Per referral communication, provider reports patient needs assistance with transportation options to [facility]. Patient has previous OP [facility] outreach in the past, including assistance with transportation. As of September 2022, patient was a registered rider with the [facility] ([facility]).    CELINA CUEVAS outreached covering [facility] for [facility] to see if [facility] goes out of county. CMOC reported that they usually only approve out of county transportation if the service is not offered within the Replaced by [facility]. CMOC reported that if this is needed, provider can fill out the out of county form to be sent to [facility] for review, and they will approve or deny it.    CELINA CUEVAS placed initial outreach call to patient and left a voicemail. CELINA CUEVAS to place second outreach call within one week if return call is not received prior.

## 2024-08-15 ENCOUNTER — PATIENT OUTREACH (OUTPATIENT)
Dept: CASE MANAGEMENT | Facility: OTHER | Age: 69
End: 2024-08-15

## 2024-08-15 DIAGNOSIS — Z59.82 TRANSPORTATION INSECURITY: Primary | ICD-10-CM

## 2024-08-15 SDOH — ECONOMIC STABILITY - TRANSPORTATION SECURITY: TRANSPORTATION INSECURITY: Z59.82

## 2024-08-15 NOTE — PROGRESS NOTES
CELINA CUEVAS placed second outreach call to patient and left a voicemail. Per chart, patient's niece is noted to be contacted and is on Medical Communication Consent form. CELINA CUEVAS placed outreach call to patient's niece. Patient's niece confirmed knowledge of patient needing to go to [place], but was unsure if [facility] would be able to transport patient. CELINA CUEVAS sent referral to [facility] to assist with form for provider to complete and to then submit to [facility] to see if they are able to transport patient out of county.    CELINA CUEVAS also recommended to patient's niece to reach out to patient's insurance to see if they offer a transportation benefit.    Patient's niece appreciative of outreach. CELINA CUEVAS to send CMOC referral and follow patient.

## 2024-08-19 ENCOUNTER — PATIENT OUTREACH (OUTPATIENT)
Dept: CASE MANAGEMENT | Facility: OTHER | Age: 69
End: 2024-08-19

## 2024-08-19 NOTE — PROGRESS NOTES
Care management  Zandra requested to be added to care plan in basket was sent to  Chiqui Vazquez.     Cmoc outreached patient erickson Villeda [phone] spoke with her regarding her transportation concerns for her uncle.    Erickson did clarify that patient does have [facility] transportation services but was looking for out of county services. Erickson was informed that patient don't qualify for this service due to not having [employer] (medical assistance shared ride van transportation services).    Erickson did mention that she will be meeting with her uncle Wednesday and will be calling patient medical insurance together to see if he qualifies for transportation through them.    Cmoc informed erickson that an outreach will be made to her by end of week to check status. Erickson agreed and understood. Assessment will be done at next outreach phone was breaking up. No other concerns at this time.

## 2024-08-22 ENCOUNTER — PATIENT OUTREACH (OUTPATIENT)
Dept: CASE MANAGEMENT | Facility: OTHER | Age: 69
End: 2024-08-22

## 2024-08-22 NOTE — PROGRESS NOTES
Care management  received call back from kaycee Felipe regarding her uncles concern for transportation.     Destinee informed me that she was able to speak to her uncles insurance company and they state that he is eligible for transportation services called [employer]. This company only cover up to 50 miles.    The niece is concerned because her uncle needs major dental work done and the closest facility that she is aware of that can assist him with his dental issues is located in [place] which is 87 miles from where patient lives.    Cmoc informed kaycee that I would look into dental surgeons that he can possibly to go within the 50 miles. Kaycee was very pleased with cmocs outreach and assistance.    Cmoc will outreach kaycee tomorrow with the needed information. Patient assessment was also able to be done at this time.    No other concerns at this time.

## 2024-08-22 NOTE — PROGRESS NOTES
Care management  called patient kaycee Felipe to follow up on her reaching out to patient insurance to verify if he is able to get transportation through them. No contact was made detailed message was left to call me back.    No other concerns at this time.

## 2024-08-27 ENCOUNTER — PATIENT OUTREACH (OUTPATIENT)
Dept: CASE MANAGEMENT | Facility: OTHER | Age: 69
End: 2024-08-27

## 2024-08-27 NOTE — PROGRESS NOTES
Care management  called and spoke with patient's kaycee Felipe regarding dental services.    [facility] informed kaycee that there are dental services for Dentistry - Oral & Maxillofacial within 50 miles of patient home.     [facility] did pull up [employer] dental website and there were 140 dentistry offices that patient can schedule with.    Kaycee informed [facility] that she will look on website and call to get patient scheduled. Kaycee also mentioned that she was also looking up dentist services near her home that take patient insurance so she would be able to drive patient.    Kaycee was informed if there were any other services needed for patient that she can gladly reach out to [facility]. Kaycee appreciated services. No other outreaches or concerns at this time. Referral will be closed.

## 2024-08-28 ENCOUNTER — PATIENT OUTREACH (OUTPATIENT)
Dept: CASE MANAGEMENT | Facility: OTHER | Age: 69
End: 2024-08-28

## 2024-08-28 NOTE — PROGRESS NOTES
CELINA CUEVAS reviewed patient's chart. Per CMOC note on 8/27, CMOC assisted patient's niece in finding multiple different dentistry locations that accept patient's insurance. Patient's niece reported that she will look into these to find one patient can go to, and potentially one that is close to her home so that she can drive patient. CMOC closed referral as niece reported no other needs at this time.    CELINA CUEVAS placed outreach call to patient's niece. Patient's niece reported that she plans to call some of the places listed on [employer] website to see if they will be able to see patient. She reported that when she spoke to a representative at [facility] a few weeks ago, they said the only place patient would be able to go was toward the [place] area. CELINA CUEVAS notified patient's niece that if she is unable to find somewhere local for patient, to contact PCP, CELINA CUEVAS or CMOC to see if further assistance is able to be provided. Patient's niece appreciative.    Patient's niece reported that patient had no other needs at this time.    CELINA CUEVAS resolved episode and removed self from care team.

## 2024-09-16 ENCOUNTER — TELEPHONE (OUTPATIENT)
Age: 69
End: 2024-09-16

## 2024-09-16 NOTE — TELEPHONE ENCOUNTER
Patient called to request refill of pantoprazole. He was informed that a refill for the 20mg dosage was sent to [facility] 07/11/24. However, a refill for the 40mg dosage was dispensed 09/11/24, managed by [facility]. He said he also takes famotidine 20mg, managed by [facility].     Should patient be on all 3 meds? What strength is appropriate for the pantoprazole? Please update med list accordingly.    Please contact patient at phone #[phone] to let him know which med(s) he should be taking.

## 2024-09-26 ENCOUNTER — LAB (OUTPATIENT)
Dept: LAB | Facility: MEDICAL CENTER | Age: 69
End: 2024-09-26
Payer: COMMERCIAL

## 2024-09-26 DIAGNOSIS — K14.8 LESION OF TONGUE: ICD-10-CM

## 2024-09-26 DIAGNOSIS — Z12.5 SCREENING FOR PROSTATE CANCER: ICD-10-CM

## 2024-09-26 DIAGNOSIS — Z01.818 PREOP EXAMINATION: ICD-10-CM

## 2024-09-26 DIAGNOSIS — R13.12 OROPHARYNGEAL DYSPHAGIA: ICD-10-CM

## 2024-09-26 LAB
APTT PPP: 35 SECONDS (ref 23–34)
BASOPHILS # BLD AUTO: 0.03 THOUSANDS/ΜL (ref 0–0.1)
BASOPHILS NFR BLD AUTO: 0 % (ref 0–1)
EOSINOPHIL # BLD AUTO: 0.03 THOUSAND/ΜL (ref 0–0.61)
EOSINOPHIL NFR BLD AUTO: 0 % (ref 0–6)
ERYTHROCYTE [DISTWIDTH] IN BLOOD BY AUTOMATED COUNT: 13.2 % (ref 11.6–15.1)
HCT VFR BLD AUTO: 43.2 % (ref 36.5–49.3)
HGB BLD-MCNC: 14.7 G/DL (ref 12–17)
IMM GRANULOCYTES # BLD AUTO: 0.04 THOUSAND/UL (ref 0–0.2)
IMM GRANULOCYTES NFR BLD AUTO: 1 % (ref 0–2)
INR PPP: 1.03 (ref 0.85–1.19)
LYMPHOCYTES # BLD AUTO: 2.4 THOUSANDS/ΜL (ref 0.6–4.47)
LYMPHOCYTES NFR BLD AUTO: 31 % (ref 14–44)
MCH RBC QN AUTO: 30.8 PG (ref 26.8–34.3)
MCHC RBC AUTO-ENTMCNC: 34 G/DL (ref 31.4–37.4)
MCV RBC AUTO: 91 FL (ref 82–98)
MONOCYTES # BLD AUTO: 0.52 THOUSAND/ΜL (ref 0.17–1.22)
MONOCYTES NFR BLD AUTO: 7 % (ref 4–12)
NEUTROPHILS # BLD AUTO: 4.86 THOUSANDS/ΜL (ref 1.85–7.62)
NEUTS SEG NFR BLD AUTO: 61 % (ref 43–75)
NRBC BLD AUTO-RTO: 0 /100 WBCS
PLATELET # BLD AUTO: 275 THOUSANDS/UL (ref 149–390)
PMV BLD AUTO: 10.2 FL (ref 8.9–12.7)
PROTHROMBIN TIME: 13.8 SECONDS (ref 12.3–15)
PSA SERPL-MCNC: 0.8 NG/ML (ref 0–4)
RBC # BLD AUTO: 4.77 MILLION/UL (ref 3.88–5.62)
WBC # BLD AUTO: 7.88 THOUSAND/UL (ref 4.31–10.16)

## 2024-09-26 PROCEDURE — 85730 THROMBOPLASTIN TIME PARTIAL: CPT

## 2024-09-26 PROCEDURE — 36415 COLL VENOUS BLD VENIPUNCTURE: CPT

## 2024-09-26 PROCEDURE — 85610 PROTHROMBIN TIME: CPT

## 2024-09-26 PROCEDURE — 85025 COMPLETE CBC W/AUTO DIFF WBC: CPT

## 2024-09-26 PROCEDURE — G0103 PSA SCREENING: HCPCS

## 2024-10-01 DIAGNOSIS — E11.9 TYPE 2 DIABETES MELLITUS WITHOUT COMPLICATION, WITHOUT LONG-TERM CURRENT USE OF INSULIN (HCC): ICD-10-CM

## 2024-10-02 ENCOUNTER — OFFICE VISIT (OUTPATIENT)
Dept: URGENT CARE | Facility: MEDICAL CENTER | Age: 69
End: 2024-10-02
Payer: COMMERCIAL

## 2024-10-02 VITALS
TEMPERATURE: 97.7 F | DIASTOLIC BLOOD PRESSURE: 78 MMHG | BODY MASS INDEX: 45.47 KG/M2 | OXYGEN SATURATION: 94 % | SYSTOLIC BLOOD PRESSURE: 128 MMHG | WEIGHT: 315 LBS | HEART RATE: 89 BPM | RESPIRATION RATE: 20 BRPM

## 2024-10-02 DIAGNOSIS — I10 ESSENTIAL HYPERTENSION: ICD-10-CM

## 2024-10-02 DIAGNOSIS — J40 SINOBRONCHITIS: Primary | ICD-10-CM

## 2024-10-02 DIAGNOSIS — J32.9 SINOBRONCHITIS: Primary | ICD-10-CM

## 2024-10-02 LAB
SARS-COV-2 AG UPPER RESP QL IA: NEGATIVE
VALID CONTROL: NORMAL

## 2024-10-02 PROCEDURE — G0463 HOSPITAL OUTPT CLINIC VISIT: HCPCS

## 2024-10-02 PROCEDURE — 87811 SARS-COV-2 COVID19 W/OPTIC: CPT

## 2024-10-02 PROCEDURE — 99213 OFFICE O/P EST LOW 20 MIN: CPT

## 2024-10-02 RX ORDER — AZITHROMYCIN 250 MG/1
TABLET, FILM COATED ORAL
Qty: 6 TABLET | Refills: 0 | Status: SHIPPED | OUTPATIENT
Start: 2024-10-02 | End: 2024-10-06

## 2024-10-02 RX ORDER — BENZONATATE 200 MG/1
200 CAPSULE ORAL 3 TIMES DAILY PRN
Qty: 20 CAPSULE | Refills: 0 | Status: SHIPPED | OUTPATIENT
Start: 2024-10-02

## 2024-10-02 RX ORDER — METOPROLOL SUCCINATE 25 MG/1
25 TABLET, EXTENDED RELEASE ORAL DAILY
Qty: 90 TABLET | Refills: 1 | Status: SHIPPED | OUTPATIENT
Start: 2024-10-02

## 2024-10-02 NOTE — PATIENT INSTRUCTIONS
Rapid covid - negative    Take full course of Azithromycin as prescribed  Eat yogurt with live and active cultures and/or take a probiotic at least 3 hours before or after antibiotic dose. Monitor stool for diarrhea and/or blood. If this occurs, contact primary care doctor ASAP.     Take over the counter Mucinex during the day  Take over the counter cough suppressant at night  Fluids and rest (Warm water with honey and lemon)  Tylenol/Ibuprofen for pain fever    Follow up with PCP in 3-5 days.  Proceed to  ER if symptoms worsen.    If tests are performed, our office will contact you with results only if changes need to made to the care plan discussed with you at the visit. You can review your full results on [facility].

## 2024-10-02 NOTE — PROGRESS NOTES
[facility]        NAME: Ean Young is a 69 y.o. male  : 1955    MRN: [id number]  DATE: 2024  TIME: 8:44 AM    Assessment and Plan   Sinobronchitis [J32.9, J40]  1. Sinobronchitis  Poct Covid 19 Rapid Antigen Test    azithromycin (ZITHROMAX) 250 mg tablet        Rapid covid - negative    Patient Instructions     Rapid covid - negative    Take full course of Azithromycin as prescribed  Eat yogurt with live and active cultures and/or take a probiotic at least 3 hours before or after antibiotic dose. Monitor stool for diarrhea and/or blood. If this occurs, contact primary care doctor ASAP.     Take over the counter Mucinex during the day  Take over the counter cough suppressant at night  Fluids and rest (Warm water with honey and lemon)  Tylenol/Ibuprofen for pain fever    Follow up with PCP in 3-5 days.  Proceed to  ER if symptoms worsen.    If tests are performed, our office will contact you with results only if changes need to made to the care plan discussed with you at the visit. You can review your full results on [facility].    Chief Complaint     Chief Complaint   Patient presents with    Sinusitis     Sx started Monday. Sinus pressure pain, drainage, and with cough. Concerned related to having tongue surgery and Cpap. [place] warm but didn't take temperature. No N/V/D. No SOB. Not taking any OTC meds except a nasal spray prescribed by ENT. Had covid vaccine last Friday.     Cough    Fatigue    Generalized Body Aches         History of Present Illness       69-year-old male arrives reporting sinus pain and congestion increasing over the past 3 days.  Patient is concerned because he has an upcoming oral surgery planned and does not want to be sick to have to postpone surgery.  Patient denies any fevers.  Patient denies any shortness of breath, chest pain or abdominal pain.  Patient would like to be tested for COVID at this point.    Sinusitis  Associated symptoms include  coughing and sinus pressure. Pertinent negatives include no shortness of breath.   Cough  Pertinent negatives include no shortness of breath.   Fatigue  Associated symptoms include coughing and fatigue. Pertinent negatives include no abdominal pain, nausea or vomiting.   Generalized Body Aches  Associated symptoms include fatigue and coughing. Pertinent negatives include no shortness of breath, abdominal pain, diarrhea, nausea or vomiting.       Review of Systems   Review of Systems   Constitutional:  Positive for fatigue.   HENT:  Positive for sinus pressure and sinus pain.    Respiratory:  Positive for cough. Negative for shortness of breath.    Gastrointestinal:  Negative for abdominal pain, diarrhea, nausea and vomiting.         Current Medications       Current Outpatient Medications:     amLODIPine (NORVASC) 5 mg tablet, Take 1 tablet (5 mg total) by mouth daily, Disp: 90 tablet, Rfl: 3    atorvastatin (LIPITOR) 40 mg tablet, Take 1 tablet (40 mg total) by mouth daily, Disp: 90 tablet, Rfl: 2    azelastine (ASTELIN) 0.1 % nasal spray, 2 sprays into each nostril 2 (two) times a day, Disp: 1 mL, Rfl: 1    azithromycin (ZITHROMAX) 250 mg tablet, Take 2 tablets today then 1 tablet daily x 4 days, Disp: 6 tablet, Rfl: 0    brimonidine tartrate 0.2 % ophthalmic solution, INSTILL 1 DROP INTO EACH EYE TWICE DAILY, Disp: , Rfl:     Empagliflozin (JARDIANCE) 10 MG TABS tablet, Take 1 tablet (10 mg total) by mouth daily, Disp: 30 tablet, Rfl: 5    famotidine (PEPCID) 20 mg tablet, take 1 tablet by mouth every morning and BEFORE BEDTIME, Disp: , Rfl:     hydroCHLOROthiazide 12.5 mg tablet, take 1 tablet by mouth once daily, Disp: 90 tablet, Rfl: 3    losartan (COZAAR) 50 mg tablet, Take 1 tablet (50 mg total) by mouth 2 (two) times a day, Disp: 180 tablet, Rfl: 3    metFORMIN (GLUCOPHAGE) 500 mg tablet, Take 10 mg by mouth 2 (two) times a day with meals, Disp: , Rfl:     metoprolol succinate (TOPROL-XL) 25 mg 24 hr  tablet, Take 1 tablet (25 mg total) by mouth daily, Disp: 90 tablet, Rfl: 1    Omega-3 Fatty Acids (Fish Oil) 1200 MG CAPS, Take by mouth 2 (two) times a day, Disp: , Rfl:     pantoprazole (PROTONIX) 20 mg tablet, Take 1 tablet (20 mg total) by mouth daily, Disp: 90 tablet, Rfl: 1    pantoprazole (PROTONIX) 40 mg tablet, Take 40 mg by mouth every morning, Disp: , Rfl:     tamsulosin (FLOMAX) 0.4 mg, Take 1 capsule (0.4 mg total) by mouth daily with dinner, Disp: 90 capsule, Rfl: 3    chlorhexidine (PERIDEX) 0.12 % solution, SWISH AND SPIT 15 MILLILITER twice a day for 10 days (Patient not taking: Reported on 8/12/2024), Disp: , Rfl:     meloxicam (Mobic) 15 mg tablet, Take 1 tablet (15 mg total) by mouth daily, Disp: 30 tablet, Rfl: 0    Current Allergies     Allergies as of 10/02/2024    (No Known Allergies)            The following portions of the patient's history were reviewed and updated as appropriate: allergies, current medications, past family history, past medical history, past social history, past surgical history and problem list.     Past Medical History:   Diagnosis Date    Dyslipidemia     GERD (gastroesophageal reflux disease)     HL (hearing loss)     Hypertension     Moderate aortic stenosis     Obstructive sleep apnea        Past Surgical History:   Procedure Laterality Date    SHOULDER ARTHROSCOPY Right     TONGUE SURGERY  07/2024       Family History   Problem Relation Age of Onset    Heart disease Mother     Heart attack Mother     Prostate cancer Father     Parkinsonism Maternal Grandmother          Medications have been verified.        Objective   /78   Pulse 89   Temp 97.7 °F (36.5 °C)   Resp 20   Wt (!) 148 kg (326 lb)   SpO2 94%   BMI 45.47 kg/m²        Physical Exam     Physical Exam  Vitals and nursing note reviewed.   Constitutional:       General: He is not in acute distress.     Appearance: Normal appearance. He is not ill-appearing.   HENT:      Head: Normocephalic.       Right Ear: Tympanic membrane, ear canal and external ear normal.      Left Ear: Tympanic membrane, ear canal and external ear normal.      Nose: No congestion.      Right Sinus: Maxillary sinus tenderness and frontal sinus tenderness present.      Left Sinus: Maxillary sinus tenderness and frontal sinus tenderness present.      Mouth/Throat:      Mouth: Mucous membranes are moist.      Pharynx: No oropharyngeal exudate or posterior oropharyngeal erythema.   Eyes:      Extraocular Movements: Extraocular movements intact.      Conjunctiva/sclera: Conjunctivae normal.      Pupils: Pupils are equal, round, and reactive to light.   Cardiovascular:      Rate and Rhythm: Normal rate and regular rhythm.      Pulses: Normal pulses.      Heart sounds: Normal heart sounds.   Pulmonary:      Effort: Pulmonary effort is normal. No respiratory distress.      Breath sounds: Normal breath sounds. No stridor. No wheezing, rhonchi or rales.   Chest:      Chest wall: No tenderness.   Musculoskeletal:         General: Normal range of motion.      Cervical back: Normal range of motion and neck supple.   Lymphadenopathy:      Cervical: No cervical adenopathy.   Skin:     General: Skin is warm and dry.      Capillary Refill: Capillary refill takes less than 2 seconds.   Neurological:      General: No focal deficit present.      Mental Status: He is alert and oriented to person, place, and time.   Psychiatric:         Mood and Affect: Mood normal.         Behavior: Behavior normal.

## 2024-10-22 ENCOUNTER — OFFICE VISIT (OUTPATIENT)
Dept: UROLOGY | Facility: CLINIC | Age: 69
End: 2024-10-22
Payer: COMMERCIAL

## 2024-10-22 VITALS
WEIGHT: 312.4 LBS | SYSTOLIC BLOOD PRESSURE: 106 MMHG | HEIGHT: 71 IN | TEMPERATURE: 97.2 F | BODY MASS INDEX: 43.73 KG/M2 | OXYGEN SATURATION: 95 % | DIASTOLIC BLOOD PRESSURE: 62 MMHG | HEART RATE: 77 BPM

## 2024-10-22 DIAGNOSIS — R35.1 BENIGN PROSTATIC HYPERPLASIA WITH NOCTURIA: Primary | ICD-10-CM

## 2024-10-22 DIAGNOSIS — N40.1 BENIGN PROSTATIC HYPERPLASIA WITH NOCTURIA: Primary | ICD-10-CM

## 2024-10-22 DIAGNOSIS — Z12.5 SCREENING FOR PROSTATE CANCER: ICD-10-CM

## 2024-10-22 LAB
SL AMB  POCT GLUCOSE, UA: NEGATIVE
SL AMB LEUKOCYTE ESTERASE,UA: NEGATIVE
SL AMB POCT BILIRUBIN,UA: NEGATIVE
SL AMB POCT BLOOD,UA: NEGATIVE
SL AMB POCT CLARITY,UA: CLEAR
SL AMB POCT COLOR,UA: YELLOW
SL AMB POCT KETONES,UA: NEGATIVE
SL AMB POCT NITRITE,UA: NEGATIVE
SL AMB POCT PH,UA: 6
SL AMB POCT SPECIFIC GRAVITY,UA: 1.02
SL AMB POCT URINE PROTEIN: NEGATIVE
SL AMB POCT UROBILINOGEN: 0.2

## 2024-10-22 PROCEDURE — 81002 URINALYSIS NONAUTO W/O SCOPE: CPT

## 2024-10-22 PROCEDURE — 99213 OFFICE O/P EST LOW 20 MIN: CPT

## 2024-10-22 RX ORDER — TAMSULOSIN HYDROCHLORIDE 0.4 MG/1
0.4 CAPSULE ORAL
Qty: 90 CAPSULE | Refills: 3 | Status: SHIPPED | OUTPATIENT
Start: 2024-10-22

## 2024-10-22 NOTE — PROGRESS NOTES
10/22/2024    No chief complaint on file.      Assessment and Plan    69 y.o. male manage by AP Team    1.BPH with LUTS  Stable symptoms on Flomax 0.4 mg HS.   Follow-up 1 year     2.  Prostate cancer screening  Positive family history of prostate cancer in his father around late 60's to early 70's.   PSA trend  0.798 (9/26/2024)  0.7 (10/23/2023)  1.8 (5/1/2023)  0.7 (8/11/2022)  Repeat PSA in 1 year            Interval HPI:      He presents today reporting doing well from urologic standpoint.  Unfortunately he was recently diagnosed with cancer to his tongue.  This started after he was found to have a lesion at the base of his tongue and reported troubles with swallowing.  He was referred to Dr. Swenson in July and underwent biopsy.  He recently had a repeat biopsy on 10/7.  He does report some difficulty with swallowing medications at times..         History of Present Illness  Ean Young is a 69 y.o. male here for follow-up evaluation of BPH and prostate cancer screening.     Established patient last seen by me in October 2023 for follow-up evaluation of BPH.  He  was initially seen by me in August 2022 for complaints of urinary frequency, weak urinary stream, sensation of incomplete bladder emptying, and nocturia 2-3 times per night.  This had been worsening over the past several weeks.  His PVR at the time was 108 mL.  He did report issues with constipation as well.  He was started on tamsulosin 0.4 mg HS. Symptoms have been well managed with Flomax and reported nocturia 1 time per night. PVR improved to 72 mL.     Prostate cancer screening: His PSA at the time of his last visit with me was 1.8 as of 5/01/2023, prior to this it was 0.7 on 8/11/2022. He does have a positive family history of prostate cancer in his father and due to doubling of his PSA I recommended a repeat PSA which improved to previous baseline of 0.7 on 10/23/2023. Current PSA is 0.798 as of 9/26/2024.             Review of Systems    Constitutional:  Negative for chills and fever.   HENT:  Positive for trouble swallowing. Negative for congestion and sore throat.    Respiratory:  Negative for cough and shortness of breath.    Cardiovascular:  Negative for chest pain and leg swelling.   Gastrointestinal:  Negative for abdominal pain, constipation and diarrhea.   Genitourinary:  Negative for difficulty urinating, dysuria, frequency, hematuria and urgency.        Nocturia    Musculoskeletal:  Negative for back pain and gait problem.   Skin:  Negative for wound.   Allergic/Immunologic: Negative for immunocompromised state.   Hematological:  Does not bruise/bleed easily.               Vitals  There were no vitals filed for this visit.    Physical Exam  Vitals reviewed.   Constitutional:       General: He is not in acute distress.     Appearance: Normal appearance. He is not ill-appearing or toxic-appearing.   HENT:      Head: Normocephalic and atraumatic.   Eyes:      General: No scleral icterus.     Conjunctiva/sclera: Conjunctivae normal.   Cardiovascular:      Rate and Rhythm: Normal rate.   Pulmonary:      Effort: Pulmonary effort is normal. No respiratory distress.   Abdominal:      Tenderness: There is no right CVA tenderness or left CVA tenderness.      Hernia: No hernia is present.   Musculoskeletal:      Cervical back: Normal range of motion.      Right lower leg: No edema.      Left lower leg: No edema.   Skin:     General: Skin is warm and dry.      Coloration: Skin is not jaundiced or pale.   Neurological:      General: No focal deficit present.      Mental Status: He is alert and oriented to person, place, and time. Mental status is at baseline.      Gait: Gait normal.   Psychiatric:         Mood and Affect: Mood normal.         Behavior: Behavior normal.         Thought Content: Thought content normal.         Judgment: Judgment normal.         Past History  Past Medical History:   Diagnosis Date    Dyslipidemia     GERD  (gastroesophageal reflux disease)     HL (hearing loss)     Hypertension     Moderate aortic stenosis     Obstructive sleep apnea      Social History     Socioeconomic History    Marital status: Single     Spouse name: Not on file    Number of children: Not on file    Years of education: Not on file    Highest education level: Not on file   Occupational History    Not on file   Tobacco Use    Smoking status: Never     Passive exposure: Never    Smokeless tobacco: Never   Vaping Use    Vaping status: Never Used   Substance and Sexual Activity    Alcohol use: Yes     Comment: rare    Drug use: Never    Sexual activity: Not Currently   Other Topics Concern    Not on file   Social History Narrative    Not on file     Social Determinants of Health     Financial Resource Strain: Low Risk  (11/29/2023)    Overall Financial Resource Strain (CARDIA)     Difficulty of Paying Living Expenses: Not hard at all   Food Insecurity: Not on file   Transportation Needs: No Transportation Needs (11/29/2023)    PRAPARE - Transportation     Lack of Transportation (Medical): No     Lack of Transportation (Non-Medical): No   Physical Activity: Not on file   Stress: Not on file   Social Connections: Unknown (6/18/2024)    Received from [employer]    Social Connections     How often do you feel lonely or isolated from those around you? (Adult - for ages 18 years and over): Not on file   Intimate Partner Violence: Not on file   Housing Stability: Not on file     Social History     Tobacco Use   Smoking Status Never    Passive exposure: Never   Smokeless Tobacco Never     Family History   Problem Relation Age of Onset    Heart disease Mother     Heart attack Mother     Prostate cancer Father     Parkinsonism Maternal Grandmother        The following portions of the patient's history were reviewed and updated as appropriate allergies, current medications, past medical history, past social history, past surgical history and problem  list    Imaging:    Results  No results found for this or any previous visit (from the past 1 hour(s)).]  Lab Results   Component Value Date    PSA 0.798 09/26/2024    PSA 0.7 10/23/2023    PSA 1.8 05/01/2023    PSA 0.7 08/11/2022     Lab Results   Component Value Date    CALCIUM 8.7 04/08/2024    K 4.0 04/08/2024    CO2 31 04/08/2024     04/08/2024    BUN 14 04/18/2024    CREATININE 0.80 04/18/2024     Lab Results   Component Value Date    WBC 7.88 09/26/2024    HGB 14.7 09/26/2024    HCT 43.2 09/26/2024    MCV 91 09/26/2024     09/26/2024       Please Note:  Voice dictation software has been used to create this document. There may be inadvertent transcriptions errors.     ESTELA Chan 10/22/24

## 2024-11-04 ENCOUNTER — RA CDI HCC (OUTPATIENT)
Dept: OTHER | Facility: HOSPITAL | Age: 69
End: 2024-11-04

## 2024-11-11 ENCOUNTER — OFFICE VISIT (OUTPATIENT)
Dept: FAMILY MEDICINE CLINIC | Facility: CLINIC | Age: 69
End: 2024-11-11
Payer: COMMERCIAL

## 2024-11-11 VITALS
WEIGHT: 311.4 LBS | TEMPERATURE: 97.4 F | HEART RATE: 84 BPM | HEIGHT: 71 IN | BODY MASS INDEX: 43.6 KG/M2 | OXYGEN SATURATION: 94 % | SYSTOLIC BLOOD PRESSURE: 128 MMHG | DIASTOLIC BLOOD PRESSURE: 78 MMHG

## 2024-11-11 DIAGNOSIS — J34.89 SINUS PRESSURE: ICD-10-CM

## 2024-11-11 DIAGNOSIS — E11.9 TYPE 2 DIABETES MELLITUS WITHOUT COMPLICATION, WITHOUT LONG-TERM CURRENT USE OF INSULIN (HCC): ICD-10-CM

## 2024-11-11 DIAGNOSIS — J01.00 SUBACUTE MAXILLARY SINUSITIS: ICD-10-CM

## 2024-11-11 DIAGNOSIS — C01 CANCER OF BASE OF TONGUE (HCC): Primary | ICD-10-CM

## 2024-11-11 LAB — SL AMB POCT HEMOGLOBIN AIC: 6.5 (ref ?–6.5)

## 2024-11-11 PROCEDURE — 83036 HEMOGLOBIN GLYCOSYLATED A1C: CPT | Performed by: FAMILY MEDICINE

## 2024-11-11 PROCEDURE — 99213 OFFICE O/P EST LOW 20 MIN: CPT | Performed by: FAMILY MEDICINE

## 2024-11-11 RX ORDER — FLUTICASONE PROPIONATE 50 MCG
2 SPRAY, SUSPENSION (ML) NASAL DAILY
Qty: 16 G | Refills: 5 | Status: SHIPPED | OUTPATIENT
Start: 2024-11-11

## 2024-11-11 RX ORDER — AZITHROMYCIN 250 MG/1
TABLET, FILM COATED ORAL
Qty: 6 TABLET | Refills: 0 | Status: SHIPPED | OUTPATIENT
Start: 2024-11-11 | End: 2024-11-15

## 2024-11-11 NOTE — ASSESSMENT & PLAN NOTE
Lab Results   Component Value Date    HGBA1C 7.0 (A) 08/09/2024   Currently controlled.  Continue current medications.    Orders:    POCT hemoglobin A1c

## 2024-11-11 NOTE — PROGRESS NOTES
Ambulatory Visit  Name: Ean Young      : 1955      MRN: [id number]  Encounter Provider: Timmy Chapman DO  Encounter Date: 2024   Encounter department: [facility]    Assessment & Plan  Cancer of base of tongue (HCC)  Patient is currently seeing specialist.  The plan is to do surgical intervention which hopefully will provide a cure.       Sinus pressure  Patient has sinusitis.  Will prescribe Zithromax pack and fluticasone nasal spray.  Patient may also use saline nasal spray.  Avoid oxymetazoline over-the-counter  Orders:    sodium chloride (OCEAN) 0.65 % nasal spray; 2 sprays into each nostril as needed for congestion (May use as needed.  This is a safe nasal spray)    Type 2 diabetes mellitus without complication, without long-term current use of insulin ([facility])    Lab Results   Component Value Date    HGBA1C 7.0 (A) 2024   Currently controlled.  Continue current medications.    Orders:    POCT hemoglobin A1c    Subacute maxillary sinusitis    Orders:    azithromycin (ZITHROMAX) 250 mg tablet; Take 2 tablets today then 1 tablet daily x 4 days    fluticasone (FLONASE) 50 mcg/act nasal spray; 2 sprays into each nostril daily    sodium chloride (OCEAN) 0.65 % nasal spray; 2 sprays into each nostril as needed for congestion (May use as needed.  This is a safe nasal spray)       History of Present Illness     Patient is a pleasant 69-year-old male who presents this morning with complaints of sinus pressure and congestion that is not relieved by over-the-counter nasal sprays as well as prescription nasal sprays.  The patient does have a history of cancer at the base of the tongue. Surgical intervention advised as primnary course.          Review of Systems   Constitutional:  Negative for chills and fever.   HENT:  Positive for congestion, sinus pressure, sneezing and voice change. Negative for ear pain and sore throat.    Eyes:  Negative for pain and visual disturbance.  "  Respiratory:  Negative for cough and shortness of breath.    Cardiovascular:  Negative for chest pain and palpitations.   Gastrointestinal:  Negative for abdominal pain and vomiting.   Genitourinary:  Negative for dysuria and hematuria.   Musculoskeletal:  Negative for arthralgias and back pain.   Skin:  Negative for color change and rash.   Neurological:  Negative for seizures and syncope.   All other systems reviewed and are negative.          Objective     /78   Pulse 84   Temp (!) 97.4 °F (36.3 °C)   Ht 5' 11\" (1.803 m)   Wt (!) 141 kg (311 lb 6.4 oz)   SpO2 94%   BMI 43.43 kg/m²     Physical Exam  Vitals and nursing note reviewed.   Constitutional:       General: He is not in acute distress.     Appearance: Normal appearance. He is well-developed.   HENT:      Head: Normocephalic and atraumatic.      Right Ear: Tympanic membrane normal.      Left Ear: Tympanic membrane normal.      Mouth/Throat:      Mouth: Mucous membranes are moist.      Pharynx: Oropharynx is clear. Posterior oropharyngeal erythema present.   Eyes:      Extraocular Movements: Extraocular movements intact.      Conjunctiva/sclera: Conjunctivae normal.      Pupils: Pupils are equal, round, and reactive to light.   Cardiovascular:      Rate and Rhythm: Normal rate and regular rhythm.      Heart sounds: Normal heart sounds. No murmur heard.     No friction rub.   Pulmonary:      Effort: Pulmonary effort is normal. No respiratory distress.      Breath sounds: Normal breath sounds.   Abdominal:      General: Bowel sounds are normal.      Palpations: Abdomen is soft.      Tenderness: There is no abdominal tenderness. There is no guarding or rebound.      Hernia: No hernia is present.   Musculoskeletal:         General: No swelling.      Cervical back: Neck supple.   Skin:     General: Skin is warm and dry.      Capillary Refill: Capillary refill takes less than 2 seconds.   Neurological:      General: No focal deficit present.      " Mental Status: He is alert and oriented to person, place, and time.      Gait: Gait normal.   Psychiatric:         Mood and Affect: Mood normal.         Behavior: Behavior normal.         Thought Content: Thought content normal.

## 2024-12-03 ENCOUNTER — TELEPHONE (OUTPATIENT)
Age: 69
End: 2024-12-03

## 2024-12-03 NOTE — TELEPHONE ENCOUNTER
[facility] called in to inform the office that a mistake was made on the document sent in with patient . Stating that they do have the correct patient just an error on document.

## 2024-12-12 ENCOUNTER — OFFICE VISIT (OUTPATIENT)
Dept: CARDIOLOGY CLINIC | Facility: CLINIC | Age: 69
End: 2024-12-12
Payer: COMMERCIAL

## 2024-12-12 VITALS
HEIGHT: 71 IN | DIASTOLIC BLOOD PRESSURE: 80 MMHG | SYSTOLIC BLOOD PRESSURE: 132 MMHG | HEART RATE: 79 BPM | OXYGEN SATURATION: 96 % | BODY MASS INDEX: 41.92 KG/M2 | WEIGHT: 299.4 LBS

## 2024-12-12 DIAGNOSIS — E78.5 DYSLIPIDEMIA: ICD-10-CM

## 2024-12-12 DIAGNOSIS — I35.0 MODERATE AORTIC STENOSIS: ICD-10-CM

## 2024-12-12 DIAGNOSIS — I10 ESSENTIAL HYPERTENSION: Primary | ICD-10-CM

## 2024-12-12 DIAGNOSIS — E66.813 CLASS 3 SEVERE OBESITY DUE TO EXCESS CALORIES WITH SERIOUS COMORBIDITY AND BODY MASS INDEX (BMI) OF 40.0 TO 44.9 IN ADULT (HCC): ICD-10-CM

## 2024-12-12 DIAGNOSIS — E66.01 CLASS 3 SEVERE OBESITY DUE TO EXCESS CALORIES WITH SERIOUS COMORBIDITY AND BODY MASS INDEX (BMI) OF 40.0 TO 44.9 IN ADULT (HCC): ICD-10-CM

## 2024-12-12 DIAGNOSIS — Z01.810 PREOP CARDIOVASCULAR EXAM: ICD-10-CM

## 2024-12-12 PROCEDURE — 99214 OFFICE O/P EST MOD 30 MIN: CPT | Performed by: INTERNAL MEDICINE

## 2024-12-12 PROCEDURE — 93000 ELECTROCARDIOGRAM COMPLETE: CPT | Performed by: INTERNAL MEDICINE

## 2024-12-12 NOTE — ASSESSMENT & PLAN NOTE
-Moderate on transthoracic echocardiogram May 2024  -Continue losartan 50 mg twice daily  -Will repeat transthoracic echocardiogram in 6 months for monitoring of disease.

## 2024-12-12 NOTE — ASSESSMENT & PLAN NOTE
-Patient notes blood pressure is well-controlled at home particularly since significant weight reduction of approximately 40 pounds  -Continue current medical therapy with amlodipine 5 mg daily, hydrochlorothiazide 12.5 mg daily, losartan 50 mg twice daily, metoprolol succinate 25 mg daily  -Continue to monitor

## 2024-12-12 NOTE — PROGRESS NOTES
Patient ID: Ean Young is a 69 y.o. male.        Plan:      Assessment & Plan  Preop cardiovascular exam  -According to the revised cardiac risk index patient is intermediate-high risk for planned operative procedure.  Patient fully understands and excepting of these risks and wished to proceed with surgery as planned.  In the setting of adequate functional status and unrevealing stress testing patient is appropriate risk to proceed with surgery.  Essential hypertension  -Patient notes blood pressure is well-controlled at home particularly since significant weight reduction of approximately 40 pounds  -Continue current medical therapy with amlodipine 5 mg daily, hydrochlorothiazide 12.5 mg daily, losartan 50 mg twice daily, metoprolol succinate 25 mg daily  -Continue to monitor  Moderate aortic stenosis  -Moderate on transthoracic echocardiogram May 2024  -Continue losartan 50 mg twice daily  -Will repeat transthoracic echocardiogram in 6 months for monitoring of disease.  Class 3 severe obesity due to excess calories with serious comorbidity and body mass index (BMI) of 40.0 to 44.9 in adult (HCC)  -Counseled patient on dietary and lifestyle modifications along with need for weight reduction goal BMI less than 29  -Continue to monitor  Dyslipidemia  -Counseled patient on dietary and lifestyle modifications  -Follow-up pending laboratory study results including fasting lipid panel and CMP  -Continue atorvastatin 40 mg daily.      Follow up Plan/Other summary comments:  -Lipid panel 4/8/2024 showing total cholesterol 169, triglyceride 267, HDL 34, LDL 82  -Follow-up pending laboratory study results which are already ordered including fasting lipid panel and CMP  -Patient counseled on dietary and lifestyle modifications including following a low-salt, low-fat, heart healthy diet with sodium restriction to less than 1800 mg of sodium daily, fluid restriction to less than 2000 mL fluid daily along weight  reduction goal BMI less than 29 and compliance with CPAP therapy  -Patient counseled on need for DASH diet and NSAID avoidance  -Patient currently on Jardiance 10 mg daily by primary care physician  -Continue atorvastatin 40 mg daily, hydrochlorothiazide 12.5 mg daily, metoprolol succinate 25 mg daily, losartan 50 mg twice daily, amlodipine 5 mg daily  -Patient will be seen in 3 months or sooner if necessary and will have transthoracic echocardiogram performed at that time to monitor aortic stenosis  -Patient counseled if he were to have any warning or alarm type symptoms he is to seek emergency medical care immediately.  -Patient will monitor home blood pressure readings let our office know if significantly elevated greater than 130/80's MHG for up titration of medical therapy.  -ECG performed in the office today shows sinus rhythm heart rate 74 bpm  -According to the revised cardiac risk index patient is an intermediate-high risk for planned operative procedure.  Patient fully understands and is accepting of these risks and wishes to proceed with surgery for surgical cure of his cancer.  Given this along with adequate functional status and unrevealing stress testing patient is appropriate risk to proceed with surgery if requested.    HPI:   -Patient is a 69-year-old male with hypertension, hyperlipidemia, obesity, obstructive sleep apnea compliant with CPAP therapy who initially presented to the office in December 2022 after referral from primary care physician for newly discovered murmur.  Patient had been seen by cardiology several years prior to that in [place] and had been recommended to undergo stress testing at that time but was unable to due to issues with insurance.  He presents to the office today for scheduled follow-up.  -Currently in the office today patient denies any chest pain, palpitations, lightness or dizziness, loss of consciousness, shortness of breath, lower extremity edema, orthopnea or  bendopnea.  He notes unfortunately he was recently diagnosed with throat cancer and has undergone multiple biopsies.  He is going to be undergoing surgery in the near future and will be meeting with his surgeon early next week to determine this.  He is hoping for surgical cure of this and they are optimistic for him.  He notes he can easily walk 4 city blocks on flat surface or up 2 flights of stairs with no chest pain or anginal symptoms.  He denies any issues with elevated blood pressure readings at home and notes that actually since losing approximately 40 pounds with this has had very well-controlled blood pressures.  Patient denies any issues with anesthesia.      Most recent or relevant cardiac/vascular testing:    -Transthoracic echocardiogram 5/20/2024 showing left ventricular systolic function normal cemented LVEF 60% with grade 1 diastolic dysfunction, normal right ventricular systolic function with mildly dilated left and right atrium with moderate aortic stenosis with trace pulmonic regurgitation and trace tricuspid regurgitation.    -Nuclear stress test 12/30/2022 showing no diagnostic evidence of ischemia on perfusion imaging however diaphragmatic attenuation present.      Past Surgical History:   Procedure Laterality Date    SHOULDER ARTHROSCOPY Right     TONGUE SURGERY  07/2024       Review of Systems   Review of Systems   Constitutional:  Negative for chills, diaphoresis, fatigue and fever.   HENT:  Positive for trouble swallowing. Negative for voice change.    Eyes:  Negative for pain and redness.   Respiratory:  Negative for shortness of breath and wheezing.    Cardiovascular:  Negative for chest pain, palpitations and leg swelling.   Gastrointestinal:  Negative for abdominal pain, blood in stool, constipation, diarrhea, nausea and vomiting.   Genitourinary:  Negative for dysuria.   Musculoskeletal:  Positive for arthralgias.   Skin:  Negative for rash.   Neurological:  Negative for dizziness,  "syncope, light-headedness and headaches.   Psychiatric/Behavioral:  Negative for agitation and confusion.    All other systems reviewed and are negative.         Objective:     /80   Pulse 79   Ht 5' 11\" (1.803 m)   Wt 136 kg (299 lb 6.4 oz)   SpO2 96%   BMI 41.76 kg/m²     PHYSICAL EXAM:  Physical Exam  Vitals reviewed.   Constitutional:       General: He is not in acute distress.     Appearance: He is obese. He is not diaphoretic.   HENT:      Head: Normocephalic and atraumatic.   Eyes:      General:         Right eye: No discharge.         Left eye: No discharge.   Neck:      Comments: Trachea midline, neck obese, difficult to assess JVD  Cardiovascular:      Rate and Rhythm: Normal rate and regular rhythm.      Heart sounds: Murmur (KAREN) heard.   Pulmonary:      Effort: Pulmonary effort is normal. No respiratory distress.      Breath sounds: No wheezing.   Chest:      Chest wall: No tenderness.   Abdominal:      General: Bowel sounds are normal.      Palpations: Abdomen is soft.      Tenderness: There is no abdominal tenderness. There is no rebound.   Musculoskeletal:      Right lower leg: No edema.      Left lower leg: No edema.   Skin:     General: Skin is warm and dry.   Neurological:      Mental Status: He is alert.      Comments: Awake, alert, able to answer questions appropriately, able to move extremities bilaterally.   Psychiatric:         Mood and Affect: Mood normal.         Behavior: Behavior normal.          Meds reviewed.  Current Outpatient Medications on File Prior to Visit   Medication Sig Dispense Refill    amLODIPine (NORVASC) 5 mg tablet Take 1 tablet (5 mg total) by mouth daily 90 tablet 3    atorvastatin (LIPITOR) 40 mg tablet Take 1 tablet (40 mg total) by mouth daily 90 tablet 2    azelastine (ASTELIN) 0.1 % nasal spray 2 sprays into each nostril 2 (two) times a day 1 mL 1    brimonidine tartrate 0.2 % ophthalmic solution INSTILL 1 DROP INTO EACH EYE TWICE DAILY      " Empagliflozin (JARDIANCE) 10 MG TABS tablet Take 1 tablet (10 mg total) by mouth daily 30 tablet 5    famotidine (PEPCID) 20 mg tablet take 1 tablet by mouth every morning and BEFORE BEDTIME      fluticasone (FLONASE) 50 mcg/act nasal spray 2 sprays into each nostril daily 16 g 5    hydroCHLOROthiazide 12.5 mg tablet take 1 tablet by mouth once daily 90 tablet 3    losartan (COZAAR) 50 mg tablet Take 1 tablet (50 mg total) by mouth 2 (two) times a day 180 tablet 3    metoprolol succinate (TOPROL-XL) 25 mg 24 hr tablet Take 1 tablet (25 mg total) by mouth daily 90 tablet 1    Omega-3 Fatty Acids (Fish Oil) 1200 MG CAPS Take by mouth 2 (two) times a day      pantoprazole (PROTONIX) 20 mg tablet Take 1 tablet (20 mg total) by mouth daily 90 tablet 1    pantoprazole (PROTONIX) 40 mg tablet Take 40 mg by mouth every morning      sodium chloride (OCEAN) 0.65 % nasal spray 2 sprays into each nostril as needed for congestion (May use as needed.  This is a safe nasal spray) 120 mL 2    tamsulosin (FLOMAX) 0.4 mg Take 1 capsule (0.4 mg total) by mouth daily with dinner 90 capsule 3    meloxicam (Mobic) 15 mg tablet Take 1 tablet (15 mg total) by mouth daily 30 tablet 0     No current facility-administered medications on file prior to visit.      Past Medical History:   Diagnosis Date    Dyslipidemia     GERD (gastroesophageal reflux disease)     HL (hearing loss)     Hypertension     Moderate aortic stenosis     Obstructive sleep apnea            Social History     Tobacco Use   Smoking Status Never    Passive exposure: Never   Smokeless Tobacco Never     Family History   Problem Relation Age of Onset    Heart disease Mother     Heart attack Mother     Prostate cancer Father     Parkinsonism Maternal Grandmother

## 2024-12-12 NOTE — ASSESSMENT & PLAN NOTE
-Counseled patient on dietary and lifestyle modifications  -Follow-up pending laboratory study results including fasting lipid panel and CMP  -Continue atorvastatin 40 mg daily.

## 2024-12-12 NOTE — ASSESSMENT & PLAN NOTE
-Counseled patient on dietary and lifestyle modifications along with need for weight reduction goal BMI less than 29  -Continue to monitor

## 2024-12-16 ENCOUNTER — VBI (OUTPATIENT)
Dept: ADMINISTRATIVE | Facility: OTHER | Age: 69
End: 2024-12-16

## 2024-12-17 ENCOUNTER — DOCTOR'S OFFICE (OUTPATIENT)
Dept: URBAN - NONMETROPOLITAN AREA CLINIC 1 | Facility: CLINIC | Age: 69
Setting detail: OPHTHALMOLOGY
End: 2024-12-17
Payer: COMMERCIAL

## 2024-12-17 DIAGNOSIS — H26.493: ICD-10-CM

## 2024-12-17 DIAGNOSIS — H40.023: ICD-10-CM

## 2024-12-17 DIAGNOSIS — H35.3132: ICD-10-CM

## 2024-12-17 DIAGNOSIS — E11.9: ICD-10-CM

## 2024-12-17 DIAGNOSIS — H43.813: ICD-10-CM

## 2024-12-17 LAB
LEFT EYE DIABETIC RETINOPATHY: NORMAL
RIGHT EYE DIABETIC RETINOPATHY: NORMAL

## 2024-12-17 PROCEDURE — 92014 COMPRE OPH EXAM EST PT 1/>: CPT | Performed by: OPHTHALMOLOGY

## 2024-12-17 PROCEDURE — 92134 CPTRZ OPH DX IMG PST SGM RTA: CPT | Performed by: OPHTHALMOLOGY

## 2024-12-17 ASSESSMENT — TONOMETRY
OD_IOP_MMHG: 12
OS_IOP_MMHG: 13

## 2024-12-17 ASSESSMENT — PACHYMETRY
OD_CT_UM: 541
OS_CT_CORRECTION: 0
OD_CT_CORRECTION: 0
OS_CT_UM: 541

## 2024-12-17 ASSESSMENT — CONFRONTATIONAL VISUAL FIELD TEST (CVF)
OS_FINDINGS: FULL
OD_FINDINGS: FULL

## 2024-12-17 NOTE — TELEPHONE ENCOUNTER
12/16/24 7:43 PM     Chart reviewed for Hemoglobin A1c blood pressure reading  was/were submitted to the patient's insurance.     Tracie Mcmanus   [facility]

## 2024-12-19 ENCOUNTER — OFFICE VISIT (OUTPATIENT)
Dept: URGENT CARE | Facility: MEDICAL CENTER | Age: 69
End: 2024-12-19
Payer: COMMERCIAL

## 2024-12-19 VITALS
HEIGHT: 71 IN | RESPIRATION RATE: 18 BRPM | BODY MASS INDEX: 40.6 KG/M2 | TEMPERATURE: 97.8 F | SYSTOLIC BLOOD PRESSURE: 138 MMHG | HEART RATE: 79 BPM | DIASTOLIC BLOOD PRESSURE: 72 MMHG | WEIGHT: 290 LBS | OXYGEN SATURATION: 95 %

## 2024-12-19 DIAGNOSIS — R51.9 NONINTRACTABLE HEADACHE, UNSPECIFIED CHRONICITY PATTERN, UNSPECIFIED HEADACHE TYPE: Primary | ICD-10-CM

## 2024-12-19 PROBLEM — J44.9 CHRONIC OBSTRUCTIVE PULMONARY DISEASE (HCC): Status: ACTIVE | Noted: 2024-12-19

## 2024-12-19 PROBLEM — E11.9 DIABETES MELLITUS (HCC): Status: ACTIVE | Noted: 2024-12-19

## 2024-12-19 PROBLEM — N40.0 BENIGN PROSTATIC HYPERPLASIA: Status: ACTIVE | Noted: 2024-12-19

## 2024-12-19 PROCEDURE — G0463 HOSPITAL OUTPT CLINIC VISIT: HCPCS

## 2024-12-19 PROCEDURE — 99214 OFFICE O/P EST MOD 30 MIN: CPT

## 2024-12-19 RX ORDER — NAPROXEN 500 MG/1
500 TABLET ORAL 2 TIMES DAILY WITH MEALS
Qty: 10 TABLET | Refills: 0 | Status: SHIPPED | OUTPATIENT
Start: 2024-12-19 | End: 2024-12-26

## 2024-12-19 RX ORDER — CHLORHEXIDINE GLUCONATE ORAL RINSE 1.2 MG/ML
SOLUTION DENTAL
COMMUNITY
Start: 2024-09-30

## 2024-12-19 NOTE — PATIENT INSTRUCTIONS
You may take over the counter Tylenol (Acetaminophen) as needed, as directed on packaging. Be sure to get plenty of rest, and drinking fluids to remain hydrated.     Please follow up with your primary provider in the next several days. Should you have any worsening of symptoms, or lack of improvement please be re-evaluated. If needed for significant concerns, consider 911 or ER evaluation.

## 2024-12-19 NOTE — PROGRESS NOTES
[facility]        NAME: Ean Young is a 69 y.o. male  : 1955    MRN: [id number]  DATE: 2024  TIME: 4:49 PM    Assessment and Plan   Nonintractable headache, unspecified chronicity pattern, unspecified headache type [R51.9]  1. Nonintractable headache, unspecified chronicity pattern, unspecified headache type  naproxen (EC NAPROSYN) 500 MG EC tablet            Patient Instructions       Follow up with PCP in 3-5 days.  Proceed to  ER if symptoms worsen.    If tests are performed, our office will contact you with results only if changes need to made to the care plan discussed with you at the visit. [facility]    Chief Complaint     Chief Complaint   Patient presents with   • Sinusitis     Pt states he's had a tooth extraction and has been feeling pressure on his Left TMJ and recent cancer dx is on the same side, headache on the same side, difficulty sleeping, feels like a sharp needle pain, Tylenol 500mg was taken but not providing relief         History of Present Illness       Patient here with reports of having pressure headache which has been ongoing x1 mth. He reports that he has seen Dr. Chapman. Patient has Stage 2 throat cancer. Saw oncologist yesterday- non-operative due to patient would lose his voice completely. Will be doing radiation in the near future as they feel this is his best options for improvement of quality of life. He has lost weight- reports he was at 350+lb about 1 mth ago- he has not been able to eat due to difficulty with chewing. Has been doing soft/liquid diet- he has a good appetite but difficulty with eating.     Patient reports having pain on the left side of his face, up the side and around the back of his head. He has been taking tylenol at night time to help with the pain to help him sleep. He has also been using a heating pad. He reports the tylenol does help for about 4 hours. Headache described as a  sharp stabbing pain in the back of his head. He reports the pain is constant in nature.  He has had trouble at times speaking. 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 He was using seeing PT in the past for his neck pain- he has a cervical support pillow which he uses.     He did place his niece on the speaker phone which I was able to also speak with- she denies any reports of any neurological changes. No significant change in speech for him. He has no acute focal neurological deficits. We had an at length discussion regarding potential causes of his headache including but not limited to continued problems with his neck, pain related to ongoing cancer, sinus congestion related, and/or vascular problems. Recommended conservative management with close follow up with PCP as well as we had a discussion regarding strict ER precautions.             Review of Systems   Review of Systems   Constitutional:  Negative for chills, fatigue and fever.   HENT:  Positive for trouble swallowing and voice change. Negative for congestion, postnasal drip, rhinorrhea and sore throat.         Voice change ongoing for the patient.   Swallowing difficulty secondary to Cancer   Respiratory:  Positive for cough. Negative for shortness of breath.         Cough which has been productive.    Cardiovascular:  Negative for chest pain.   Gastrointestinal:  Negative for abdominal pain, constipation, diarrhea, nausea and vomiting.   Skin:  Negative for color change and rash.   Neurological:  Positive for speech difficulty and headaches. Negative for dizziness and light-headedness.        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          Current Medications       Current Outpatient Medications:   •  amLODIPine (NORVASC) 5 mg tablet, Take 1 tablet (5 mg total) by mouth daily,  Disp: 90 tablet, Rfl: 3  •  atorvastatin (LIPITOR) 40 mg tablet, Take 1 tablet (40 mg total) by mouth daily, Disp: 90 tablet, Rfl: 2  •  azelastine (ASTELIN) 0.1 % nasal spray, 2 sprays into each nostril 2 (two) times a day, Disp: 1 mL, Rfl: 1  •  brimonidine tartrate 0.2 % ophthalmic solution, INSTILL 1 DROP INTO EACH EYE TWICE DAILY, Disp: , Rfl:   •  chlorhexidine (PERIDEX) 0.12 % solution, PLACE 15 MILLILITERS BY MUCOUS MEMBRANE ROUTE, 2 TIMES PER DAY, F...  (REFER TO PRESCRIPTION NOTES)., Disp: , Rfl:   •  Empagliflozin (JARDIANCE) 10 MG TABS tablet, Take 1 tablet (10 mg total) by mouth daily, Disp: 30 tablet, Rfl: 5  •  famotidine (PEPCID) 20 mg tablet, take 1 tablet by mouth every morning and BEFORE BEDTIME, Disp: , Rfl:   •  fluticasone (FLONASE) 50 mcg/act nasal spray, 2 sprays into each nostril daily, Disp: 16 g, Rfl: 5  •  hydroCHLOROthiazide 12.5 mg tablet, take 1 tablet by mouth once daily, Disp: 90 tablet, Rfl: 3  •  losartan (COZAAR) 50 mg tablet, Take 1 tablet (50 mg total) by mouth 2 (two) times a day, Disp: 180 tablet, Rfl: 3  •  metoprolol succinate (TOPROL-XL) 25 mg 24 hr tablet, Take 1 tablet (25 mg total) by mouth daily, Disp: 90 tablet, Rfl: 1  •  naproxen (EC NAPROSYN) 500 MG EC tablet, Take 1 tablet (500 mg total) by mouth 2 (two) times a day with meals for 5 days, Disp: 10 tablet, Rfl: 0  •  Omega-3 Fatty Acids (Fish Oil) 1200 MG CAPS, Take by mouth 2 (two) times a day, Disp: , Rfl:   •  pantoprazole (PROTONIX) 20 mg tablet, Take 1 tablet (20 mg total) by mouth daily, Disp: 90 tablet, Rfl: 1  •  pantoprazole (PROTONIX) 40 mg tablet, Take 40 mg by mouth every morning, Disp: , Rfl:   •  sodium chloride (OCEAN) 0.65 % nasal spray, 2 sprays into each nostril as needed for congestion (May use as needed.  This is a safe nasal spray), Disp: 120 mL, Rfl: 2  •  tamsulosin (FLOMAX) 0.4 mg, Take 1 capsule (0.4 mg total) by mouth daily with dinner, Disp: 90 capsule, Rfl: 3  •  meloxicam (Mobic) 15 mg  "tablet, Take 1 tablet (15 mg total) by mouth daily, Disp: 30 tablet, Rfl: 0    Current Allergies     Allergies as of 12/19/2024 - Reviewed 12/19/2024   Allergen Reaction Noted   • Pollen extract Other (See Comments) 12/18/2024            The following portions of the patient's history were reviewed and updated as appropriate: allergies, current medications, past family history, past medical history, past social history, past surgical history and problem list.     Past Medical History:   Diagnosis Date   • Cancer (HCC)    • Dyslipidemia    • GERD (gastroesophageal reflux disease)    • HL (hearing loss)    • Hypertension    • Moderate aortic stenosis    • Obstructive sleep apnea        Past Surgical History:   Procedure Laterality Date   • SHOULDER ARTHROSCOPY Right    • TONGUE SURGERY  07/2024       Family History   Problem Relation Age of Onset   • Heart disease Mother    • Heart attack Mother    • Prostate cancer Father    • Parkinsonism Maternal Grandmother          Medications have been verified.        Objective   /72   Pulse 79   Temp 97.8 °F (36.6 °C)   Resp 18   Ht 5' 11\" (1.803 m)   Wt 132 kg (290 lb)   SpO2 95%   BMI 40.45 kg/m²        Physical Exam     Physical Exam  Vitals and nursing note reviewed.   Constitutional:       General: He is awake. He is not in acute distress.     Appearance: Normal appearance. He is well-developed. He is obese. He is not ill-appearing.   HENT:      Head: Normocephalic and atraumatic.      Right Ear: Tympanic membrane, ear canal and external ear normal.      Left Ear: Tympanic membrane, ear canal and external ear normal.      Nose: Nose normal.      Mouth/Throat:      Lips: Pink.      Mouth: Mucous membranes are moist.      Pharynx: Oropharynx is clear.   Eyes:      Extraocular Movements: Extraocular movements intact.      Conjunctiva/sclera: Conjunctivae normal.      Pupils: Pupils are equal, round, and reactive to light.   Cardiovascular:      Rate and Rhythm: " Normal rate and regular rhythm.      Pulses: Normal pulses.      Heart sounds: S1 normal and S2 normal. Murmur heard.   Pulmonary:      Effort: Pulmonary effort is normal.      Breath sounds: Normal breath sounds.   Abdominal:      General: Abdomen is flat. Bowel sounds are normal.      Palpations: Abdomen is soft.   Musculoskeletal:         General: Normal range of motion.      Cervical back: Full passive range of motion without pain, normal range of motion and neck supple.   Skin:     General: Skin is warm and dry.      Capillary Refill: Capillary refill takes less than 2 seconds.      Findings: No rash.   Neurological:      General: No focal deficit present.      Mental Status: He is alert and oriented to person, place, and time. Mental status is at baseline.      GCS: GCS eye subscore is 4. GCS verbal subscore is 5. GCS motor subscore is 6.   Psychiatric:         Mood and Affect: Mood normal.         Behavior: Behavior normal. Behavior is cooperative.

## 2024-12-23 ASSESSMENT — REFRACTION_CURRENTRX
OD_OVR_VA: 20/
OS_OVR_VA: 20/
OS_CYLINDER: -2.00
OS_SPHERE: +1.25
OD_CYLINDER: -1.75
OD_AXIS: 092
OD_ADD: +2.50
OD_SPHERE: +1.00
OS_VPRISM_DIRECTION: BF
OS_AXIS: 103
OD_VPRISM_DIRECTION: BF
OS_ADD: +2.50

## 2024-12-23 ASSESSMENT — REFRACTION_AUTOREFRACTION
OS_SPHERE: +1.25
OD_CYLINDER: -1.75
OS_AXIS: 100
OD_AXIS: 087
OS_CYLINDER: -2.00
OD_SPHERE: +1.25

## 2024-12-23 ASSESSMENT — REFRACTION_MANIFEST
OS_ADD: +2.50
OD_CYLINDER: -1.75
OD_VA2: 20/30
OD_VA1: 20/30
OD_ADD: +2.50
OS_AXIS: 100
OD_AXIS: 090
OD_SPHERE: +1.00
OS_VA1: 20/30-2
OS_CYLINDER: -2.00
OS_SPHERE: +1.25
OS_VA2: 20/30-2

## 2024-12-23 ASSESSMENT — KERATOMETRY
OS_K2POWER_DIOPTERS: 43.75
OD_AXISANGLE_DEGREES: 131
OS_K1POWER_DIOPTERS: 42.25
OD_K2POWER_DIOPTERS: 44.50
OS_AXISANGLE_DEGREES: 029
OD_K1POWER_DIOPTERS: 43.25

## 2024-12-23 ASSESSMENT — VISUAL ACUITY
OS_BCVA: 20/40
OD_BCVA: 20/50+2

## 2024-12-26 ENCOUNTER — OFFICE VISIT (OUTPATIENT)
Dept: FAMILY MEDICINE CLINIC | Facility: CLINIC | Age: 69
End: 2024-12-26
Payer: COMMERCIAL

## 2024-12-26 VITALS
DIASTOLIC BLOOD PRESSURE: 68 MMHG | SYSTOLIC BLOOD PRESSURE: 108 MMHG | HEART RATE: 74 BPM | WEIGHT: 292 LBS | BODY MASS INDEX: 40.73 KG/M2 | TEMPERATURE: 97.4 F | OXYGEN SATURATION: 95 %

## 2024-12-26 DIAGNOSIS — G47.09 OTHER INSOMNIA: ICD-10-CM

## 2024-12-26 DIAGNOSIS — R51.9 INTRACTABLE EPISODIC HEADACHE, UNSPECIFIED HEADACHE TYPE: Primary | ICD-10-CM

## 2024-12-26 DIAGNOSIS — C76.0 HEAD AND NECK CANCER (HCC): ICD-10-CM

## 2024-12-26 PROCEDURE — 99214 OFFICE O/P EST MOD 30 MIN: CPT | Performed by: FAMILY MEDICINE

## 2024-12-26 PROCEDURE — G2211 COMPLEX E/M VISIT ADD ON: HCPCS | Performed by: FAMILY MEDICINE

## 2024-12-26 RX ORDER — BUTALBITAL, ACETAMINOPHEN AND CAFFEINE 300; 40; 50 MG/1; MG/1; MG/1
CAPSULE ORAL
Qty: 14 CAPSULE | Refills: 0 | Status: SHIPPED | OUTPATIENT
Start: 2024-12-26

## 2024-12-26 NOTE — ASSESSMENT & PLAN NOTE
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  Patient saw head and neck surgeon at [facility].  He discussed that chemo-radiation is the best option for the patient.  There was no evidence of distant metastasis on further imaging studies.  Patient is having trouble eating due to difficulty with chewing.  He is on a soft liquid diet.  He has lost weight.

## 2024-12-26 NOTE — PROGRESS NOTES
Name: Ean Young      : 1955      MRN: [id number]  Encounter Provider: Timmy Chapman DO  Encounter Date: 2024   Encounter department: [facility]  :  Assessment & Plan  Intractable episodic headache, unspecified headache type  Patient presented on 2024 to urgent care with complaints of a headache.  He had a tooth extraction and was feeling pressure on his left TMJ.  He was also having difficulty sleeping.  Tylenol was not effective.  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  PET scan imaging revealed no evidence of distant metastasis.  Patient was seen by head and neck surgeon who advised radiation-chemotherapy as best treatment option.  Patient apparently is proceeding with radiation treatments.    Patient has been having pain on the left side of his face up to side and around the back of his head.  He has been having insomnia.  He was also using a heating pad.  The Tylenol helped for about 4 hours but then the headache came back.  It was described as a sharp stabbing type of pain in the back of his head.  He uses a cervical support pillow.    It is worth noting that the patient had an MRI of the cervical spine which showed arthritic changes at the C3-C4 level on the left.  He was referred to pain management.  This was August 10, 2023.    No evidence of abnormality that would suggest an intracranial abnormality.  The fact that I can reproduce the headache on palpation, I suspect tension as a component.  Will do a short-term trial of Fioricet at bedtime to see if this will help with the headaches.  I will see the patient back in 5 days.  Patient to call sooner with any concerns.  Red flags to go to the emergency room were reviewed.  Orders:    Butalbital-APAP-Caffeine (Fioricet) -40 MG CAPS; Take 1 to 2 capsules by mouth at bedtime as needed for headache    Other insomnia  As stated above.  Patient with  left-sided facial pain radiating to the back of the head and cervical spine area.  Will see if the Fioricet helps with this as well.       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  Patient saw head and neck surgeon at [facility] physician [facility].  He discussed that chemo-radiation is the best option for the patient.  There was no evidence of distant metastasis on further imaging studies.  Patient is having trouble eating due to difficulty with chewing.  He is on a soft liquid diet.  He has lost weight.              History of Present Illness     Patient presented on 12/19/2024 to urgent care with complaints of a headache.  He had a tooth extraction and was feeling pressure on his left TMJ.  He was also having difficulty sleeping.  Tylenol was not effective.  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  PET scan imaging revealed no evidence of distant metastasis.  Patient was seen by head and neck surgeon who advised radiation-chemotherapy as best treatment option.  Patient apparently is proceeding with radiation treatments.    Patient has been having pain on the left side of his face up to side and around the back of his head.  He has been having insomnia.  He was also using a heating pad.  The Tylenol helped for about 4 hours but then the headache came back.  It was described as a sharp stabbing type of pain in the back of his head.  He uses a cervical support pillow.    It is worth noting that the patient had an MRI of the cervical spine which showed arthritic changes at the C3-C4 level on the left.  He was referred to pain management.  This was August 10, 2023.    The patient states that he can reproduce the headache by touching the top of his head and near his left ear.        Review of Systems   Constitutional:  Negative for chills and fever.   HENT:  Negative for ear pain and sore  throat.    Eyes:  Negative for pain and visual disturbance.   Respiratory:  Negative for cough and shortness of breath.    Cardiovascular:  Negative for chest pain and palpitations.   Gastrointestinal:  Negative for abdominal pain and vomiting.   Genitourinary:  Negative for dysuria and hematuria.   Musculoskeletal:  Negative for arthralgias and back pain.   Skin:  Negative for color change and rash.   Neurological:  Positive for headaches. Negative for seizures and syncope.   Psychiatric/Behavioral:  Positive for sleep disturbance. The patient is nervous/anxious.    All other systems reviewed and are negative.      Objective   /68   Pulse 74   Temp (!) 97.4 °F (36.3 °C)   Wt 132 kg (292 lb)   SpO2 95%   BMI 40.73 kg/m²      Physical Exam  Vitals and nursing note reviewed.   Constitutional:       General: He is not in acute distress.     Appearance: Normal appearance. He is well-developed.   HENT:      Head: Normocephalic and atraumatic.      Right Ear: Tympanic membrane normal.      Left Ear: Tympanic membrane normal.      Mouth/Throat:      Mouth: Mucous membranes are moist.      Pharynx: Oropharynx is clear.   Eyes:      Extraocular Movements: Extraocular movements intact.      Conjunctiva/sclera: Conjunctivae normal.      Pupils: Pupils are equal, round, and reactive to light.   Cardiovascular:      Rate and Rhythm: Normal rate and regular rhythm.      Heart sounds: Murmur heard.      No friction rub.   Pulmonary:      Effort: Pulmonary effort is normal. No respiratory distress.      Breath sounds: Normal breath sounds.   Abdominal:      General: Bowel sounds are normal.      Palpations: Abdomen is soft.      Tenderness: There is no abdominal tenderness. There is no guarding or rebound.      Hernia: No hernia is present.   Musculoskeletal:         General: No swelling.      Cervical back: Neck supple.      Comments: Left TMJ pain.  Positive tenderness to palpation (reproducible headache) on  suboccipital and temporal muscle.   Skin:     General: Skin is warm and dry.      Capillary Refill: Capillary refill takes less than 2 seconds.   Neurological:      General: No focal deficit present.      Mental Status: He is alert and oriented to person, place, and time.      Gait: Gait normal.   Psychiatric:         Mood and Affect: Mood normal.         Behavior: Behavior normal.         Thought Content: Thought content normal.       Administrative Statements   I have spent a total time of 30 minutes in caring for this patient on the day of the visit/encounter including Documenting in the medical record, Reviewing / ordering tests, medicine, procedures  , and Obtaining or reviewing history  .

## 2024-12-30 DIAGNOSIS — K21.9 GASTROESOPHAGEAL REFLUX DISEASE WITHOUT ESOPHAGITIS: ICD-10-CM

## 2024-12-30 RX ORDER — PANTOPRAZOLE SODIUM 20 MG/1
20 TABLET, DELAYED RELEASE ORAL DAILY
Qty: 90 TABLET | Refills: 1 | Status: SHIPPED | OUTPATIENT
Start: 2024-12-30 | End: 2024-12-30 | Stop reason: SDUPTHER

## 2024-12-30 RX ORDER — PANTOPRAZOLE SODIUM 20 MG/1
20 TABLET, DELAYED RELEASE ORAL DAILY
Qty: 90 TABLET | Refills: 1 | Status: SHIPPED | OUTPATIENT
Start: 2024-12-30

## 2024-12-31 ENCOUNTER — OFFICE VISIT (OUTPATIENT)
Dept: FAMILY MEDICINE CLINIC | Facility: CLINIC | Age: 69
End: 2024-12-31
Payer: COMMERCIAL

## 2024-12-31 VITALS
OXYGEN SATURATION: 95 % | DIASTOLIC BLOOD PRESSURE: 68 MMHG | HEIGHT: 71 IN | WEIGHT: 286.4 LBS | BODY MASS INDEX: 40.1 KG/M2 | TEMPERATURE: 97.5 F | SYSTOLIC BLOOD PRESSURE: 116 MMHG | HEART RATE: 82 BPM

## 2024-12-31 DIAGNOSIS — C76.0 HEAD AND NECK CANCER (HCC): ICD-10-CM

## 2024-12-31 DIAGNOSIS — R51.9 INTRACTABLE EPISODIC HEADACHE, UNSPECIFIED HEADACHE TYPE: Primary | ICD-10-CM

## 2024-12-31 DIAGNOSIS — G47.09 OTHER INSOMNIA: ICD-10-CM

## 2024-12-31 DIAGNOSIS — R45.89 ANXIETY ABOUT TREATMENT: ICD-10-CM

## 2024-12-31 DIAGNOSIS — E11.9 TYPE 2 DIABETES MELLITUS WITHOUT COMPLICATION, WITHOUT LONG-TERM CURRENT USE OF INSULIN (HCC): ICD-10-CM

## 2024-12-31 PROCEDURE — 99214 OFFICE O/P EST MOD 30 MIN: CPT | Performed by: FAMILY MEDICINE

## 2024-12-31 PROCEDURE — G2211 COMPLEX E/M VISIT ADD ON: HCPCS | Performed by: FAMILY MEDICINE

## 2024-12-31 RX ORDER — HYDROXYZINE PAMOATE 25 MG/1
25 CAPSULE ORAL 3 TIMES DAILY PRN
Qty: 30 CAPSULE | Refills: 1 | Status: SHIPPED | OUTPATIENT
Start: 2024-12-31 | End: 2025-01-02 | Stop reason: SDUPTHER

## 2024-12-31 NOTE — ASSESSMENT & PLAN NOTE
Lab Results   Component Value Date    HGBA1C 6.5 11/11/2024   Currently controlled on medication.  Continue to monitor.    Orders:    Albumin / creatinine urine ratio; Future

## 2024-12-31 NOTE — ASSESSMENT & PLAN NOTE
Will stop the Aricept.  Trial of hydroxyzine 25 mg by mouth 3 times a day as needed for anxiety and insomnia.  Orders:    hydrOXYzine pamoate (VISTARIL) 25 mg capsule; Take 1 capsule (25 mg total) by mouth 3 (three) times a day as needed for itching

## 2024-12-31 NOTE — ASSESSMENT & PLAN NOTE
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  Patient is followed by head and neck surgeon at [facility].  He discussed that chemo-radiation is the best option for the patient.  There was no evidence of distant metastasis on further imaging studies.

## 2024-12-31 NOTE — ASSESSMENT & PLAN NOTE
Certainly is understandable that the patient would be anxious about upcoming chemotherapy-radiation treatment for his head and neck surgery.  I will do a trial of hydroxyzine.  Patient will let me know if this helps in 1 week.  It will be 25 mg by mouth 3 times a day as needed for anxiety.  I do believe the anxiety is contributing to his tension headache as well.  I do believe the anxiety is contributing to his insomnia.  Orders:    hydrOXYzine pamoate (VISTARIL) 25 mg capsule; Take 1 capsule (25 mg total) by mouth 3 (three) times a day as needed for itching

## 2024-12-31 NOTE — ASSESSMENT & PLAN NOTE
Patient was seen on 12/26/2024.  The patient was placed on a trial of Fioricet at bedtime to see if this helped his headache which I believed was more from tension.  I was able to reproduce the headache on palpation of the frontal and temporal muscles patient also has a history of cervical spine degenerative disc changes on the left at C3-C4.  Patient has seen pain management in the past.  The patient states that Fioricet is not working.  I will stop Fioricet.  I will do a trial of Atarax 25 mg 3 times a day as needed for anxiety and headache.  I really believe the anxiety is contributing to the tension headache.  Again, the headache is reproducible on palpation of the frontal and temporal muscles.  I do suspect intracranial abnormality.  I do not believe the patient needs any further neuroimaging at this time.

## 2024-12-31 NOTE — PROGRESS NOTES
Name: Ean Young      : 1955      MRN: [id number]  Encounter Provider: Timmy Chapman DO  Encounter Date: 2024   Encounter department: [facility]  :  Assessment & Plan  Intractable episodic headache, unspecified headache type  Patient was seen on 2024.  The patient was placed on a trial of Fioricet at bedtime to see if this helped his headache which I believed was more from tension.  I was able to reproduce the headache on palpation of the frontal and temporal muscles patient also has a history of cervical spine degenerative disc changes on the left at C3-C4.  Patient has seen pain management in the past.  The patient states that Fioricet is not working.  I will stop Fioricet.  I will do a trial of Atarax 25 mg 3 times a day as needed for anxiety and headache.  I really believe the anxiety is contributing to the tension headache.  Again, the headache is reproducible on palpation of the frontal and temporal muscles.  I do suspect intracranial abnormality.  I do not believe the patient needs any further neuroimaging at this time.       Type 2 diabetes mellitus without complication, without long-term current use of insulin (HCC)    Lab Results   Component Value Date    HGBA1C 6.5 2024   Currently controlled on medication.  Continue to monitor.    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  Patient is followed by head and neck surgeon at [facility] physician group.  He discussed that chemo-radiation is the best option for the patient.  There was no evidence of distant metastasis on further imaging studies.       Anxiety about treatment  Certainly is understandable that the patient would be anxious about upcoming chemotherapy-radiation treatment for his head and neck surgery.  I will do a trial of hydroxyzine.  Patient will let me know if this helps in 1  week.  It will be 25 mg by mouth 3 times a day as needed for anxiety.  I do believe the anxiety is contributing to his tension headache as well.  I do believe the anxiety is contributing to his insomnia.  Orders:    hydrOXYzine pamoate (VISTARIL) 25 mg capsule; Take 1 capsule (25 mg total) by mouth 3 (three) times a day as needed for itching    Other insomnia  Will stop the Aricept.  Trial of hydroxyzine 25 mg by mouth 3 times a day as needed for anxiety and insomnia.  Orders:    hydrOXYzine pamoate (VISTARIL) 25 mg capsule; Take 1 capsule (25 mg total) by mouth 3 (three) times a day as needed for itching           History of Present Illness     Patient is a pleasant 69-year-old male who presents today for follow-up headache.  At the last visit, I suspected tension headache since I could reproduce the headache on palpation of the scalp.  I placed the patient on a trial of Fioricet at bedtime to help with headache as well as to help with sleep.  The patient does have some anxiety over his upcoming treatment for basaloid squamous cell carcinoma at the base of the tongue.  Is located at the left side of the tongue.  Patient is scheduled for chemotherapy-radiation treatments.      Review of Systems   Constitutional:  Negative for chills and fever.   HENT:  Positive for voice change. Negative for ear pain and sore throat.    Eyes:  Negative for pain and visual disturbance.   Respiratory:  Negative for cough and shortness of breath.    Cardiovascular:  Negative for chest pain and palpitations.   Gastrointestinal:  Negative for abdominal pain and vomiting.   Genitourinary:  Negative for dysuria and hematuria.   Musculoskeletal:  Negative for arthralgias and back pain.   Skin:  Negative for color change and rash.   Neurological:  Positive for headaches. Negative for seizures and syncope.   Psychiatric/Behavioral:  Positive for sleep disturbance. Negative for agitation, dysphoric mood, hallucinations and suicidal ideas. The  "patient is nervous/anxious.    All other systems reviewed and are negative.      Objective   /68   Pulse 82   Temp 97.5 °F (36.4 °C)   Ht 5' 11\" (1.803 m)   Wt 130 kg (286 lb 6.4 oz)   SpO2 95%   BMI 39.94 kg/m²      Physical Exam  Vitals and nursing note reviewed.   Constitutional:       General: He is not in acute distress.     Appearance: He is well-developed. He is not ill-appearing, toxic-appearing or diaphoretic.   HENT:      Head: Normocephalic and atraumatic.   Eyes:      Conjunctiva/sclera: Conjunctivae normal.   Cardiovascular:      Rate and Rhythm: Normal rate and regular rhythm.      Heart sounds: No murmur heard.  Pulmonary:      Effort: Pulmonary effort is normal. No respiratory distress.      Breath sounds: Normal breath sounds.   Abdominal:      Palpations: Abdomen is soft.      Tenderness: There is no abdominal tenderness.   Musculoskeletal:         General: No swelling.      Cervical back: Neck supple.   Skin:     General: Skin is warm and dry.      Capillary Refill: Capillary refill takes less than 2 seconds.   Neurological:      General: No focal deficit present.      Mental Status: He is alert and oriented to person, place, and time.      Gait: Gait normal.      Comments: Reproducible headache on palpation of frontal and temporal muscles   Psychiatric:         Mood and Affect: Mood normal.         Behavior: Behavior normal.         Thought Content: Thought content normal.         "

## 2025-01-02 ENCOUNTER — TELEPHONE (OUTPATIENT)
Dept: FAMILY MEDICINE CLINIC | Facility: CLINIC | Age: 70
End: 2025-01-02

## 2025-01-02 DIAGNOSIS — R45.89 ANXIETY ABOUT TREATMENT: ICD-10-CM

## 2025-01-02 DIAGNOSIS — G47.09 OTHER INSOMNIA: ICD-10-CM

## 2025-01-02 RX ORDER — HYDROXYZINE PAMOATE 25 MG/1
25 CAPSULE ORAL 3 TIMES DAILY PRN
Qty: 30 CAPSULE | Refills: 1 | Status: SHIPPED | OUTPATIENT
Start: 2025-01-02

## 2025-01-02 NOTE — TELEPHONE ENCOUNTER
Fanta from Dr Timmy Lawton's office ( radiation / oncology ) called   Pt was there stating he was given hydroxyzine 25 mg for his h/a   He took the bottle in and Fanta said the bottle say as needed for itching.     I read your note and it states that he where giving the pt hydroxyzine 25 mg 3 times a day as needed for anxiety / headache.   Fanta wants to clarify that the medication is for his h/a and is asking if the directions on the label should be changed.     Please advise thank you

## 2025-01-03 ENCOUNTER — APPOINTMENT (OUTPATIENT)
Dept: LAB | Facility: MEDICAL CENTER | Age: 70
End: 2025-01-03
Payer: COMMERCIAL

## 2025-01-03 DIAGNOSIS — M54.2 PAIN IN NECK: ICD-10-CM

## 2025-01-03 DIAGNOSIS — E78.5 DYSLIPIDEMIA: ICD-10-CM

## 2025-01-03 DIAGNOSIS — E11.9 TYPE 2 DIABETES MELLITUS WITHOUT COMPLICATION, WITHOUT LONG-TERM CURRENT USE OF INSULIN (HCC): ICD-10-CM

## 2025-01-03 DIAGNOSIS — T66.XXXS ADVERSE EFFECT OF RADIATION, SEQUELA: ICD-10-CM

## 2025-01-03 PROCEDURE — 36415 COLL VENOUS BLD VENIPUNCTURE: CPT

## 2025-01-03 PROCEDURE — 80053 COMPREHEN METABOLIC PANEL: CPT

## 2025-01-03 PROCEDURE — 84443 ASSAY THYROID STIM HORMONE: CPT

## 2025-01-03 PROCEDURE — 84436 ASSAY OF TOTAL THYROXINE: CPT

## 2025-01-03 PROCEDURE — 80061 LIPID PANEL: CPT

## 2025-01-04 LAB
ALBUMIN SERPL BCG-MCNC: 4.3 G/DL (ref 3.5–5)
ALP SERPL-CCNC: 79 U/L (ref 34–104)
ALT SERPL W P-5'-P-CCNC: 24 U/L (ref 7–52)
ANION GAP SERPL CALCULATED.3IONS-SCNC: 6 MMOL/L (ref 4–13)
AST SERPL W P-5'-P-CCNC: 19 U/L (ref 13–39)
BILIRUB SERPL-MCNC: 0.53 MG/DL (ref 0.2–1)
BUN SERPL-MCNC: 12 MG/DL (ref 5–25)
CALCIUM SERPL-MCNC: 9.5 MG/DL (ref 8.4–10.2)
CHLORIDE SERPL-SCNC: 101 MMOL/L (ref 96–108)
CHOLEST SERPL-MCNC: 174 MG/DL (ref ?–200)
CO2 SERPL-SCNC: 32 MMOL/L (ref 21–32)
CREAT SERPL-MCNC: 0.75 MG/DL (ref 0.6–1.3)
GFR SERPL CREATININE-BSD FRML MDRD: 94 ML/MIN/1.73SQ M
GLUCOSE P FAST SERPL-MCNC: 105 MG/DL (ref 65–99)
HDLC SERPL-MCNC: 34 MG/DL
LDLC SERPL CALC-MCNC: 94 MG/DL (ref 0–100)
POTASSIUM SERPL-SCNC: 3.9 MMOL/L (ref 3.5–5.3)
PROT SERPL-MCNC: 7.2 G/DL (ref 6.4–8.4)
SODIUM SERPL-SCNC: 139 MMOL/L (ref 135–147)
T4 SERPL-MCNC: 9.77 UG/DL (ref 6.09–12.23)
TRIGL SERPL-MCNC: 231 MG/DL (ref ?–150)
TSH SERPL DL<=0.05 MIU/L-ACNC: 1.36 UIU/ML (ref 0.45–4.5)

## 2025-01-06 ENCOUNTER — RESULTS FOLLOW-UP (OUTPATIENT)
Dept: CARDIOLOGY CLINIC | Facility: CLINIC | Age: 70
End: 2025-01-06

## 2025-01-20 ENCOUNTER — TELEPHONE (OUTPATIENT)
Age: 70
End: 2025-01-20

## 2025-01-20 ENCOUNTER — TELEPHONE (OUTPATIENT)
Dept: CARDIOLOGY CLINIC | Facility: CLINIC | Age: 70
End: 2025-01-20

## 2025-01-20 NOTE — TELEPHONE ENCOUNTER
Caller: Ean Young     Doctor: Benjamin Tsang DO    Reason for call: Returning the call of Benjamin Tsang DO. Requested a call back after 3 pm due to Chemo/radiation schedule.     Call back#: [phone]

## 2025-01-20 NOTE — TELEPHONE ENCOUNTER
-Attempted to call patient to discuss antihypertensive regimen given lower blood pressure readings.  Unfortunately I was not able to reach the patient but did leave a message on his phone with my name and office number to call back for a better time to speak.

## 2025-01-20 NOTE — TELEPHONE ENCOUNTER
Pt called to make Dr. Chapman aware that he's been losing weight due to the radiation and chemo and his BP was dropping. Since Saturday, 1/18 he cut back on his losartan, taking only 25 mg once in the am and 25mg in the pm.  I offered him an appt to discuss this with the doctor but he refused.  He just wanted to make the doctor aware of what's happening.

## 2025-01-20 NOTE — TELEPHONE ENCOUNTER
Pt called stating BP's are dropping  Saturday BP 95/44, so pt cut Losartan 50 mg in half  BP this morning 95/55, 107/75  Pt stated he has lost weight from the cancer and not being able to eat   Pt attends radiation everyday M-F and chemo on Tuesday's and vitals are monitored during these treatments. Pt will call back with any additional concerns.

## 2025-01-21 ENCOUNTER — TELEPHONE (OUTPATIENT)
Dept: CARDIOLOGY CLINIC | Facility: CLINIC | Age: 70
End: 2025-01-21

## 2025-01-21 DIAGNOSIS — I10 ESSENTIAL HYPERTENSION: ICD-10-CM

## 2025-01-21 RX ORDER — LOSARTAN POTASSIUM 25 MG/1
25 TABLET ORAL 2 TIMES DAILY
Qty: 60 TABLET | Refills: 6 | Status: SHIPPED | OUTPATIENT
Start: 2025-01-21

## 2025-01-21 NOTE — TELEPHONE ENCOUNTER
-I was able to call and speak with patient about hypotension.  Since reducing losartan to 25 mg daily from 50 mg daily dosing his blood pressure today was 139/84 and yesterday was 107/74.  He notes feeling better at this time therefore we will continue to monitor on lower dose however if blood pressures remain low we will need to likely reduce amlodipine at that time.  Patient will continue to monitor and let our office know if any significant issues.

## 2025-01-25 ENCOUNTER — VBI (OUTPATIENT)
Dept: ADMINISTRATIVE | Facility: OTHER | Age: 70
End: 2025-01-25

## 2025-01-25 NOTE — TELEPHONE ENCOUNTER
01/25/25 11:52 AM     Chart reviewed for eGFR and Hemoglobin A1c was/were submitted to the patient's insurance.     Tracie Mcmanus   [facility]

## 2025-01-27 ENCOUNTER — TELEPHONE (OUTPATIENT)
Age: 70
End: 2025-01-27

## 2025-01-27 NOTE — TELEPHONE ENCOUNTER
Caller: Kari Peña [facility]    Doctor: Sharan    Reason for call: Calling to request updated medication list and last office note for continuity of care.   Please fax to [phone] attn: Dr. Lawton    Call back#: [phone]

## 2025-02-04 ENCOUNTER — HOSPITAL ENCOUNTER (INPATIENT)
Facility: HOSPITAL | Age: 70
LOS: 1 days | Discharge: HOME | DRG: 377 | End: 2025-02-06
Attending: EMERGENCY MEDICINE | Admitting: FAMILY MEDICINE
Payer: COMMERCIAL

## 2025-02-04 ENCOUNTER — APPOINTMENT (OUTPATIENT)
Dept: CT IMAGING | Facility: HOSPITAL | Age: 70
DRG: 377 | End: 2025-02-04
Payer: COMMERCIAL

## 2025-02-04 DIAGNOSIS — K26.9 DUODENUM ULCER: ICD-10-CM

## 2025-02-04 DIAGNOSIS — K92.2 GI BLEED: Primary | ICD-10-CM

## 2025-02-04 DIAGNOSIS — K92.1 MELENA: ICD-10-CM

## 2025-02-04 DIAGNOSIS — I10 ESSENTIAL HYPERTENSION: ICD-10-CM

## 2025-02-04 DIAGNOSIS — C76.0 HEAD AND NECK CANCER (HCC): ICD-10-CM

## 2025-02-04 DIAGNOSIS — K25.9 GASTRIC ULCER: ICD-10-CM

## 2025-02-04 PROBLEM — R71.0 DECREASED HEMOGLOBIN: Status: ACTIVE | Noted: 2025-02-04

## 2025-02-04 PROBLEM — I95.9 HYPOTENSION: Status: ACTIVE | Noted: 2025-02-04

## 2025-02-04 PROBLEM — E87.6 HYPOKALEMIA: Status: ACTIVE | Noted: 2025-02-04

## 2025-02-04 PROBLEM — R65.10 SIRS (SYSTEMIC INFLAMMATORY RESPONSE SYNDROME) (HCC): Status: ACTIVE | Noted: 2025-02-04

## 2025-02-04 PROBLEM — G47.33 OSA (OBSTRUCTIVE SLEEP APNEA): Status: ACTIVE | Noted: 2025-02-04

## 2025-02-04 LAB
ABO GROUP BLD: NORMAL
ABO GROUP BLD: NORMAL
ALBUMIN SERPL BCG-MCNC: 3.7 G/DL (ref 3.5–5)
ALP SERPL-CCNC: 62 U/L (ref 34–104)
ALT SERPL W P-5'-P-CCNC: 45 U/L (ref 7–52)
ANION GAP SERPL CALCULATED.3IONS-SCNC: 15 MMOL/L (ref 4–13)
APTT PPP: 34 SECONDS (ref 23–34)
AST SERPL W P-5'-P-CCNC: 28 U/L (ref 13–39)
ATRIAL RATE: 267 BPM
BASOPHILS # BLD AUTO: 0.01 THOUSANDS/ΜL (ref 0–0.1)
BASOPHILS NFR BLD AUTO: 0 % (ref 0–1)
BILIRUB SERPL-MCNC: 0.82 MG/DL (ref 0.2–1)
BLD GP AB SCN SERPL QL: NEGATIVE
BUN SERPL-MCNC: 15 MG/DL (ref 5–25)
CALCIUM SERPL-MCNC: 8.9 MG/DL (ref 8.4–10.2)
CHLORIDE SERPL-SCNC: 102 MMOL/L (ref 96–108)
CO2 SERPL-SCNC: 21 MMOL/L (ref 21–32)
CREAT SERPL-MCNC: 0.58 MG/DL (ref 0.6–1.3)
EOSINOPHIL # BLD AUTO: 0.01 THOUSAND/ΜL (ref 0–0.61)
EOSINOPHIL NFR BLD AUTO: 0 % (ref 0–6)
ERYTHROCYTE [DISTWIDTH] IN BLOOD BY AUTOMATED COUNT: 14.2 % (ref 11.6–15.1)
FERRITIN SERPL-MCNC: 238 NG/ML (ref 24–336)
GFR SERPL CREATININE-BSD FRML MDRD: 104 ML/MIN/1.73SQ M
GLUCOSE SERPL-MCNC: 101 MG/DL (ref 65–140)
GLUCOSE SERPL-MCNC: 119 MG/DL (ref 65–140)
GLUCOSE SERPL-MCNC: 125 MG/DL (ref 65–140)
GLUCOSE SERPL-MCNC: 79 MG/DL (ref 65–140)
GLUCOSE SERPL-MCNC: 95 MG/DL (ref 65–140)
HCT VFR BLD AUTO: 31.4 % (ref 36.5–49.3)
HGB BLD-MCNC: 10.7 G/DL (ref 12–17)
IMM GRANULOCYTES # BLD AUTO: 0 THOUSAND/UL (ref 0–0.2)
IMM GRANULOCYTES NFR BLD AUTO: 0 % (ref 0–2)
INR PPP: 1.08 (ref 0.85–1.19)
IRON SATN MFR SERPL: 19 % (ref 15–50)
IRON SERPL-MCNC: 45 UG/DL (ref 50–212)
LIPASE SERPL-CCNC: 26 U/L (ref 11–82)
LYMPHOCYTES # BLD AUTO: 0.58 THOUSANDS/ΜL (ref 0.6–4.47)
LYMPHOCYTES NFR BLD AUTO: 18 % (ref 14–44)
MCH RBC QN AUTO: 31.1 PG (ref 26.8–34.3)
MCHC RBC AUTO-ENTMCNC: 34.1 G/DL (ref 31.4–37.4)
MCV RBC AUTO: 91 FL (ref 82–98)
MONOCYTES # BLD AUTO: 0.22 THOUSAND/ΜL (ref 0.17–1.22)
MONOCYTES NFR BLD AUTO: 7 % (ref 4–12)
NEUTROPHILS # BLD AUTO: 2.42 THOUSANDS/ΜL (ref 1.85–7.62)
NEUTS SEG NFR BLD AUTO: 75 % (ref 43–75)
NRBC BLD AUTO-RTO: 0 /100 WBCS
PLATELET # BLD AUTO: 105 THOUSANDS/UL (ref 149–390)
PMV BLD AUTO: 9.5 FL (ref 8.9–12.7)
POTASSIUM SERPL-SCNC: 3.1 MMOL/L (ref 3.5–5.3)
PROT SERPL-MCNC: 6.3 G/DL (ref 6.4–8.4)
PROTHROMBIN TIME: 14.5 SECONDS (ref 12.3–15)
QRS AXIS: 41 DEGREES
QRSD INTERVAL: 104 MS
QT INTERVAL: 338 MS
QTC INTERVAL: 481 MS
RBC # BLD AUTO: 3.44 MILLION/UL (ref 3.88–5.62)
RH BLD: POSITIVE
RH BLD: POSITIVE
SARS-COV-2 RNA RESP QL NAA+PROBE: NEGATIVE
SODIUM SERPL-SCNC: 138 MMOL/L (ref 135–147)
SPECIMEN EXPIRATION DATE: NORMAL
T WAVE AXIS: 24 DEGREES
TIBC SERPL-MCNC: 235.2 UG/DL (ref 250–450)
TRANSFERRIN SERPL-MCNC: 168 MG/DL (ref 203–362)
UIBC SERPL-MCNC: 190 UG/DL (ref 155–355)
VENTRICULAR RATE: 122 BPM
WBC # BLD AUTO: 3.24 THOUSAND/UL (ref 4.31–10.16)

## 2025-02-04 PROCEDURE — 36415 COLL VENOUS BLD VENIPUNCTURE: CPT | Performed by: EMERGENCY MEDICINE

## 2025-02-04 PROCEDURE — 74177 CT ABD & PELVIS W/CONTRAST: CPT

## 2025-02-04 PROCEDURE — 99223 1ST HOSP IP/OBS HIGH 75: CPT | Performed by: FAMILY MEDICINE

## 2025-02-04 PROCEDURE — 87635 SARS-COV-2 COVID-19 AMP PRB: CPT

## 2025-02-04 PROCEDURE — 83540 ASSAY OF IRON: CPT | Performed by: FAMILY MEDICINE

## 2025-02-04 PROCEDURE — 85610 PROTHROMBIN TIME: CPT | Performed by: EMERGENCY MEDICINE

## 2025-02-04 PROCEDURE — 80053 COMPREHEN METABOLIC PANEL: CPT | Performed by: EMERGENCY MEDICINE

## 2025-02-04 PROCEDURE — 86900 BLOOD TYPING SEROLOGIC ABO: CPT | Performed by: EMERGENCY MEDICINE

## 2025-02-04 PROCEDURE — 83550 IRON BINDING TEST: CPT | Performed by: FAMILY MEDICINE

## 2025-02-04 PROCEDURE — 93005 ELECTROCARDIOGRAM TRACING: CPT

## 2025-02-04 PROCEDURE — 99223 1ST HOSP IP/OBS HIGH 75: CPT | Performed by: STUDENT IN AN ORGANIZED HEALTH CARE EDUCATION/TRAINING PROGRAM

## 2025-02-04 PROCEDURE — 99285 EMERGENCY DEPT VISIT HI MDM: CPT | Performed by: EMERGENCY MEDICINE

## 2025-02-04 PROCEDURE — 82948 REAGENT STRIP/BLOOD GLUCOSE: CPT

## 2025-02-04 PROCEDURE — 86901 BLOOD TYPING SEROLOGIC RH(D): CPT | Performed by: EMERGENCY MEDICINE

## 2025-02-04 PROCEDURE — 99285 EMERGENCY DEPT VISIT HI MDM: CPT

## 2025-02-04 PROCEDURE — 94660 CPAP INITIATION&MGMT: CPT

## 2025-02-04 PROCEDURE — 85025 COMPLETE CBC W/AUTO DIFF WBC: CPT | Performed by: EMERGENCY MEDICINE

## 2025-02-04 PROCEDURE — 83690 ASSAY OF LIPASE: CPT | Performed by: EMERGENCY MEDICINE

## 2025-02-04 PROCEDURE — 85730 THROMBOPLASTIN TIME PARTIAL: CPT | Performed by: EMERGENCY MEDICINE

## 2025-02-04 PROCEDURE — 94760 N-INVAS EAR/PLS OXIMETRY 1: CPT

## 2025-02-04 PROCEDURE — 93010 ELECTROCARDIOGRAM REPORT: CPT | Performed by: INTERNAL MEDICINE

## 2025-02-04 PROCEDURE — 86850 RBC ANTIBODY SCREEN: CPT | Performed by: EMERGENCY MEDICINE

## 2025-02-04 PROCEDURE — 96365 THER/PROPH/DIAG IV INF INIT: CPT

## 2025-02-04 PROCEDURE — 82728 ASSAY OF FERRITIN: CPT | Performed by: FAMILY MEDICINE

## 2025-02-04 PROCEDURE — 82272 OCCULT BLD FECES 1-3 TESTS: CPT

## 2025-02-04 RX ORDER — HYDROCHLOROTHIAZIDE 12.5 MG/1
12.5 TABLET ORAL DAILY
Status: DISCONTINUED | OUTPATIENT
Start: 2025-02-05 | End: 2025-02-04

## 2025-02-04 RX ORDER — ENOXAPARIN SODIUM 100 MG/ML
40 INJECTION SUBCUTANEOUS DAILY
Status: DISCONTINUED | OUTPATIENT
Start: 2025-02-04 | End: 2025-02-04

## 2025-02-04 RX ORDER — FAMOTIDINE 20 MG/1
20 TABLET, FILM COATED ORAL 2 TIMES DAILY
Status: DISCONTINUED | OUTPATIENT
Start: 2025-02-04 | End: 2025-02-06 | Stop reason: HOSPADM

## 2025-02-04 RX ORDER — SODIUM CHLORIDE, SODIUM GLUCONATE, SODIUM ACETATE, POTASSIUM CHLORIDE, MAGNESIUM CHLORIDE, SODIUM PHOSPHATE, DIBASIC, AND POTASSIUM PHOSPHATE .53; .5; .37; .037; .03; .012; .00082 G/100ML; G/100ML; G/100ML; G/100ML; G/100ML; G/100ML; G/100ML
2000 INJECTION, SOLUTION INTRAVENOUS ONCE
Status: COMPLETED | OUTPATIENT
Start: 2025-02-04 | End: 2025-02-04

## 2025-02-04 RX ORDER — AZELASTINE 1 MG/ML
2 SPRAY, METERED NASAL 2 TIMES DAILY
Status: DISCONTINUED | OUTPATIENT
Start: 2025-02-04 | End: 2025-02-06 | Stop reason: HOSPADM

## 2025-02-04 RX ORDER — PANTOPRAZOLE SODIUM 40 MG/10ML
40 INJECTION, POWDER, LYOPHILIZED, FOR SOLUTION INTRAVENOUS EVERY 12 HOURS
Status: DISCONTINUED | OUTPATIENT
Start: 2025-02-04 | End: 2025-02-06 | Stop reason: HOSPADM

## 2025-02-04 RX ORDER — HYDROXYZINE HYDROCHLORIDE 25 MG/1
25 TABLET, FILM COATED ORAL 3 TIMES DAILY PRN
Status: DISCONTINUED | OUTPATIENT
Start: 2025-02-04 | End: 2025-02-06 | Stop reason: HOSPADM

## 2025-02-04 RX ORDER — METOPROLOL SUCCINATE 25 MG/1
25 TABLET, EXTENDED RELEASE ORAL DAILY
Status: DISCONTINUED | OUTPATIENT
Start: 2025-02-05 | End: 2025-02-06 | Stop reason: HOSPADM

## 2025-02-04 RX ORDER — LOSARTAN POTASSIUM 25 MG/1
25 TABLET ORAL 2 TIMES DAILY
Status: DISCONTINUED | OUTPATIENT
Start: 2025-02-04 | End: 2025-02-04

## 2025-02-04 RX ORDER — INSULIN LISPRO 100 [IU]/ML
1-6 INJECTION, SOLUTION INTRAVENOUS; SUBCUTANEOUS
Status: DISCONTINUED | OUTPATIENT
Start: 2025-02-04 | End: 2025-02-06 | Stop reason: HOSPADM

## 2025-02-04 RX ORDER — BRIMONIDINE TARTRATE 2 MG/ML
1 SOLUTION/ DROPS OPHTHALMIC 2 TIMES DAILY
Status: DISCONTINUED | OUTPATIENT
Start: 2025-02-04 | End: 2025-02-06 | Stop reason: HOSPADM

## 2025-02-04 RX ORDER — SODIUM CHLORIDE 9 MG/ML
100 INJECTION, SOLUTION INTRAVENOUS CONTINUOUS
Status: DISCONTINUED | OUTPATIENT
Start: 2025-02-04 | End: 2025-02-05

## 2025-02-04 RX ORDER — POTASSIUM CHLORIDE 1500 MG/1
40 TABLET, EXTENDED RELEASE ORAL ONCE
Status: COMPLETED | OUTPATIENT
Start: 2025-02-04 | End: 2025-02-04

## 2025-02-04 RX ORDER — AMLODIPINE BESYLATE 5 MG/1
5 TABLET ORAL DAILY
Status: DISCONTINUED | OUTPATIENT
Start: 2025-02-05 | End: 2025-02-04

## 2025-02-04 RX ORDER — ATORVASTATIN CALCIUM 40 MG/1
40 TABLET, FILM COATED ORAL DAILY
Status: DISCONTINUED | OUTPATIENT
Start: 2025-02-04 | End: 2025-02-06 | Stop reason: HOSPADM

## 2025-02-04 RX ORDER — SODIUM CHLORIDE, SODIUM LACTATE, POTASSIUM CHLORIDE, CALCIUM CHLORIDE 600; 310; 30; 20 MG/100ML; MG/100ML; MG/100ML; MG/100ML
125 INJECTION, SOLUTION INTRAVENOUS CONTINUOUS
Status: CANCELLED | OUTPATIENT
Start: 2025-02-04

## 2025-02-04 RX ADMIN — PANTOPRAZOLE SODIUM 40 MG: 40 INJECTION, POWDER, FOR SOLUTION INTRAVENOUS at 10:57

## 2025-02-04 RX ADMIN — BRIMONIDINE TARTRATE 1 DROP: 2 SOLUTION/ DROPS OPHTHALMIC at 21:54

## 2025-02-04 RX ADMIN — SODIUM CHLORIDE 100 ML/HR: 0.9 INJECTION, SOLUTION INTRAVENOUS at 10:58

## 2025-02-04 RX ADMIN — FAMOTIDINE 20 MG: 20 TABLET, FILM COATED ORAL at 17:12

## 2025-02-04 RX ADMIN — SODIUM CHLORIDE, SODIUM GLUCONATE, SODIUM ACETATE, POTASSIUM CHLORIDE, MAGNESIUM CHLORIDE, SODIUM PHOSPHATE, DIBASIC, AND POTASSIUM PHOSPHATE 2000 ML: .53; .5; .37; .037; .03; .012; .00082 INJECTION, SOLUTION INTRAVENOUS at 07:36

## 2025-02-04 RX ADMIN — ATORVASTATIN CALCIUM 40 MG: 40 TABLET, FILM COATED ORAL at 10:56

## 2025-02-04 RX ADMIN — PANTOPRAZOLE SODIUM 40 MG: 40 INJECTION, POWDER, FOR SOLUTION INTRAVENOUS at 21:53

## 2025-02-04 RX ADMIN — FAMOTIDINE 20 MG: 20 TABLET, FILM COATED ORAL at 12:17

## 2025-02-04 RX ADMIN — IOHEXOL 100 ML: 350 INJECTION, SOLUTION INTRAVENOUS at 10:18

## 2025-02-04 RX ADMIN — SODIUM CHLORIDE 100 ML/HR: 0.9 INJECTION, SOLUTION INTRAVENOUS at 22:19

## 2025-02-04 RX ADMIN — AZELASTINE HYDROCHLORIDE 2 SPRAY: 137 SPRAY, METERED NASAL at 10:56

## 2025-02-04 RX ADMIN — BRIMONIDINE TARTRATE 1 DROP: 2 SOLUTION/ DROPS OPHTHALMIC at 10:56

## 2025-02-04 RX ADMIN — POTASSIUM CHLORIDE 40 MEQ: 1500 TABLET, EXTENDED RELEASE ORAL at 10:56

## 2025-02-04 NOTE — ED PROVIDER NOTES
Time reflects when diagnosis was documented in both MDM as applicable and the Disposition within this note       Time User Action Codes Description Comment    2/4/2025  9:24 AM Rey Deleon Add [K92.2] GI bleed     2/4/2025  9:24 AM Rey Deleon Add [C76.0] Head and neck cancer (HCC)           ED Disposition       ED Disposition   Admit    Condition   Stable    Date/Time   Tue Feb 4, 2025  9:40 AM    Comment   Case was discussed with ALLYSSA and the patient's admission status was agreed to be Admission Status: observation status to the service of Dr. Colon.               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  Presents with approximately 1 week history of dark stools.    Problems Addressed:  GI bleed: acute illness or injury  Head and neck cancer (HCC): chronic illness or injury    Amount and/or Complexity of Data Reviewed  External Data Reviewed: labs, radiology and notes.  Labs: ordered. Decision-making details documented in ED Course.     Details: Hgb 10.7 today, was 14.7 on 9/26/24  Discussion of management or test interpretation with external provider(s): Discussed with hospitalist for admission.  Patient updated agrees with treatment plan.    Risk  Prescription drug management.  Decision regarding hospitalization.             Medications   potassium chloride (Klor-Con M20) CR tablet 40 mEq (has no administration in time range)   multi-electrolyte (ISOLYTE-S PH 7.4) bolus 2,000 mL (0 mL Intravenous Stopped 2/4/25 0837)       ED Risk Strat Scores                          SBIRT 22yo+      Flowsheet Row Most Recent Value   Initial Alcohol Screen: US AUDIT-C     1. How often do you have a drink containing alcohol? 0 Filed at: 02/04/2025 0724   2. How many drinks containing alcohol do you have on a typical day you are drinking?  0 Filed at: 02/04/2025 0724   3a. Male UNDER 65: How often do  you have five or more drinks on one occasion? 0 Filed at: 02/04/2025 0724   3b. FEMALE Any Age, or MALE 65+: How often do you have 4 or more drinks on one occassion? 0 Filed at: 02/04/2025 0724   Audit-C Score 0 Filed at: 02/04/2025 0724   SANTA: How many times in the past year have you...    Used an illegal drug or used a prescription medication for non-medical reasons? Never Filed at: 02/04/2025 0724                            History of Present Illness       Chief Complaint   Patient presents with    Rectal Bleeding     Pt c/o bloody/black stools for 1 week. No abdominal pain       Past Medical History:   Diagnosis Date    Cancer (HCC)     Dyslipidemia     GERD (gastroesophageal reflux disease)     HL (hearing loss)     Hypertension     Moderate aortic stenosis     Obstructive sleep apnea       Past Surgical History:   Procedure Laterality Date    SHOULDER ARTHROSCOPY Right     TONGUE SURGERY  07/2024      Family History   Problem Relation Age of Onset    Heart disease Mother     Heart attack Mother     Prostate cancer Father     Parkinsonism Maternal Grandmother       Social History     Tobacco Use    Smoking status: Never     Passive exposure: Never    Smokeless tobacco: Never   Vaping Use    Vaping status: Never Used   Substance Use Topics    Alcohol use: Yes     Comment: rare    Drug use: Never      E-Cigarette/Vaping    E-Cigarette Use Never User       E-Cigarette/Vaping Substances    Nicotine No     THC No     CBD No     Flavoring No       I have reviewed and agree with the history as documented.     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  Presents with approximately 1 week history of dark stools.  Denies any gross blood in the stool but states has been dark over the last 7 days.  Complains of generalized overall fatigue.  Patient does not take any anticoagulation.  He states he has difficulty eating and drinking due to the radiation and  difficulty swallowing.  Denies abdominal pain.  Denies any near-syncope or syncopal events.  Has had previous colonoscopy states approximately 2 years ago was reported negative results.          Review of Systems   Constitutional:  Positive for fatigue. Negative for activity change, appetite change, chills and fever.   HENT:  Positive for trouble swallowing. Negative for ear pain, hearing loss, rhinorrhea, sneezing and sore throat.    Eyes:  Negative for pain and visual disturbance.   Respiratory:  Negative for cough, choking, chest tightness, shortness of breath, wheezing and stridor.    Cardiovascular:  Negative for chest pain, palpitations and leg swelling.   Gastrointestinal:  Negative for abdominal pain, constipation, diarrhea, nausea and vomiting.        Dark stools   Genitourinary:  Negative for difficulty urinating, dysuria, frequency, hematuria and testicular pain.   Musculoskeletal:  Negative for arthralgias, back pain, gait problem and neck pain.   Skin:  Negative for color change and rash.   Allergic/Immunologic: Negative for immunocompromised state.   Neurological:  Negative for dizziness, seizures, syncope, speech difficulty, weakness, light-headedness, numbness and headaches.   All other systems reviewed and are negative.          Objective       ED Triage Vitals [02/04/25 0724]   Temperature Pulse Blood Pressure Respirations SpO2 Patient Position - Orthostatic VS   98.3 °F (36.8 °C) (!) 137 114/62 20 96 % Lying      Temp Source Heart Rate Source BP Location FiO2 (%) Pain Score    Temporal Monitor Left arm -- No Pain      Vitals      Date and Time Temp Pulse SpO2 Resp BP Pain Score FACES Pain Rating User   02/04/25 0840 -- 121 94 % 28 87/63 -- -- AB   02/04/25 0724 98.3 °F (36.8 °C) 137 96 % 20 114/62 No Pain -- BMD            Physical Exam  Vitals and nursing note reviewed.   Constitutional:       General: He is not in acute distress.     Appearance: Normal appearance. He is well-developed and normal  weight. He is not ill-appearing, toxic-appearing or diaphoretic.   HENT:      Head: Normocephalic and atraumatic.      Nose: Nose normal.      Mouth/Throat:      Mouth: Mucous membranes are moist.      Pharynx: Oropharynx is clear.   Eyes:      General: No scleral icterus.     Extraocular Movements: Extraocular movements intact.      Conjunctiva/sclera: Conjunctivae normal.   Cardiovascular:      Rate and Rhythm: Regular rhythm. Tachycardia present.      Pulses: Normal pulses.      Heart sounds: Normal heart sounds. No murmur heard.  Pulmonary:      Effort: Pulmonary effort is normal. No respiratory distress.      Breath sounds: Normal breath sounds. No wheezing or rales.   Chest:      Chest wall: No tenderness.   Abdominal:      General: Bowel sounds are normal. There is no distension.      Palpations: Abdomen is soft. There is no mass.      Tenderness: There is no abdominal tenderness. There is no right CVA tenderness, left CVA tenderness, guarding or rebound.   Genitourinary:     Rectum: Guaiac result positive.   Musculoskeletal:         General: No tenderness, deformity or signs of injury. Normal range of motion.      Cervical back: Normal range of motion and neck supple. No rigidity or tenderness.      Right lower leg: No edema.      Left lower leg: No edema.   Lymphadenopathy:      Cervical: No cervical adenopathy.   Skin:     General: Skin is warm and dry.      Capillary Refill: Capillary refill takes less than 2 seconds.      Coloration: Skin is not jaundiced or pale.      Findings: No bruising, erythema, lesion or rash.   Neurological:      General: No focal deficit present.      Mental Status: He is alert and oriented to person, place, and time. Mental status is at baseline.      Motor: No abnormal muscle tone.   Psychiatric:         Mood and Affect: Mood normal.         Behavior: Behavior normal.         Results Reviewed       Procedure Component Value Units Date/Time    COVID only [[id number]]     Lab  Status: No result Specimen: Nares from Nose     Occult blood 1-3, stool [[id number]]     Lab Status: No result Specimen: Stool     Comprehensive metabolic panel [[id number]]  (Abnormal) Collected: 02/04/25 0734    Lab Status: Final result Specimen: Blood from Arm, Right Updated: 02/04/25 0800     Sodium 138 mmol/L      Potassium 3.1 mmol/L      Chloride 102 mmol/L      CO2 21 mmol/L      ANION GAP 15 mmol/L      BUN 15 mg/dL      Creatinine 0.58 mg/dL      Glucose 119 mg/dL      Calcium 8.9 mg/dL      AST 28 U/L      ALT 45 U/L      Alkaline Phosphatase 62 U/L      Total Protein 6.3 g/dL      Albumin 3.7 g/dL      Total Bilirubin 0.82 mg/dL      eGFR 104 ml/min/1.73sq m     Narrative:      [employer] guidelines for Chronic Kidney Disease (CKD):     Stage 1 with normal or high GFR (GFR > 90 mL/min/1.73 square meters)    Stage 2 Mild CKD (GFR = 60-89 mL/min/1.73 square meters)    Stage 3A Moderate CKD (GFR = 45-59 mL/min/1.73 square meters)    Stage 3B Moderate CKD (GFR = 30-44 mL/min/1.73 square meters)    Stage 4 Severe CKD (GFR = 15-29 mL/min/1.73 square meters)    Stage 5 End Stage CKD (GFR <15 mL/min/1.73 square meters)  Note: GFR calculation is accurate only with a steady state creatinine    Lipase [[id number]]  (Normal) Collected: 02/04/25 0734    Lab Status: Final result Specimen: Blood from Arm, Right Updated: 02/04/25 0800     Lipase 26 u/L     Protime-INR [[id number]]  (Normal) Collected: 02/04/25 0734    Lab Status: Final result Specimen: Blood from Arm, Right Updated: 02/04/25 0753     Protime 14.5 seconds      INR 1.08    Narrative:      INR Therapeutic Range    Indication                                             INR Range      Atrial Fibrillation                                               2.0-3.0  Hypercoagulable State                                    2.0.2.3  Left Ventricular Asist Device                            2.0-3.0  Mechanical Heart Valve                                   -    Aortic(with afib, MI, embolism, HF, LA enlargement,    and/or coagulopathy)                                     2.0-3.0 (2.5-3.5)     Mitral                                                             2.5-3.5  Prosthetic/Bioprosthetic Heart Valve               2.0-3.0  Venous thromboembolism (VTE: VT, PE        2.0-3.0    APTT [[id number]]  (Normal) Collected: 02/04/25 0734    Lab Status: Final result Specimen: Blood from Arm, Right Updated: 02/04/25 0753     PTT 34 seconds     CBC and differential [[id number]]  (Abnormal) Collected: 02/04/25 0734    Lab Status: Final result Specimen: Blood from Arm, Right Updated: 02/04/25 0742     WBC 3.24 Thousand/uL      RBC 3.44 Million/uL      Hemoglobin 10.7 g/dL      Hematocrit 31.4 %      MCV 91 fL      MCH 31.1 pg      MCHC 34.1 g/dL      RDW 14.2 %      MPV 9.5 fL      Platelets 105 Thousands/uL      nRBC 0 /100 WBCs      Segmented % 75 %      Immature Grans % 0 %      Lymphocytes % 18 %      Monocytes % 7 %      Eosinophils Relative 0 %      Basophils Relative 0 %      Absolute Neutrophils 2.42 Thousands/µL      Absolute Immature Grans 0.00 Thousand/uL      Absolute Lymphocytes 0.58 Thousands/µL      Absolute Monocytes 0.22 Thousand/µL      Eosinophils Absolute 0.01 Thousand/µL      Basophils Absolute 0.01 Thousands/µL     iFOBT/FIT [[id number]]     Lab Status: No result Specimen: Stool             CT abdomen pelvis w contrast    (Results Pending)       Procedures    ED Medication and Procedure Management   Prior to Admission Medications   Prescriptions Last Dose Informant Patient Reported? Taking?   Empagliflozin (JARDIANCE) 10 MG TABS tablet 2/4/2025 Morning Self No Yes   Sig: Take 1 tablet (10 mg total) by mouth daily   Omega-3 Fatty Acids (Fish Oil) 1200 MG CAPS 2/4/2025 Morning Self Yes Yes   Sig: Take by mouth 2 (two) times a day   amLODIPine (NORVASC) 5 mg tablet 2/4/2025 Morning Self No Yes   Sig: Take 1 tablet (5 mg total) by mouth daily   atorvastatin  (LIPITOR) 40 mg tablet 2/3/2025 Self No Yes   Sig: Take 1 tablet (40 mg total) by mouth daily   azelastine (ASTELIN) 0.1 % nasal spray  Self No No   Si sprays into each nostril 2 (two) times a day   brimonidine tartrate 0.2 % ophthalmic solution  Self Yes No   Sig: INSTILL 1 DROP INTO EACH EYE TWICE DAILY   famotidine (PEPCID) 20 mg tablet 2025 Morning Self Yes Yes   Sig: take 1 tablet by mouth every morning and BEFORE BEDTIME   fluticasone (FLONASE) 50 mcg/act nasal spray   No No   Si sprays into each nostril daily   hydrOXYzine pamoate (VISTARIL) 25 mg capsule   No No   Sig: Take 1 capsule (25 mg total) by mouth 3 (three) times a day as needed for anxiety (And headache)   hydroCHLOROthiazide 12.5 mg tablet 2025 Morning Self No Yes   Sig: take 1 tablet by mouth once daily   losartan (COZAAR) 25 mg tablet 2025 Morning  No Yes   Sig: Take 1 tablet (25 mg total) by mouth 2 (two) times a day   metoprolol succinate (TOPROL-XL) 25 mg 24 hr tablet 2025 Morning Self No Yes   Sig: Take 1 tablet (25 mg total) by mouth daily   naproxen (EC NAPROSYN) 500 MG EC tablet   No No   Sig: Take 1 tablet (500 mg total) by mouth 2 (two) times a day with meals for 5 days   pantoprazole (PROTONIX) 20 mg tablet 2025 Morning  No Yes   Sig: Take 1 tablet (20 mg total) by mouth daily   pantoprazole (PROTONIX) 40 mg tablet 2025 Morning Self Yes Yes   Sig: Take 40 mg by mouth every morning   tamsulosin (FLOMAX) 0.4 mg 2025 Morning  No Yes   Sig: Take 1 capsule (0.4 mg total) by mouth daily with dinner      Facility-Administered Medications: None     Patient's Medications   Discharge Prescriptions    No medications on file     No discharge procedures on file.  ED SEPSIS DOCUMENTATION   Time reflects when diagnosis was documented in both MDM as applicable and the Disposition within this note       Time User Action Codes Description Comment    2025  9:24 AM Rey Deleon Add [K92.2] GI bleed      2/4/2025  9:24 AM Rey Deleon Add [C76.0] Head and neck cancer (HCC)                  Rey Deleon,   02/04/25 0942

## 2025-02-04 NOTE — ASSESSMENT & PLAN NOTE
F/u with cardiology, Dr. Tsang  Home meds on norvasc 5mg, Cozaar 25mg BID, Toprol XL 25mg daily, and hydrochlorothiazide 12.5mg daily  Presented with hypotension  Patient has ~30% decreased in weight for the past one week with loss of appetite.  DC hydrochlorothiazide and norvasc    - hold home meds cozaa and Toprol XL  - f/u with cardiology outpatient to readjust HTN meds

## 2025-02-04 NOTE — PLAN OF CARE
Problem: PAIN - ADULT  Goal: Verbalizes/displays adequate comfort level or baseline comfort level  Description: Interventions:  - Encourage patient to monitor pain and request assistance  - Assess pain using appropriate pain scale  - Administer analgesics based on type and severity of pain and evaluate response  - Implement non-pharmacological measures as appropriate and evaluate response  - Consider cultural and social influences on pain and pain management  - Notify physician/advanced practitioner if interventions unsuccessful or patient reports new pain  Outcome: Progressing     Problem: INFECTION - ADULT  Goal: Absence or prevention of progression during hospitalization  Description: INTERVENTIONS:  - Assess and monitor for signs and symptoms of infection  - Monitor lab/diagnostic results  - Monitor all insertion sites, i.e. indwelling lines, tubes, and drains  - Monitor endotracheal if appropriate and nasal secretions for changes in amount and color  - [place] appropriate cooling/warming therapies per order  - Administer medications as ordered  - Instruct and encourage patient and family to use good hand hygiene technique  - Identify and instruct in appropriate isolation precautions for identified infection/condition  Outcome: Progressing     Problem: SAFETY ADULT  Goal: Patient will remain free of falls  Description: INTERVENTIONS:  - Educate patient/family on patient safety including physical limitations  - Instruct patient to call for assistance with activity   - Consult OT/PT to assist with strengthening/mobility   - Keep Call bell within reach  - Keep bed low and locked with side rails adjusted as appropriate  - Keep care items and personal belongings within reach  - Initiate and maintain comfort rounds  - Make Fall Risk Sign visible to staff  - Offer Toileting every 2 Hours, in advance of need  - Initiate/Maintain bed/chair alarm  - Obtain necessary fall risk management equipment: nonskid footwear  -  Apply yellow socks and bracelet for high fall risk patients  - Consider moving patient to room near nurses station  Outcome: Progressing  Goal: Maintain or return to baseline ADL function  Description: INTERVENTIONS:  -  Assess patient's ability to carry out ADLs; assess patient's baseline for ADL function and identify physical deficits which impact ability to perform ADLs (bathing, care of mouth/teeth, toileting, grooming, dressing, etc.)  - Assess/evaluate cause of self-care deficits   - Assess range of motion  - Assess patient's mobility; develop plan if impaired  - Assess patient's need for assistive devices and provide as appropriate  - Encourage maximum independence but intervene and supervise when necessary  - Involve family in performance of ADLs  - Assess for home care needs following discharge   - Consider OT consult to assist with ADL evaluation and planning for discharge  - Provide patient education as appropriate  Outcome: Progressing  Goal: Maintains/Returns to pre admission functional level  Description: INTERVENTIONS:  - Perform AM-PAC 6 Click Basic Mobility/ Daily Activity assessment daily.  - Set and communicate daily mobility goal to care team and patient/family/caregiver.   - Collaborate with rehabilitation services on mobility goals if consulted  - Perform Range of Motion 3 times a day.  - Reposition patient every 2 hours.  - Dangle patient 3 times a day  - Stand patient 3 times a day  - Ambulate patient 3 times a day  - Out of bed to chair 3 times a day   - Out of bed for meals 3 times a day  - Out of bed for toileting  - Record patient progress and toleration of activity level   Outcome: Progressing     Problem: DISCHARGE PLANNING  Goal: Discharge to home or other facility with appropriate resources  Description: INTERVENTIONS:  - Identify barriers to discharge w/patient and caregiver  - Arrange for needed discharge resources and transportation as appropriate  - Identify discharge learning  needs (meds, wound care, etc.)  - Arrange for interpretive services to assist at discharge as needed  - Refer to Case Management Department for coordinating discharge planning if the patient needs post-hospital services based on physician/advanced practitioner order or complex needs related to functional status, cognitive ability, or social support system  Outcome: Progressing     Problem: Knowledge Deficit  Goal: Patient/family/caregiver demonstrates understanding of disease process, treatment plan, medications, and discharge instructions  Description: Complete learning assessment and assess knowledge base.  Interventions:  - Provide teaching at level of understanding  - Provide teaching via preferred learning methods  Outcome: Progressing     Problem: GASTROINTESTINAL - ADULT  Goal: Minimal or absence of nausea and/or vomiting  Description: INTERVENTIONS:  - Administer IV fluids if ordered to ensure adequate hydration  - Maintain NPO status until nausea and vomiting are resolved  - Nasogastric tube if ordered  - Administer ordered antiemetic medications as needed  - Provide nonpharmacologic comfort measures as appropriate  - Advance diet as tolerated, if ordered  - Consider nutrition services referral to assist patient with adequate nutrition and appropriate food choices  Outcome: Progressing  Goal: Maintains or returns to baseline bowel function  Description: INTERVENTIONS:  - Assess bowel function  - Encourage oral fluids to ensure adequate hydration  - Administer IV fluids if ordered to ensure adequate hydration  - Administer ordered medications as needed  - Encourage mobilization and activity  - Consider nutritional services referral to assist patient with adequate nutrition and appropriate food choices  Outcome: Progressing  Goal: Maintains adequate nutritional intake  Description: INTERVENTIONS:  - Monitor percentage of each meal consumed  - Identify factors contributing to decreased intake, treat as  appropriate  - Assist with meals as needed  - Monitor I&O, weight, and lab values if indicated  - Obtain nutrition services referral as needed  Outcome: Progressing

## 2025-02-04 NOTE — ASSESSMENT & PLAN NOTE
Dark stool and dizziness for past one week. Denied fever, chills, N/V, abdominal pain  See acute GI bleeding A/P

## 2025-02-04 NOTE — LETTER
February 6, 2025     Timmy Chapman, DO  [address] [facility] [address]    Patient: Ean Young   YOB: 1955   Date of Visit: 2/4/2025       Dear Dr. Chapman:    Thank you for referring Ean Young to me for evaluation. Below are my notes for this consultation.    If you have questions, please do not hesitate to call me. I look forward to following your patient along with you.         Sincerely,        No name on file        CC: No Recipients    Shilpa Colon MD  2/5/2025 10:16 AM  Signed  Progress Note - Hospitalist   Name: Ean Young 69 y.o. male I MRN: [id number]  Unit/Bed#: 403-01 I Date of Admission: 2/4/2025   Date of Service: 2/5/2025 I Hospital Day: 0    Assessment & Plan  GI bleeding  Hx of squamous cell tongue cancer, started radiation and chemotherapy a week ago. F/u [facility] Dr. Rory Lopez.   Not on AC.   Patient presented with dark stools for past one week, possibly upper GI bleed from ulcers, hx of naproxen use  In ED, hemoglobin 10.7 (prior stable at 14.7). Vitals with hypotension  Lipase negative, low potassium 3.1    Continue IV PPI  GI input appreciated, For EGD today   Melena  Dark stool and dizziness for past one week. Denied fever, chills, N/V, abdominal pain  See acute GI bleeding A/P  Head and neck cancer (HCC)  PET scan on 10/30/2024 which confirmed left base of tongue malignancy. 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 Tsang  Home meds on norvasc 5mg, Cozaar 25mg BID, Toprol XL 25mg daily, and hydrochlorothiazide 12.5mg daily    Patient lost about 100lbs, DC Cozaar, Norvasc and HCTZ  Received 2 L NS and maintained on IVF, will provide another 500cc bolus today 2/5/25      Dyslipidemia   lipitor 40mg daily  Type 2 diabetes mellitus without  complication, without long-term current use of insulin (HCC)  Lab Results   Component Value Date    HGBA1C 6.5 11/11/2024       Recent Labs     02/04/25  1817 02/04/25  2113 02/05/25  0705 02/05/25  0949   POCGLU 125 95 92 88       Blood Sugar Average: Last 72 hrs:  (P) [id number] meds on jardiance 10mg daily    Glycemic control adequate, A1c 6.5. continue to hold jardiance   Benign prostatic hyperplasia  Home med on flomax daily  - hold flomax due to hypotension  Anxiety about treatment  Continue with home meds   JM (obstructive sleep apnea)  On CPAP at bedtime  - continue CPAP treatment  Hypotension  Improving with IVF  Hypokalemia  Continue to replete     VTE Pharmacologic Prophylaxis: VTE Score: 5 none 2/2 GIB    Mobility:   Basic Mobility Inpatient Raw Score: 24  JH-HLM Goal: 8: Walk 250 feet or more  JH-HLM Achieved: 7: Walk 25 feet or more  JH-HLM Goal achieved. Continue to encourage appropriate mobility.    Patient Centered Rounds: I performed bedside rounds with nursing staff today.   Discussions with Specialists or Other Care Team Provider: GI    Education and Discussions with Family / Patient:     Current Length of Stay: 0 day(s)  Current Patient Status: Inpatient   Certification Statement: The patient will continue to require additional inpatient hospital stay due to GIB  Discharge Plan: Anticipate discharge in 24-48 hrs to home.    Code Status: Level 1 - Full Code    Subjective  Seen and examined, he's feeling better and dizziness resolved     Objective:  Temp:  [97.7 °F (36.5 °C)-98.4 °F (36.9 °C)] 97.7 °F (36.5 °C)  HR:  [] 73  BP: ()/(57-75) 94/57  Resp:  [17-26] 17  SpO2:  [94 %-98 %] 98 %  O2 Device: None (Room air)    Body mass index is 36.34 kg/m².     Input and Output Summary (last 24 hours):     Intake/Output Summary (Last 24 hours) at 2/5/2025 1011  Last data filed at 2/5/2025 0742  Gross per 24 hour   Intake 210 ml   Output --   Net 210 ml       Physical  Exam  Constitutional:       Appearance: Normal appearance.   Eyes:      General: No scleral icterus.  Cardiovascular:      Rate and Rhythm: Normal rate and regular rhythm.      Pulses: Normal pulses.      Heart sounds: No murmur heard.  Pulmonary:      Effort: Pulmonary effort is normal. No respiratory distress.      Breath sounds: Normal breath sounds. No wheezing or rales.   Abdominal:      General: Abdomen is flat. Bowel sounds are normal. There is no distension.      Tenderness: There is no abdominal tenderness. There is no guarding.   Musculoskeletal:      Right lower leg: No edema.      Left lower leg: No edema.   Skin:     Capillary Refill: Capillary refill takes 2 to 3 seconds.   Neurological:      Mental Status: He is alert and oriented to person, place, and time. Mental status is at baseline.           Lines/Drains:              Lab Results: I have reviewed the following results:   Results from last 7 days   Lab Units 02/05/25  0455   WBC Thousand/uL 1.83*   HEMOGLOBIN g/dL 8.1*   HEMATOCRIT % 23.5*   PLATELETS Thousands/uL 95*   SEGS PCT % 70   LYMPHO PCT % 20   MONO PCT % 9   EOS PCT % 1     Results from last 7 days   Lab Units 02/05/25  0455   SODIUM mmol/L 141   POTASSIUM mmol/L 3.3*   CHLORIDE mmol/L 107   CO2 mmol/L 25   BUN mg/dL 16   CREATININE mg/dL 0.49*   ANION GAP mmol/L 9   CALCIUM mg/dL 7.8*   ALBUMIN g/dL 2.9*   TOTAL BILIRUBIN mg/dL 0.57   ALK PHOS U/L 48   ALT U/L 35   AST U/L 22   GLUCOSE RANDOM mg/dL 81     Results from last 7 days   Lab Units 02/04/25  0734   INR  1.08     Results from last 7 days   Lab Units 02/05/25  0949 02/05/25  0705 02/04/25  2113 02/04/25  1817 02/04/25  1411 02/04/25  1055   POC GLUCOSE mg/dl 88 92 95 125 101 79               Recent Cultures (last 7 days):         Imaging Results Review: I reviewed radiology reports from this admission including: CT abdomen/pelvis.  Other Study Results Review: No additional pertinent studies reviewed.    Last 24 Hours Medication  List:     Current Facility-Administered Medications:   •  atorvastatin (LIPITOR) tablet 40 mg, Daily  •  azelastine (ASTELIN) 0.1 % nasal spray 2 spray, BID  •  brimonidine tartrate 0.2 % ophthalmic solution 1 drop, BID  •  famotidine (PEPCID) tablet 20 mg, BID  •  hydrOXYzine HCL (ATARAX) tablet 25 mg, TID PRN  •  insulin lispro (HumALOG/ADMELOG) 100 units/mL subcutaneous injection 1-6 Units, 4 times day **AND** Fingerstick Glucose (POCT), 4 times day  •  lactated ringers bolus 500 mL, Once  •  lactated ringers infusion, Continuous, Last Rate: 125 mL/hr (02/05/25 0737)  •  magnesium Oxide (MAG-OX) tablet 400 mg, BID  •  [Held by provider] metoprolol succinate (TOPROL-XL) 24 hr tablet 25 mg, Daily  •  pantoprazole (PROTONIX) injection 40 mg, Q12H    Administrative Statements  Today, Patient Was Seen By: Shilpa Colon MD      **Please Note: This note may have been constructed using a voice recognition system.**

## 2025-02-04 NOTE — ED NOTES
Patient provided with lunch tray. No complaints at this time. Call shen at bedside     Mirian Fernandes RN  02/04/25 3521

## 2025-02-04 NOTE — H&P
"H&P - Hospitalist   Name: Ean Young 69 y.o. male I MRN: [id number]  Unit/Bed#: RM06 I Date of Admission: 2/4/2025   Date of Service: 2/4/2025 I Hospital Day: 0     Assessment & Plan  GI bleeding  Hx of squamous cell tongue cancer, started radiation and chemotherapy a week ago. F/u [facility] Dr. Rory Lopez.   Not on AC.   Patient presented with dark stools for past one week, possibly upper GI bleed from ulcers, hx of naproxen use  In ED, hemoglobin 10.7 (prior stable at 14.7). Vitals with hypotension  Lipase negative, low potassium 3.1  Based on clinical symptoms and lab findings, patient likely has upper GI bleeding. Type and screen done    - serial monitor hemoglobin q6hrs  - GI consult placed  - start NPO, sips with med  - start IV protonix 40mg BID  - IV fluids for hydration  - transfuse with pRBCs as needed if hemoglobin < 7  Hematochezia  Dark stool and dizziness for past one week. Denied fever, chills, N/V, abdominal pain  See acute GI bleeding A/P  Head and neck cancer (HCC)  PET scan on 10/30/2024 which confirmed left base of tongue malignancy. 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 Tsang  Home meds on norvasc 5mg, Cozaar 25mg BID, Toprol XL 25mg daily, and hydrochlorothiazide 12.5mg daily  Presented with hypotension  Patient has ~30% decreased in weight for the past one week with loss of appetite.  DC hydrochlorothiazide and norvasc    - hold home meds cozaa and Toprol XL  - f/u with cardiology outpatient to readjust HTN meds  Dyslipidemia  Continue home meds on lipitor 40mg daily  Type 2 diabetes mellitus without complication, without long-term current use of insulin (HCC)  Lab Results   Component Value Date    HGBA1C 6.5 11/11/2024       No results for input(s): \"POCGLU\" in the last " 72 hours.    Blood Sugar Average: Last 72 hrs:  home meds on jardiance 10mg daily    - Home regimen on hold  - Utilize ISS with accu check ACHS  - Hypoglycemia protocol  - Diabetic diet  - Follow up with endocrinology outpatient to readjust med dosage  Benign prostatic hyperplasia  Home med on flomax daily  - hold flomax due to hypotension  Anxiety about treatment  Continue with home meds   JM (obstructive sleep apnea)  On CPAP at bedtime  - continue CPAP treatment  Hypotension  Presented with low BP 87/63  Hold home anti-hypertensive meds  - monitor BP  - continue IV fluids  Hypokalemia  Low potassium 3.1  - Replete with IV potassium as needed  - monitor AM lab      VTE Pharmacologic Prophylaxis:   High Risk (Score >/= 5) - Pharmacological DVT Prophylaxis Contraindicated. Sequential Compression Devices Ordered.  Code Status: Level 1 - Full Code   Discussion with family:  will call family member.     Anticipated Length of Stay: Patient will be admitted on an inpatient basis with an anticipated length of stay of greater than 2 midnights secondary to GI bleeding, requiring GI evaluation and monitoring hemoglobin.    History of Present Illness   Chief Complaint: 1 week hx of dark stools and fatigue    Ean Young is a 69 y.o. male with a PMH of squamous cell tongue cancer (on radiation and chemotherapy), HTN, dyslipidemia, DM2, anxiety, JM who presents with dark stools and generalized fatigue. Patient reported he started on chemotherapy one week ago and began to lose weight. Prior weight pre-treatment was 354lb and dropped to 262lb. Patient also has changed in taste so less appetite in the past one week. He has bowel movement daily but it has been dark color for the past one week. He denied smoking, drinking alcohol, or using illicit drugs. He denied recent fever, chills, N/V, CP, abdominal pain, dysuria, hematuria.     In ED, patient's vitals with low BP 87/63. Hemoglobin was 10.7 (baseline 14.7). Lipase  negative. CBC without leukocytosis. CT abdomen/pelvis pending. Patient likely has acute upper GI bleed based on clinical symptoms and drop in hemoglobin from baseline. Low pressure could be due to GI bleed and being on multiple anti-hypertensive meds while experiencing major weight loss in past one week. Admitted for further monitoring of hemodynamic status and management of GI bleeding.     Review of Systems   Constitutional:  Positive for fatigue. Negative for appetite change, chills and fever.   HENT:  Negative for congestion and trouble swallowing.    Eyes:  Negative for pain and visual disturbance.   Respiratory:  Negative for cough and shortness of breath.    Cardiovascular:  Negative for chest pain and palpitations.   Gastrointestinal:  Positive for blood in stool. Negative for abdominal pain, constipation, diarrhea, nausea and vomiting.   Genitourinary:  Negative for dysuria and hematuria.   Musculoskeletal:  Negative for myalgias.   Skin:  Negative for color change and rash.   Neurological:  Negative for headaches.   Psychiatric/Behavioral:  Negative for confusion.    All other systems reviewed and are negative.      Historical Information   Past Medical History:   Diagnosis Date    Cancer (HCC)     Dyslipidemia     GERD (gastroesophageal reflux disease)     HL (hearing loss)     Hypertension     Moderate aortic stenosis     Obstructive sleep apnea      Past Surgical History:   Procedure Laterality Date    SHOULDER ARTHROSCOPY Right     TONGUE SURGERY  07/2024     Social History     Tobacco Use    Smoking status: Never     Passive exposure: Never    Smokeless tobacco: Never   Vaping Use    Vaping status: Never Used   Substance and Sexual Activity    Alcohol use: Yes     Comment: rare    Drug use: Never    Sexual activity: Not Currently     E-Cigarette/Vaping    E-Cigarette Use Never User      E-Cigarette/Vaping Substances    Nicotine No     THC No     CBD No     Flavoring No      Family History   Problem  Relation Age of Onset    Heart disease Mother     Heart attack Mother     Prostate cancer Father     Parkinsonism Maternal Grandmother      Social History:  Marital Status: Single   Occupation: retired  Patient Pre-hospital Living Situation: Apartment, alone  Patient Pre-hospital Level of Mobility: walks  Patient Pre-hospital Diet Restrictions: regular diet    Meds/Allergies   I have reviewed home medications with patient personally.  Prior to Admission medications    Medication Sig Start Date End Date Taking? Authorizing Provider   amLODIPine (NORVASC) 5 mg tablet Take 1 tablet (5 mg total) by mouth daily 2/16/24  Yes Myke Bates,    atorvastatin (LIPITOR) 40 mg tablet Take 1 tablet (40 mg total) by mouth daily 6/3/24  Yes Benjamin Tsang DO   Empagliflozin (JARDIANCE) 10 MG TABS tablet Take 1 tablet (10 mg total) by mouth daily 10/1/24 3/30/25 Yes Timmy Chapman DO   famotidine (PEPCID) 20 mg tablet take 1 tablet by mouth every morning and BEFORE BEDTIME   Yes Historical Provider, MD   hydroCHLOROthiazide 12.5 mg tablet take 1 tablet by mouth once daily 3/25/24  Yes Benjamin Tsang DO   losartan (COZAAR) 25 mg tablet Take 1 tablet (25 mg total) by mouth 2 (two) times a day 1/21/25  Yes Benjamin Tsang DO   metoprolol succinate (TOPROL-XL) 25 mg 24 hr tablet Take 1 tablet (25 mg total) by mouth daily 10/2/24  Yes Timmy Chapman DO   Omega-3 Fatty Acids (Fish Oil) 1200 MG CAPS Take by mouth 2 (two) times a day   Yes Historical Provider, MD   pantoprazole (PROTONIX) 20 mg tablet Take 1 tablet (20 mg total) by mouth daily 12/30/24  Yes Timmy Chapman DO   pantoprazole (PROTONIX) 40 mg tablet Take 40 mg by mouth every morning 3/20/24  Yes Historical Provider, MD   tamsulosin (FLOMAX) 0.4 mg Take 1 capsule (0.4 mg total) by mouth daily with dinner 10/22/24  Yes ESTELA Chan   azelastine (ASTELIN) 0.1 % nasal spray 2 sprays into each nostril 2 (two) times a day 8/9/24   Timmy Chapman DO    brimonidine tartrate 0.2 % ophthalmic solution INSTILL 1 DROP INTO EACH EYE TWICE DAILY 2/19/23   Historical Provider, MD   fluticasone (FLONASE) 50 mcg/act nasal spray 2 sprays into each nostril daily 11/11/24   Timmy Chapman DO   hydrOXYzine pamoate (VISTARIL) 25 mg capsule Take 1 capsule (25 mg total) by mouth 3 (three) times a day as needed for anxiety (And headache) 1/2/25   Timmy Chapman DO   naproxen (EC NAPROSYN) 500 MG EC tablet Take 1 tablet (500 mg total) by mouth 2 (two) times a day with meals for 5 days 12/19/24 12/26/24  ESTELA Ahn     Allergies   Allergen Reactions    Pollen Extract Other (See Comments)       Objective :  Temp:  [98.3 °F (36.8 °C)] 98.3 °F (36.8 °C)  HR:  [121-137] 121  BP: ()/(62-63) 87/63  Resp:  [20-28] 28  SpO2:  [94 %-96 %] 94 %  O2 Device: None (Room air)    Physical Exam  Vitals reviewed.   Constitutional:       General: He is not in acute distress.     Appearance: Normal appearance.   HENT:      Head: Normocephalic and atraumatic.      Right Ear: External ear normal.      Left Ear: External ear normal.      Nose: Nose normal. No congestion or rhinorrhea.      Mouth/Throat:      Mouth: Mucous membranes are moist.      Pharynx: Oropharynx is clear. No oropharyngeal exudate or posterior oropharyngeal erythema.   Eyes:      General:         Right eye: No discharge.         Left eye: No discharge.      Extraocular Movements: Extraocular movements intact.      Conjunctiva/sclera: Conjunctivae normal.   Cardiovascular:      Rate and Rhythm: Normal rate and regular rhythm.      Pulses: Normal pulses.      Heart sounds: Normal heart sounds. No murmur heard.  Pulmonary:      Effort: Pulmonary effort is normal. No respiratory distress.      Breath sounds: Normal breath sounds. No wheezing.   Abdominal:      General: Abdomen is flat. Bowel sounds are normal. There is no distension.      Palpations: Abdomen is soft. There is no mass.      Tenderness: There is  no abdominal tenderness. There is no guarding or rebound.   Musculoskeletal:         General: Normal range of motion.      Cervical back: Normal range of motion and neck supple. No rigidity.      Right lower leg: No edema.      Left lower leg: No edema.   Skin:     General: Skin is warm.      Capillary Refill: Capillary refill takes less than 2 seconds.   Neurological:      General: No focal deficit present.      Mental Status: He is alert and oriented to person, place, and time.   Psychiatric:         Mood and Affect: Mood normal.              Lab Results: I have reviewed the following results:  Results from last 7 days   Lab Units 02/04/25  0734   WBC Thousand/uL 3.24*   HEMOGLOBIN g/dL 10.7*   HEMATOCRIT % 31.4*   PLATELETS Thousands/uL 105*   SEGS PCT % 75   LYMPHO PCT % 18   MONO PCT % 7   EOS PCT % 0     Results from last 7 days   Lab Units 02/04/25  0734   SODIUM mmol/L 138   POTASSIUM mmol/L 3.1*   CHLORIDE mmol/L 102   CO2 mmol/L 21   BUN mg/dL 15   CREATININE mg/dL 0.58*   ANION GAP mmol/L 15*   CALCIUM mg/dL 8.9   ALBUMIN g/dL 3.7   TOTAL BILIRUBIN mg/dL 0.82   ALK PHOS U/L 62   ALT U/L 45   AST U/L 28   GLUCOSE RANDOM mg/dL 119     Results from last 7 days   Lab Units 02/04/25  0734   INR  1.08         Lab Results   Component Value Date    HGBA1C 6.5 11/11/2024    HGBA1C 7.0 (A) 08/09/2024    HGBA1C 7.7 (H) 04/08/2024           Imaging Results Review: I reviewed radiology reports from this admission including: pending CT abdomen/pelvis.  Other Study Results Review: EKG was reviewed.     Administrative Statements   I have spent a total time of 30 minutes in caring for this patient on the day of the visit/encounter including Impressions, Counseling / Coordination of care, Documenting in the medical record, Reviewing / ordering tests, medicine, procedures  , Obtaining or reviewing history  , and Communicating with other healthcare professionals .    ** Please Note: This note has been constructed using a  voice recognition system. **

## 2025-02-04 NOTE — ASSESSMENT & PLAN NOTE
After a discussion with the attending, Dr. Cary, with the patient's evidence of anemia and a 2 g drop in his hemoglobin, melena for the past 7 days, and fatigue, we do feel would be appropriate to proceed with an EGD tomorrow to evaluate for any underlying etiology that could explain the symptoms.  The patient was agreeable and verbalized an understanding.    Change diet to clear liquid diet and then n.p.o. at midnight.  Started famotidine 20 mg twice daily secondary to the melena and GI bleeding.    Continue to maintain a large bore IV.  Continue to monitor hemoglobin and transfuse as per protocol.   Continue with serial abdominal exams.   Continue to monitor stool output for any overt signs of GI bleeding.   Continue PPI as ordered.   Continue PRN antiemetics as ordered.

## 2025-02-04 NOTE — CONSULTS
Consultation - Gastroenterology   Name: Ean Young 69 y.o. male I MRN: [id number]  Unit/Bed#: RM06 I Date of Admission: 2/4/2025   Date of Service: 2/4/2025 I Hospital Day: 0   Inpatient consult to gastroenterology  Consult performed by: ESTELA Hwang  Consult ordered by: Shilpa Colon MD        Physician Requesting Evaluation: Shilpa Colon MD   Reason for Evaluation / Principal Problem: GI bleeding, melena    Assessment & Plan  GI bleeding  After a discussion with the attending, Dr. Cary, with the patient's evidence of anemia and a 2 g drop in his hemoglobin, melena for the past 7 days, and fatigue, we do feel would be appropriate to proceed with an EGD tomorrow to evaluate for any underlying etiology that could explain the symptoms.  The patient was agreeable and verbalized an understanding.    Change diet to clear liquid diet and then n.p.o. at midnight.  Started famotidine 20 mg twice daily secondary to the melena and GI bleeding.    Continue to maintain a large bore IV.  Continue to monitor hemoglobin and transfuse as per protocol.   Continue with serial abdominal exams.   Continue to monitor stool output for any overt signs of GI bleeding.   Continue PPI as ordered.   Continue PRN antiemetics as ordered.   Melena  See above.   Head and neck cancer (HCC)  Continue to follow-up with hematology oncology regarding radiation and chemotherapy treatments.  Essential hypertension  As per primary team.  Type 2 diabetes mellitus without complication, without long-term current use of insulin (HCC)  Lab Results   Component Value Date    HGBA1C 6.5 11/11/2024       Recent Labs     02/04/25  1055   POCGLU 79       Blood Sugar Average: Last 72 hrs:  (P) 79  As per primary team.  Hypotension  As per primary team.  Hypokalemia  As per primary team.    Continue rest of medications as per primary team.   Continue supportive care.     I did contact the patient's niece Destinee to update her on the treatment plan as  per the patient's request.    I have discussed the above management plan in detail with the primary service.   Gastroenterology service will follow.    History of Present Illness   HPI:  Ean Young is a 69 y.o. 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  The patient reports that he does have a history of a gastric ulcer many years ago and the black and tarry stools were a symptom he had then so that is mainly why he came in.  He is currently in his third week of radiation and chemotherapy treatments for his squamous cell tongue cancer and is experiencing intermittent dysphagia, mainly with solid foods and has been drinking protein shakes and making sure that he is staying hydrated.  The patient does feel that the dysphagia symptoms have actually been improving slowly.    The patient reports that they are currently experiencing intermittent dysphagia, but, denies any reflux, nausea, vomiting, decreased appetite, unplanned weight loss, or abdominal pain.    The patient reports that they are currently experiencing daily melena, but, denies any  diarrhea, constipation, straining, or bloody stools. Last BM: This morning with melena, but denies any evidence of blood.    The patient is currently n.p.o. with sips.    Tobacco/Vaping: Denied  ETOH: Denied Hx of ETOH: Denied  Marijuana: Denied  Illicit Drug Use: Denied Hx of Illicit Drug Use: Denied    Meds: Protonix 40 mg twice daily.  Daily NSAID Use: Denied  Daily Tylenol Use: Denied    Imaging: (25) CT of the abdomen pelvis with contrast: Normal.    Endoscopy History: EGD: (None):     COLONOSCOPY: (2-3 years ago): Normal per patient, no documentation to review. Reports having cologuard screening test 1 year ago. Negative.      DUE: Cologuard Screenin    Review of Systems  I have reviewed the  patient's PMH, PSH, Social History, Family History, Meds, and Allergies    Objective :  Temp:  [98.3 °F (36.8 °C)] 98.3 °F (36.8 °C)  HR:  [121-137] 121  BP: ()/(62-63) 87/63  Resp:  [20-28] 28  SpO2:  [94 %-96 %] 94 %  O2 Device: None (Room air)    Physical Exam  Exam:  Oral mucosa normal upon visual inspection, without any sores, lesions, or ulcerations. Sclera without icterus and benign. Lung sounds clear to auscultation b/l. Normal S1 & S2 upon exam. Abdomen is round, obese, soft, nontender, with normal bowel sounds x 4.  No edema noted of the b/l lower extremities upon exam today. Skin is non-icteric.       Lab Results: I have reviewed the following results:CBC/BMP:   .     02/04/25  0734   WBC 3.24*   HGB 10.7*   HCT 31.4*   *   SODIUM 138   K 3.1*      CO2 21   BUN 15   CREATININE 0.58*   GLUC 119    , LFTs:   .     02/04/25  0734   AST 28   ALT 45   ALB 3.7   TBILI 0.82   ALKPHOS 62        Imaging Results Review: I reviewed radiology reports from this admission including: CT abdomen/pelvis.  Other Study Results Review: No additional pertinent studies reviewed.

## 2025-02-04 NOTE — ASSESSMENT & PLAN NOTE
PET scan on 10/30/2024 which confirmed left base of tongue malignancy. 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

## 2025-02-04 NOTE — ASSESSMENT & PLAN NOTE
Hx of squamous cell tongue cancer, started radiation and chemotherapy a week ago. F/u [facility] Dr. Rory Lopez.   Not on AC.   Patient presented with dark stools for past one week, possibly upper GI bleed from ulcers, hx of naproxen use  In ED, hemoglobin 10.7 (prior stable at 14.7). Vitals with hypotension  Lipase negative, low potassium 3.1  Based on clinical symptoms and lab findings, patient likely has upper GI bleeding. Type and screen done    - serial monitor hemoglobin q6hrs  - GI consult placed  - start NPO, sips with med  - start IV protonix 40mg BID  - IV fluids for hydration  - transfuse with pRBCs as needed if hemoglobin < 7

## 2025-02-04 NOTE — ASSESSMENT & PLAN NOTE
Lab Results   Component Value Date    HGBA1C 6.5 11/11/2024       Recent Labs     02/04/25  1055   POCGLU 79       Blood Sugar Average: Last 72 hrs:  (P) 79  As per primary team.

## 2025-02-04 NOTE — ASSESSMENT & PLAN NOTE
"  Problem: Adult Inpatient Plan of Care  Goal: Patient-Specific Goal (Individualized)  Description: You can add care plan individualizations to a care plan. Examples of Individualization might be:  \"Parent requests to be called daily at 9am for status\", \"I have a hard time hearing out of my right ear\", or \"Do not touch me to wake me up as it startles  me\".  Outcome: Progressing   Goal Outcome Evaluation:      Plan of Care Reviewed With: patient    Pt A&Ox4. VSS in RA. Complains of headache PS 5/10, given PRN Tylenol. Pain decreased to 1/10. Ambulates to bathroom with Ax1 with walker. Discharge order placed by provider. Pt was informed.                    " As per primary team.

## 2025-02-04 NOTE — ASSESSMENT & PLAN NOTE
"Lab Results   Component Value Date    HGBA1C 6.5 11/11/2024       No results for input(s): \"POCGLU\" in the last 72 hours.    Blood Sugar Average: Last 72 hrs:  home meds on jardiance 10mg daily    - Home regimen on hold  - Utilize ISS with accu check ACHS  - Hypoglycemia protocol  - Diabetic diet  - Follow up with endocrinology outpatient to readjust med dosage  "

## 2025-02-05 ENCOUNTER — ANESTHESIA (INPATIENT)
Dept: PERIOP | Facility: HOSPITAL | Age: 70
DRG: 377 | End: 2025-02-05
Payer: COMMERCIAL

## 2025-02-05 ENCOUNTER — ANESTHESIA EVENT (INPATIENT)
Dept: PERIOP | Facility: HOSPITAL | Age: 70
DRG: 377 | End: 2025-02-05
Payer: COMMERCIAL

## 2025-02-05 ENCOUNTER — APPOINTMENT (INPATIENT)
Dept: PERIOP | Facility: HOSPITAL | Age: 70
DRG: 377 | End: 2025-02-05
Payer: COMMERCIAL

## 2025-02-05 LAB
ALBUMIN SERPL BCG-MCNC: 2.9 G/DL (ref 3.5–5)
ALP SERPL-CCNC: 48 U/L (ref 34–104)
ALT SERPL W P-5'-P-CCNC: 35 U/L (ref 7–52)
ANION GAP SERPL CALCULATED.3IONS-SCNC: 9 MMOL/L (ref 4–13)
AST SERPL W P-5'-P-CCNC: 22 U/L (ref 13–39)
BASOPHILS # BLD AUTO: 0 THOUSANDS/ΜL (ref 0–0.1)
BASOPHILS NFR BLD AUTO: 0 % (ref 0–1)
BILIRUB SERPL-MCNC: 0.57 MG/DL (ref 0.2–1)
BUN SERPL-MCNC: 16 MG/DL (ref 5–25)
CALCIUM ALBUM COR SERPL-MCNC: 8.7 MG/DL (ref 8.3–10.1)
CALCIUM SERPL-MCNC: 7.8 MG/DL (ref 8.4–10.2)
CHLORIDE SERPL-SCNC: 107 MMOL/L (ref 96–108)
CO2 SERPL-SCNC: 25 MMOL/L (ref 21–32)
CREAT SERPL-MCNC: 0.49 MG/DL (ref 0.6–1.3)
EOSINOPHIL # BLD AUTO: 0.01 THOUSAND/ΜL (ref 0–0.61)
EOSINOPHIL NFR BLD AUTO: 1 % (ref 0–6)
ERYTHROCYTE [DISTWIDTH] IN BLOOD BY AUTOMATED COUNT: 14 % (ref 11.6–15.1)
GFR SERPL CREATININE-BSD FRML MDRD: 112 ML/MIN/1.73SQ M
GLUCOSE P FAST SERPL-MCNC: 81 MG/DL (ref 65–99)
GLUCOSE SERPL-MCNC: 81 MG/DL (ref 65–140)
GLUCOSE SERPL-MCNC: 84 MG/DL (ref 65–140)
GLUCOSE SERPL-MCNC: 85 MG/DL (ref 65–140)
GLUCOSE SERPL-MCNC: 88 MG/DL (ref 65–140)
GLUCOSE SERPL-MCNC: 92 MG/DL (ref 65–140)
GLUCOSE SERPL-MCNC: 95 MG/DL (ref 65–140)
HCT VFR BLD AUTO: 23.5 % (ref 36.5–49.3)
HGB BLD-MCNC: 8.1 G/DL (ref 12–17)
HGB BLD-MCNC: 8.3 G/DL (ref 12–17)
HGB BLD-MCNC: 8.5 G/DL (ref 12–17)
HGB BLD-MCNC: 8.6 G/DL (ref 12–17)
IMM GRANULOCYTES # BLD AUTO: 0 THOUSAND/UL (ref 0–0.2)
IMM GRANULOCYTES NFR BLD AUTO: 0 % (ref 0–2)
LYMPHOCYTES # BLD AUTO: 0.36 THOUSANDS/ΜL (ref 0.6–4.47)
LYMPHOCYTES NFR BLD AUTO: 20 % (ref 14–44)
MAGNESIUM SERPL-MCNC: 1.7 MG/DL (ref 1.9–2.7)
MCH RBC QN AUTO: 31.6 PG (ref 26.8–34.3)
MCHC RBC AUTO-ENTMCNC: 34.5 G/DL (ref 31.4–37.4)
MCV RBC AUTO: 92 FL (ref 82–98)
MONOCYTES # BLD AUTO: 0.16 THOUSAND/ΜL (ref 0.17–1.22)
MONOCYTES NFR BLD AUTO: 9 % (ref 4–12)
NEUTROPHILS # BLD AUTO: 1.3 THOUSANDS/ΜL (ref 1.85–7.62)
NEUTS SEG NFR BLD AUTO: 70 % (ref 43–75)
NRBC BLD AUTO-RTO: 0 /100 WBCS
PHOSPHATE SERPL-MCNC: 3.5 MG/DL (ref 2.3–4.1)
PLATELET # BLD AUTO: 95 THOUSANDS/UL (ref 149–390)
PMV BLD AUTO: 9.7 FL (ref 8.9–12.7)
POTASSIUM SERPL-SCNC: 3.3 MMOL/L (ref 3.5–5.3)
PROT SERPL-MCNC: 4.7 G/DL (ref 6.4–8.4)
RBC # BLD AUTO: 2.56 MILLION/UL (ref 3.88–5.62)
SODIUM SERPL-SCNC: 141 MMOL/L (ref 135–147)
WBC # BLD AUTO: 1.83 THOUSAND/UL (ref 4.31–10.16)

## 2025-02-05 PROCEDURE — 80053 COMPREHEN METABOLIC PANEL: CPT

## 2025-02-05 PROCEDURE — 94760 N-INVAS EAR/PLS OXIMETRY 1: CPT

## 2025-02-05 PROCEDURE — 0W3P8ZZ CONTROL BLEEDING IN GASTROINTESTINAL TRACT, VIA NATURAL OR ARTIFICIAL OPENING ENDOSCOPIC: ICD-10-PCS | Performed by: STUDENT IN AN ORGANIZED HEALTH CARE EDUCATION/TRAINING PROGRAM

## 2025-02-05 PROCEDURE — 83735 ASSAY OF MAGNESIUM: CPT

## 2025-02-05 PROCEDURE — 85018 HEMOGLOBIN: CPT

## 2025-02-05 PROCEDURE — 85025 COMPLETE CBC W/AUTO DIFF WBC: CPT

## 2025-02-05 PROCEDURE — 43255 EGD CONTROL BLEEDING ANY: CPT | Performed by: STUDENT IN AN ORGANIZED HEALTH CARE EDUCATION/TRAINING PROGRAM

## 2025-02-05 PROCEDURE — 82948 REAGENT STRIP/BLOOD GLUCOSE: CPT

## 2025-02-05 PROCEDURE — 84100 ASSAY OF PHOSPHORUS: CPT

## 2025-02-05 PROCEDURE — 94660 CPAP INITIATION&MGMT: CPT

## 2025-02-05 PROCEDURE — 99232 SBSQ HOSP IP/OBS MODERATE 35: CPT | Performed by: FAMILY MEDICINE

## 2025-02-05 RX ORDER — PHENYLEPHRINE HCL IN 0.9% NACL 1 MG/10 ML
SYRINGE (ML) INTRAVENOUS AS NEEDED
Status: DISCONTINUED | OUTPATIENT
Start: 2025-02-05 | End: 2025-02-05

## 2025-02-05 RX ORDER — EPHEDRINE SULFATE 50 MG/ML
INJECTION INTRAVENOUS AS NEEDED
Status: DISCONTINUED | OUTPATIENT
Start: 2025-02-05 | End: 2025-02-05

## 2025-02-05 RX ORDER — LIDOCAINE HYDROCHLORIDE 20 MG/ML
INJECTION, SOLUTION EPIDURAL; INFILTRATION; INTRACAUDAL; PERINEURAL AS NEEDED
Status: DISCONTINUED | OUTPATIENT
Start: 2025-02-05 | End: 2025-02-05

## 2025-02-05 RX ORDER — POTASSIUM CHLORIDE 1500 MG/1
40 TABLET, EXTENDED RELEASE ORAL ONCE
Status: COMPLETED | OUTPATIENT
Start: 2025-02-05 | End: 2025-02-05

## 2025-02-05 RX ORDER — LANOLIN ALCOHOL/MO/W.PET/CERES
400 CREAM (GRAM) TOPICAL 2 TIMES DAILY
Status: DISCONTINUED | OUTPATIENT
Start: 2025-02-05 | End: 2025-02-06 | Stop reason: HOSPADM

## 2025-02-05 RX ORDER — POTASSIUM CHLORIDE 14.9 MG/ML
20 INJECTION INTRAVENOUS
Status: COMPLETED | OUTPATIENT
Start: 2025-02-05 | End: 2025-02-05

## 2025-02-05 RX ORDER — PROPOFOL 10 MG/ML
INJECTION, EMULSION INTRAVENOUS AS NEEDED
Status: DISCONTINUED | OUTPATIENT
Start: 2025-02-05 | End: 2025-02-05

## 2025-02-05 RX ORDER — ONDANSETRON 2 MG/ML
4 INJECTION INTRAMUSCULAR; INTRAVENOUS ONCE AS NEEDED
Status: DISCONTINUED | OUTPATIENT
Start: 2025-02-05 | End: 2025-02-06 | Stop reason: HOSPADM

## 2025-02-05 RX ORDER — SODIUM CHLORIDE, SODIUM LACTATE, POTASSIUM CHLORIDE, CALCIUM CHLORIDE 600; 310; 30; 20 MG/100ML; MG/100ML; MG/100ML; MG/100ML
125 INJECTION, SOLUTION INTRAVENOUS CONTINUOUS
Status: DISCONTINUED | OUTPATIENT
Start: 2025-02-05 | End: 2025-02-06 | Stop reason: HOSPADM

## 2025-02-05 RX ADMIN — BRIMONIDINE TARTRATE 1 DROP: 2 SOLUTION/ DROPS OPHTHALMIC at 08:22

## 2025-02-05 RX ADMIN — SODIUM CHLORIDE, SODIUM LACTATE, POTASSIUM CHLORIDE, AND CALCIUM CHLORIDE 125 ML/HR: .6; .31; .03; .02 INJECTION, SOLUTION INTRAVENOUS at 23:51

## 2025-02-05 RX ADMIN — POTASSIUM CHLORIDE 40 MEQ: 1500 TABLET, EXTENDED RELEASE ORAL at 08:22

## 2025-02-05 RX ADMIN — PROPOFOL 100 MG: 10 INJECTION, EMULSION INTRAVENOUS at 13:34

## 2025-02-05 RX ADMIN — PROPOFOL 20 MG: 10 INJECTION, EMULSION INTRAVENOUS at 13:38

## 2025-02-05 RX ADMIN — FAMOTIDINE 20 MG: 20 TABLET, FILM COATED ORAL at 17:34

## 2025-02-05 RX ADMIN — LIDOCAINE HYDROCHLORIDE 60 MG: 20 INJECTION, SOLUTION EPIDURAL; INFILTRATION; INTRACAUDAL; PERINEURAL at 13:34

## 2025-02-05 RX ADMIN — Medication 100 MCG: at 13:49

## 2025-02-05 RX ADMIN — PROPOFOL 20 MG: 10 INJECTION, EMULSION INTRAVENOUS at 13:39

## 2025-02-05 RX ADMIN — PROPOFOL 30 MG: 10 INJECTION, EMULSION INTRAVENOUS at 13:40

## 2025-02-05 RX ADMIN — POTASSIUM CHLORIDE 20 MEQ: 14.9 INJECTION, SOLUTION INTRAVENOUS at 10:39

## 2025-02-05 RX ADMIN — SODIUM CHLORIDE, SODIUM LACTATE, POTASSIUM CHLORIDE, AND CALCIUM CHLORIDE 125 ML/HR: .6; .31; .03; .02 INJECTION, SOLUTION INTRAVENOUS at 15:43

## 2025-02-05 RX ADMIN — PANTOPRAZOLE SODIUM 40 MG: 40 INJECTION, POWDER, FOR SOLUTION INTRAVENOUS at 10:00

## 2025-02-05 RX ADMIN — PROPOFOL 20 MG: 10 INJECTION, EMULSION INTRAVENOUS at 13:36

## 2025-02-05 RX ADMIN — Medication 400 MG: at 17:33

## 2025-02-05 RX ADMIN — AZELASTINE HYDROCHLORIDE 2 SPRAY: 137 SPRAY, METERED NASAL at 17:46

## 2025-02-05 RX ADMIN — PROPOFOL 20 MG: 10 INJECTION, EMULSION INTRAVENOUS at 13:37

## 2025-02-05 RX ADMIN — SODIUM CHLORIDE, SODIUM LACTATE, POTASSIUM CHLORIDE, AND CALCIUM CHLORIDE 500 ML: .6; .31; .03; .02 INJECTION, SOLUTION INTRAVENOUS at 10:38

## 2025-02-05 RX ADMIN — BRIMONIDINE TARTRATE 1 DROP: 2 SOLUTION/ DROPS OPHTHALMIC at 21:24

## 2025-02-05 RX ADMIN — EPHEDRINE SULFATE 5 MG: 50 INJECTION, SOLUTION INTRAVENOUS at 13:50

## 2025-02-05 RX ADMIN — ATORVASTATIN CALCIUM 40 MG: 40 TABLET, FILM COATED ORAL at 08:22

## 2025-02-05 RX ADMIN — FAMOTIDINE 20 MG: 20 TABLET, FILM COATED ORAL at 08:22

## 2025-02-05 RX ADMIN — SODIUM CHLORIDE, SODIUM LACTATE, POTASSIUM CHLORIDE, AND CALCIUM CHLORIDE 125 ML/HR: .6; .31; .03; .02 INJECTION, SOLUTION INTRAVENOUS at 07:37

## 2025-02-05 RX ADMIN — AZELASTINE HYDROCHLORIDE 2 SPRAY: 137 SPRAY, METERED NASAL at 08:22

## 2025-02-05 RX ADMIN — PROPOFOL 20 MG: 10 INJECTION, EMULSION INTRAVENOUS at 13:35

## 2025-02-05 RX ADMIN — Medication 400 MG: at 08:22

## 2025-02-05 RX ADMIN — PANTOPRAZOLE SODIUM 40 MG: 40 INJECTION, POWDER, FOR SOLUTION INTRAVENOUS at 22:48

## 2025-02-05 RX ADMIN — POTASSIUM CHLORIDE 20 MEQ: 14.9 INJECTION, SOLUTION INTRAVENOUS at 12:42

## 2025-02-05 NOTE — ASSESSMENT & PLAN NOTE
Hx of squamous cell tongue cancer, started radiation and chemotherapy a week ago. F/u [facility] Dr. Rory Lopez.   Not on AC.   Patient presented with dark stools for past one week, possibly upper GI bleed from ulcers, hx of naproxen use  In ED, hemoglobin 10.7 (prior stable at 14.7). Vitals with hypotension  Lipase negative, low potassium 3.1    Continue IV PPI  GI input appreciated, For EGD today

## 2025-02-05 NOTE — ASSESSMENT & PLAN NOTE
Lab Results   Component Value Date    HGBA1C 6.5 11/11/2024       Recent Labs     02/04/25  1817 02/04/25  2113 02/05/25  0705 02/05/25  0949   POCGLU 125 95 92 88       Blood Sugar Average: Last 72 hrs:  (P) [id number] meds on jardiance 10mg daily    Glycemic control adequate, A1c 6.5. continue to hold jardiance

## 2025-02-05 NOTE — CASE MANAGEMENT
Case Management Discharge Planning Note    Patient name Ean Young  Location /403-01 MRN [id number]  : 1955 Date 2025       Current Admission Date: 2025  Current Admission Diagnosis:GI bleeding   Patient Active Problem List    Diagnosis Date Noted Date Diagnosed    Melena 2025     GI bleeding 2025     JM (obstructive sleep apnea) 2025     Hypotension 2025     Hypokalemia 2025     Intractable episodic headache 2024     Anxiety about treatment 2024     Other insomnia 2024     Head and neck cancer (HCC) 2024     Benign prostatic hyperplasia 2024     Chronic obstructive pulmonary disease (HCC) 2024     Diabetes mellitus ([facility]) 2024     Class 3 severe obesity due to excess calories with serious comorbidity and body mass index (BMI) of 40.0 to 44.9 in adult ([facility]) 2024     Laryngopharyngeal reflux (LPR) 2024     Cervical spondylosis 10/06/2023     Bilateral carpal tunnel syndrome 10/06/2023     Cervical radiculopathy 2023     Moderate aortic stenosis 2022     Decreased hearing of both ears 2022     Urinary frequency 2022     Type 2 diabetes mellitus without complication, without long-term current use of insulin ([facility]) 2022     Essential hypertension 2022     Dyslipidemia 2022     Gastroesophageal reflux disease without esophagitis 2022     Obstructive sleep apnea 2022       LOS (days): 0  Geometric Mean LOS (GMLOS) (days):   Days to GMLOS:     OBJECTIVE:            Current admission status: Observation   Preferred Pharmacy:   [id number] - [place] - [address]  [address] [facility] [address]  Phone: [phone] Fax: [phone]    Primary Care Provider: Timmy Chapman DO    Primary Insurance: [facility] REP  Secondary Insurance: [place] [employer] AND [employer] [facility]    DISCHARGE DETAILS:  Chart review done. At this time  no CM needs identified re: dc. CM will follow in the event any dc needs arise.

## 2025-02-05 NOTE — QUICK NOTE
The patient is currently n.p.o. for an EGD today with Dr. Cary.  He continues deny any abdominal pain, nausea, vomiting, or GERD symptoms.    I obtained informed verbal consent from the patient. The risks/benefits/alternatives of the procedure were discussed with the patient. Risks included, but not limited to,bleeding, infection, sore throat, and perforation and incomplete procedure were discussed. The patient gave verbal understanding and is agreeable to proceed. Patient's questions were answered to the best of my ability and until they verbalized an understanding.      Patient is to remain n.p.o. and proceed with EGD as scheduled today.

## 2025-02-05 NOTE — ASSESSMENT & PLAN NOTE
PET scan on 10/30/2024 which confirmed left base of tongue malignancy. 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

## 2025-02-05 NOTE — UTILIZATION REVIEW
NOTIFICATION OF OBSERVATION ADMISSION   AUTHORIZATION REQUEST   SERVICING FACILITY:   [place] [address]  Tax ID:  [id number]  NPI: [id number] ATTENDING PROVIDER:  Attending Name and NPI#: Shilpa Colon Md [[id number]]  Address: [address] [place] [address]  Phone: [phone]     ADMISSION INFORMATION:  Place of Service: On [facility]  Place of Service Code: 22 CPT Code:   Admitting Diagnosis Code/Description:  Melena [K92.1]  Rectal bleeding [K62.5]  GI bleed [K92.2]  Head and neck cancer (HCC) [C76.0]  Observation Admission Date/Time: 02/04/2025 0941  Discharge Date/Time: No discharge date for patient encounter.     UTILIZATION REVIEW CONTACT:  Jayden Mejia Utilization   Network Utilization Review Department  Phone: [phone]  Fax [phone]  Email: [email or web address]  Contact for approvals/pending authorizations, clinical reviews, and discharge.     PHYSICIAN ADVISORY SERVICES:  Medical Necessity Denial & [employer] Review  Phone: [phone]  Fax: [phone]  Email: [email or web address]     DISCHARGE SUPPORT TEAM:  For Patients Discharge Needs & Updates  Phone: [phone] opt. 2 Fax: [phone]  Email: [email or web address]

## 2025-02-05 NOTE — QUICK NOTE
GI BRIEF NOTE:    Plan for EGD today.  N.p.o. for procedure.  Labs reviewed.  Hemoglobin 8.1 from 8.5.  Vital signs stable.  Further recommendations to follow pending endoscopy.    Freeman Cary D.O.  [facility]  Division of Gastroenterology & Hepatology  Available on [employer]  [email or web address]

## 2025-02-05 NOTE — PLAN OF CARE
Problem: PAIN - ADULT  Goal: Verbalizes/displays adequate comfort level or baseline comfort level  Description: Interventions:  - Encourage patient to monitor pain and request assistance  - Assess pain using appropriate pain scale  - Administer analgesics based on type and severity of pain and evaluate response  - Implement non-pharmacological measures as appropriate and evaluate response  - Consider cultural and social influences on pain and pain management  - Notify physician/advanced practitioner if interventions unsuccessful or patient reports new pain  Outcome: Progressing     Problem: INFECTION - ADULT  Goal: Absence or prevention of progression during hospitalization  Description: INTERVENTIONS:  - Assess and monitor for signs and symptoms of infection  - Monitor lab/diagnostic results  - Monitor all insertion sites, i.e. indwelling lines, tubes, and drains  - Monitor endotracheal if appropriate and nasal secretions for changes in amount and color  - [place] appropriate cooling/warming therapies per order  - Administer medications as ordered  - Instruct and encourage patient and family to use good hand hygiene technique  - Identify and instruct in appropriate isolation precautions for identified infection/condition  Outcome: Progressing     Problem: SAFETY ADULT  Goal: Patient will remain free of falls  Description: INTERVENTIONS:  - Educate patient/family on patient safety including physical limitations  - Instruct patient to call for assistance with activity   - Consult OT/PT to assist with strengthening/mobility   - Keep Call bell within reach  - Keep bed low and locked with side rails adjusted as appropriate  - Keep care items and personal belongings within reach  - Initiate and maintain comfort rounds  - Make Fall Risk Sign visible to staff  - Offer Toileting every 2 Hours, in advance of need  - Initiate/Maintain bed/chair alarm  - Obtain necessary fall risk management equipment: nonskid footwear  -  Apply yellow socks and bracelet for high fall risk patients  - Consider moving patient to room near nurses station  Outcome: Progressing  Goal: Maintain or return to baseline ADL function  Description: INTERVENTIONS:  -  Assess patient's ability to carry out ADLs; assess patient's baseline for ADL function and identify physical deficits which impact ability to perform ADLs (bathing, care of mouth/teeth, toileting, grooming, dressing, etc.)  - Assess/evaluate cause of self-care deficits   - Assess range of motion  - Assess patient's mobility; develop plan if impaired  - Assess patient's need for assistive devices and provide as appropriate  - Encourage maximum independence but intervene and supervise when necessary  - Involve family in performance of ADLs  - Assess for home care needs following discharge   - Consider OT consult to assist with ADL evaluation and planning for discharge  - Provide patient education as appropriate  Outcome: Progressing  Goal: Maintains/Returns to pre admission functional level  Description: INTERVENTIONS:  - Perform AM-PAC 6 Click Basic Mobility/ Daily Activity assessment daily.  - Set and communicate daily mobility goal to care team and patient/family/caregiver.   - Collaborate with rehabilitation services on mobility goals if consulted  - Perform Range of Motion 3 times a day.  - Reposition patient every 2 hours.  - Dangle patient 3 times a day  - Stand patient 3 times a day  - Ambulate patient 3 times a day  - Out of bed to chair 3 times a day   - Out of bed for meals 3 times a day  - Out of bed for toileting  - Record patient progress and toleration of activity level   Outcome: Progressing     Problem: DISCHARGE PLANNING  Goal: Discharge to home or other facility with appropriate resources  Description: INTERVENTIONS:  - Identify barriers to discharge w/patient and caregiver  - Arrange for needed discharge resources and transportation as appropriate  - Identify discharge learning  needs (meds, wound care, etc.)  - Arrange for interpretive services to assist at discharge as needed  - Refer to Case Management Department for coordinating discharge planning if the patient needs post-hospital services based on physician/advanced practitioner order or complex needs related to functional status, cognitive ability, or social support system  Outcome: Progressing     Problem: Knowledge Deficit  Goal: Patient/family/caregiver demonstrates understanding of disease process, treatment plan, medications, and discharge instructions  Description: Complete learning assessment and assess knowledge base.  Interventions:  - Provide teaching at level of understanding  - Provide teaching via preferred learning methods  Outcome: Progressing     Problem: GASTROINTESTINAL - ADULT  Goal: Minimal or absence of nausea and/or vomiting  Description: INTERVENTIONS:  - Administer IV fluids if ordered to ensure adequate hydration  - Maintain NPO status until nausea and vomiting are resolved  - Nasogastric tube if ordered  - Administer ordered antiemetic medications as needed  - Provide nonpharmacologic comfort measures as appropriate  - Advance diet as tolerated, if ordered  - Consider nutrition services referral to assist patient with adequate nutrition and appropriate food choices  Outcome: Progressing  Goal: Maintains or returns to baseline bowel function  Description: INTERVENTIONS:  - Assess bowel function  - Encourage oral fluids to ensure adequate hydration  - Administer IV fluids if ordered to ensure adequate hydration  - Administer ordered medications as needed  - Encourage mobilization and activity  - Consider nutritional services referral to assist patient with adequate nutrition and appropriate food choices  Outcome: Progressing  Goal: Maintains adequate nutritional intake  Description: INTERVENTIONS:  - Monitor percentage of each meal consumed  - Identify factors contributing to decreased intake, treat as  appropriate  - Assist with meals as needed  - Monitor I&O, weight, and lab values if indicated  - Obtain nutrition services referral as needed  Outcome: Progressing

## 2025-02-05 NOTE — RESPIRATORY THERAPY NOTE
02/04/25 2820   Respiratory Assessment   Assessment Type Assess only   General Appearance Alert;Awake   Respiratory Pattern Normal   Chest Assessment Chest expansion symmetrical   Resp Comments (S)  Patient placed on CPAP for sleeping.  Patient does not know home settings.  Will titrate per protocol.   Non-Invasive Information   O2 Interface Device Full face mask   Non-Invasive Ventilation Mode CPAP   $ Intermittent NIV Yes   SpO2 96 %   $ Pulse Oximetry Spot Check Charge Completed   Non-Invasive Settings   FiO2 (%) 24   PEEP/CPAP (cm H2O) 12   Non-Invasive Readings   Total Rate 14   Spontaneous MV (mL) 9.8   Spontaneous Vt (mL) 693   Leak (lpm) 20   Skin Intervention Mask rotated;Skin intact   Non-Invasive Alarms   Insp Pressure High (cm H20) 16   Insp Pressure Low (cm H20) 10   MV Low (L/min) 3   Vt High (mL) 1.3   Vt Low (mL) 200   High Resp Rate (BPM) 35 BPM   Low Resp Rate (BPM) 8 BPM   Apnea Interval (sec) 20

## 2025-02-05 NOTE — PLAN OF CARE
Problem: PAIN - ADULT  Goal: Verbalizes/displays adequate comfort level or baseline comfort level  Description: Interventions:  - Encourage patient to monitor pain and request assistance  - Assess pain using appropriate pain scale  - Administer analgesics based on type and severity of pain and evaluate response  - Implement non-pharmacological measures as appropriate and evaluate response  - Consider cultural and social influences on pain and pain management  - Notify physician/advanced practitioner if interventions unsuccessful or patient reports new pain  Outcome: Progressing     Problem: INFECTION - ADULT  Goal: Absence or prevention of progression during hospitalization  Description: INTERVENTIONS:  - Assess and monitor for signs and symptoms of infection  - Monitor lab/diagnostic results  - Monitor all insertion sites, i.e. indwelling lines, tubes, and drains  - Monitor endotracheal if appropriate and nasal secretions for changes in amount and color  - [place] appropriate cooling/warming therapies per order  - Administer medications as ordered  - Instruct and encourage patient and family to use good hand hygiene technique  - Identify and instruct in appropriate isolation precautions for identified infection/condition  Outcome: Progressing     Problem: SAFETY ADULT  Goal: Patient will remain free of falls  Description: INTERVENTIONS:  - Educate patient/family on patient safety including physical limitations  - Instruct patient to call for assistance with activity   - Consult OT/PT to assist with strengthening/mobility   - Keep Call bell within reach  - Keep bed low and locked with side rails adjusted as appropriate  - Keep care items and personal belongings within reach  - Initiate and maintain comfort rounds  - Make Fall Risk Sign visible to staff  - Offer Toileting every 2 Hours, in advance of need  - Initiate/Maintain bed/chair alarm  - Obtain necessary fall risk management equipment: nonskid footwear  -  Apply yellow socks and bracelet for high fall risk patients  - Consider moving patient to room near nurses station  Outcome: Progressing  Goal: Maintain or return to baseline ADL function  Description: INTERVENTIONS:  -  Assess patient's ability to carry out ADLs; assess patient's baseline for ADL function and identify physical deficits which impact ability to perform ADLs (bathing, care of mouth/teeth, toileting, grooming, dressing, etc.)  - Assess/evaluate cause of self-care deficits   - Assess range of motion  - Assess patient's mobility; develop plan if impaired  - Assess patient's need for assistive devices and provide as appropriate  - Encourage maximum independence but intervene and supervise when necessary  - Involve family in performance of ADLs  - Assess for home care needs following discharge   - Consider OT consult to assist with ADL evaluation and planning for discharge  - Provide patient education as appropriate  Outcome: Progressing  Goal: Maintains/Returns to pre admission functional level  Description: INTERVENTIONS:  - Perform AM-PAC 6 Click Basic Mobility/ Daily Activity assessment daily.  - Set and communicate daily mobility goal to care team and patient/family/caregiver.   - Collaborate with rehabilitation services on mobility goals if consulted  - Perform Range of Motion 3 times a day.  - Reposition patient every 2 hours.  - Dangle patient 3 times a day  - Stand patient 3 times a day  - Ambulate patient 3 times a day  - Out of bed to chair 3 times a day   - Out of bed for meals 3 times a day  - Out of bed for toileting  - Record patient progress and toleration of activity level   Outcome: Progressing     Problem: DISCHARGE PLANNING  Goal: Discharge to home or other facility with appropriate resources  Description: INTERVENTIONS:  - Identify barriers to discharge w/patient and caregiver  - Arrange for needed discharge resources and transportation as appropriate  - Identify discharge learning  needs (meds, wound care, etc.)  - Arrange for interpretive services to assist at discharge as needed  - Refer to Case Management Department for coordinating discharge planning if the patient needs post-hospital services based on physician/advanced practitioner order or complex needs related to functional status, cognitive ability, or social support system  Outcome: Progressing     Problem: Knowledge Deficit  Goal: Patient/family/caregiver demonstrates understanding of disease process, treatment plan, medications, and discharge instructions  Description: Complete learning assessment and assess knowledge base.  Interventions:  - Provide teaching at level of understanding  - Provide teaching via preferred learning methods  Outcome: Progressing     Problem: GASTROINTESTINAL - ADULT  Goal: Minimal or absence of nausea and/or vomiting  Description: INTERVENTIONS:  - Administer IV fluids if ordered to ensure adequate hydration  - Maintain NPO status until nausea and vomiting are resolved  - Nasogastric tube if ordered  - Administer ordered antiemetic medications as needed  - Provide nonpharmacologic comfort measures as appropriate  - Advance diet as tolerated, if ordered  - Consider nutrition services referral to assist patient with adequate nutrition and appropriate food choices  Outcome: Progressing  Goal: Maintains or returns to baseline bowel function  Description: INTERVENTIONS:  - Assess bowel function  - Encourage oral fluids to ensure adequate hydration  - Administer IV fluids if ordered to ensure adequate hydration  - Administer ordered medications as needed  - Encourage mobilization and activity  - Consider nutritional services referral to assist patient with adequate nutrition and appropriate food choices  Outcome: Progressing  Goal: Maintains adequate nutritional intake  Description: INTERVENTIONS:  - Monitor percentage of each meal consumed  - Identify factors contributing to decreased intake, treat as  appropriate  - Assist with meals as needed  - Monitor I&O, weight, and lab values if indicated  - Obtain nutrition services referral as needed  Outcome: Progressing

## 2025-02-05 NOTE — ASSESSMENT & PLAN NOTE
F/u with cardiology, Dr. Tsang  Home meds on norvasc 5mg, Cozaar 25mg BID, Toprol XL 25mg daily, and hydrochlorothiazide 12.5mg daily    Patient lost about 100lbs, DC Cozaar, Norvasc and HCTZ  Received 2 L NS and maintained on IVF, will provide another 500cc bolus today 2/5/25

## 2025-02-05 NOTE — UTILIZATION REVIEW
OBSERVATION 2-4-25 CHANGED TO INPATIENT 2-5-25 FOR EGD TO EVALUATE GI BLEED, SERIAL HB Q6 HR, IV PROTONIX Q12 HR.      Initial Clinical Review    Admission: Date/Time/Statement:   Admission Orders (From admission, onward)       Ordered        02/05/25 0843  INPATIENT ADMISSION  Once            02/04/25 0941  Place in Observation  Once                          Orders Placed This Encounter   Procedures    Place in Observation     Standing Status:   Standing     Number of Occurrences:   1     Level of Care:   Med Surg [16]    INPATIENT ADMISSION     Standing Status:   Standing     Number of Occurrences:   1     Level of Care:   Med Surg [16]     Estimated length of stay:   More than 2 Midnights     Certification:   I certify that inpatient services are medically necessary for this patient for a duration of greater than two midnights. See H&P and MD Progress Notes for additional information about the patient's course of treatment.     ED Arrival Information       Expected   2/4/2025     Arrival   2/4/2025 07:19    Acuity   Urgent              Means of arrival   Ambulance    Escorted by   [facility]    Service   Hospitalist    Admission type   Emergency              Arrival complaint   Bloody Stool             Chief Complaint   Patient presents with    Rectal Bleeding     Pt c/o bloody/black stools for 1 week. No abdominal pain       Initial Presentation: 69 y.o. male presents to ed from home on 2-4-25 via ems for black and bloody stool without pain for 1 week. Difficulty swallowing due to radiation therapy.  Also on chemotherapy.   PMHX:  squamous cell cancer of tongue, HTN, JM, DM.peptic ulcer.    Clinical assessment significant for: guaiac positive,  tachycardia 119 - 137 sustained tachypnea 26-28 sustained, hypotension 87/63, Spo2 94% resting.  WBC 3.24, HB 10.7, K 3.1, AN GAP 15.  Imaging w/o acute finding. Initially treated with iv multi electrolyte bolus 2L, iv protonix, iv .9% ns 100/hr.  Admit  to observation for GI bleed.       GI consult:  Acute decline in HB from 14- 10.7.  Plan includes: continue iv protonix q12 hr, clear liquid diet, npo mn, Egd 2-5. IF Egd unremarkable, consider colonoscopy.  Trend Hb. Collect iron studies.        Date: 2-5-25     Day 2: observation changed to inpatient  Npo for Egd.  Wbc 1.83. K 3.3. HB decreased 8.1> 8.4.  Continue iv protonix q12. Start  /hr. Serial HB q6 hr.       Date: 2-6-25   Day 3: Has surpassed a 2nd midnight with active treatments and services for transition from iv to oral medications, trend HB.  HB 8.6>  8.3> 7.6> 7.9> 8.1.  Received iv protonix at 0900.  Iv  /hr completed at 0715     Discharged to 2-6-25 to home        ED Treatment-Medication Administration from 02/04/2025 0719 to 02/04/2025 1345         Date/Time Order Dose Route Action     02/04/2025 0736 multi-electrolyte (ISOLYTE-S PH 7.4) bolus 2,000 mL 2,000 mL Intravenous New Bag     02/04/2025 1056 potassium chloride (Klor-Con M20) CR tablet 40 mEq 40 mEq Oral Given     02/04/2025 1058 sodium chloride 0.9 % infusion 100 mL/hr Intravenous New Bag     02/04/2025 1057 pantoprazole (PROTONIX) injection 40 mg 40 mg Intravenous Given     02/04/2025 1056 atorvastatin (LIPITOR) tablet 40 mg 40 mg Oral Given     02/04/2025 1056 azelastine (ASTELIN) 0.1 % nasal spray 2 spray 2 spray Each Nare Given     02/04/2025 1056 brimonidine tartrate 0.2 % ophthalmic solution 1 drop 1 drop Both Eyes Given     02/04/2025 1217 famotidine (PEPCID) tablet 20 mg 20 mg Oral Given       Scheduled Medications:    atorvastatin, 40 mg, Oral, Daily  azelastine, 2 spray, Each Nare, BID  brimonidine tartrate, 1 drop, Both Eyes, BID  famotidine, 20 mg, Oral, BID  insulin lispro, 1-6 Units, Subcutaneous, 4 times day  magnesium Oxide, 400 mg, Oral, BID  [Held by provider] metoprolol succinate, 25 mg, Oral, Daily  pantoprazole, 40 mg, Intravenous, Q12H      Continuous IV Infusions:  lactated ringers, 125 mL/hr,  Intravenous, Continuous      PRN Meds:  hydrOXYzine HCL, 25 mg, Oral, TID PRN      ED Triage Vitals [02/04/25 0724]   Temperature Pulse Respirations Blood Pressure SpO2 Pain Score   98.3 °F (36.8 °C) (!) 137 20 114/62 96 % No Pain     Weight (last 2 days)       Date/Time Weight    02/04/25 1427 118 (260.58)            Vital Signs (last 3 days)       Date/Time Temp Pulse Resp BP MAP (mmHg) SpO2 O2 Device Cristela Coma Scale Score Pain    02/05/25 07:07:38 97.7 °F (36.5 °C) 73 17 94/57 69 98 % None (Room air) -- --    02/04/25 21:37:17 97.7 °F (36.5 °C) 92 20 96/59 71 96 % -- -- --    02/04/25 1938 -- -- -- -- -- 95 % None (Room air) -- --    02/04/25 1427 -- -- -- -- -- 95 % None (Room air) -- No Pain    02/04/25 14:03:40 -- 83 19 -- -- 97 % -- -- --    02/04/25 13:54:51 -- -- 19 110/75 87 -- -- -- --    02/04/25 1330 98.4 °F (36.9 °C) 119 22 100/66 -- 97 % None (Room air) -- No Pain    02/04/25 1230 98.3 °F (36.8 °C) 125 22 100/66 75 95 % None (Room air) -- No Pain    02/04/25 1215 -- 115 20 -- -- 94 % -- -- --    02/04/25 1100 98.3 °F (36.8 °C) 126 26 120/67 85 95 % None (Room air) 15 No Pain    02/04/25 0840 -- 121 28 87/63 68 94 % -- -- --    02/04/25 0735 -- -- -- -- -- -- -- 15 --    02/04/25 0724 98.3 °F (36.8 °C) 137 20 114/62 80 96 % None (Room air) -- No Pain         Pertinent Labs/Diagnostic Test Results:     EGD  2-6-25     IMPRESSION:  Irregular Z-line  Schatzki ring in the GE junction  3 cm type I hiatal hernia  Erythematous, scarred mucosa in the duodenal bulb  Single small angioectasia in the 2nd part of the duodenum; there was stigmata of recent hemorrhage; tissue was ablated with argon plasma coagulation; placed 1 clip successfully; hemostasis achieved        RECOMMENDATION:  Follow up with GI Clinic  Okay to resume diet  Monitor blood counts and for signs of overt bleeding  Continue PPI BID for now  Avoid NSAIDs    Radiology:  CT abdomen pelvis w contrast   Final  (02/04 1043)      No acute findings  in the abdomen or pelvis. Colonic diverticulosis without diverticulitis.        Cardiology:  ECG 12 lead   Final  (02/04 0939)   Atrial fibrillation with rapid ventricular response   Nonspecific ST abnormality   Abnormal ECG        GI:  No orders to display       Results from last 7 days   Lab Units 02/04/25  1134   SARS-COV-2  Negative     Results from last 7 days   Lab Units 02/05/25  0455 02/05/25  0008 02/04/25  0734   WBC Thousand/uL 1.83*  --  3.24*   HEMOGLOBIN g/dL 8.1* 8.5* 10.7*   HEMATOCRIT % 23.5*  --  31.4*   PLATELETS Thousands/uL 95*  --  105*   TOTAL NEUT ABS Thousands/µL 1.30*  --  2.42         Results from last 7 days   Lab Units 02/05/25  0455 02/04/25  0734   SODIUM mmol/L 141 138   POTASSIUM mmol/L 3.3* 3.1*   CHLORIDE mmol/L 107 102   CO2 mmol/L 25 21   ANION GAP mmol/L 9 15*   BUN mg/dL 16 15   CREATININE mg/dL 0.49* 0.58*   EGFR ml/min/1.73sq m 112 104   CALCIUM mg/dL 7.8* 8.9   MAGNESIUM mg/dL 1.7*  --    PHOSPHORUS mg/dL 3.5  --      Results from last 7 days   Lab Units 02/05/25  0455 02/04/25  0734   AST U/L 22 28   ALT U/L 35 45   ALK PHOS U/L 48 62   TOTAL PROTEIN g/dL 4.7* 6.3*   ALBUMIN g/dL 2.9* 3.7   TOTAL BILIRUBIN mg/dL 0.57 0.82     Results from last 7 days   Lab Units 02/05/25  0705 02/04/25  2113 02/04/25  1817 02/04/25  1411 02/04/25  1055   POC GLUCOSE mg/dl 92 95 125 101 79     Results from last 7 days   Lab Units 02/05/25  0455 02/04/25  0734   GLUCOSE RANDOM mg/dL 81 119       Results from last 7 days   Lab Units 02/04/25  0734   PROTIME seconds 14.5   INR  1.08   PTT seconds 34       Results from last 7 days   Lab Units 02/04/25  1133   FERRITIN ng/mL 238   IRON SATURATION % 19   IRON ug/dL 45*   TIBC ug/dL 235.2*     Results from last 7 days   Lab Units 02/04/25  1133   TRANSFERRIN mg/dL 168*     Results from last 7 days   Lab Units 02/04/25  0734   LIPASE u/L 26         Past Medical History:   Diagnosis Date    Cancer (HCC)     Dyslipidemia     GERD (gastroesophageal  reflux disease)     HL (hearing loss)     Hypertension     Moderate aortic stenosis     Obstructive sleep apnea      Present on Admission:     Essential hypertension   Dyslipidemia   Type 2 diabetes mellitus without complication, without long-term current use of insulin (HCC)   Benign prostatic hyperplasia   Head and neck cancer (HCC)   Anxiety about treatment      Admitting Diagnosis:     Melena [K92.1]  Rectal bleeding [K62.5]  GI bleed [K92.2]  Head and neck cancer (HCC) [C76.0]    Age/Sex: 69 y.o. male    Network Utilization Review Department  ATTENTION: Please call with any questions or concerns to [phone] and carefully listen to the prompts so that you are directed to the right person. All voicemails are confidential.   For Discharge needs, contact Care Management DC Support Team at [phone] opt. 2  Send all requests for admission clinical reviews, approved or denied determinations and any other requests to dedicated fax number below belonging to the campus where the patient is receiving treatment. List of dedicated fax numbers for the Facilities:  FACILITY NAME UR FAX NUMBER   ADMISSION DENIALS (Administrative/Medical Necessity) [phone]   DISCHARGE SUPPORT TEAM (NETWORK) [phone]   PARENT CHILD HEALTH (Maternity/NICU/Pediatrics) [phone]   [facility] [phone]   [facility] [phone]   [facility] [phone]   [facility] [phone]   [facility] [phone]   [facility] [phone]   [facility] [phone]   [facility] [phone]   [facility] [phone]   [facility] [phone]   [facility] [phone]   [facility] [phone]

## 2025-02-05 NOTE — QUICK NOTE
As promised and discussed yesterday, I did follow-up with the patient's niece and emergency contact, Destinee, and reviewed with her all of the results of the EGD had today until she verbalized an understanding.    I explained to her that we would like him to stay on the Protonix probably for the next 3 to 6 months to encourage healing of the duodenum.  I did explain to her that the biopsies that we completed will be back from the lab sometime in the next 5 to 7 days and someone will reach out to them.    I explained to her that from our standpoint as long as he is medically stable, without any evidence of continued bleeding, most likely they will send home tomorrow, however, I did explain to her that we are consulting service and we do not get the final say, however, I did explain to her that once he is discharged I will make sure our office staff reaches out and get him scheduled for a follow-up office visit in the next few weeks.    Destinee was very thankful and appreciative of all the information and everything everyone has done.

## 2025-02-05 NOTE — PROGRESS NOTES
Progress Note - Hospitalist   Name: Ean Young 69 y.o. male I MRN: [id number]  Unit/Bed#: 403-01 I Date of Admission: 2/4/2025   Date of Service: 2/5/2025 I Hospital Day: 0    Assessment & Plan  GI bleeding  Hx of squamous cell tongue cancer, started radiation and chemotherapy a week ago. F/u [facility] Dr. Rory Lopez.   Not on AC.   Patient presented with dark stools for past one week, possibly upper GI bleed from ulcers, hx of naproxen use  In ED, hemoglobin 10.7 (prior stable at 14.7). Vitals with hypotension  Lipase negative, low potassium 3.1    Continue IV PPI  GI input appreciated, For EGD today   Melena  Dark stool and dizziness for past one week. Denied fever, chills, N/V, abdominal pain  See acute GI bleeding A/P  Head and neck cancer (HCC)  PET scan on 10/30/2024 which confirmed left base of tongue malignancy. 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 Tsang  Home meds on norvasc 5mg, Cozaar 25mg BID, Toprol XL 25mg daily, and hydrochlorothiazide 12.5mg daily    Patient lost about 100lbs, DC Cozaar, Norvasc and HCTZ  Received 2 L NS and maintained on IVF, will provide another 500cc bolus today 2/5/25      Dyslipidemia   lipitor 40mg daily  Type 2 diabetes mellitus without complication, without long-term current use of insulin ([facility])  Lab Results   Component Value Date    HGBA1C 6.5 11/11/2024       Recent Labs     02/04/25  1817 02/04/25  2113 02/05/25  0705 02/05/25  0949   POCGLU 125 95 92 88       Blood Sugar Average: Last 72 hrs:  (P) [id number] meds on jardiance 10mg daily    Glycemic control adequate, A1c 6.5. continue to hold jardiance   Benign prostatic hyperplasia  Home med on flomax daily  - hold flomax due to hypotension  Anxiety about treatment  Continue with home  meds   JM (obstructive sleep apnea)  On CPAP at bedtime  - continue CPAP treatment  Hypotension  Improving with IVF  Hypokalemia  Continue to replete     VTE Pharmacologic Prophylaxis: VTE Score: 5 none 2/2 GIB    Mobility:   Basic Mobility Inpatient Raw Score: 24  JH-HLM Goal: 8: Walk 250 feet or more  JH-HLM Achieved: 7: Walk 25 feet or more  JH-HLM Goal achieved. Continue to encourage appropriate mobility.    Patient Centered Rounds: I performed bedside rounds with nursing staff today.   Discussions with Specialists or Other Care Team Provider: GI    Education and Discussions with Family / Patient:     Current Length of Stay: 0 day(s)  Current Patient Status: Inpatient   Certification Statement: The patient will continue to require additional inpatient hospital stay due to GIB  Discharge Plan: Anticipate discharge in 24-48 hrs to home.    Code Status: Level 1 - Full Code    Subjective   Seen and examined, he's feeling better and dizziness resolved     Objective :  Temp:  [97.7 °F (36.5 °C)-98.4 °F (36.9 °C)] 97.7 °F (36.5 °C)  HR:  [] 73  BP: ()/(57-75) 94/57  Resp:  [17-26] 17  SpO2:  [94 %-98 %] 98 %  O2 Device: None (Room air)    Body mass index is 36.34 kg/m².     Input and Output Summary (last 24 hours):     Intake/Output Summary (Last 24 hours) at 2/5/2025 1011  Last data filed at 2/5/2025 0742  Gross per 24 hour   Intake 210 ml   Output --   Net 210 ml       Physical Exam  Constitutional:       Appearance: Normal appearance.   Eyes:      General: No scleral icterus.  Cardiovascular:      Rate and Rhythm: Normal rate and regular rhythm.      Pulses: Normal pulses.      Heart sounds: No murmur heard.  Pulmonary:      Effort: Pulmonary effort is normal. No respiratory distress.      Breath sounds: Normal breath sounds. No wheezing or rales.   Abdominal:      General: Abdomen is flat. Bowel sounds are normal. There is no distension.      Tenderness: There is no abdominal tenderness. There is no  guarding.   Musculoskeletal:      Right lower leg: No edema.      Left lower leg: No edema.   Skin:     Capillary Refill: Capillary refill takes 2 to 3 seconds.   Neurological:      Mental Status: He is alert and oriented to person, place, and time. Mental status is at baseline.           Lines/Drains:              Lab Results: I have reviewed the following results:   Results from last 7 days   Lab Units 02/05/25  0455   WBC Thousand/uL 1.83*   HEMOGLOBIN g/dL 8.1*   HEMATOCRIT % 23.5*   PLATELETS Thousands/uL 95*   SEGS PCT % 70   LYMPHO PCT % 20   MONO PCT % 9   EOS PCT % 1     Results from last 7 days   Lab Units 02/05/25  0455   SODIUM mmol/L 141   POTASSIUM mmol/L 3.3*   CHLORIDE mmol/L 107   CO2 mmol/L 25   BUN mg/dL 16   CREATININE mg/dL 0.49*   ANION GAP mmol/L 9   CALCIUM mg/dL 7.8*   ALBUMIN g/dL 2.9*   TOTAL BILIRUBIN mg/dL 0.57   ALK PHOS U/L 48   ALT U/L 35   AST U/L 22   GLUCOSE RANDOM mg/dL 81     Results from last 7 days   Lab Units 02/04/25  0734   INR  1.08     Results from last 7 days   Lab Units 02/05/25  0949 02/05/25  0705 02/04/25  2113 02/04/25  1817 02/04/25  1411 02/04/25  1055   POC GLUCOSE mg/dl 88 92 95 125 101 79               Recent Cultures (last 7 days):         Imaging Results Review: I reviewed radiology reports from this admission including: CT abdomen/pelvis.  Other Study Results Review: No additional pertinent studies reviewed.    Last 24 Hours Medication List:     Current Facility-Administered Medications:     atorvastatin (LIPITOR) tablet 40 mg, Daily    azelastine (ASTELIN) 0.1 % nasal spray 2 spray, BID    brimonidine tartrate 0.2 % ophthalmic solution 1 drop, BID    famotidine (PEPCID) tablet 20 mg, BID    hydrOXYzine HCL (ATARAX) tablet 25 mg, TID PRN    insulin lispro (HumALOG/ADMELOG) 100 units/mL subcutaneous injection 1-6 Units, 4 times day **AND** Fingerstick Glucose (POCT), 4 times day    lactated ringers bolus 500 mL, Once    lactated ringers infusion, Continuous,  Last Rate: 125 mL/hr (02/05/25 0737)    magnesium Oxide (MAG-OX) tablet 400 mg, BID    [Held by provider] metoprolol succinate (TOPROL-XL) 24 hr tablet 25 mg, Daily    pantoprazole (PROTONIX) injection 40 mg, Q12H    Administrative Statements   Today, Patient Was Seen By: Shilpa Colon MD      **Please Note: This note may have been constructed using a voice recognition system.**

## 2025-02-05 NOTE — ANESTHESIA PREPROCEDURE EVALUATION
Procedure:  EGD    Relevant Problems   CARDIO   (+) Essential hypertension   (+) Moderate aortic stenosis      ENDO   (+) Type 2 diabetes mellitus without complication, without long-term current use of insulin (HCC)      GI/HEPATIC   (+) GI bleeding   (+) Gastroesophageal reflux disease without esophagitis      /RENAL   (+) Benign prostatic hyperplasia      MUSCULOSKELETAL   (+) Cervical spondylosis      NEURO/PSYCH   (+) Anxiety about treatment   (+) Intractable episodic headache      PULMONARY   (+) Chronic obstructive pulmonary disease (HCC)   (+) JM (obstructive sleep apnea)   (+) Obstructive sleep apnea      Oncology   (+) Head and neck cancer (HCC)      Left Ventricle: Left ventricular cavity size is normal. Wall thickness  is mildly increased. There is concentric remodeling. The left ventricular  ejection fraction is 60% by visual estimation. Systolic function is  normal. Although no diagnostic regional wall motion abnormality was  identified, this possibility cannot be completely excluded on the basis of  this study. Diastolic function is mildly abnormal, consistent with grade I  (abnormal) relaxation.    Right Ventricle: Right ventricular cavity size is normal. Systolic  function is normal.    Left Atrium: The atrium is mildly dilated.    Right Atrium: The atrium is mildly dilated.    Aortic Valve: The aortic valve is trileaflet. The leaflets are  moderately thickened. The leaflets are moderately calcified. There is  moderately reduced mobility. There is moderate stenosis. The aortic valve  mean gradient is 29 mmHg. The dimensionless velocity index is 0.29. The  aortic valve area is 1.09 cm2.    Oral cavity/ Oropharynx: There is a 1.9 x 1.9 cm at the left base of tongue,   resulting in partial effacement of the left vallecula..     Physical Exam    Airway    Mallampati score: II  TM Distance: >3 FB  Neck ROM: full     Dental       Cardiovascular      Pulmonary      Other Findings        Anesthesia  Plan  ASA Score- 3     Anesthesia Type- IV sedation with anesthesia with ASA Monitors.         Additional Monitors:     Airway Plan:            Plan Factors-    Chart reviewed.                      Induction- intravenous.    Postoperative Plan-     Perioperative Resuscitation Plan - Level 1 - Full Code.       Informed Consent- Anesthetic plan and risks discussed with patient.  I personally reviewed this patient with the CRNA. Discussed and agreed on the Anesthesia Plan with the CRNA..      NPO Status:  Vitals Value Taken Time   Date of last liquid 02/04/25 02/05/25 1305   Time of last liquid 1730 02/05/25 1305   Date of last solid 01/15/25 02/05/25 1305   Time of last solid

## 2025-02-05 NOTE — ANESTHESIA POSTPROCEDURE EVALUATION
Post-Op Assessment Note    CV Status:  Stable  Pain Score: 0    Pain management: adequate       Mental Status:  Sleepy and arousable   Hydration Status:  Euvolemic   PONV Controlled:  Controlled   Airway Patency:  Patent     Post Op Vitals Reviewed: Yes    No anethesia notable event occurred.    Staff: Anesthesiologist, CRNA           Last Filed PACU Vitals:  Vitals Value Taken Time   Temp 97.9    Pulse 68 02/05/25 1353   BP 76/47 02/05/25 1349   Resp 28 02/05/25 1353   SpO2 99 % 02/05/25 1353   Vitals shown include unfiled device data.

## 2025-02-06 ENCOUNTER — TRANSITIONAL CARE MANAGEMENT (OUTPATIENT)
Dept: FAMILY MEDICINE CLINIC | Facility: CLINIC | Age: 70
End: 2025-02-06

## 2025-02-06 VITALS
BODY MASS INDEX: 36.48 KG/M2 | OXYGEN SATURATION: 95 % | WEIGHT: 260.58 LBS | HEIGHT: 71 IN | DIASTOLIC BLOOD PRESSURE: 68 MMHG | TEMPERATURE: 97.7 F | RESPIRATION RATE: 20 BRPM | SYSTOLIC BLOOD PRESSURE: 111 MMHG | HEART RATE: 75 BPM

## 2025-02-06 PROBLEM — K92.1 MELENA: Status: RESOLVED | Noted: 2025-02-04 | Resolved: 2025-02-06

## 2025-02-06 PROBLEM — E87.6 HYPOKALEMIA: Status: RESOLVED | Noted: 2025-02-04 | Resolved: 2025-02-06

## 2025-02-06 PROBLEM — D64.89 OTHER SPECIFIED ANEMIAS: Status: ACTIVE | Noted: 2025-02-06

## 2025-02-06 PROBLEM — K25.9 GASTRIC ULCER: Status: ACTIVE | Noted: 2025-02-06

## 2025-02-06 PROBLEM — D61.818 PANCYTOPENIA (HCC): Status: ACTIVE | Noted: 2025-02-06

## 2025-02-06 LAB
ANION GAP SERPL CALCULATED.3IONS-SCNC: 8 MMOL/L (ref 4–13)
BASOPHILS # BLD AUTO: 0.01 THOUSANDS/ΜL (ref 0–0.1)
BASOPHILS NFR BLD AUTO: 0 % (ref 0–1)
BUN SERPL-MCNC: 18 MG/DL (ref 5–25)
CALCIUM SERPL-MCNC: 8.3 MG/DL (ref 8.4–10.2)
CHLORIDE SERPL-SCNC: 110 MMOL/L (ref 96–108)
CO2 SERPL-SCNC: 24 MMOL/L (ref 21–32)
CREAT SERPL-MCNC: 0.43 MG/DL (ref 0.6–1.3)
EOSINOPHIL # BLD AUTO: 0.01 THOUSAND/ΜL (ref 0–0.61)
EOSINOPHIL NFR BLD AUTO: 0 % (ref 0–6)
ERYTHROCYTE [DISTWIDTH] IN BLOOD BY AUTOMATED COUNT: 14.4 % (ref 11.6–15.1)
GFR SERPL CREATININE-BSD FRML MDRD: 118 ML/MIN/1.73SQ M
GLUCOSE SERPL-MCNC: 80 MG/DL (ref 65–140)
GLUCOSE SERPL-MCNC: 81 MG/DL (ref 65–140)
GLUCOSE SERPL-MCNC: 94 MG/DL (ref 65–140)
HCT VFR BLD AUTO: 23.1 % (ref 36.5–49.3)
HGB BLD-MCNC: 7.6 G/DL (ref 12–17)
HGB BLD-MCNC: 7.9 G/DL (ref 12–17)
HGB BLD-MCNC: 8.1 G/DL (ref 12–17)
IMM GRANULOCYTES # BLD AUTO: 0 THOUSAND/UL (ref 0–0.2)
IMM GRANULOCYTES NFR BLD AUTO: 0 % (ref 0–2)
LYMPHOCYTES # BLD AUTO: 0.39 THOUSANDS/ΜL (ref 0.6–4.47)
LYMPHOCYTES NFR BLD AUTO: 17 % (ref 14–44)
MCH RBC QN AUTO: 31.9 PG (ref 26.8–34.3)
MCHC RBC AUTO-ENTMCNC: 34.2 G/DL (ref 31.4–37.4)
MCV RBC AUTO: 93 FL (ref 82–98)
MONOCYTES # BLD AUTO: 0.15 THOUSAND/ΜL (ref 0.17–1.22)
MONOCYTES NFR BLD AUTO: 7 % (ref 4–12)
NEUTROPHILS # BLD AUTO: 1.76 THOUSANDS/ΜL (ref 1.85–7.62)
NEUTS SEG NFR BLD AUTO: 76 % (ref 43–75)
NRBC BLD AUTO-RTO: 0 /100 WBCS
PLATELET # BLD AUTO: 86 THOUSANDS/UL (ref 149–390)
PMV BLD AUTO: 9.5 FL (ref 8.9–12.7)
POTASSIUM SERPL-SCNC: 3.7 MMOL/L (ref 3.5–5.3)
RBC # BLD AUTO: 2.48 MILLION/UL (ref 3.88–5.62)
SODIUM SERPL-SCNC: 142 MMOL/L (ref 135–147)
WBC # BLD AUTO: 2.32 THOUSAND/UL (ref 4.31–10.16)

## 2025-02-06 PROCEDURE — 94660 CPAP INITIATION&MGMT: CPT

## 2025-02-06 PROCEDURE — 82948 REAGENT STRIP/BLOOD GLUCOSE: CPT

## 2025-02-06 PROCEDURE — 80048 BASIC METABOLIC PNL TOTAL CA: CPT | Performed by: FAMILY MEDICINE

## 2025-02-06 PROCEDURE — 85025 COMPLETE CBC W/AUTO DIFF WBC: CPT | Performed by: FAMILY MEDICINE

## 2025-02-06 PROCEDURE — 85018 HEMOGLOBIN: CPT

## 2025-02-06 PROCEDURE — 99239 HOSP IP/OBS DSCHRG MGMT >30: CPT | Performed by: FAMILY MEDICINE

## 2025-02-06 RX ORDER — PANTOPRAZOLE SODIUM 40 MG/1
40 TABLET, DELAYED RELEASE ORAL 2 TIMES DAILY
Qty: 30 TABLET | Refills: 0 | Status: SHIPPED | OUTPATIENT
Start: 2025-02-06 | End: 2025-02-07 | Stop reason: SDUPTHER

## 2025-02-06 RX ORDER — METOPROLOL SUCCINATE 25 MG/1
12.5 TABLET, EXTENDED RELEASE ORAL DAILY
Qty: 15 TABLET | Refills: 0 | Status: SHIPPED | OUTPATIENT
Start: 2025-02-06

## 2025-02-06 RX ADMIN — PANTOPRAZOLE SODIUM 40 MG: 40 INJECTION, POWDER, FOR SOLUTION INTRAVENOUS at 09:11

## 2025-02-06 RX ADMIN — FAMOTIDINE 20 MG: 20 TABLET, FILM COATED ORAL at 09:11

## 2025-02-06 RX ADMIN — SODIUM CHLORIDE, SODIUM LACTATE, POTASSIUM CHLORIDE, AND CALCIUM CHLORIDE 125 ML/HR: .6; .31; .03; .02 INJECTION, SOLUTION INTRAVENOUS at 07:10

## 2025-02-06 RX ADMIN — BRIMONIDINE TARTRATE 1 DROP: 2 SOLUTION/ DROPS OPHTHALMIC at 09:11

## 2025-02-06 RX ADMIN — ATORVASTATIN CALCIUM 40 MG: 40 TABLET, FILM COATED ORAL at 09:11

## 2025-02-06 RX ADMIN — Medication 400 MG: at 09:11

## 2025-02-06 NOTE — DISCHARGE SUMMARY
Discharge Summary - Hospitalist   Name: Ean Young 69 y.o. male I MRN: [id number]  Unit/Bed#: 403-01 I Date of Admission: 2/4/2025   Date of Service: 2/6/2025 I Hospital Day: 1     Assessment & Plan  GI bleeding  Hx of squamous cell tongue cancer, started radiation and chemotherapy a week ago. F/u [facility] Dr. Rory Lopez.   Not on AC.   Patient presented with dark stools for past one week, possibly upper GI bleed from ulcers, hx of naproxen use  In ED, hemoglobin 10.7 (prior stable at 14.7). Vitals with hypotension  Lipase negative, low potassium 3.1  Hemoglobin trending down 7.6  EGD 02/05 shows Schatzki ring in the GE junction; 3 cm type I hiatal hernia; erythematous scarred mucosa in duodenal bulb, small angioectasia in 2nd part of duodenum, there was stigmata of recent hemorrhage      - GI input appreciated  - Continue protonix 40mg BID for next 3 - 6 months  - avoid NSAIDS  - f/u with GI outpatient, Dr. Freeman Cary  Head and neck cancer (HCC)  PET scan on 10/30/2024 which confirmed left base of tongue malignancy. 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 Tsang  Home meds on norvasc 5mg, Cozaar 25mg BID, Toprol XL 25mg daily, and hydrochlorothiazide 12.5mg daily    Patient lost about 100lbs, DC Cozaar, Norvasc and HCTZ  Received 2 L NS and maintained on IVF, will provide another 500cc bolus today 2/5/25    - continue with only 12.5mg of Metoprolol XL daily  - monitor BP daily  - f/u with cardiologist outpatient  Dyslipidemia   lipitor 40mg daily  Type 2 diabetes mellitus without complication, without long-term current use of insulin ([facility])  Lab Results   Component Value Date    HGBA1C 6.5 11/11/2024       Recent Labs     02/05/25  1814 02/05/25  2103 02/06/25  0711 02/06/25  0952   POCGLU 95 84 81  94       Blood Sugar Average: Last 72 hrs:  (P) [id number]  Glycemic control adequate, A1c 6.5  POC glucose stable ~80s without any insulin treatments or home med.    DC jardiance, please f/u with PCP for glucose monitoring.   Benign prostatic hyperplasia  Home med on flomax daily  - resume flomax 0.4mg daily  Anxiety about treatment  Continue with home meds   JM (obstructive sleep apnea)  On CPAP at bedtime  - continue CPAP treatment  Hypotension  Improving with IVF  Other specified anemias  Acute blood loss anemia due to possible GI bleed, as evidenced by hbg 10.7 >> 8.1 >> 7.6 >> 7.6, normal MCV  dark stools for one week prior to admission     - see GI bleed A/P  - continue protonix 40mg BID for at least 3 months  - f/u GI outpatient  Pancytopenia (HCC)  Pancytopenia due to chemotherapy and acute blood loss from possible GI bleed  as evidenced by wbc (3.24-1.83), hbg (10.7-8.1), platelets (105-95)    See GI bleeding A/P     Gastric ulcer       Medical Problems       Resolved Problems  Date Reviewed: 12/31/2024          Resolved    Melena 2/6/2025     Resolved by  My Garcia DO    Hypokalemia 2/6/2025     Resolved by  My Garcia DO        Discharging Physician / Practitioner: My Garcia DO  PCP: Timmy Chapman DO  Admission Date:   Admission Orders (From admission, onward)       Ordered        02/05/25 0843  INPATIENT ADMISSION  Once            02/04/25 0941  Place in Observation  Once                          Discharge Date: 02/06/25    Consultations During Hospital Stay:  GI, case management    Procedures Performed:   none    Significant Findings / Test Results:   Results Reviewed       Procedure Component Value Units Date/Time    Comprehensive metabolic panel [[id number]]  (Abnormal) Collected: 02/05/25 0455    Lab Status: Final result Specimen: Blood from Arm, Left Updated: 02/05/25 0636     Sodium 141 mmol/L      Potassium 3.3 mmol/L      Chloride 107 mmol/L      CO2 25 mmol/L      ANION GAP  9 mmol/L      BUN 16 mg/dL      Creatinine 0.49 mg/dL      Glucose 81 mg/dL      Glucose, Fasting 81 mg/dL      Calcium 7.8 mg/dL      Corrected Calcium 8.7 mg/dL      AST 22 U/L      ALT 35 U/L      Alkaline Phosphatase 48 U/L      Total Protein 4.7 g/dL      Albumin 2.9 g/dL      Total Bilirubin 0.57 mg/dL      eGFR 112 ml/min/1.73sq m     Narrative:      National Kidney Disease Foundation guidelines for Chronic Kidney Disease (CKD):     Stage 1 with normal or high GFR (GFR > 90 mL/min/1.73 square meters)    Stage 2 Mild CKD (GFR = 60-89 mL/min/1.73 square meters)    Stage 3A Moderate CKD (GFR = 45-59 mL/min/1.73 square meters)    Stage 3B Moderate CKD (GFR = 30-44 mL/min/1.73 square meters)    Stage 4 Severe CKD (GFR = 15-29 mL/min/1.73 square meters)    Stage 5 End Stage CKD (GFR <15 mL/min/1.73 square meters)  Note: GFR calculation is accurate only with a steady state creatinine    Magnesium [[id number]]  (Abnormal) Collected: 02/05/25 0455    Lab Status: Final result Specimen: Blood from Arm, Left Updated: 02/05/25 0636     Magnesium 1.7 mg/dL     Phosphorus [[id number]]  (Normal) Collected: 02/05/25 0455    Lab Status: Final result Specimen: Blood from Arm, Left Updated: 02/05/25 0636     Phosphorus 3.5 mg/dL     CBC and differential [[id number]]  (Abnormal) Collected: 02/05/25 0455    Lab Status: Final result Specimen: Blood from Arm, Left Updated: 02/05/25 0620     WBC 1.83 Thousand/uL      RBC 2.56 Million/uL      Hemoglobin 8.1 g/dL      Hematocrit 23.5 %      MCV 92 fL      MCH 31.6 pg      MCHC 34.5 g/dL      RDW 14.0 %      MPV 9.7 fL      Platelets 95 Thousands/uL      nRBC 0 /100 WBCs      Segmented % 70 %      Immature Grans % 0 %      Lymphocytes % 20 %      Monocytes % 9 %      Eosinophils Relative 1 %      Basophils Relative 0 %      Absolute Neutrophils 1.30 Thousands/µL      Absolute Immature Grans 0.00 Thousand/uL      Absolute Lymphocytes 0.36 Thousands/µL      Absolute Monocytes 0.16  Thousand/µL      Eosinophils Absolute 0.01 Thousand/µL      Basophils Absolute 0.00 Thousands/µL     Ferritin [[id number]]  (Normal) Collected: 02/04/25 1133    Lab Status: Final result Specimen: Blood from Arm, Right Updated: 02/04/25 1629     Ferritin 238 ng/mL     TIBC Panel (incl. Iron, TIBC, % Iron Saturation) [[id number]]  (Abnormal) Collected: 02/04/25 1133    Lab Status: Final result Specimen: Blood from Arm, Right Updated: 02/04/25 1627     Iron Saturation 19 %      TIBC 235.2 ug/dL      Iron 45 ug/dL      Transferrin 168 mg/dL      UIBC 190 ug/dL     COVID only [[id number]]  (Normal) Collected: 02/04/25 1134    Lab Status: Final result Specimen: Nares from Nose Updated: 02/04/25 1218     SARS-CoV-2 Negative    Narrative:      FOR PEDIATRIC PATIENTS - copy/paste COVID Guidelines URL to browser: [email or web address]    SARS-CoV-2 assay is a Nucleic Acid Amplification assay intended for the  qualitative detection of nucleic acid from SARS-CoV-2 in nasopharyngeal  swabs. Results are for the presumptive identification of SARS-CoV-2 RNA.    Positive results are indicative of infection with SARS-CoV-2, the virus  causing COVID-19, but do not rule out bacterial infection or co-infection  with other viruses. Laboratories within the [place] and its  territories are required to report all positive results to the appropriate  public health authorities. Negative results do not preclude SARS-CoV-2  infection and should not be used as the sole basis for treatment or other  patient management decisions. Negative results must be combined with  clinical observations, patient history, and epidemiological information.  This test has not been FDA cleared or approved.    This test has been authorized by FDA under an Emergency Use Authorization  (EUA). This test is only authorized for the duration of time the  declaration that circumstances exist justifying the  authorization of the  emergency use of an in vitro diagnostic tests for detection of SARS-CoV-2  virus and/or diagnosis of COVID-19 infection under section 564(b)(1) of  the Act, 21 U.S.C. 360bbb-3(b)(1), unless the authorization is terminated  or revoked sooner. The test has been validated but independent review by FDA  and CLIA is pending.    Test performed using [employer] GeneXpert: This RT-PCR assay targets N2,  a region unique to SARS-CoV-2. A conserved region in the E-gene was chosen  for pan-Sarbecovirus detection which includes SARS-CoV-2.    According to CMS-2020-01-R, this platform meets the definition of high-throughput technology.    Fingerstick Glucose (POCT) [[id number]]  (Normal) Collected: 02/04/25 1055    Lab Status: Final result Specimen: Blood Updated: 02/04/25 1056     POC Glucose 79 mg/dl     Comprehensive metabolic panel [[id number]]  (Abnormal) Collected: 02/04/25 0734    Lab Status: Final result Specimen: Blood from Arm, Right Updated: 02/04/25 0800     Sodium 138 mmol/L      Potassium 3.1 mmol/L      Chloride 102 mmol/L      CO2 21 mmol/L      ANION GAP 15 mmol/L      BUN 15 mg/dL      Creatinine 0.58 mg/dL      Glucose 119 mg/dL      Calcium 8.9 mg/dL      AST 28 U/L      ALT 45 U/L      Alkaline Phosphatase 62 U/L      Total Protein 6.3 g/dL      Albumin 3.7 g/dL      Total Bilirubin 0.82 mg/dL      eGFR 104 ml/min/1.73sq m     Narrative:      National Kidney Disease Foundation guidelines for Chronic Kidney Disease (CKD):     Stage 1 with normal or high GFR (GFR > 90 mL/min/1.73 square meters)    Stage 2 Mild CKD (GFR = 60-89 mL/min/1.73 square meters)    Stage 3A Moderate CKD (GFR = 45-59 mL/min/1.73 square meters)    Stage 3B Moderate CKD (GFR = 30-44 mL/min/1.73 square meters)    Stage 4 Severe CKD (GFR = 15-29 mL/min/1.73 square meters)    Stage 5 End Stage CKD (GFR <15 mL/min/1.73 square meters)  Note: GFR calculation is accurate only with a steady state creatinine    Lipase [[id number]]   (Normal) Collected: 02/04/25 0734    Lab Status: Final result Specimen: Blood from Arm, Right Updated: 02/04/25 0800     Lipase 26 u/L     Protime-INR [[id number]]  (Normal) Collected: 02/04/25 0734    Lab Status: Final result Specimen: Blood from Arm, Right Updated: 02/04/25 0753     Protime 14.5 seconds      INR 1.08    Narrative:      INR Therapeutic Range    Indication                                             INR Range      Atrial Fibrillation                                               2.0-3.0  Hypercoagulable State                                    2.0.2.3  Left Ventricular Asist Device                            2.0-3.0  Mechanical Heart Valve                                  -    Aortic(with afib, MI, embolism, HF, LA enlargement,    and/or coagulopathy)                                     2.0-3.0 (2.5-3.5)     Mitral                                                             2.5-3.5  Prosthetic/Bioprosthetic Heart Valve               2.0-3.0  Venous thromboembolism (VTE: VT, PE        2.0-3.0    APTT [[id number]]  (Normal) Collected: 02/04/25 0734    Lab Status: Final result Specimen: Blood from Arm, Right Updated: 02/04/25 0753     PTT 34 seconds     CBC and differential [[id number]]  (Abnormal) Collected: 02/04/25 0734    Lab Status: Final result Specimen: Blood from Arm, Right Updated: 02/04/25 0742     WBC 3.24 Thousand/uL      RBC 3.44 Million/uL      Hemoglobin 10.7 g/dL      Hematocrit 31.4 %      MCV 91 fL      MCH 31.1 pg      MCHC 34.1 g/dL      RDW 14.2 %      MPV 9.5 fL      Platelets 105 Thousands/uL      nRBC 0 /100 WBCs      Segmented % 75 %      Immature Grans % 0 %      Lymphocytes % 18 %      Monocytes % 7 %      Eosinophils Relative 0 %      Basophils Relative 0 %      Absolute Neutrophils 2.42 Thousands/µL      Absolute Immature Grans 0.00 Thousand/uL      Absolute Lymphocytes 0.58 Thousands/µL      Absolute Monocytes 0.22 Thousand/µL      Eosinophils Absolute 0.01 Thousand/µL       Basophils Absolute 0.01 Thousands/µL     iFOBT/FIT [[id number]]     Lab Status: No result Specimen: Stool              CT abdomen pelvis w contrast   Final Result by Iftikhar Lambert MD (02/04 1043)      No acute findings in the abdomen or pelvis. Colonic diverticulosis without diverticulitis.         Workstation performed: [id number]            EGD 02/05/2025 shows Schatzki ring in the GE junction; 3 cm type I hiatal hernia; erythematous scarred mucosa in duodenal bulb, small angioectasia in 2nd part of duodenum, there was stigmata of recent hemorrhage      Incidental Findings:   none    Test Results Pending at Discharge (will require follow up):   none     Outpatient Tests Requested:  none    Complications:  none    Reason for Admission: GI bleeding 2/2 ulcers    Hospital Course:   Ean Young is a 69 y.o. male with a PMH of squamous cell tongue cancer (on radiation and chemotherapy), HTN, dyslipidemia, DM2, anxiety, JM who presents with dark stools and generalized fatigue. Patient reported he started on chemotherapy one week ago and began to lose weight. Prior weight pre-treatment was 354lb and dropped to 262lb. Patient also has changed in taste so less appetite in the past one week. He has bowel movement daily but it has been dark color for the past one week. He denied smoking, drinking alcohol, or using illicit drugs. He denied recent fever, chills, N/V, CP, abdominal pain, dysuria, hematuria.      In ED, patient's vitals with low BP 87/63. Hemoglobin was 10.7 (baseline 14.7). Lipase negative. CBC without leukocytosis. CT abdomen/pelvis pending. Patient likely has acute upper GI bleed based on clinical symptoms and drop in hemoglobin from baseline. Low pressure could be due to GI bleed and being on multiple anti-hypertensive meds while experiencing major weight loss in past one week. Admitted for further monitoring of hemodynamic status and management of GI bleeding.     Hospital Course:   During  "hospitalization, patient underwent EGD and it shows schatzki ring in the GE junction; 3 cm type I hiatal hernia; erythematous scarred mucosa in duodenal bulb, small angioectasia in 2nd part of duodenum, there was stigmata of recent hemorrhage. Thus, his melena and GI bleeding is 2/2 to gastric ulcers. He was treated with IV protonix 40mg BID. On the day of discharge, patient denied fever, chills, headache, CP, SOB, diarrhea, changes with urination. He has mild soreness around mid-epigastric area. Per GI evaluation, patient could follow up with GI outpatient and continue protonix for next 3-6 months.     Please see above list of diagnoses and related plan for additional information.     Condition at Discharge: stable    Discharge Day Visit / Exam:   Subjective:    Patient has no acute symptoms, mild soreness of R upper abdomen. He hasn't had bowel movement for past 3 days. Denied fever, CP, SOB, dizziness, changes with urination.     Vitals: Blood Pressure: 111/68 (02/06/25 0713)  Pulse: 75 (02/06/25 0713)  Temperature: 97.7 °F (36.5 °C) (02/05/25 1423)  Temp Source: Temporal (02/05/25 1302)  Respirations: 20 (02/05/25 1423)  Height: 5' 11\" (180.3 cm) (02/04/25 1427)  Weight - Scale: 118 kg (260 lb 9.3 oz) (02/04/25 1427)  SpO2: 95 % (02/06/25 0713)    Physical Exam  Vitals reviewed.   Constitutional:       General: He is not in acute distress.     Appearance: Normal appearance. He is not ill-appearing.   HENT:      Head: Normocephalic and atraumatic.      Right Ear: External ear normal.      Left Ear: External ear normal.      Nose: Nose normal. No congestion or rhinorrhea.      Mouth/Throat:      Mouth: Mucous membranes are moist.      Pharynx: Oropharynx is clear. No oropharyngeal exudate or posterior oropharyngeal erythema.   Eyes:      General:         Right eye: No discharge.      Extraocular Movements: Extraocular movements intact.      Conjunctiva/sclera: Conjunctivae normal.   Cardiovascular:      Rate and " Rhythm: Normal rate and regular rhythm.      Pulses: Normal pulses.      Heart sounds: Normal heart sounds. No murmur heard.  Pulmonary:      Effort: Pulmonary effort is normal. No respiratory distress.      Breath sounds: Normal breath sounds. No wheezing.   Abdominal:      General: Abdomen is flat. Bowel sounds are normal. There is no distension.      Palpations: Abdomen is soft.      Tenderness: There is no abdominal tenderness. There is no guarding or rebound.      Comments: Mild soreness with palpation of mid-epigastric area   Musculoskeletal:         General: Normal range of motion.      Cervical back: Normal range of motion and neck supple. No rigidity.      Right lower leg: No edema.      Left lower leg: No edema.   Skin:     General: Skin is warm.      Capillary Refill: Capillary refill takes less than 2 seconds.   Neurological:      General: No focal deficit present.      Mental Status: He is alert and oriented to person, place, and time.      Motor: No weakness.   Psychiatric:         Mood and Affect: Mood normal.          Discussion with Family: Attempted to update  (niece) via phone. Unable to contact.    Discharge instructions/Information to patient and family:   See after visit summary for information provided to patient and family.      Provisions for Follow-Up Care:  See after visit summary for information related to follow-up care and any pertinent home health orders.      Mobility at time of Discharge:   Basic Mobility Inpatient Raw Score: 24  JH-HLM Goal: 8: Walk 250 feet or more  JH-HLM Achieved: 7: Walk 25 feet or more  HLM Goal achieved. Continue to encourage appropriate mobility.     Disposition:   Home    Planned Readmission: none    Discharge Medications:  See after visit summary for reconciled discharge medications provided to patient and/or family.      Administrative Statements   Discharge Statement:  I have spent a total time of 30 minutes in caring for this patient on the  day of the visit/encounter. >30 minutes of time was spent on: Impressions, Counseling / Coordination of care, Documenting in the medical record, Reviewing / ordering tests, medicine, procedures  , and Communicating with other healthcare professionals .    **Please Note: This note may have been constructed using a voice recognition system**

## 2025-02-06 NOTE — ASSESSMENT & PLAN NOTE
Acute blood loss anemia due to possible GI bleed, as evidenced by hbg 10.7 >> 8.1 >> 7.6 >> 7.6, normal MCV  dark stools for one week prior to admission    - see GI bleed A/P  - Protonix IVP  - monitor with serial labs  - if Hgb < 7, transfuse with pRBCs as needed

## 2025-02-06 NOTE — ASSESSMENT & PLAN NOTE
Pancytopenia due to chemotherapy and acute blood loss from possible GI bleed  as evidenced by wbc (3.24-1.83), hbg (10.7-8.1), platelets (105-95)    See GI bleeding A/P

## 2025-02-06 NOTE — ASSESSMENT & PLAN NOTE
Acute blood loss anemia due to possible GI bleed, as evidenced by hbg 10.7 >> 8.1 >> 7.6 >> 7.6, normal MCV  dark stools for one week prior to admission     - see GI bleed A/P  - continue protonix 40mg BID for at least 3 months  - f/u GI outpatient

## 2025-02-06 NOTE — ASSESSMENT & PLAN NOTE
F/u with cardiology, Dr. Tsang  Home meds on norvasc 5mg, Cozaar 25mg BID, Toprol XL 25mg daily, and hydrochlorothiazide 12.5mg daily    Patient lost about 100lbs, DC Cozaar, Norvasc and HCTZ  Received 2.5 L NS and maintained on IVF

## 2025-02-06 NOTE — CASE MANAGEMENT
Case Management Discharge Planning Note    Patient name Ean Young  Location /403-01 MRN [id number]  : 1955 Date 2025       Current Admission Date: 2025  Current Admission Diagnosis:GI bleeding   Patient Active Problem List    Diagnosis Date Noted Date Diagnosed    Other specified anemias 2025     Pancytopenia (HCC) 2025     GI bleeding 2025     JM (obstructive sleep apnea) 2025     Hypotension 2025     Intractable episodic headache 2024     Anxiety about treatment 2024     Other insomnia 2024     Head and neck cancer (HCC) 2024     Benign prostatic hyperplasia 2024     Chronic obstructive pulmonary disease ([facility]) 2024     Diabetes mellitus ([facility]) 2024     Class 3 severe obesity due to excess calories with serious comorbidity and body mass index (BMI) of 40.0 to 44.9 in adult ([facility]) 2024     Laryngopharyngeal reflux (LPR) 2024     Cervical spondylosis 10/06/2023     Bilateral carpal tunnel syndrome 10/06/2023     Cervical radiculopathy 2023     Moderate aortic stenosis 2022     Decreased hearing of both ears 2022     Urinary frequency 2022     Type 2 diabetes mellitus without complication, without long-term current use of insulin ([facility]) 2022     Essential hypertension 2022     Dyslipidemia 2022     Gastroesophageal reflux disease without esophagitis 2022     Obstructive sleep apnea 2022       LOS (days): 1  Geometric Mean LOS (GMLOS) (days): 2  Days to GMLOS:0.9     OBJECTIVE:  Risk of Unplanned Readmission Score: 11.45         Current admission status: Inpatient   Preferred Pharmacy:   [id number] [place] - [address]  [address]  [place] [address]  Phone: [phone] Fax: [phone]    Primary Care Provider: Timmy Chapman DO    Primary Insurance: [employer] RE[employer]  Secondary Insurance: [place] [employer] [facility]  [facility]    DISCHARGE DETAILS:  Pt is being dc'd home on this date and will follow up with GI and Cardiology after dc.      [facility]  Discussed with Patient  there may be an out of pocket expense for transport.

## 2025-02-06 NOTE — ASSESSMENT & PLAN NOTE
Lab Results   Component Value Date    HGBA1C 6.5 11/11/2024       Recent Labs     02/05/25  1814 02/05/25  2103 02/06/25  0711 02/06/25  0952   POCGLU 95 84 81 94       Blood Sugar Average: Last 72 hrs:  (P) [id number]  home meds on jardiance 10mg daily  A1c 6.5    Home regimen on hold  - Utilize ISS with accu check ACHS  - Hypoglycemia protocol  - Diabetic diet  - Follow up with endocrinology outpatient to readjust med dosage

## 2025-02-06 NOTE — ASSESSMENT & PLAN NOTE
F/u with cardiology, Dr. Tsang  Home meds on norvasc 5mg, Cozaar 25mg BID, Toprol XL 25mg daily, and hydrochlorothiazide 12.5mg daily    Patient lost about 100lbs, DC Cozaar, Norvasc and HCTZ  Received 2 L NS and maintained on IVF, will provide another 500cc bolus today 2/5/25    - continue with only 12.5mg of Metoprolol XL daily  - monitor BP daily  - f/u with cardiologist outpatient

## 2025-02-06 NOTE — DISCHARGE INSTR - AVS FIRST PAGE
Please continue with Protonix 40mg twice daily for 3-6 months, follow up with your GI after discharge (referral provided)    Please call [facility] to inquire if you could continue with radiation therapy on Monday with recent history of GI bleeding from gastric ulcer.     Please follow up with your cardiologist to review blood pressure medication. Stop taking Norvasc, Cozaar, and Hydrochlorothiazide. Only take Toprol XL 12.5mg daily for blood pressure    Please follow up with your PCP to review diabetes medication. Stop taking Jardiance.

## 2025-02-06 NOTE — ASSESSMENT & PLAN NOTE
PET scan on 10/30/2024 which confirmed left base of tongue malignancy. 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

## 2025-02-06 NOTE — ASSESSMENT & PLAN NOTE
Lab Results   Component Value Date    HGBA1C 6.5 11/11/2024       Recent Labs     02/05/25  1814 02/05/25  2103 02/06/25  0711 02/06/25  0952   POCGLU 95 84 81 94       Blood Sugar Average: Last 72 hrs:  (P) [id number]  Glycemic control adequate, A1c 6.5  POC glucose stable ~80s without any insulin treatments or home med.    DC jardiance, please f/u with PCP for glucose monitoring.

## 2025-02-06 NOTE — ASSESSMENT & PLAN NOTE
Hx of squamous cell tongue cancer, started radiation and chemotherapy a week ago. F/u [facility] Dr. Rory Lopez.   Not on AC.   Patient presented with dark stools for past one week, possibly upper GI bleed from ulcers, hx of naproxen use  In ED, hemoglobin 10.7 (prior stable at 14.7). Vitals with hypotension  Lipase negative, low potassium 3.1  Hemoglobin trending down 7.6  EGD 02/05 shows Schatzki ring in the GE junction; 3 cm type I hiatal hernia; erythematous scarred mucosa in duodenal bulb, small angioectasia in 2nd part of duodenum, there was stigmata of recent hemorrhage     - GI input appreciated  - Continue IV PPI BID, Per GI, patient will need protonix for next 3 - 6 months  - avoid NSAIDS  - f/u with GI outpatient, Dr. Freeman Cary

## 2025-02-06 NOTE — ASSESSMENT & PLAN NOTE
Hx of squamous cell tongue cancer, started radiation and chemotherapy a week ago. F/u [facility] Dr. Rory Lopez.   Not on AC.   Patient presented with dark stools for past one week, possibly upper GI bleed from ulcers, hx of naproxen use  In ED, hemoglobin 10.7 (prior stable at 14.7). Vitals with hypotension  Lipase negative, low potassium 3.1  Hemoglobin trending down 7.6  EGD 02/05 shows Schatzki ring in the GE junction; 3 cm type I hiatal hernia; erythematous scarred mucosa in duodenal bulb, small angioectasia in 2nd part of duodenum, there was stigmata of recent hemorrhage      - GI input appreciated  - Continue protonix 40mg BID for next 3 - 6 months  - avoid NSAIDS  - f/u with GI outpatient, Dr. Freeman Cary

## 2025-02-06 NOTE — ASSESSMENT & PLAN NOTE
PET scan on 10/30/2024 which confirmed left base of tongue malignancy. 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

## 2025-02-06 NOTE — PLAN OF CARE
Problem: SAFETY ADULT  Goal: Patient will remain free of falls  Description: INTERVENTIONS:  - Educate patient/family on patient safety including physical limitations  - Instruct patient to call for assistance with activity   - Consult OT/PT to assist with strengthening/mobility   - Keep Call bell within reach  - Keep bed low and locked with side rails adjusted as appropriate  - Keep care items and personal belongings within reach  - Initiate and maintain comfort rounds  - Make Fall Risk Sign visible to staff  - Offer Toileting every 2 Hours, in advance of need  - Initiate/Maintain bed/chair alarm  - Obtain necessary fall risk management equipment: nonskid footwear  - Apply yellow socks and bracelet for high fall risk patients  - Consider moving patient to room near nurses station  Outcome: Progressing     Problem: SAFETY ADULT  Goal: Maintain or return to baseline ADL function  Description: INTERVENTIONS:  -  Assess patient's ability to carry out ADLs; assess patient's baseline for ADL function and identify physical deficits which impact ability to perform ADLs (bathing, care of mouth/teeth, toileting, grooming, dressing, etc.)  - Assess/evaluate cause of self-care deficits   - Assess range of motion  - Assess patient's mobility; develop plan if impaired  - Assess patient's need for assistive devices and provide as appropriate  - Encourage maximum independence but intervene and supervise when necessary  - Involve family in performance of ADLs  - Assess for home care needs following discharge   - Consider OT consult to assist with ADL evaluation and planning for discharge  - Provide patient education as appropriate  Outcome: Progressing     Problem: INFECTION - ADULT  Goal: Absence or prevention of progression during hospitalization  Description: INTERVENTIONS:  - Assess and monitor for signs and symptoms of infection  - Monitor lab/diagnostic results  - Monitor all insertion sites, i.e. indwelling lines, tubes,  and drains  - Monitor endotracheal if appropriate and nasal secretions for changes in amount and color  - [place] appropriate cooling/warming therapies per order  - Administer medications as ordered  - Instruct and encourage patient and family to use good hand hygiene technique  - Identify and instruct in appropriate isolation precautions for identified infection/condition  Outcome: Progressing

## 2025-02-06 NOTE — PLAN OF CARE
Problem: PAIN - ADULT  Goal: Verbalizes/displays adequate comfort level or baseline comfort level  Description: Interventions:  - Encourage patient to monitor pain and request assistance  - Assess pain using appropriate pain scale  - Administer analgesics based on type and severity of pain and evaluate response  - Implement non-pharmacological measures as appropriate and evaluate response  - Consider cultural and social influences on pain and pain management  - Notify physician/advanced practitioner if interventions unsuccessful or patient reports new pain  Outcome: Progressing     Problem: INFECTION - ADULT  Goal: Absence or prevention of progression during hospitalization  Description: INTERVENTIONS:  - Assess and monitor for signs and symptoms of infection  - Monitor lab/diagnostic results  - Monitor all insertion sites, i.e. indwelling lines, tubes, and drains  - Monitor endotracheal if appropriate and nasal secretions for changes in amount and color  - [place] appropriate cooling/warming therapies per order  - Administer medications as ordered  - Instruct and encourage patient and family to use good hand hygiene technique  - Identify and instruct in appropriate isolation precautions for identified infection/condition  Outcome: Progressing     Problem: SAFETY ADULT  Goal: Patient will remain free of falls  Description: INTERVENTIONS:  - Educate patient/family on patient safety including physical limitations  - Instruct patient to call for assistance with activity   - Consult OT/PT to assist with strengthening/mobility   - Keep Call bell within reach  - Keep bed low and locked with side rails adjusted as appropriate  - Keep care items and personal belongings within reach  - Initiate and maintain comfort rounds  - Make Fall Risk Sign visible to staff  - Offer Toileting every 2 Hours, in advance of need  - Initiate/Maintain bed/chair alarm  - Obtain necessary fall risk management equipment: nonskid footwear  -  Apply yellow socks and bracelet for high fall risk patients  - Consider moving patient to room near nurses station  Outcome: Progressing  Goal: Maintain or return to baseline ADL function  Description: INTERVENTIONS:  -  Assess patient's ability to carry out ADLs; assess patient's baseline for ADL function and identify physical deficits which impact ability to perform ADLs (bathing, care of mouth/teeth, toileting, grooming, dressing, etc.)  - Assess/evaluate cause of self-care deficits   - Assess range of motion  - Assess patient's mobility; develop plan if impaired  - Assess patient's need for assistive devices and provide as appropriate  - Encourage maximum independence but intervene and supervise when necessary  - Involve family in performance of ADLs  - Assess for home care needs following discharge   - Consider OT consult to assist with ADL evaluation and planning for discharge  - Provide patient education as appropriate  Outcome: Progressing  Goal: Maintains/Returns to pre admission functional level  Description: INTERVENTIONS:  - Perform AM-PAC 6 Click Basic Mobility/ Daily Activity assessment daily.  - Set and communicate daily mobility goal to care team and patient/family/caregiver.   - Collaborate with rehabilitation services on mobility goals if consulted  - Perform Range of Motion 3 times a day.  - Reposition patient every 2 hours.  - Dangle patient 3 times a day  - Stand patient 3 times a day  - Ambulate patient 3 times a day  - Out of bed to chair 3 times a day   - Out of bed for meals 3 times a day  - Out of bed for toileting  - Record patient progress and toleration of activity level   Outcome: Progressing     Problem: DISCHARGE PLANNING  Goal: Discharge to home or other facility with appropriate resources  Description: INTERVENTIONS:  - Identify barriers to discharge w/patient and caregiver  - Arrange for needed discharge resources and transportation as appropriate  - Identify discharge learning  needs (meds, wound care, etc.)  - Arrange for interpretive services to assist at discharge as needed  - Refer to Case Management Department for coordinating discharge planning if the patient needs post-hospital services based on physician/advanced practitioner order or complex needs related to functional status, cognitive ability, or social support system  Outcome: Progressing     Problem: Knowledge Deficit  Goal: Patient/family/caregiver demonstrates understanding of disease process, treatment plan, medications, and discharge instructions  Description: Complete learning assessment and assess knowledge base.  Interventions:  - Provide teaching at level of understanding  - Provide teaching via preferred learning methods  Outcome: Progressing     Problem: GASTROINTESTINAL - ADULT  Goal: Minimal or absence of nausea and/or vomiting  Description: INTERVENTIONS:  - Administer IV fluids if ordered to ensure adequate hydration  - Maintain NPO status until nausea and vomiting are resolved  - Nasogastric tube if ordered  - Administer ordered antiemetic medications as needed  - Provide nonpharmacologic comfort measures as appropriate  - Advance diet as tolerated, if ordered  - Consider nutrition services referral to assist patient with adequate nutrition and appropriate food choices  Outcome: Progressing  Goal: Maintains or returns to baseline bowel function  Description: INTERVENTIONS:  - Assess bowel function  - Encourage oral fluids to ensure adequate hydration  - Administer IV fluids if ordered to ensure adequate hydration  - Administer ordered medications as needed  - Encourage mobilization and activity  - Consider nutritional services referral to assist patient with adequate nutrition and appropriate food choices  Outcome: Progressing  Goal: Maintains adequate nutritional intake  Description: INTERVENTIONS:  - Monitor percentage of each meal consumed  - Identify factors contributing to decreased intake, treat as  appropriate  - Assist with meals as needed  - Monitor I&O, weight, and lab values if indicated  - Obtain nutrition services referral as needed  Outcome: Progressing

## 2025-02-07 ENCOUNTER — TELEPHONE (OUTPATIENT)
Dept: GASTROENTEROLOGY | Facility: CLINIC | Age: 70
End: 2025-02-07

## 2025-02-07 DIAGNOSIS — K92.2 GI BLEED: ICD-10-CM

## 2025-02-07 DIAGNOSIS — K25.9 GASTRIC ULCER: ICD-10-CM

## 2025-02-07 RX ORDER — FAMOTIDINE 20 MG/1
20 TABLET, FILM COATED ORAL
Qty: 30 TABLET | Refills: 1 | Status: ON HOLD | OUTPATIENT
Start: 2025-02-07

## 2025-02-07 RX ORDER — PANTOPRAZOLE SODIUM 40 MG/1
40 TABLET, DELAYED RELEASE ORAL 2 TIMES DAILY
Qty: 60 TABLET | Refills: 1 | Status: ON HOLD | OUTPATIENT
Start: 2025-02-07

## 2025-02-07 NOTE — TELEPHONE ENCOUNTER
Can you please call the patient and get him scheduled for an outpatient follow-up visit in the next 4 to 6 weeks with myself or Dr. Cary in either office and you can use an urgent slot as per me if you need to.

## 2025-02-07 NOTE — UTILIZATION REVIEW
NOTIFICATION OF ADMISSION DISCHARGE   This is a Notification of Discharge from [facility]. Please be advised that this patient has been discharge from our facility. Below you will find the admission and discharge date and time including the patient’s disposition.   UTILIZATION REVIEW CONTACT:  Jayden Mejia  Utilization   Network Utilization Review Department  Phone: [phone] x carefully listen to the prompts. All voicemails are confidential.  Email: [email or web address]     ADMISSION INFORMATION  PRESENTATION DATE: 2/4/2025  7:19 AM  OBERVATION ADMISSION DATE: 02/04/2025 0941  INPATIENT ADMISSION DATE: 2/5/25  8:44 AM   DISCHARGE DATE: 2/6/2025  2:32 PM   DISPOSITION:Home/Self Care    Network Utilization Review Department  ATTENTION: Please call with any questions or concerns to [phone] and carefully listen to the prompts so that you are directed to the right person. All voicemails are confidential.   For Discharge needs, contact Care Management DC Support Team at [phone] opt. 2  Send all requests for admission clinical reviews, approved or denied determinations and any other requests to dedicated fax number below belonging to the campus where the patient is receiving treatment. List of dedicated fax numbers for the Facilities:  FACILITY NAME UR FAX NUMBER   ADMISSION DENIALS (Administrative/Medical Necessity) [phone]   DISCHARGE SUPPORT TEAM ([facility]) [phone]   PARENT CHILD HEALTH (Maternity/NICU/Pediatrics) [phone]   [facility] [phone]   [facility] [phone]   [facility] [phone]   [facility] [phone]   [facility] [phone]   [facility] [phone]   [facility] [phone]   [facility]  [phone]   [facility] [phone]   [facility] [phone]   [facility] [phone]   [facility] [phone]

## 2025-02-09 ENCOUNTER — NURSE TRIAGE (OUTPATIENT)
Dept: OTHER | Facility: OTHER | Age: 70
End: 2025-02-09

## 2025-02-09 ENCOUNTER — APPOINTMENT (EMERGENCY)
Dept: CT IMAGING | Facility: HOSPITAL | Age: 70
End: 2025-02-09
Payer: COMMERCIAL

## 2025-02-09 ENCOUNTER — HOSPITAL ENCOUNTER (EMERGENCY)
Facility: HOSPITAL | Age: 70
End: 2025-02-09
Attending: EMERGENCY MEDICINE | Admitting: EMERGENCY MEDICINE
Payer: COMMERCIAL

## 2025-02-09 ENCOUNTER — HOSPITAL ENCOUNTER (INPATIENT)
Facility: HOSPITAL | Age: 70
LOS: 3 days | End: 2025-02-12
Attending: INTERNAL MEDICINE | Admitting: HOSPITALIST
Payer: COMMERCIAL

## 2025-02-09 VITALS
SYSTOLIC BLOOD PRESSURE: 129 MMHG | HEIGHT: 71 IN | DIASTOLIC BLOOD PRESSURE: 57 MMHG | RESPIRATION RATE: 20 BRPM | WEIGHT: 270.5 LBS | BODY MASS INDEX: 37.87 KG/M2 | OXYGEN SATURATION: 99 % | HEART RATE: 88 BPM | TEMPERATURE: 97.5 F

## 2025-02-09 DIAGNOSIS — K92.2 GI BLEED: Primary | ICD-10-CM

## 2025-02-09 DIAGNOSIS — K92.1 MELENA: ICD-10-CM

## 2025-02-09 DIAGNOSIS — K25.0 ACUTE GASTRIC ULCER WITH HEMORRHAGE: Primary | ICD-10-CM

## 2025-02-09 DIAGNOSIS — D64.9 ANEMIA, UNSPECIFIED TYPE: ICD-10-CM

## 2025-02-09 DIAGNOSIS — D62 ACUTE BLOOD LOSS ANEMIA: ICD-10-CM

## 2025-02-09 PROBLEM — N40.0 BENIGN PROSTATIC HYPERPLASIA: Chronic | Status: ACTIVE | Noted: 2024-12-19

## 2025-02-09 PROBLEM — I95.9 HYPOTENSION: Status: RESOLVED | Noted: 2025-02-04 | Resolved: 2025-02-09

## 2025-02-09 PROBLEM — E66.812 CLASS 2 SEVERE OBESITY DUE TO EXCESS CALORIES WITH SERIOUS COMORBIDITY AND BODY MASS INDEX (BMI) OF 37.0 TO 37.9 IN ADULT (HCC): Chronic | Status: ACTIVE | Noted: 2024-12-12

## 2025-02-09 PROBLEM — E11.9 TYPE 2 DIABETES MELLITUS WITHOUT COMPLICATION, WITHOUT LONG-TERM CURRENT USE OF INSULIN (HCC): Chronic | Status: ACTIVE | Noted: 2022-08-11

## 2025-02-09 PROBLEM — I10 ESSENTIAL HYPERTENSION: Chronic | Status: ACTIVE | Noted: 2022-07-14

## 2025-02-09 PROBLEM — E11.9 DIABETES MELLITUS (HCC): Status: RESOLVED | Noted: 2024-12-19 | Resolved: 2025-02-09

## 2025-02-09 PROBLEM — E66.01 CLASS 2 SEVERE OBESITY DUE TO EXCESS CALORIES WITH SERIOUS COMORBIDITY AND BODY MASS INDEX (BMI) OF 37.0 TO 37.9 IN ADULT (HCC): Chronic | Status: ACTIVE | Noted: 2024-12-12

## 2025-02-09 PROBLEM — K21.9 GASTROESOPHAGEAL REFLUX DISEASE WITHOUT ESOPHAGITIS: Chronic | Status: ACTIVE | Noted: 2022-07-14

## 2025-02-09 PROBLEM — D64.89 OTHER SPECIFIED ANEMIAS: Status: RESOLVED | Noted: 2025-02-06 | Resolved: 2025-02-09

## 2025-02-09 PROBLEM — G47.33 OBSTRUCTIVE SLEEP APNEA: Chronic | Status: ACTIVE | Noted: 2022-07-14

## 2025-02-09 PROBLEM — C76.0 HEAD AND NECK CANCER (HCC): Chronic | Status: ACTIVE | Noted: 2024-12-26

## 2025-02-09 PROBLEM — E78.5 DYSLIPIDEMIA: Chronic | Status: ACTIVE | Noted: 2022-07-14

## 2025-02-09 LAB
ABO GROUP BLD: NORMAL
ALBUMIN SERPL BCG-MCNC: 3.3 G/DL (ref 3.5–5)
ALP SERPL-CCNC: 54 U/L (ref 34–104)
ALT SERPL W P-5'-P-CCNC: 28 U/L (ref 7–52)
ANION GAP SERPL CALCULATED.3IONS-SCNC: 12 MMOL/L (ref 4–13)
APTT PPP: 34 SECONDS (ref 23–34)
AST SERPL W P-5'-P-CCNC: 21 U/L (ref 13–39)
BASOPHILS # BLD AUTO: 0.01 THOUSANDS/ΜL (ref 0–0.1)
BASOPHILS NFR BLD AUTO: 1 % (ref 0–1)
BILIRUB SERPL-MCNC: 0.79 MG/DL (ref 0.2–1)
BLD GP AB SCN SERPL QL: NEGATIVE
BUN SERPL-MCNC: 13 MG/DL (ref 5–25)
CALCIUM ALBUM COR SERPL-MCNC: 8.9 MG/DL (ref 8.3–10.1)
CALCIUM SERPL-MCNC: 8.3 MG/DL (ref 8.4–10.2)
CHLORIDE SERPL-SCNC: 105 MMOL/L (ref 96–108)
CO2 SERPL-SCNC: 24 MMOL/L (ref 21–32)
CREAT SERPL-MCNC: 0.47 MG/DL (ref 0.6–1.3)
EOSINOPHIL # BLD AUTO: 0 THOUSAND/ΜL (ref 0–0.61)
EOSINOPHIL NFR BLD AUTO: 0 % (ref 0–6)
ERYTHROCYTE [DISTWIDTH] IN BLOOD BY AUTOMATED COUNT: 16.1 % (ref 11.6–15.1)
GFR SERPL CREATININE-BSD FRML MDRD: 114 ML/MIN/1.73SQ M
GLUCOSE SERPL-MCNC: 86 MG/DL (ref 65–140)
GLUCOSE SERPL-MCNC: 97 MG/DL (ref 65–140)
HCT VFR BLD AUTO: 19.6 % (ref 36.5–49.3)
HGB BLD-MCNC: 6.6 G/DL (ref 12–17)
IMM GRANULOCYTES # BLD AUTO: 0.01 THOUSAND/UL (ref 0–0.2)
IMM GRANULOCYTES NFR BLD AUTO: 1 % (ref 0–2)
INR PPP: 1.2 (ref 0.85–1.19)
LIPASE SERPL-CCNC: 23 U/L (ref 11–82)
LYMPHOCYTES # BLD AUTO: 0.39 THOUSANDS/ΜL (ref 0.6–4.47)
LYMPHOCYTES NFR BLD AUTO: 19 % (ref 14–44)
MCH RBC QN AUTO: 31.7 PG (ref 26.8–34.3)
MCHC RBC AUTO-ENTMCNC: 33.7 G/DL (ref 31.4–37.4)
MCV RBC AUTO: 94 FL (ref 82–98)
MONOCYTES # BLD AUTO: 0.14 THOUSAND/ΜL (ref 0.17–1.22)
MONOCYTES NFR BLD AUTO: 7 % (ref 4–12)
NEUTROPHILS # BLD AUTO: 1.46 THOUSANDS/ΜL (ref 1.85–7.62)
NEUTS SEG NFR BLD AUTO: 72 % (ref 43–75)
NRBC BLD AUTO-RTO: 0 /100 WBCS
PLATELET # BLD AUTO: 94 THOUSANDS/UL (ref 149–390)
PMV BLD AUTO: 9.9 FL (ref 8.9–12.7)
POTASSIUM SERPL-SCNC: 3.6 MMOL/L (ref 3.5–5.3)
PROT SERPL-MCNC: 5.4 G/DL (ref 6.4–8.4)
PROTHROMBIN TIME: 15.7 SECONDS (ref 12.3–15)
RBC # BLD AUTO: 2.08 MILLION/UL (ref 3.88–5.62)
RH BLD: POSITIVE
SODIUM SERPL-SCNC: 141 MMOL/L (ref 135–147)
SPECIMEN EXPIRATION DATE: NORMAL
WBC # BLD AUTO: 2.01 THOUSAND/UL (ref 4.31–10.16)

## 2025-02-09 PROCEDURE — 85730 THROMBOPLASTIN TIME PARTIAL: CPT

## 2025-02-09 PROCEDURE — 86923 COMPATIBILITY TEST ELECTRIC: CPT

## 2025-02-09 PROCEDURE — 93005 ELECTROCARDIOGRAM TRACING: CPT

## 2025-02-09 PROCEDURE — 94660 CPAP INITIATION&MGMT: CPT

## 2025-02-09 PROCEDURE — 99285 EMERGENCY DEPT VISIT HI MDM: CPT

## 2025-02-09 PROCEDURE — 99223 1ST HOSP IP/OBS HIGH 75: CPT | Performed by: PHYSICIAN ASSISTANT

## 2025-02-09 PROCEDURE — 74178 CT ABD&PLV WO CNTR FLWD CNTR: CPT

## 2025-02-09 PROCEDURE — 86850 RBC ANTIBODY SCREEN: CPT

## 2025-02-09 PROCEDURE — 80053 COMPREHEN METABOLIC PANEL: CPT

## 2025-02-09 PROCEDURE — 36430 TRANSFUSION BLD/BLD COMPNT: CPT

## 2025-02-09 PROCEDURE — 82948 REAGENT STRIP/BLOOD GLUCOSE: CPT

## 2025-02-09 PROCEDURE — 86900 BLOOD TYPING SEROLOGIC ABO: CPT

## 2025-02-09 PROCEDURE — 85025 COMPLETE CBC W/AUTO DIFF WBC: CPT

## 2025-02-09 PROCEDURE — 86901 BLOOD TYPING SEROLOGIC RH(D): CPT

## 2025-02-09 PROCEDURE — 85610 PROTHROMBIN TIME: CPT

## 2025-02-09 PROCEDURE — 99285 EMERGENCY DEPT VISIT HI MDM: CPT | Performed by: EMERGENCY MEDICINE

## 2025-02-09 PROCEDURE — 83690 ASSAY OF LIPASE: CPT

## 2025-02-09 PROCEDURE — 96374 THER/PROPH/DIAG INJ IV PUSH: CPT

## 2025-02-09 PROCEDURE — P9016 RBC LEUKOCYTES REDUCED: HCPCS

## 2025-02-09 PROCEDURE — 94760 N-INVAS EAR/PLS OXIMETRY 1: CPT

## 2025-02-09 PROCEDURE — 36415 COLL VENOUS BLD VENIPUNCTURE: CPT

## 2025-02-09 RX ORDER — ACETAMINOPHEN 325 MG/1
650 TABLET ORAL EVERY 4 HOURS PRN
Status: DISCONTINUED | OUTPATIENT
Start: 2025-02-09 | End: 2025-02-12 | Stop reason: HOSPADM

## 2025-02-09 RX ORDER — AZELASTINE 1 MG/ML
2 SPRAY, METERED NASAL 2 TIMES DAILY
Status: DISCONTINUED | OUTPATIENT
Start: 2025-02-09 | End: 2025-02-12 | Stop reason: HOSPADM

## 2025-02-09 RX ORDER — BRIMONIDINE TARTRATE 2 MG/ML
1 SOLUTION/ DROPS OPHTHALMIC EVERY 12 HOURS
Status: DISCONTINUED | OUTPATIENT
Start: 2025-02-09 | End: 2025-02-12 | Stop reason: HOSPADM

## 2025-02-09 RX ORDER — FUROSEMIDE 10 MG/ML
40 INJECTION INTRAMUSCULAR; INTRAVENOUS DAILY
Status: DISCONTINUED | OUTPATIENT
Start: 2025-02-10 | End: 2025-02-12 | Stop reason: HOSPADM

## 2025-02-09 RX ORDER — PANTOPRAZOLE SODIUM 40 MG/10ML
40 INJECTION, POWDER, LYOPHILIZED, FOR SOLUTION INTRAVENOUS ONCE
Status: COMPLETED | OUTPATIENT
Start: 2025-02-09 | End: 2025-02-09

## 2025-02-09 RX ORDER — METOPROLOL SUCCINATE 25 MG/1
12.5 TABLET, EXTENDED RELEASE ORAL DAILY
Status: DISCONTINUED | OUTPATIENT
Start: 2025-02-10 | End: 2025-02-12 | Stop reason: HOSPADM

## 2025-02-09 RX ORDER — PANTOPRAZOLE SODIUM 40 MG/10ML
40 INJECTION, POWDER, LYOPHILIZED, FOR SOLUTION INTRAVENOUS EVERY 12 HOURS SCHEDULED
Status: DISCONTINUED | OUTPATIENT
Start: 2025-02-09 | End: 2025-02-12 | Stop reason: HOSPADM

## 2025-02-09 RX ORDER — INSULIN LISPRO 100 [IU]/ML
2-12 INJECTION, SOLUTION INTRAVENOUS; SUBCUTANEOUS EVERY 6 HOURS SCHEDULED
Status: DISCONTINUED | OUTPATIENT
Start: 2025-02-09 | End: 2025-02-10

## 2025-02-09 RX ORDER — HYDROXYZINE HYDROCHLORIDE 25 MG/1
25 TABLET, FILM COATED ORAL EVERY 8 HOURS PRN
Status: DISCONTINUED | OUTPATIENT
Start: 2025-02-09 | End: 2025-02-12 | Stop reason: HOSPADM

## 2025-02-09 RX ORDER — TAMSULOSIN HYDROCHLORIDE 0.4 MG/1
0.4 CAPSULE ORAL
Status: DISCONTINUED | OUTPATIENT
Start: 2025-02-10 | End: 2025-02-12 | Stop reason: HOSPADM

## 2025-02-09 RX ORDER — SODIUM CHLORIDE 9 MG/ML
75 INJECTION, SOLUTION INTRAVENOUS CONTINUOUS
Status: DISCONTINUED | OUTPATIENT
Start: 2025-02-09 | End: 2025-02-10

## 2025-02-09 RX ORDER — ONDANSETRON 2 MG/ML
4 INJECTION INTRAMUSCULAR; INTRAVENOUS EVERY 6 HOURS PRN
Status: DISCONTINUED | OUTPATIENT
Start: 2025-02-09 | End: 2025-02-12 | Stop reason: HOSPADM

## 2025-02-09 RX ADMIN — SODIUM CHLORIDE 75 ML/HR: 0.9 INJECTION, SOLUTION INTRAVENOUS at 20:21

## 2025-02-09 RX ADMIN — PANTOPRAZOLE SODIUM 40 MG: 40 INJECTION, POWDER, FOR SOLUTION INTRAVENOUS at 14:45

## 2025-02-09 RX ADMIN — PANTOPRAZOLE SODIUM 40 MG: 40 INJECTION, POWDER, FOR SOLUTION INTRAVENOUS at 20:20

## 2025-02-09 RX ADMIN — IOHEXOL 100 ML: 350 INJECTION, SOLUTION INTRAVENOUS at 12:00

## 2025-02-09 NOTE — EMTALA/ACUTE CARE TRANSFER
[facility]  [address] [address]  Dept: [phone]      EMTALA TRANSFER CONSENT    NAME Ean Cuevasnick                                         1955                              MRN [id number]    I have been informed of my rights regarding examination, treatment, and transfer   by Dr. Ean Esquivel DO    Benefits: Specialized equipment and/or services available at the receiving facility (Include comment)________________________ (Gastroenterology)    Risks:            I authorize the performance of emergency medical procedures and treatments upon me in both transit and upon arrival at the receiving facility.  Additionally, I authorize the release of any and all medical records to the receiving facility and request they be transported with me, if possible.  I understand that the safest mode of transportation during a medical emergency is an ambulance and that the Hospital advocates the use of this mode of transport. Risks of traveling to the receiving facility by car, including absence of medical control, life sustaining equipment, such as oxygen, and medical personnel has been explained to me and I fully understand them.    (NIK CORRECT BOX BELOW)  [  ]  I consent to the stated transfer and to be transported by ambulance/helicopter.  [  ]  I consent to the stated transfer, but refuse transportation by ambulance and accept full responsibility for my transportation by car.  I understand the risks of non-ambulance transfers and I exonerate the Hospital and its staff from any deterioration in my condition that results from this refusal.    X___________________________________________    DATE  25  TIME________  Signature of patient or legally responsible individual signing on patient behalf           RELATIONSHIP TO PATIENT_________________________          Provider Certification    NAME Ean Young                                          1955                              MRN [id number]    A medical screening exam was performed on the above named patient.  Based on the examination:    Condition Necessitating Transfer The primary encounter diagnosis was GI bleed. A diagnosis of Acute blood loss anemia was also pertinent to this visit.    Patient Condition: The patient has been stabilized such that within reasonable medical probability, no material deterioration of the patient condition or the condition of the unborn child(nubia) is likely to result from the transfer    Reason for Transfer: Level of Care needed not available at this facility    Transfer Requirements: Facility [facility]   Space available and qualified personnel available for treatment as acknowledged by    [facility] to accept transfer and to provide appropriate medical treatment as acknowledged by       Darin[facility]  Appropriate medical records of the examination and treatment of the patient are provided at the time of transfer   STAFF INITIAL WHEN COMPLETED _______  Transfer will be performed by qualified personnel from    and appropriate transfer equipment as required, including the use of necessary and appropriate life support measures.    Provider Certification: I have examined the patient and explained the following risks and benefits of being transferred/refusing transfer to the patient/family:         Based on these reasonable risks and benefits to the patient and/or the unborn child(nubia), and based upon the information available at the time of the patient’s examination, I certify that the medical benefits reasonably to be expected from the provision of appropriate medical treatments at another medical facility outweigh the increasing risks, if any, to the individual’s medical condition, and in the case of labor to the unborn child, from effecting the transfer.    X____________________________________________ DATE 25        TIME_______      ORIGINAL - SEND TO  MEDICAL RECORDS   COPY - SEND WITH PATIENT DURING TRANSFER

## 2025-02-09 NOTE — TELEPHONE ENCOUNTER
Per on call provider-    Pt should go to ED for eval because he is still having dark stools and he's lightheaded.    Pt called and told of instructions and he verbalized understanding.  Reason for Disposition  • Patient sounds very sick or weak to the triager    Protocols used: Stools - Unusual Color-Adult-AH

## 2025-02-09 NOTE — ED PROVIDER NOTES
Time reflects when diagnosis was documented in both MDM as applicable and the Disposition within this note       Time User Action Codes Description Comment    2/9/2025  2:22 PM Eduardo Parekh Add [K92.2] GI bleed     2/9/2025  2:22 PM Eduardo Parekh [D62] Acute blood loss anemia           ED Disposition       ED Disposition   Transfer to Another Facility-In Network    Condition   --    Date/Time   Sun Feb 9, 2025  2:22 PM    Comment   Ean Young should be transferred out to [facility].               Assessment & Plan       Medical Decision Making  69-year-old male presents to ED for evaluation of dark stools, lightheadedness as seen in HPI.  On physical examination patient vital signs stable.  Normotensive.  Afebrile.  Nontachycardic.  Nonhypoxic.  Alert and responding to questions appropriately.  Pale complexion.  No murmur.  Normal breath sounds.  No focal areas of tenderness of abdomen.  Given patient's recent history, will begin workup with CBC, APTT, pro time INR, CMP, lipase.  Differential diagnosis includes but not limited to GI bleed, ABLA, electrolyte abnormality, anemia, diverticulitis    CBC with hemoglobin of 6.6.  This is a significant drop from 8.1 three days ago.  Pro time INR with a slightly elevated INR of 1.2.  aPTT WNL.  CMP without concerning values.  Lipase WNL.  Given the drop of hemoglobin and concern for GI bleed recommended blood transfusion.  Patient agreeable to this.  Consent obtained.  2 units of PRBC ordered.  Type and screen ordered.  Will further obtain workup with CT high volume bleeding abdominal scan.      CT returns without acute findings.    Discussed patient with current on-call GI doctor at [facility] Dr. Lopez and [facility] admitting IM doctor Dr. Kenney.  After conversation of goals, it is agreed that it would be in patient's best interest to transfer to [facility] in order to obtain a more prompt GI scope procedure.  At [facility], GI is not  onsite until Tuesday.  Going to [place] would allow a sooner scope. The possibility of decompensation while waiting for scope on Tuesday is the main concern.  Patient agreeable to transfer.  Transfer ordered.  Patient accepted by [facility] at [facility].  EMTALA forms signed.     Patient monitored in the ED until transfer.  Remains stable.  Received 2 units PRBC without complication.     Amount and/or Complexity of Data Reviewed  Labs: ordered.  Radiology: ordered.    Risk  Prescription drug management.             Medications   iohexol (OMNIPAQUE) 350 MG/ML injection (MULTI-DOSE) 100 mL (100 mL Intravenous Given 2/9/25 1200)   pantoprazole (PROTONIX) injection 40 mg (40 mg Intravenous Given 2/9/25 1445)       ED Risk Strat Scores                          SBIRT 22yo+      Flowsheet Row Most Recent Value   Initial Alcohol Screen: US AUDIT-C     1. How often do you have a drink containing alcohol? 0 Filed at: 02/09/2025 1017   2. How many drinks containing alcohol do you have on a typical day you are drinking?  0 Filed at: 02/09/2025 1017   3a. Male UNDER 65: How often do you have five or more drinks on one occasion? 0 Filed at: 02/09/2025 1017   3b. FEMALE Any Age, or MALE 65+: How often do you have 4 or more drinks on one occassion? 0 Filed at: 02/09/2025 1017   Audit-C Score 0 Filed at: 02/09/2025 1017   SANTA: How many times in the past year have you...    Used an illegal drug or used a prescription medication for non-medical reasons? Never Filed at: 02/09/2025 1017                            History of Present Illness       Chief Complaint   Patient presents with    Rectal Bleeding     Patient reports black tarry stool stool since yesterday. Also reports intermittent abdominal pain. Denies any n/v       Past Medical History:   Diagnosis Date    Cancer (HCC)     Dyslipidemia     GERD (gastroesophageal reflux disease)     HL (hearing loss)     Hypertension     Moderate aortic stenosis     Obstructive sleep apnea   "     Past Surgical History:   Procedure Laterality Date    SHOULDER ARTHROSCOPY Right     TONGUE SURGERY  07/2024      Family History   Problem Relation Age of Onset    Heart disease Mother     Heart attack Mother     Prostate cancer Father     Parkinsonism Maternal Grandmother       Social History     Tobacco Use    Smoking status: Never     Passive exposure: Never    Smokeless tobacco: Never   Vaping Use    Vaping status: Never Used   Substance Use Topics    Alcohol use: Yes     Comment: rare    Drug use: Never      E-Cigarette/Vaping    E-Cigarette Use Never User       E-Cigarette/Vaping Substances    Nicotine No     THC No     CBD No     Flavoring No       I have reviewed and agree with the history as documented.     69-year-old male with history of hyperlipidemia, GERD, hypertension, aortic stenosis, JM, throat cancer presents to ED for evaluation of dark stools.  Patient states that he was recently admitted to the hospital from February 4 to February 6 for GI bleed.  During that time he had EGD performed.  Note from EGD procedure states: \"Single small angioectasia in the 2nd part of the duodenum; there was stigmata of recent hemorrhage; tissue was ablated with argon plasma coagulation; placed 1 clip successfully; hemostasis achieved\". Patient states that the bleeding stopped after EGD procedure.  The bleeding then started again on Saturday as he had dark tarry stools once again.  A difference between current episode of dark stools is that it is more formed.  Last week when he was having GI bleed it was a lot looser, watery.  Denies use of anticoagulants.  Patient became particularly concerned this morning after he had episode of lightheadedness.  He checked his blood pressure and it was slightly elevated compared to his usual.  Denies chest pain, shortness of breath, blurred vision, speech difficulty, facial droop, numbness and tingling of extremities, ambulatory dysfunction, hemoptysis, seizure, syncope.  He " called the triage line.  Triage line advised him to come to ED for further evaluation.        Review of Systems   Gastrointestinal:  Positive for abdominal pain and blood in stool. Negative for diarrhea, nausea and vomiting.   Neurological:  Positive for light-headedness.   All other systems reviewed and are negative.          Objective       ED Triage Vitals [02/09/25 1016]   Temperature Pulse Blood Pressure Respirations SpO2 Patient Position - Orthostatic VS   (!) 97 °F (36.1 °C) 92 117/56 17 97 % Sitting      Temp Source Heart Rate Source BP Location FiO2 (%) Pain Score    Temporal Monitor Left arm -- No Pain      Vitals      Date and Time Temp Pulse SpO2 Resp BP Pain Score FACES Pain Rating User   02/09/25 1830 97.5 °F (36.4 °C) 88 99 % 20 129/57 No Pain -- [place]   02/09/25 1820 97.4 °F (36.3 °C) 93 98 % 22 141/60 -- -- [place]   02/09/25 1800 97.3 °F (36.3 °C) 99 97 % 20 152/97 No Pain -- [place]   02/09/25 1720 97.2 °F (36.2 °C) 89 97 % 22 109/56 -- -- [place]   02/09/25 1650 97.6 °F (36.4 °C) 95 99 % 17 141/63 -- -- [place]   02/09/25 1635 97.7 °F (36.5 °C) 94 99 % 18 120/56 -- -- [place]   02/09/25 1630 97.4 °F (36.3 °C) 99 -- 18 112/60 -- -- [place]   02/09/25 1625 97.4 °F (36.3 °C) 99 96 % 16 102/58 -- -- [place]   02/09/25 1620 97.3 °F (36.3 °C) 97 -- 16 106/58 -- -- [place]   02/09/25 1615 97.4 °F (36.3 °C) 91 97 % 20 101/57 No Pain -- [place]   02/09/25 1600 97.6 °F (36.4 °C) 100 97 % 20 122/57 No Pain -- [place]   02/09/25 1545 97.6 °F (36.4 °C) 92 99 % 20 104/59 -- -- [place]   02/09/25 1515 97.5 °F (36.4 °C) 92 99 % 20 103/63 -- -- [place]   02/09/25 1415 97.9 °F (36.6 °C) 85 97 % 22 116/69 -- -- [place]   02/09/25 1345 98.2 °F (36.8 °C) 84 99 % 13 143/64 -- -- [place]   02/09/25 1330 98.1 °F (36.7 °C) 83 96 % 18 113/61 -- -- [place]   02/09/25 1325 97.7 °F (36.5 °C) 80 96 % 18 114/58 -- -- [place]   02/09/25 1320 97.9 °F (36.6 °C) 80 96 % 18 107/55 -- -- [place]   02/09/25 1315 97.5 °F (36.4 °C) 81 -- 18 121/65 -- -- [place]   02/09/25 1313 97.5 °F (36.4 °C) 79 99 % 18 108/65 No Pain -- [place]    02/09/25 1100 97 °F (36.1 °C) 86 94 % 18 101/57 No Pain -- [place]   02/09/25 1016 97 °F (36.1 °C) 92 97 % 17 117/56 No Pain -- PP            Physical Exam  Vitals and nursing note reviewed.   Constitutional:       General: He is not in acute distress.     Appearance: Normal appearance. He is well-developed. He is not ill-appearing, toxic-appearing or diaphoretic.   HENT:      Head: Normocephalic and atraumatic.      Nose: Nose normal.   Eyes:      General: No scleral icterus.        Right eye: No discharge.         Left eye: No discharge.      Extraocular Movements: Extraocular movements intact.      Conjunctiva/sclera: Conjunctivae normal.      Pupils: Pupils are equal, round, and reactive to light.   Cardiovascular:      Rate and Rhythm: Normal rate and regular rhythm.      Pulses: Normal pulses.      Heart sounds: Normal heart sounds. No murmur heard.     No friction rub. No gallop.   Pulmonary:      Effort: Pulmonary effort is normal. No respiratory distress.      Breath sounds: Normal breath sounds. No stridor. No wheezing, rhonchi or rales.   Abdominal:      Palpations: Abdomen is soft.      Tenderness: There is no abdominal tenderness. There is no guarding.   Musculoskeletal:         General: No swelling.      Cervical back: Neck supple.   Skin:     General: Skin is warm and dry.      Capillary Refill: Capillary refill takes less than 2 seconds.      Coloration: Skin is pale. Skin is not jaundiced.      Findings: No rash.   Neurological:      Mental Status: He is alert.   Psychiatric:         Mood and Affect: Mood normal.         Results Reviewed       Procedure Component Value Units Date/Time    Comprehensive metabolic panel [[id number]]  (Abnormal) Collected: 02/09/25 1037    Lab Status: Final result Specimen: Blood from Arm, Right Updated: 02/09/25 1126     Sodium 141 mmol/L      Potassium 3.6 mmol/L      Chloride 105 mmol/L      CO2 24 mmol/L      ANION GAP 12 mmol/L      BUN 13 mg/dL      Creatinine 0.47  mg/dL      Glucose 97 mg/dL      Calcium 8.3 mg/dL      Corrected Calcium 8.9 mg/dL      AST 21 U/L      ALT 28 U/L      Alkaline Phosphatase 54 U/L      Total Protein 5.4 g/dL      Albumin 3.3 g/dL      Total Bilirubin 0.79 mg/dL      eGFR 114 ml/min/1.73sq m     Narrative:      National Kidney Disease Foundation guidelines for Chronic Kidney Disease (CKD):     Stage 1 with normal or high GFR (GFR > 90 mL/min/1.73 square meters)    Stage 2 Mild CKD (GFR = 60-89 mL/min/1.73 square meters)    Stage 3A Moderate CKD (GFR = 45-59 mL/min/1.73 square meters)    Stage 3B Moderate CKD (GFR = 30-44 mL/min/1.73 square meters)    Stage 4 Severe CKD (GFR = 15-29 mL/min/1.73 square meters)    Stage 5 End Stage CKD (GFR <15 mL/min/1.73 square meters)  Note: GFR calculation is accurate only with a steady state creatinine    Lipase [[id number]]  (Normal) Collected: 02/09/25 1037    Lab Status: Final result Specimen: Blood from Arm, Right Updated: 02/09/25 1126     Lipase 23 u/L     CBC and differential [[id number]]  (Abnormal) Collected: 02/09/25 1037    Lab Status: Final result Specimen: Blood from Arm, Right Updated: 02/09/25 1120     WBC 2.01 Thousand/uL      RBC 2.08 Million/uL      Hemoglobin 6.6 g/dL      Hematocrit 19.6 %      MCV 94 fL      MCH 31.7 pg      MCHC 33.7 g/dL      RDW 16.1 %      MPV 9.9 fL      Platelets 94 Thousands/uL      nRBC 0 /100 WBCs      Segmented % 72 %      Immature Grans % 1 %      Lymphocytes % 19 %      Monocytes % 7 %      Eosinophils Relative 0 %      Basophils Relative 1 %      Absolute Neutrophils 1.46 Thousands/µL      Absolute Immature Grans 0.01 Thousand/uL      Absolute Lymphocytes 0.39 Thousands/µL      Absolute Monocytes 0.14 Thousand/µL      Eosinophils Absolute 0.00 Thousand/µL      Basophils Absolute 0.01 Thousands/µL     Protime-INR [[id number]]  (Abnormal) Collected: 02/09/25 1037    Lab Status: Final result Specimen: Blood from Arm, Right Updated: 02/09/25 1118     Protime 15.7  seconds      INR 1.20    Narrative:      INR Therapeutic Range    Indication                                             INR Range      Atrial Fibrillation                                               2.0-3.0  Hypercoagulable State                                    2.0.2.3  Left Ventricular Asist Device                            2.0-3.0  Mechanical Heart Valve                                  -    Aortic(with afib, MI, embolism, HF, LA enlargement,    and/or coagulopathy)                                     2.0-3.0 (2.5-3.5)     Mitral                                                             2.5-3.5  Prosthetic/Bioprosthetic Heart Valve               2.0-3.0  Venous thromboembolism (VTE: VT, PE        2.0-3.0    APTT [[id number]]  (Normal) Collected: 02/09/25 1037    Lab Status: Final result Specimen: Blood from Arm, Right Updated: 02/09/25 1118     PTT 34 seconds             CT high volume bleeding scan abdomen pelvis   Final Interpretation by Ashley Skinner MD (02/09 1239)      No CT evidence of active high-volume gastrointestinal hemorrhage.   Linear radiodensity in the distal duodenum suggestive of an endoscopy clip. Correlate with history.   No acute intra-abdominal pathology.         Workstation performed: [id number]             ECG 12 Lead Documentation Only    Date/Time: 2/9/2025 10:27 AM    Performed by: Eduardo Parekh PA-C  Authorized by: Eduardo Parekh PA-C    Patient location:  ED  Interpretation:     Interpretation: normal    Rate:     ECG rate:  91    ECG rate assessment: normal    Rhythm:     Rhythm: sinus rhythm    Ectopy:     Ectopy: none    QRS:     QRS axis:  Normal    QRS intervals:  Normal  Conduction:     Conduction: normal    ST segments:     ST segments:  Normal  T waves:     T waves: normal        ED Medication and Procedure Management   Prior to Admission Medications   Prescriptions Last Dose Informant Patient Reported? Taking?   Omega-3 Fatty Acids (Fish Oil) 1200 MG  CAPS  Self Yes No   Sig: Take by mouth 2 (two) times a day   atorvastatin (LIPITOR) 40 mg tablet  Self No No   Sig: Take 1 tablet (40 mg total) by mouth daily   azelastine (ASTELIN) 0.1 % nasal spray  Self No No   Si sprays into each nostril 2 (two) times a day   brimonidine tartrate 0.2 % ophthalmic solution  Self Yes No   Sig: INSTILL 1 DROP INTO EACH EYE TWICE DAILY   famotidine (PEPCID) 20 mg tablet   No No   Sig: Take 1 tablet (20 mg total) by mouth daily at bedtime   fluticasone (FLONASE) 50 mcg/act nasal spray   No No   Si sprays into each nostril daily   hydrOXYzine pamoate (VISTARIL) 25 mg capsule   No No   Sig: Take 1 capsule (25 mg total) by mouth 3 (three) times a day as needed for anxiety (And headache)   metoprolol succinate (TOPROL-XL) 25 mg 24 hr tablet   No No   Sig: Take 0.5 tablets (12.5 mg total) by mouth daily   pantoprazole (PROTONIX) 40 mg tablet   No No   Sig: Take 1 tablet (40 mg total) by mouth 2 (two) times a day   tamsulosin (FLOMAX) 0.4 mg   No No   Sig: Take 1 capsule (0.4 mg total) by mouth daily with dinner      Facility-Administered Medications: None     Discharge Medication List as of 2025  6:48 PM        CONTINUE these medications which have NOT CHANGED    Details   atorvastatin (LIPITOR) 40 mg tablet Take 1 tablet (40 mg total) by mouth daily, Starting Mon 6/3/2024, Normal      azelastine (ASTELIN) 0.1 % nasal spray 2 sprays into each nostril 2 (two) times a day, Starting 2024, Normal      brimonidine tartrate 0.2 % ophthalmic solution INSTILL 1 DROP INTO EACH EYE TWICE DAILY, Historical Med      famotidine (PEPCID) 20 mg tablet Take 1 tablet (20 mg total) by mouth daily at bedtime, Starting 2025, Normal      fluticasone (FLONASE) 50 mcg/act nasal spray 2 sprays into each nostril daily, Starting 2024, Normal      hydrOXYzine pamoate (VISTARIL) 25 mg capsule Take 1 capsule (25 mg total) by mouth 3 (three) times a day as needed for anxiety (And  headache), Starting Thu 1/2/2025, Normal      metoprolol succinate (TOPROL-XL) 25 mg 24 hr tablet Take 0.5 tablets (12.5 mg total) by mouth daily, Starting Thu 2/6/2025, Normal      Omega-3 Fatty Acids (Fish Oil) 1200 MG CAPS Take by mouth 2 (two) times a day, Historical Med      pantoprazole (PROTONIX) 40 mg tablet Take 1 tablet (40 mg total) by mouth 2 (two) times a day, Starting Fri 2/7/2025, Normal      tamsulosin (FLOMAX) 0.4 mg Take 1 capsule (0.4 mg total) by mouth daily with dinner, Starting Tue 10/22/2024, Normal           No discharge procedures on file.  ED SEPSIS DOCUMENTATION   Time reflects when diagnosis was documented in both MDM as applicable and the Disposition within this note       Time User Action Codes Description Comment    2/9/2025  2:22 PM Eduardo Parekh [K92.2] GI bleed     2/9/2025  2:22 PM Eduardo Parekh [D62] Acute blood loss anemia                  Eduardo Parekh PA-C  02/09/25 9539

## 2025-02-09 NOTE — TELEPHONE ENCOUNTER
"Answer Assessment - Initial Assessment Questions  1. COLOR: \"What color is it?\" \"Is that color in part or all of the stool?\"      Black     2. ONSET: \"When was the unusual color first noted?\"     Has happened the last two BMs.        4. OTHER SYMPTOMS: \"Do you have any other symptoms?\" (e.g., abdomen pain, diarrhea, jaundice, fever).      Denies GI symptoms. Feels lightheaded when he first gets up.        Pt also has swelling of his L leg when he is walking. When he elevates it it goes away.       Pt was in the hospital and had an EGD done because patient had been having dark stools for a week.    Protocols used: Stools - Unusual Color-Adult-    "

## 2025-02-10 ENCOUNTER — APPOINTMENT (INPATIENT)
Dept: NON INVASIVE DIAGNOSTICS | Facility: HOSPITAL | Age: 70
End: 2025-02-10
Payer: COMMERCIAL

## 2025-02-10 ENCOUNTER — APPOINTMENT (INPATIENT)
Dept: GASTROENTEROLOGY | Facility: HOSPITAL | Age: 70
End: 2025-02-10
Payer: COMMERCIAL

## 2025-02-10 ENCOUNTER — ANESTHESIA (INPATIENT)
Dept: GASTROENTEROLOGY | Facility: HOSPITAL | Age: 70
End: 2025-02-10
Payer: COMMERCIAL

## 2025-02-10 ENCOUNTER — ANESTHESIA EVENT (INPATIENT)
Dept: GASTROENTEROLOGY | Facility: HOSPITAL | Age: 70
End: 2025-02-10
Payer: COMMERCIAL

## 2025-02-10 LAB
ABO GROUP BLD BPU: NORMAL
ABO GROUP BLD BPU: NORMAL
ANION GAP SERPL CALCULATED.3IONS-SCNC: 6 MMOL/L (ref 4–13)
BPU ID: NORMAL
BPU ID: NORMAL
BUN SERPL-MCNC: 17 MG/DL (ref 5–25)
CALCIUM SERPL-MCNC: 7.8 MG/DL (ref 8.4–10.2)
CHLORIDE SERPL-SCNC: 110 MMOL/L (ref 96–108)
CO2 SERPL-SCNC: 25 MMOL/L (ref 21–32)
CREAT SERPL-MCNC: 0.4 MG/DL (ref 0.6–1.3)
CROSSMATCH: NORMAL
CROSSMATCH: NORMAL
ERYTHROCYTE [DISTWIDTH] IN BLOOD BY AUTOMATED COUNT: 16.9 % (ref 11.6–15.1)
GFR SERPL CREATININE-BSD FRML MDRD: 122 ML/MIN/1.73SQ M
GLUCOSE SERPL-MCNC: 78 MG/DL (ref 65–140)
GLUCOSE SERPL-MCNC: 80 MG/DL (ref 65–140)
GLUCOSE SERPL-MCNC: 82 MG/DL (ref 65–140)
GLUCOSE SERPL-MCNC: 84 MG/DL (ref 65–140)
GLUCOSE SERPL-MCNC: 86 MG/DL (ref 65–140)
GLUCOSE SERPL-MCNC: 94 MG/DL (ref 65–140)
HCT VFR BLD AUTO: 20.2 % (ref 36.5–49.3)
HCT VFR BLD AUTO: 21.4 % (ref 36.5–49.3)
HCT VFR BLD AUTO: 22.8 % (ref 36.5–49.3)
HGB BLD-MCNC: 7.2 G/DL (ref 12–17)
HGB BLD-MCNC: 7.2 G/DL (ref 12–17)
HGB BLD-MCNC: 7.7 G/DL (ref 12–17)
MCH RBC QN AUTO: 30.9 PG (ref 26.8–34.3)
MCHC RBC AUTO-ENTMCNC: 33.6 G/DL (ref 31.4–37.4)
MCV RBC AUTO: 92 FL (ref 82–98)
PLATELET # BLD AUTO: 83 THOUSANDS/UL (ref 149–390)
PMV BLD AUTO: 9.9 FL (ref 8.9–12.7)
POTASSIUM SERPL-SCNC: 3.6 MMOL/L (ref 3.5–5.3)
RBC # BLD AUTO: 2.33 MILLION/UL (ref 3.88–5.62)
SODIUM SERPL-SCNC: 141 MMOL/L (ref 135–147)
UNIT DISPENSE STATUS: NORMAL
UNIT DISPENSE STATUS: NORMAL
UNIT PRODUCT CODE: NORMAL
UNIT PRODUCT CODE: NORMAL
UNIT PRODUCT VOLUME: 300 ML
UNIT PRODUCT VOLUME: 350 ML
UNIT RH: NORMAL
UNIT RH: NORMAL
WBC # BLD AUTO: 1.94 THOUSAND/UL (ref 4.31–10.16)

## 2025-02-10 PROCEDURE — 0DJ08ZZ INSPECTION OF UPPER INTESTINAL TRACT, VIA NATURAL OR ARTIFICIAL OPENING ENDOSCOPIC: ICD-10-PCS | Performed by: STUDENT IN AN ORGANIZED HEALTH CARE EDUCATION/TRAINING PROGRAM

## 2025-02-10 PROCEDURE — 43235 EGD DIAGNOSTIC BRUSH WASH: CPT | Performed by: STUDENT IN AN ORGANIZED HEALTH CARE EDUCATION/TRAINING PROGRAM

## 2025-02-10 PROCEDURE — 93971 EXTREMITY STUDY: CPT | Performed by: SURGERY

## 2025-02-10 PROCEDURE — 85014 HEMATOCRIT: CPT | Performed by: PHYSICIAN ASSISTANT

## 2025-02-10 PROCEDURE — 85018 HEMOGLOBIN: CPT | Performed by: PHYSICIAN ASSISTANT

## 2025-02-10 PROCEDURE — 99223 1ST HOSP IP/OBS HIGH 75: CPT | Performed by: STUDENT IN AN ORGANIZED HEALTH CARE EDUCATION/TRAINING PROGRAM

## 2025-02-10 PROCEDURE — 94760 N-INVAS EAR/PLS OXIMETRY 1: CPT

## 2025-02-10 PROCEDURE — 82948 REAGENT STRIP/BLOOD GLUCOSE: CPT

## 2025-02-10 PROCEDURE — 93971 EXTREMITY STUDY: CPT

## 2025-02-10 PROCEDURE — 0DJD8ZZ INSPECTION OF LOWER INTESTINAL TRACT, VIA NATURAL OR ARTIFICIAL OPENING ENDOSCOPIC: ICD-10-PCS | Performed by: STUDENT IN AN ORGANIZED HEALTH CARE EDUCATION/TRAINING PROGRAM

## 2025-02-10 PROCEDURE — 99232 SBSQ HOSP IP/OBS MODERATE 35: CPT | Performed by: HOSPITALIST

## 2025-02-10 PROCEDURE — 80048 BASIC METABOLIC PNL TOTAL CA: CPT | Performed by: PHYSICIAN ASSISTANT

## 2025-02-10 PROCEDURE — 85027 COMPLETE CBC AUTOMATED: CPT | Performed by: PHYSICIAN ASSISTANT

## 2025-02-10 PROCEDURE — 94660 CPAP INITIATION&MGMT: CPT

## 2025-02-10 RX ORDER — ATORVASTATIN CALCIUM 40 MG/1
40 TABLET, FILM COATED ORAL
Status: DISCONTINUED | OUTPATIENT
Start: 2025-02-10 | End: 2025-02-12 | Stop reason: HOSPADM

## 2025-02-10 RX ORDER — INSULIN LISPRO 100 [IU]/ML
2-12 INJECTION, SOLUTION INTRAVENOUS; SUBCUTANEOUS
Status: DISCONTINUED | OUTPATIENT
Start: 2025-02-10 | End: 2025-02-12 | Stop reason: HOSPADM

## 2025-02-10 RX ORDER — BISACODYL 5 MG/1
10 TABLET, DELAYED RELEASE ORAL ONCE
Status: COMPLETED | OUTPATIENT
Start: 2025-02-10 | End: 2025-02-10

## 2025-02-10 RX ORDER — PROPOFOL 10 MG/ML
INJECTION, EMULSION INTRAVENOUS AS NEEDED
Status: DISCONTINUED | OUTPATIENT
Start: 2025-02-10 | End: 2025-02-10

## 2025-02-10 RX ORDER — LIDOCAINE HYDROCHLORIDE 20 MG/ML
INJECTION, SOLUTION EPIDURAL; INFILTRATION; INTRACAUDAL; PERINEURAL AS NEEDED
Status: DISCONTINUED | OUTPATIENT
Start: 2025-02-10 | End: 2025-02-10

## 2025-02-10 RX ADMIN — BRIMONIDINE TARTRATE 1 DROP: 2 SOLUTION/ DROPS OPHTHALMIC at 21:08

## 2025-02-10 RX ADMIN — ATORVASTATIN CALCIUM 40 MG: 40 TABLET, FILM COATED ORAL at 16:12

## 2025-02-10 RX ADMIN — SODIUM CHLORIDE 75 ML/HR: 0.9 INJECTION, SOLUTION INTRAVENOUS at 09:08

## 2025-02-10 RX ADMIN — METOPROLOL SUCCINATE 12.5 MG: 25 TABLET, EXTENDED RELEASE ORAL at 09:07

## 2025-02-10 RX ADMIN — PROPOFOL 50 MG: 10 INJECTION, EMULSION INTRAVENOUS at 13:45

## 2025-02-10 RX ADMIN — PROPOFOL 50 MG: 10 INJECTION, EMULSION INTRAVENOUS at 13:42

## 2025-02-10 RX ADMIN — BISACODYL 10 MG: 5 TABLET, COATED ORAL at 15:16

## 2025-02-10 RX ADMIN — PROPOFOL 30 MG: 10 INJECTION, EMULSION INTRAVENOUS at 13:44

## 2025-02-10 RX ADMIN — PROPOFOL 60 MG: 10 INJECTION, EMULSION INTRAVENOUS at 13:41

## 2025-02-10 RX ADMIN — TAMSULOSIN HYDROCHLORIDE 0.4 MG: 0.4 CAPSULE ORAL at 16:12

## 2025-02-10 RX ADMIN — LIDOCAINE HYDROCHLORIDE 60 MG: 20 INJECTION, SOLUTION EPIDURAL; INFILTRATION; INTRACAUDAL at 13:41

## 2025-02-10 RX ADMIN — PANTOPRAZOLE SODIUM 40 MG: 40 INJECTION, POWDER, FOR SOLUTION INTRAVENOUS at 09:07

## 2025-02-10 RX ADMIN — AZELASTINE HYDROCHLORIDE 2 SPRAY: 137 SPRAY, METERED NASAL at 21:08

## 2025-02-10 RX ADMIN — FUROSEMIDE 40 MG: 10 INJECTION, SOLUTION INTRAMUSCULAR; INTRAVENOUS at 05:38

## 2025-02-10 RX ADMIN — BRIMONIDINE TARTRATE 1 DROP: 2 SOLUTION/ DROPS OPHTHALMIC at 09:04

## 2025-02-10 RX ADMIN — AZELASTINE HYDROCHLORIDE 2 SPRAY: 137 SPRAY, METERED NASAL at 09:03

## 2025-02-10 RX ADMIN — POLYETHYLENE GLYCOL 3350, SODIUM SULFATE ANHYDROUS, SODIUM BICARBONATE, SODIUM CHLORIDE, POTASSIUM CHLORIDE 4000 ML: 236; 22.74; 6.74; 5.86; 2.97 POWDER, FOR SOLUTION ORAL at 16:13

## 2025-02-10 RX ADMIN — PANTOPRAZOLE SODIUM 40 MG: 40 INJECTION, POWDER, FOR SOLUTION INTRAVENOUS at 21:08

## 2025-02-10 NOTE — PLAN OF CARE
Problem: GASTROINTESTINAL - ADULT  Goal: Minimal or absence of nausea and/or vomiting  Description: INTERVENTIONS:  - Administer IV fluids if ordered to ensure adequate hydration  - Maintain NPO status until nausea and vomiting are resolved  - Nasogastric tube if ordered  - Administer ordered antiemetic medications as needed  - Provide nonpharmacologic comfort measures as appropriate  - Advance diet as tolerated, if ordered  - Consider nutrition services referral to assist patient with adequate nutrition and appropriate food choices  Outcome: Progressing     Problem: GASTROINTESTINAL - ADULT  Goal: Maintains or returns to baseline bowel function  Description: INTERVENTIONS:  - Assess bowel function  - Encourage oral fluids to ensure adequate hydration  - Administer IV fluids if ordered to ensure adequate hydration  - Administer ordered medications as needed  - Encourage mobilization and activity  - Consider nutritional services referral to assist patient with adequate nutrition and appropriate food choices  Outcome: Progressing     Problem: GASTROINTESTINAL - ADULT  Goal: Maintains adequate nutritional intake  Description: INTERVENTIONS:  - Monitor percentage of each meal consumed  - Identify factors contributing to decreased intake, treat as appropriate  - Assist with meals as needed  - Monitor I&O, weight, and lab values if indicated  - Obtain nutrition services referral as needed  Outcome: Progressing     Problem: GASTROINTESTINAL - ADULT  Goal: Oral mucous membranes remain intact  Description: INTERVENTIONS  - Assess oral mucosa and hygiene practices  - Implement preventative oral hygiene regimen  - Implement oral medicated treatments as ordered  - Initiate Nutrition services referral as needed  Outcome: Progressing

## 2025-02-10 NOTE — ASSESSMENT & PLAN NOTE
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

## 2025-02-10 NOTE — ASSESSMENT & PLAN NOTE
Lab Results   Component Value Date    HGBA1C 6.5 11/11/2024       Recent Labs     02/09/25 2013 02/09/25  2355 02/10/25  0607   POCGLU 86 86 78       Blood Sugar Average: Last 72 hrs:  (P) [id number]

## 2025-02-10 NOTE — PROGRESS NOTES
Progress Note - Hospitalist   Name: Ean Young 69 y.o. male I MRN: [id number]  Unit/Bed#: -01 I Date of Admission: 2/9/2025   Date of Service: 2/10/2025 I Hospital Day: 1    Assessment & Plan  Upper GI bleed  Patient initially presented with dark tarry stools   Patient diagnosed with an acute on chronic blood loss anemia at time of arrival  Hemoglobin at admission was downtrending, hemoglobin was 8.1 on February 6, and 6.6 at the time of arrival   Status post 2 units of packed red blood cells   Follow-up hemoglobin today 7.7 (patient also has an underlying history of pancytopenia-see below)   Workup thus far:-  #1.CT: No CT evidence of active high-volume gastrointestinal hemorrhage. Linear radiodensity in the distal duodenum suggestive of an endoscopy clip. Correlate with history. No acute intra-abdominal pathology.  #2. S/p EGD 2/5/25: Report reviewed  #3.  Status post a GI evaluation  #4.  Status post EGD earlier today on 2/10/2025-no source of upper GI bleed noted-see the full report for additional details  #5.  Colonoscopy scheduled for 2/11/2025  Discharge planning once cleared by GI  Essential hypertension  Blood pressure stable  Continue Toprol-XL, Lasix, and tamsulosin  Dyslipidemia  Continue Lipitor  Gastroesophageal reflux disease without esophagitis  Continue IV Protonix  Obstructive sleep apnea  Continue CPAP at at bedtime  Type 2 diabetes mellitus without complication, without long-term current use of insulin ([facility])  Lab Results   Component Value Date    HGBA1C 6.5 11/11/2024       Recent Labs     02/09/25  2355 02/10/25  0607 02/10/25  1113 02/10/25  1552   POCGLU 86 78 82 80       Blood Sugar Average: Last 72 hrs:  (P) 82.4  Target blood sugar for the hospital is 140-180  Patient has been put on a clear liquid diet  Implement Accu-Cheks before meals and at bedtime with sliding scale coverage accordingly    Class 2 severe obesity due to excess calories with serious comorbidity and body mass  index (BMI) of 37.0 to 37.9 in adult (HCC)  BMI 37  Healthy diet with lifestyle modification recommended  Benign prostatic hyperplasia  Continue Flomax  Head and neck cancer (HCC)  PET scan 10/30/2024 with confirmation left base of tongue malignancy  Patient on chemo and radiation therapy  Follow-up [facility] oncology as scheduled  Pancytopenia (HCC)  Patient has a known history of pancytopenia due to  having squamous cell carcinoma of the tongue and also receiving chemo and radiation therapy in the past.  Will continue to monitor cell lines/counts  Hemoglobin results as above  White blood cell count today is 1.94  Platelet count is down to 83,000  Patient remains afebrile  Continue close CBC monitoring    VTE Pharmacologic Prophylaxis: VTE Score: 6 High Risk (Score >/= 5) - Pharmacological DVT Prophylaxis Contraindicated. Sequential Compression Devices Ordered.    Mobility:   Basic Mobility Inpatient Raw Score: 24  JH-HLM Goal: 8: Walk 250 feet or more  JH-HLM Achieved: 6: Walk 10 steps or more  JH-HLM Goal achieved. Continue to encourage appropriate mobility.    Patient Centered Rounds: I performed bedside rounds with nursing staff today.   Discussions with Specialists or Other Care Team Provider: GI    Education and Discussions with Family / Patient: Patient declined call to .     Current Length of Stay: 1 day(s)  Current Patient Status: Inpatient   Certification Statement: The patient will continue to require additional inpatient hospital stay due to need for an inpatient colonoscopy  Discharge Plan: Anticipate discharge in 24-48 hrs to home.    Code Status: Level 1 - Full Code    Subjective   Patient seen, sitting on the side of his bed, reports feeling okay, but is a little frustrated and wants to get to the cause of his GI bleeding.  Denies any pain or discomfort at this time.    Objective :  Temp:  [97.2 °F (36.2 °C)-99.6 °F (37.6 °C)] 99.1 °F (37.3 °C)  HR:  [] 80  BP: ()/(50-97)  143/75  Resp:  [12-22] 18  SpO2:  [94 %-100 %] 97 %  O2 Device: None (Room air)    Body mass index is 37.76 kg/m².     Input and Output Summary (last 24 hours):   No intake or output data in the 24 hours ending 02/10/25 1558    Physical Exam  Vitals and nursing note reviewed.   Constitutional:       General: He is not in acute distress.     Appearance: Normal appearance. He is not ill-appearing.   HENT:      Head: Normocephalic and atraumatic.      Nose: Nose normal.   Eyes:      Extraocular Movements: Extraocular movements intact.      Pupils: Pupils are equal, round, and reactive to light.   Cardiovascular:      Rate and Rhythm: Normal rate and regular rhythm.      Pulses: Normal pulses.      Heart sounds: Normal heart sounds. No murmur heard.     No friction rub. No gallop.   Pulmonary:      Effort: Pulmonary effort is normal.      Breath sounds: Normal breath sounds.   Abdominal:      General: There is no distension.      Palpations: Abdomen is soft. There is no mass.      Tenderness: There is no abdominal tenderness. There is no guarding or rebound.   Musculoskeletal:         General: No swelling or tenderness. Normal range of motion.      Cervical back: Normal range of motion and neck supple. No rigidity. No muscular tenderness.      Right lower leg: No edema.      Left lower leg: No edema.   Skin:     General: Skin is warm.      Capillary Refill: Capillary refill takes less than 2 seconds.      Findings: No erythema or rash.   Neurological:      General: No focal deficit present.      Mental Status: He is alert and oriented to person, place, and time. Mental status is at baseline.   Psychiatric:         Mood and Affect: Mood normal.         Behavior: Behavior normal.           Lines/Drains:              Lab Results: I have reviewed the following results:   Results from last 7 days   Lab Units 02/10/25  0858 02/10/25  0518 02/10/25  0004 02/09/25  1037   WBC Thousand/uL  --  1.94*  --  2.01*   HEMOGLOBIN g/dL  7.7* 7.2*   < > 6.6*   HEMATOCRIT % 22.8* 21.4*   < > 19.6*   PLATELETS Thousands/uL  --  83*  --  94*   SEGS PCT %  --   --   --  72   LYMPHO PCT %  --   --   --  19   MONO PCT %  --   --   --  7   EOS PCT %  --   --   --  0    < > = values in this interval not displayed.     Results from last 7 days   Lab Units 02/10/25  0518 02/09/25  1037   SODIUM mmol/L 141 141   POTASSIUM mmol/L 3.6 3.6   CHLORIDE mmol/L 110* 105   CO2 mmol/L 25 24   BUN mg/dL 17 13   CREATININE mg/dL 0.40* 0.47*   ANION GAP mmol/L 6 12   CALCIUM mg/dL 7.8* 8.3*   ALBUMIN g/dL  --  3.3*   TOTAL BILIRUBIN mg/dL  --  0.79   ALK PHOS U/L  --  54   ALT U/L  --  28   AST U/L  --  21   GLUCOSE RANDOM mg/dL 84 97     Results from last 7 days   Lab Units 02/09/25  1037   INR  1.20*     Results from last 7 days   Lab Units 02/10/25  1552 02/10/25  1113 02/10/25  0607 02/09/25  2355 02/09/25  2013 02/06/25  0952 02/06/25  0711 02/05/25  2103 02/05/25  1814 02/05/25  1440 02/05/25  0949 02/05/25  0705   POC GLUCOSE mg/dl 80 82 78 86 86 94 81 84 95 85 88 92               Recent Cultures (last 7 days):         Imaging Results Review: I reviewed radiology reports from this admission including: procedure reports.  EGD report was reviewed  Other Study Results Review: No additional pertinent studies reviewed.    Last 24 Hours Medication List:     Current Facility-Administered Medications:     acetaminophen (TYLENOL) tablet 650 mg, Q4H PRN    atorvastatin (LIPITOR) tablet 40 mg, Daily With Dinner    azelastine (ASTELIN) 0.1 % nasal spray 2 spray, BID    brimonidine tartrate 0.2 % ophthalmic solution 1 drop, Q12H    furosemide (LASIX) injection 40 mg, Daily    hydrOXYzine HCL (ATARAX) tablet 25 mg, Q8H PRN    insulin lispro (HumALOG/ADMELOG) 100 units/mL subcutaneous injection 2-12 Units, Q6H HAYLEE **AND** Fingerstick Glucose (POCT), Q6H    insulin lispro (HumALOG/ADMELOG) 100 units/mL subcutaneous injection 2-12 Units, TID AC **AND** Fingerstick Glucose (POCT),  TID AC    insulin lispro (HumALOG/ADMELOG) 100 units/mL subcutaneous injection 2-12 Units, HS    metoprolol succinate (TOPROL-XL) 24 hr tablet 12.5 mg, Daily    ondansetron (ZOFRAN) injection 4 mg, Q6H PRN    pantoprazole (PROTONIX) injection 40 mg, Q12H HAYLEE    polyethylene glycol (GOLYTELY) bowel prep 4,000 mL, Once    tamsulosin (FLOMAX) capsule 0.4 mg, Daily With Dinner    Administrative Statements   Today, Patient Was Seen By: Kunal Guallpa MD      **Please Note: This note may have been constructed using a voice recognition system.**

## 2025-02-10 NOTE — ASSESSMENT & PLAN NOTE
"Lab Results   Component Value Date    HGBA1C 6.5 11/11/2024       No results for input(s): \"POCGLU\" in the last 72 hours.    Blood Sugar Average: Last 72 hrs:  q 6h blood sugar checks while NPO    "

## 2025-02-10 NOTE — PLAN OF CARE
Problem: GASTROINTESTINAL - ADULT  Goal: Minimal or absence of nausea and/or vomiting  Description: INTERVENTIONS:  - Administer IV fluids if ordered to ensure adequate hydration  - Maintain NPO status until nausea and vomiting are resolved  - Nasogastric tube if ordered  - Administer ordered antiemetic medications as needed  - Provide nonpharmacologic comfort measures as appropriate  - Advance diet as tolerated, if ordered  - Consider nutrition services referral to assist patient with adequate nutrition and appropriate food choices  Outcome: Progressing     Problem: GASTROINTESTINAL - ADULT  Goal: Maintains or returns to baseline bowel function  Description: INTERVENTIONS:  - Assess bowel function  - Encourage oral fluids to ensure adequate hydration  - Administer IV fluids if ordered to ensure adequate hydration  - Administer ordered medications as needed  - Encourage mobilization and activity  - Consider nutritional services referral to assist patient with adequate nutrition and appropriate food choices  Outcome: Progressing     Problem: GASTROINTESTINAL - ADULT  Goal: Oral mucous membranes remain intact  Description: INTERVENTIONS  - Assess oral mucosa and hygiene practices  - Implement preventative oral hygiene regimen  - Implement oral medicated treatments as ordered  - Initiate Nutrition services referral as needed  Outcome: Progressing     Problem: METABOLIC, FLUID AND ELECTROLYTES - ADULT  Goal: Fluid balance maintained  Description: INTERVENTIONS:  - Monitor labs   - Monitor I/O and WT  - Instruct patient on fluid and nutrition as appropriate  - Assess for signs & symptoms of volume excess or deficit  Outcome: Progressing     Problem: HEMATOLOGIC - ADULT  Goal: Maintains hematologic stability  Description: INTERVENTIONS  - Assess for signs and symptoms of bleeding or hemorrhage  - Monitor labs  - Administer supportive blood products/factors as ordered and appropriate  Outcome: Progressing     Problem:  PAIN - ADULT  Goal: Verbalizes/displays adequate comfort level or baseline comfort level  Description: Interventions:  - Encourage patient to monitor pain and request assistance  - Assess pain using appropriate pain scale  - Administer analgesics based on type and severity of pain and evaluate response  - Implement non-pharmacological measures as appropriate and evaluate response  - Consider cultural and social influences on pain and pain management  - Notify physician/advanced practitioner if interventions unsuccessful or patient reports new pain  Outcome: Progressing     Problem: INFECTION - ADULT  Goal: Absence or prevention of progression during hospitalization  Description: INTERVENTIONS:  - Assess and monitor for signs and symptoms of infection  - Monitor lab/diagnostic results  - Monitor all insertion sites, i.e. indwelling lines, tubes, and drains  - Monitor endotracheal if appropriate and nasal secretions for changes in amount and color  - [place] appropriate cooling/warming therapies per order  - Administer medications as ordered  - Instruct and encourage patient and family to use good hand hygiene technique  - Identify and instruct in appropriate isolation precautions for identified infection/condition  Outcome: Progressing     Problem: SAFETY ADULT  Goal: Maintains/Returns to pre admission functional level  Description: INTERVENTIONS:  - Perform AM-PAC 6 Click Basic Mobility/ Daily Activity assessment daily.  - Set and communicate daily mobility goal to care team and patient/family/caregiver.   - Collaborate with rehabilitation services on mobility goals if consulted  - Perform Range of Motion 3 times a day.  - Reposition patient every 2 hours.  - Dangle patient 3 times a day  - Stand patient 3 times a day  - Ambulate patient 3 times a day  - Out of bed to chair 3 times a day   - Out of bed for meals 3 times a day  - Out of bed for toileting  - Record patient progress and toleration of activity level    Outcome: Progressing     Problem: DISCHARGE PLANNING  Goal: Discharge to home or other facility with appropriate resources  Description: INTERVENTIONS:  - Identify barriers to discharge w/patient and caregiver  - Arrange for needed discharge resources and transportation as appropriate  - Identify discharge learning needs (meds, wound care, etc.)  - Arrange for interpretive services to assist at discharge as needed  - Refer to Case Management Department for coordinating discharge planning if the patient needs post-hospital services based on physician/advanced practitioner order or complex needs related to functional status, cognitive ability, or social support system  Outcome: Progressing     Problem: Knowledge Deficit  Goal: Patient/family/caregiver demonstrates understanding of disease process, treatment plan, medications, and discharge instructions  Description: Complete learning assessment and assess knowledge base.  Interventions:  - Provide teaching at level of understanding  - Provide teaching via preferred learning methods  Outcome: Progressing

## 2025-02-10 NOTE — ANESTHESIA PREPROCEDURE EVALUATION
Procedure:  EGD    ECHO (05/24/24):  Interpretation Summary    Left Ventricle: Left ventricular cavity size is normal. Wall thickness is mildly increased. There is concentric remodeling. The left ventricular ejection fraction is 60% by visual estimation. Systolic function is normal. Although no diagnostic regional wall motion abnormality was identified, this possibility cannot be completely excluded on the basis of this study. Diastolic function is mildly abnormal, consistent with grade I (abnormal) relaxation.    Right Ventricle: Right ventricular cavity size is normal. Systolic function is normal.    Left Atrium: The atrium is mildly dilated.    Right Atrium: The atrium is mildly dilated.    Aortic Valve: The aortic valve is trileaflet. The leaflets are moderately thickened. The leaflets are moderately calcified. There is moderately reduced mobility. There is moderate stenosis. The aortic valve mean gradient is 29 mmHg. The dimensionless velocity index is 0.29. The aortic valve area is 1.09 cm2.    Oral cavity/ Oropharynx: There is a 1.9 x 1.9 cm at the left base of tongue,   resulting in partial effacement of the left vallecula     Relevant Problems   ANESTHESIA (within normal limits)      CARDIO   (+) Essential hypertension   (+) Moderate aortic stenosis      ENDO   (+) Type 2 diabetes mellitus without complication, without long-term current use of insulin (HCC)      GI/HEPATIC   (+) Gastric ulcer   (+) Gastroesophageal reflux disease without esophagitis   (+) Upper GI bleed      /RENAL   (+) Benign prostatic hyperplasia      GYN (within normal limits)      HEMATOLOGY   (+) Pancytopenia (HCC)      MUSCULOSKELETAL   (+) Cervical spondylosis      NEURO/PSYCH   (+) Anxiety about treatment   (+) Intractable episodic headache      PULMONARY   (+) Chronic obstructive pulmonary disease (HCC)   (+) JM (obstructive sleep apnea)   (+) Obstructive sleep apnea      Oncology   (+) Head and neck cancer (HCC)       Orthopedic/Musculoskeletal   (+) Cervical radiculopathy      Other   (+) Dyslipidemia      Lab Results   Component Value Date    WBC 1.94 (L) 02/10/2025    HGB 7.7 (L) 02/10/2025    HCT 22.8 (L) 02/10/2025    MCV 92 02/10/2025    PLT 83 (L) 02/10/2025     Lab Results   Component Value Date    SODIUM 141 02/10/2025    K 3.6 02/10/2025     (H) 02/10/2025    CO2 25 02/10/2025    AGAP 6 02/10/2025    BUN 17 02/10/2025    CREATININE 0.40 (L) 02/10/2025    GLUC 84 02/10/2025    GLUF 81 02/05/2025    CALCIUM 7.8 (L) 02/10/2025    AST 21 02/09/2025    ALT 28 02/09/2025    ALKPHOS 54 02/09/2025    TP 5.4 (L) 02/09/2025    TBILI 0.79 02/09/2025    EGFR 122 02/10/2025     Lab Results   Component Value Date    PTT 34 02/09/2025     Lab Results   Component Value Date    INR 1.20 (H) 02/09/2025    INR 1.08 02/04/2025    INR 1.03 09/26/2024    PROTIME 15.7 (H) 02/09/2025    PROTIME 14.5 02/04/2025    PROTIME 13.8 09/26/2024       Physical Exam    Airway    Mallampati score: III  TM Distance: >3 FB  Neck ROM: full     Dental       Cardiovascular  Rhythm: regular, Rate: normal, Cardiovascular exam normal    Pulmonary  Pulmonary exam normal Breath sounds clear to auscultation    Other Findings        Anesthesia Plan  ASA Score- 3     Anesthesia Type- IV sedation with anesthesia with ASA Monitors.         Additional Monitors:     Airway Plan:            Plan Factors-Exercise tolerance (METS): >4 METS.    Chart reviewed. EKG reviewed.  Existing labs reviewed. Patient summary reviewed.          Obstructive sleep apnea risk education given perioperatively.        Induction- intravenous.    Postoperative Plan-     Perioperative Resuscitation Plan - Level 1 - Full Code.       Informed Consent- Anesthetic plan and risks discussed with patient.  I personally reviewed this patient with the CRNA. Discussed and agreed on the Anesthesia Plan with the CRNA..      NPO Status:  No vitals data found for the desired time range.

## 2025-02-10 NOTE — ASSESSMENT & PLAN NOTE
PET scan 10/30/2024 with confirmation left base of tongue malignancy  Patient on chemo and radiation therapy  [facility]

## 2025-02-10 NOTE — H&P
"H&P - Hospitalist   Name: Ean Young 69 y.o. male I MRN: [id number]  Unit/Bed#: -[facility] I Date of Admission: 2/9/2025   Date of Service: 2/9/2025 I Hospital Day: 0     Assessment & Plan  Upper GI bleed  Patient with dark stools, hgb trending down from 8.1 on Feb 6 to 6.6 today  2 u PRBC ordered in ED  GI consult  NPO  Continue IV PPI  Serial hgb  CT: No CT evidence of active high-volume gastrointestinal hemorrhage.  Linear radiodensity in the distal duodenum suggestive of an endoscopy clip. Correlate with history.  No acute intra-abdominal pathology.   S/p EGD 2/5/25: Irregular Z-line  Schatzki ring in the GE junction  3 cm type I hiatal hernia  Erythematous, scarred mucosa in the duodenal bulb  Single small angioectasia in the 2nd part of the duodenum; there was stigmata of recent hemorrhage; tissue was ablated with argon plasma coagulation; placed 1 clip successfully; hemostasis achieved  Essential hypertension  Continue home medication with hold parameters  Dyslipidemia  Continue statin  Gastroesophageal reflux disease without esophagitis  IV PPI  Obstructive sleep apnea  CPAP 12  Type 2 diabetes mellitus without complication, without long-term current use of insulin ([facility])  Lab Results   Component Value Date    HGBA1C 6.5 11/11/2024       No results for input(s): \"POCGLU\" in the last 72 hours.    Blood Sugar Average: Last 72 hrs:  q 6h blood sugar checks while NPO    Class 2 severe obesity due to excess calories with serious comorbidity and body mass index (BMI) of 37.0 to 37.9 in adult (HCC)  BMI 37  Healthy diet with lifestyle modification recommended  Benign prostatic hyperplasia  Continue Flomax  Head and neck cancer (HCC)  PET scan 10/30/2024 with confirmation left base of tongue malignancy  Patient on chemo and radiation therapy  Follow-up [facility] oncology as scheduled      VTE Pharmacologic Prophylaxis: VTE Score: 6 High Risk (Score >/= 5) - Pharmacological DVT Prophylaxis Contraindicated. Sequential " Compression Devices Ordered.  Code Status: Level 1 - Full Code   Discussion with patient    Anticipated Length of Stay: Patient will be admitted on an inpatient basis with an anticipated length of stay of greater than 2 midnights secondary to IV PPI, serial labs, GI consult, likely repeat EGD.    History of Present Illness   Chief Complaint: dark stools    Ean Young is a 69 y.o. 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  Patient was recently hospitalized at [facility] 2/4/2025 for GI bleeding.  He had EGD with Schatzki ring at the GE junction, 3 cm type I hiatal hernia, erythematous scarred mucosa in the duodenal bulb, small angioectasia in the second part of the duodenum with stigmata of recent hemorrhage.  He was recommended PPI twice daily for the next 3 to 6 months and avoid NSAIDs and follow-up GI outpatient.  Tonight he presented to [facility] secondary to having dark stools and feeling lightheaded.  He was noted to be normotensive in the ED previously was hypotensive.  His hemoglobin dropped from 8.1 on 2/4 to 6.6 today.  2 units of packed red blood cells were ordered. He had CT with no evidence of active high-volume GI hemorrhage. There was no GI till Tuesday at [facility] and patient was transferred to [facility]. Patient notes left leg swelling.     Review of Systems   Constitutional:  Positive for chills. Negative for fever.   HENT:  Positive for rhinorrhea and trouble swallowing. Negative for sore throat.    Eyes:  Negative for discharge and redness.   Respiratory:  Positive for cough. Negative for shortness of breath.    Cardiovascular:  Positive for leg swelling. Negative for chest pain.   Gastrointestinal:  Positive for blood in stool.   Genitourinary:  Negative for dysuria and hematuria.   Musculoskeletal:  Negative for back pain, myalgias, neck pain and neck  stiffness.   Skin:  Positive for color change. Negative for rash and wound.   Neurological:  Positive for weakness. Negative for dizziness and headaches.   Psychiatric/Behavioral:  Negative for agitation and confusion.        Historical Information   Past Medical History:   Diagnosis Date    Cancer (HCC)     Dyslipidemia     GERD (gastroesophageal reflux disease)     HL (hearing loss)     Hypertension     Moderate aortic stenosis     Obstructive sleep apnea      Past Surgical History:   Procedure Laterality Date    SHOULDER ARTHROSCOPY Right     TONGUE SURGERY  07/2024     Social History     Tobacco Use    Smoking status: Never     Passive exposure: Never    Smokeless tobacco: Never   Vaping Use    Vaping status: Never Used   Substance and Sexual Activity    Alcohol use: Yes     Comment: rare    Drug use: Never    Sexual activity: Not Currently     E-Cigarette/Vaping    E-Cigarette Use Never User      E-Cigarette/Vaping Substances    Nicotine No     THC No     CBD No     Flavoring No      Family History   Problem Relation Age of Onset    Heart disease Mother     Heart attack Mother     Prostate cancer Father     Parkinsonism Maternal Grandmother      Social History:  Marital Status: Single   Occupation: Retired  Patient Pre-hospital Living Situation: Alone  Patient Pre-hospital Level of Mobility: walks  Patient Pre-hospital Diet Restrictions: Regular diet    Meds/Allergies   I have reviewed home medications using recent Epic encounter.  Prior to Admission medications    Medication Sig Start Date End Date Taking? Authorizing Provider   atorvastatin (LIPITOR) 40 mg tablet Take 1 tablet (40 mg total) by mouth daily 6/3/24   Benjamin Tsang, DO   azelastine (ASTELIN) 0.1 % nasal spray 2 sprays into each nostril 2 (two) times a day 8/9/24   Timmy Chapman, DO   brimonidine tartrate 0.2 % ophthalmic solution INSTILL 1 DROP INTO EACH EYE TWICE DAILY 2/19/23   Historical Provider, MD   famotidine (PEPCID) 20 mg tablet Take  1 tablet (20 mg total) by mouth daily at bedtime 2/7/25   ESTELA Hwang   fluticasone (FLONASE) 50 mcg/act nasal spray 2 sprays into each nostril daily 11/11/24   Timmy Chapman DO   hydrOXYzine pamoate (VISTARIL) 25 mg capsule Take 1 capsule (25 mg total) by mouth 3 (three) times a day as needed for anxiety (And headache) 1/2/25   Timmy Chapman DO   metoprolol succinate (TOPROL-XL) 25 mg 24 hr tablet Take 0.5 tablets (12.5 mg total) by mouth daily 2/6/25   My Garcia DO   Omega-3 Fatty Acids (Fish Oil) 1200 MG CAPS Take by mouth 2 (two) times a day    Historical Provider, MD   pantoprazole (PROTONIX) 40 mg tablet Take 1 tablet (40 mg total) by mouth 2 (two) times a day 2/7/25   ESTELA Hwang   tamsulosin (FLOMAX) 0.4 mg Take 1 capsule (0.4 mg total) by mouth daily with dinner 10/22/24   ESTELA Chan     Allergies   Allergen Reactions    Pollen Extract Other (See Comments)       Objective :  Temp:  [97 °F (36.1 °C)-98.2 °F (36.8 °C)] 97.4 °F (36.3 °C)  HR:  [] 92  BP: (101-152)/(55-97) 141/70  Resp:  [13-22] 17  SpO2:  [94 %-99 %] 94 %  O2 Device: None (Room air)    Physical Exam  Vitals and nursing note reviewed.   Constitutional:       Appearance: He is well-developed. He is obese.   HENT:      Head: Normocephalic and atraumatic.      Mouth/Throat:      Pharynx: No oropharyngeal exudate.   Eyes:      General:         Right eye: No discharge.         Left eye: No discharge.      Conjunctiva/sclera: Conjunctivae normal.      Comments: Glasses in place   Neck:      Trachea: No tracheal deviation.   Cardiovascular:      Rate and Rhythm: Normal rate and regular rhythm.      Heart sounds: Normal heart sounds. No murmur heard.     No friction rub. No gallop.   Pulmonary:      Effort: Pulmonary effort is normal. No respiratory distress.      Breath sounds: Normal breath sounds. No wheezing or rales.   Chest:      Chest wall: No tenderness.   Abdominal:      General: Bowel sounds are normal.  There is no distension.      Palpations: Abdomen is soft. There is no mass.      Tenderness: There is abdominal tenderness in the right upper quadrant and epigastric area. There is no guarding or rebound.   Musculoskeletal:         General: No tenderness or deformity. Normal range of motion.      Cervical back: Normal range of motion.   Skin:     General: Skin is warm and dry.      Coloration: Skin is not pale.      Findings: No erythema or rash.      Comments: Scattered bruising on hands from prior sticks   Neurological:      Mental Status: He is alert and oriented to person, place, and time. Mental status is at baseline.   Psychiatric:         Mood and Affect: Mood normal.         Behavior: Behavior normal.         Thought Content: Thought content normal.         Judgment: Judgment normal.        Lines/Drains:      Lab Results: I have reviewed the following results:  Results from last 7 days   Lab Units 02/09/25  1037   WBC Thousand/uL 2.01*   HEMOGLOBIN g/dL 6.6*   HEMATOCRIT % 19.6*   PLATELETS Thousands/uL 94*   SEGS PCT % 72   LYMPHO PCT % 19   MONO PCT % 7   EOS PCT % 0     Results from last 7 days   Lab Units 02/09/25  1037   SODIUM mmol/L 141   POTASSIUM mmol/L 3.6   CHLORIDE mmol/L 105   CO2 mmol/L 24   BUN mg/dL 13   CREATININE mg/dL 0.47*   ANION GAP mmol/L 12   CALCIUM mg/dL 8.3*   ALBUMIN g/dL 3.3*   TOTAL BILIRUBIN mg/dL 0.79   ALK PHOS U/L 54   ALT U/L 28   AST U/L 21   GLUCOSE RANDOM mg/dL 97     Results from last 7 days   Lab Units 02/09/25  1037   INR  1.20*     Results from last 7 days   Lab Units 02/06/25  0952 02/06/25  0711 02/05/25  2103 02/05/25  1814 02/05/25  1440 02/05/25  0949 02/05/25  0705 02/04/25  2113 02/04/25  1817 02/04/25  1411 02/04/25  1055   POC GLUCOSE mg/dl 94 81 84 95 85 88 92 95 125 101 79     Lab Results   Component Value Date    HGBA1C 6.5 11/11/2024    HGBA1C 7.0 (A) 08/09/2024    HGBA1C 7.7 (H) 04/08/2024           Imaging Results Review: I reviewed radiology reports  from this admission including: CT abdomen/pelvis.  Other Study Results Review: EKG was personally reviewed and my interpretation is: NSR. HR 91..    Administrative Statements   I have spent a total time of 75 minutes in caring for this patient on the day of the visit/encounter including Diagnostic results, Counseling / Coordination of care, Documenting in the medical record, Reviewing / ordering tests, medicine, procedures  , and Obtaining or reviewing history  .    ** Please Note: This note has been constructed using a voice recognition system. **

## 2025-02-10 NOTE — ASSESSMENT & PLAN NOTE
Patient with dark stools, hgb trending down from 8.1 on Feb 6 to 6.6 today  2 u PRBC ordered in ED  GI consult  NPO  Continue IV PPI  Serial hgb  CT: No CT evidence of active high-volume gastrointestinal hemorrhage.  Linear radiodensity in the distal duodenum suggestive of an endoscopy clip. Correlate with history.  No acute intra-abdominal pathology.   S/p EGD 2/5/25: Irregular Z-line  Schatzki ring in the GE junction  3 cm type I hiatal hernia  Erythematous, scarred mucosa in the duodenal bulb  Single small angioectasia in the 2nd part of the duodenum; there was stigmata of recent hemorrhage; tissue was ablated with argon plasma coagulation; placed 1 clip successfully; hemostasis achieved

## 2025-02-10 NOTE — ASSESSMENT & PLAN NOTE
Patient has a known history of pancytopenia due to  having squamous cell carcinoma of the tongue and also receiving chemo and radiation therapy in the past.  Will continue to monitor cell lines/counts  Hemoglobin results as above  White blood cell count today is 1.94  Platelet count is down to 83,000  Patient remains afebrile  Continue close CBC monitoring

## 2025-02-10 NOTE — ASSESSMENT & PLAN NOTE
Lab Results   Component Value Date    HGBA1C 6.5 11/11/2024       Recent Labs     02/09/25  2355 02/10/25  0607 02/10/25  1113 02/10/25  1552   POCGLU 86 78 82 80       Blood Sugar Average: Last 72 hrs:  (P) 82.4  Target blood sugar for the hospital is 140-180  Patient has been put on a clear liquid diet  Implement Accu-Cheks before meals and at bedtime with sliding scale coverage accordingly

## 2025-02-10 NOTE — ASSESSMENT & PLAN NOTE
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 Ongoing melena at home prompting presentation to ED, found to have drop in Hb to 6.6. Responded appropriately to 1U PRBC. HDS. CT bleeding scan negative but continues to have melenic output and symptomatic anemia. Ddx includes AVM, dieulafoy, PUD, R sided diverticular bleed. Note he does have moderate AS which could predispose him to forming additional AVM     Plan:   - please keep patient NPO pending procedure   - will plan for EGD today 2/10, if negative, will prep for colonoscopy for tomorrow    - two large bore IV at all times  - monitor Hb and transfuse prn to maintain >7   - continue IV PPI   - updated patient's niece via phone on plan

## 2025-02-10 NOTE — ASSESSMENT & PLAN NOTE
Patient initially presented with dark tarry stools   Patient diagnosed with an acute on chronic blood loss anemia at time of arrival  Hemoglobin at admission was downtrending, hemoglobin was 8.1 on February 6, and 6.6 at the time of arrival   Status post 2 units of packed red blood cells   Follow-up hemoglobin today 7.7 (patient also has an underlying history of pancytopenia-see below)   Workup thus far:-  #1.CT: No CT evidence of active high-volume gastrointestinal hemorrhage. Linear radiodensity in the distal duodenum suggestive of an endoscopy clip. Correlate with history. No acute intra-abdominal pathology.  #2. S/p EGD 2/5/25: Report reviewed  #3.  Status post a GI evaluation  #4.  Status post EGD earlier today on 2/10/2025-no source of upper GI bleed noted-see the full report for additional details  #5.  Colonoscopy scheduled for 2/11/2025  Discharge planning once cleared by GI

## 2025-02-10 NOTE — CONSULTS
Consultation - Gastroenterology   Name: Ean Young 69 y.o. 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 Ongoing melena at home prompting presentation to ED, found to have drop in Hb to 6.6. Responded appropriately to 1U PRBC. HDS. CT bleeding scan negative but continues to have melenic output and symptomatic anemia. Ddx includes AVM, dieulafoy, PUD, R sided diverticular bleed. Note he does have moderate AS which could predispose him to forming additional AVM     Plan:   - please keep patient NPO pending procedure   - will plan for EGD today 2/10, if negative, will prep for colonoscopy for tomorrow    - two large bore IV at all times  - monitor Hb and transfuse prn to maintain >7   - continue IV PPI   - updated patient's niece via phone on plan   Gastroesophageal reflux disease without esophagitis  PPI as above   Type 2 diabetes mellitus without complication, without long-term current use of insulin (HCC)  Lab Results   Component Value Date    HGBA1C 6.5 11/11/2024       Recent Labs     02/09/25 2013 02/09/25  2355 02/10/25  0607   POCGLU 86 86 78       Blood Sugar Average: Last 72 hrs:  (P) 83.57694827361810797    Class 2 severe obesity due to excess calories with serious comorbidity and body mass index (BMI) of 37.0 to 37.9 in adult (HCC)    Head and neck cancer (HCC)  Ongoing Xrt and chemo tx   I have discussed the above management plan in detail with the primary service.    Gastroenterology service will follow.    History of Present Illness   HPI:  Ean Young is a 69 y.o. male with h/o recent admission for UGIB secondary to Dielafoy lesion in D2 treated with APC and clip, AVM in D2 s/p APC and clip on 2/5, HH, Schatzki ring, HTN, GERD, JM, DM2, DLD, obesity, BPH, SCC tongue on chemo and XRT, moderate AS who presents with ongoing melena and subsequent drop in Hb to 6.6 compared to discharge Hb 8.1 on 2/6. He initially presented to [facility] but was transferred due to availability of GI team. HDS but Ct high volume was obtained which did not show evidence of active extravasation.     Patient evaluated at bedside. Reports feeling lightheaded this morning again. Has continued to have melena. Feels like his stool did not really recover since last hospitalization. Initially thought it was residual but started to feel symptomatic from anemia.     Has been tolerating his radiation treatments as outpatient. Denies hemoptysis or hematemesis.    Does not take NSAIDs. Was compliant with PPI at time of d/c.     Review of Systems  I have reviewed the patient's PMH, PSH, Social History, Family History, Meds, and Allergies    Objective :  Temp:  [97 °F (36.1 °C)-98.2 °F (36.8 °C)] 97.4 °F (36.3 °C)  HR:  [] 81  BP: (101-152)/(55-97) 123/70  Resp:  [13-22] 17  SpO2:  [94 %-99 %] 98 %  O2 Device: None (Room air)    Physical Exam  HENT:      Mouth/Throat:      Mouth: Mucous membranes are moist.   Eyes:      Conjunctiva/sclera: Conjunctivae normal.   Cardiovascular:      Rate and Rhythm: Normal rate and regular rhythm.   Pulmonary:      Effort: Pulmonary effort is normal. No respiratory distress.   Abdominal:      General: There is no distension.      Palpations: Abdomen is soft.      Tenderness: There is no abdominal tenderness.   Skin:     General: Skin is warm and dry.   Neurological:      Mental Status: He is alert.   Psychiatric:         Mood and Affect: Mood normal.           Lab  Results: I have reviewed the following results:CBC/BMP:   .     02/10/25  0518   WBC 1.94*   HGB 7.2*   HCT 21.4*   PLT 83*   SODIUM 141   K 3.6   *   CO2 25   BUN 17   CREATININE 0.40*   GLUC 84    , LFTs:   .     02/09/25  1037   AST 21   ALT 28   ALB 3.3*   TBILI 0.79   ALKPHOS 54        Imaging Results Review: I reviewed radiology reports from this admission including: CT abdomen/pelvis.  Other Study Results Review: EKG was reviewed.

## 2025-02-10 NOTE — ANESTHESIA POSTPROCEDURE EVALUATION
Post-Op Assessment Note    CV Status:  Stable    Pain management: adequate       Mental Status:  Alert   Hydration Status:  Stable   PONV Controlled:  None   Airway Patency:  Patent     Post Op Vitals Reviewed: Yes    No anethesia notable event occurred.    Staff: Anesthesiologist           Last Filed PACU Vitals:  Vitals Value Taken Time   Temp 99.6 °F (37.6 °C) 02/10/25 1410   Pulse 78 02/10/25 1410   /59 02/10/25 1410   Resp 12 02/10/25 1410   SpO2 98 % 02/10/25 1410       Modified Jignesh:     Vitals Value Taken Time   Activity 2 02/10/25 1410   Respiration 2 02/10/25 1410   Circulation 2 02/10/25 1410   Consciousness 2 02/10/25 1410   Oxygen Saturation 2 02/10/25 1410     Modified Jignesh Score: 10

## 2025-02-10 NOTE — UTILIZATION REVIEW
Initial Clinical Review    The patient was transferred to [facility] on 02/09 from [facility], where care began on 02/08.  0 midnights have already been surpassed with active ongoing care.     Admission: Date/Time/Statement:   Admission Orders (From admission, onward)       Ordered        02/09/25 1922  INPATIENT ADMISSION  Once                          Orders Placed This Encounter   Procedures    INPATIENT ADMISSION     Standing Status:   Standing     Number of Occurrences:   1     Level of Care:   Med Surg [16]     Estimated length of stay:   More than 2 Midnights     Certification:   I certify that inpatient services are medically necessary for this patient for a duration of greater than two midnights. See H&P and MD Progress Notes for additional information about the patient's course of treatment.     ED Arrival Information       Patient not seen in ED                       No chief complaint on file.      Initial Presentation: 69 y.o. male  with a PMH of squamous cell tongue cancer on radiation chemotherapy, hypertension, dyslipidemia, anxiety, JM on CPAP was transferred from [facility] to [facility] d/t no GI available at this time.  Pt initially presented to [facility] w/ dark stools and feeling lightheaded. He was found w/ hgb 6.6 form 8.1. 2 units of packed red blood cells were ordered. CT with no evidence of active high-volume GI hemorrhage. On arrival to [facility], pt aaox3, abdominal tenderness in the RUQ and epigastric area, L leg swelling present.  He was recently admitted on 02/04 got GI bleeding. EGD w/ Schatzki ring at the GE junction, 3 cm type I hiatal hernia, erythematous scarred mucosa in the duodenal bulb, small angioectasia in the second part of the duodenum with stigmata of recent hemorrhage. recommended PPI twice daily for the next 3 to 6 months and avoid NSAIDs and follow-up GI OP.    Admit as Inpatient for evaluation and treatment of UGI bleed.  Plan: 2 u PRBC  ordered in ED. GI consult. NPO. Continue IV PPI. Serial hgb.     Anticipated Length of Stay/Certification Statement: Patient will be admitted on an inpatient basis with an anticipated length of stay of greater than 2 midnights secondary to IV PPI, serial labs, GI consult, likely repeat EGD.     Date: 02/10   Day 2:   GI Consult: Upper GI bleed, acute blood loss anemia -plan for repeat EGD today. N.p.o. for procedure. Continue high-dose PPI. If EGD is unremarkable, would recommend colonoscopy tomorrow for possible lower GI source of bleeding. Monitor blood counts. Transfuse as needed. Further recommendations to follow pending endoscopy today.     IM Notes: Pt reports feeling okay but a little frustrated and wants to get to the cause of his GI bleeding. Denies any pain or discomfort at this time. WBC 7.7, WBC 1.94, plts down to 83,000. S/p EGD earlier today on 2/10/2025-no source of upper GI bleed noted. Plan for colonoscopy on 02/11. Continue close CBC monitoring     Date: 02/11  Day 3: Has surpassed a 2nd midnight with active treatments and services.  Per GI: Plan for colonoscopy today. N.p.o. for procedure. Vital signs stable. Hemoglobin was 5.8. This may be dilutional. Receiving blood. Remainder of labs showing leukopenia and thrombocytopenia. Further recommendations to follow pending colonoscopy.     IM Notes: 2/11 colonoscopy-noted old blood throughout the colon.  Melena throughout ileum.  GI recommending transfer pt to \[facility]\"" for capsule endoscopy. Obtaining stat H&H at 1215-planning on further transfusion of PRBCs for hemoglobin less than 7. Awaiting for bed availability. Continue Toprol-XL, Lasix, and tamsulosin. Cont IV PPI.        ED Treatment-Medication Administration - No Administrations Displayed (No Start Event Found)       None            Scheduled Medications:  azelastine, 2 spray, Each Nare, BID  bisacodyl, 10 mg, Oral, Once  brimonidine tartrate, 1 drop, Both Eyes, Q12H  furosemide, 40 mg, Intravenous,  Daily  insulin lispro, 2-12 Units, Subcutaneous, Q6H HAYLEE  metoprolol succinate, 12.5 mg, Oral, Daily  pantoprazole, 40 mg, Intravenous, Q12H HAYLEE  polyethylene glycol, 4,000 mL, Oral, Once  tamsulosin, 0.4 mg, Oral, Daily With Dinner      Continuous IV Infusions:  sodium chloride 0.9 % infusion  Rate: 75 mL/hr Dose: 75 mL/hr  Freq: Continuous Route: IV  Last Dose: Stopped (02/10/25 1437)  Start: 02/09/25 1930 End: 02/10/25 1343     PRN Meds:  acetaminophen, 650 mg, Oral, Q4H PRN  hydrOXYzine HCL, 25 mg, Oral, Q8H PRN  ondansetron, 4 mg, Intravenous, Q6H PRN      ED Triage Vitals   Temperature Pulse Respirations Blood Pressure SpO2 Pain Score   02/09/25 1928 02/09/25 1928 02/09/25 1928 02/09/25 1928 02/09/25 1928 02/09/25 1939   (!) 97.4 °F (36.3 °C) 92 17 141/70 94 % No Pain     Weight (last 2 days)       Date/Time Weight    02/09/25 19:28:44 123 (270.73)            Vital Signs (last 3 days)       Date/Time Temp Pulse Resp BP MAP (mmHg) SpO2 O2 Flow Rate (L/min) O2 Device O2 Interface Device Patient Position - Orthostatic VS Eldridge Coma Scale Score Pain    02/10/25 1405 -- 74 20 128/57 -- 96 % -- None (Room air) -- -- -- No Pain    02/10/25 1355 -- 61 17 100/50 72 100 % 6 L/min Simple mask -- -- -- No Pain    02/10/25 1352 98.7 °F (37.1 °C) 63 12 97/51 64 99 % 6 L/min Simple mask -- -- -- --    02/10/25 1316 97.9 °F (36.6 °C) 76 18 113/62 -- 97 % -- None (Room air) -- -- -- No Pain    02/10/25 0734 -- -- -- -- -- 98 % -- None (Room air) -- -- -- --    02/10/25 06:51:45 -- 81 17 123/70 88 97 % -- -- -- -- -- --    02/10/25 05:39:04 -- 82 -- 121/59 80 98 % -- -- -- -- -- --    02/10/25 0440 -- -- -- -- -- -- -- -- Full face mask -- -- --    02/09/25 2337 -- -- -- -- -- -- -- -- Full face mask -- -- --    02/09/25 22:22:23 -- 78 16 104/71 82 97 % -- -- -- -- -- --    02/09/25 1939 -- -- -- -- -- -- -- -- -- -- 15 No Pain    02/09/25 19:28:44 97.4 °F (36.3 °C) 92 17 141/70 94 94 % -- None (Room air) -- Sitting -- --               Pertinent Labs/Diagnostic Test Results:   Radiology:  VAS VENOUS DUPLEX -LOWER LIMB UNILATERAL   Final Interpretation by Mell Houston DO (02/10 1328)        Cardiology:  No orders to display     GI:  EGD   Final Result by Freeman Cary DO (02/10 1350)   Irregular Z-line   2 cm type I hiatal hernia   Erythematous, scarred mucosa in the duodenal bulb   Foreign body in the 2nd part of the duodenum   The upper third of the esophagus and middle third of the esophagus    appeared normal.   The fundus of the stomach, body of the stomach, greater curve of the    stomach, lesser curve of the stomach, incisura, antrum and prepyloric    region appeared normal.         RECOMMENDATION:   Follow up with GI Clinic    No source of bleeding seen in the upper GI tract   Recommend colonoscopy tomorrow   Clear liquid diet today   NPOpMN for colonoscopy tomorrow   Monitor blood counts and transfuse as needed   Continue PPI daily                     Freeman Cary DO           Results from last 7 days   Lab Units 02/04/25  1134   SARS-COV-2  Negative     Results from last 7 days   Lab Units 02/10/25  0858 02/10/25  0518 02/10/25  0004 02/09/25  1037 02/06/25  1207 02/06/25  0421 02/05/25  1224 02/05/25  0455   WBC Thousand/uL  --  1.94*  --  2.01*  --  2.32*  --  1.83*   HEMOGLOBIN g/dL 7.7* 7.2* 7.2* 6.6* 8.1* 7.9*   < > 8.1*   HEMATOCRIT % 22.8* 21.4* 20.2* 19.6*  --  23.1*  --  23.5*   PLATELETS Thousands/uL  --  83*  --  94*  --  86*  --  95*   TOTAL NEUT ABS Thousands/µL  --   --   --  1.46*  --  1.76*  --  1.30*    < > = values in this interval not displayed.         Results from last 7 days   Lab Units 02/10/25  0518 02/09/25  1037 02/06/25  0421 02/05/25  0455 02/04/25  0734   SODIUM mmol/L 141 141 142 141 138   POTASSIUM mmol/L 3.6 3.6 3.7 3.3* 3.1*   CHLORIDE mmol/L 110* 105 110* 107 102   CO2 mmol/L 25 24 24 25 21   ANION GAP mmol/L 6 12 8 9 15*   BUN mg/dL 17 13 18 16 15   CREATININE mg/dL 0.40* 0.47* 0.43* 0.49*  "0.58*   EGFR ml/min/1.73sq m 122 114 118 112 104   CALCIUM mg/dL 7.8* 8.3* 8.3* 7.8* 8.9   MAGNESIUM mg/dL  --   --   --  1.7*  --    PHOSPHORUS mg/dL  --   --   --  3.5  --      Results from last 7 days   Lab Units 02/09/25  1037 02/05/25  0455 02/04/25  0734   AST U/L 21 22 28   ALT U/L 28 35 45   ALK PHOS U/L 54 48 62   TOTAL PROTEIN g/dL 5.4* 4.7* 6.3*   ALBUMIN g/dL 3.3* 2.9* 3.7   TOTAL BILIRUBIN mg/dL 0.79 0.57 0.82     Results from last 7 days   Lab Units 02/10/25  1113 02/10/25  0607 02/09/25  2355 02/09/25  2013 02/06/25  0952 02/06/25  0711 02/05/25  2103 02/05/25  1814 02/05/25  1440 02/05/25  0949 02/05/25  0705 02/04/25  2113   POC GLUCOSE mg/dl 82 78 86 86 94 81 84 95 85 88 92 95     Results from last 7 days   Lab Units 02/10/25  0518 02/09/25  1037 02/06/25  0421 02/05/25  0455 02/04/25  0734   GLUCOSE RANDOM mg/dL 84 97 80 81 119             No results found for: \"BETA-HYDROXYBUTYRATE\"                           Results from last 7 days   Lab Units 02/09/25  1037 02/04/25  0734   PROTIME seconds 15.7* 14.5   INR  1.20* 1.08   PTT seconds 34 34                             Results from last 7 days   Lab Units 02/04/25  1133   FERRITIN ng/mL 238   IRON SATURATION % 19   IRON ug/dL 45*   TIBC ug/dL 235.2*     Results from last 7 days   Lab Units 02/04/25  1133   TRANSFERRIN mg/dL 168*     Results from last 7 days   Lab Units 02/10/25  0633   UNIT PRODUCT CODE  [id number]  [id number]   UNIT NUMBER  [id number]  [id number]   UNITABO  A  A   UNITRH  POS  POS   CROSSMATCH  Compatible  Compatible   UNIT DISPENSE STATUS  Presumed Trans  Presumed Trans   UNIT PRODUCT VOL ml 350  300         Results from last 7 days   Lab Units 02/09/25  1037 02/04/25  0734   LIPASE u/L 23 26                                                                   Past Medical History:   Diagnosis Date    Cancer (HCC)     Dyslipidemia     GERD (gastroesophageal reflux disease)     HL (hearing loss)     Hypertension     " Essential    Moderate aortic stenosis     Obstructive sleep apnea      Present on Admission:   Upper GI bleed   Essential hypertension   Dyslipidemia   Gastroesophageal reflux disease without esophagitis   Type 2 diabetes mellitus without complication, without long-term current use of insulin (HCC)   Obstructive sleep apnea   Benign prostatic hyperplasia   Head and neck cancer (HCC)      Admitting Diagnosis: GI bleed [K92.2]  Age/Sex: 69 y.o. male    Network Utilization Review Department  ATTENTION: Please call with any questions or concerns to [phone] and carefully listen to the prompts so that you are directed to the right person. All voicemails are confidential.   For Discharge needs, contact Care Management DC Support Team at [phone] opt. 2  Send all requests for admission clinical reviews, approved or denied determinations and any other requests to dedicated fax number below belonging to the campus where the patient is receiving treatment. List of dedicated fax numbers for the Facilities:  FACILITY NAME UR FAX NUMBER   ADMISSION DENIALS (Administrative/Medical Necessity) [phone]   DISCHARGE SUPPORT TEAM (NETWORK) [phone]   PARENT CHILD HEALTH (Maternity/NICU/Pediatrics) [phone]   [facility] [phone]   [facility] [phone]   [facility] [phone]   [facility] [phone]   [facility] [phone]   [facility] [phone]   [facility] [phone]   [facility] [phone]   [facility] [phone]   [facility] [phone]   [facility] [phone]   [facility] [phone]

## 2025-02-10 NOTE — ANESTHESIA POSTPROCEDURE EVALUATION
Post-Op Assessment Note    CV Status:  Stable  Pain Score: 0    Pain management: adequate       Mental Status:  Sleepy   Hydration Status:  Stable   PONV Controlled:  None   Airway Patency:  Patent     Post Op Vitals Reviewed: Yes    No anethesia notable event occurred.    Staff: CRNA           Last Filed PACU Vitals:  Vitals Value Taken Time   Temp     Pulse 62    BP 88/43    Resp 16    SpO2 100%

## 2025-02-11 ENCOUNTER — ANESTHESIA (INPATIENT)
Dept: GASTROENTEROLOGY | Facility: HOSPITAL | Age: 70
End: 2025-02-11
Payer: COMMERCIAL

## 2025-02-11 ENCOUNTER — APPOINTMENT (INPATIENT)
Dept: GASTROENTEROLOGY | Facility: HOSPITAL | Age: 70
End: 2025-02-11
Payer: COMMERCIAL

## 2025-02-11 ENCOUNTER — ANESTHESIA EVENT (INPATIENT)
Dept: GASTROENTEROLOGY | Facility: HOSPITAL | Age: 70
End: 2025-02-11
Payer: COMMERCIAL

## 2025-02-11 LAB
ABO GROUP BLD: NORMAL
ANION GAP SERPL CALCULATED.3IONS-SCNC: 9 MMOL/L (ref 4–13)
ATRIAL RATE: 91 BPM
BASOPHILS # BLD AUTO: 0 THOUSANDS/ÂΜL (ref 0–0.1)
BASOPHILS NFR BLD AUTO: 0 % (ref 0–1)
BLD GP AB SCN SERPL QL: NEGATIVE
BUN SERPL-MCNC: 15 MG/DL (ref 5–25)
CALCIUM SERPL-MCNC: 8.3 MG/DL (ref 8.4–10.2)
CHLORIDE SERPL-SCNC: 109 MMOL/L (ref 96–108)
CO2 SERPL-SCNC: 24 MMOL/L (ref 21–32)
CREAT SERPL-MCNC: 0.44 MG/DL (ref 0.6–1.3)
EOSINOPHIL # BLD AUTO: 0.01 THOUSAND/ÂΜL (ref 0–0.61)
EOSINOPHIL NFR BLD AUTO: 0 % (ref 0–6)
ERYTHROCYTE [DISTWIDTH] IN BLOOD BY AUTOMATED COUNT: 17.2 % (ref 11.6–15.1)
EST. AVERAGE GLUCOSE BLD GHB EST-MCNC: 117 MG/DL
GFR SERPL CREATININE-BSD FRML MDRD: 117 ML/MIN/1.73SQ M
GLUCOSE SERPL-MCNC: 76 MG/DL (ref 65–140)
GLUCOSE SERPL-MCNC: 80 MG/DL (ref 65–140)
GLUCOSE SERPL-MCNC: 84 MG/DL (ref 65–140)
GLUCOSE SERPL-MCNC: 85 MG/DL (ref 65–140)
GLUCOSE SERPL-MCNC: 88 MG/DL (ref 65–140)
GLUCOSE SERPL-MCNC: 89 MG/DL (ref 65–140)
HBA1C MFR BLD: 5.7 %
HCT VFR BLD AUTO: 16.9 % (ref 36.5–49.3)
HCT VFR BLD AUTO: 20.4 % (ref 36.5–49.3)
HCT VFR BLD AUTO: 23.7 % (ref 36.5–49.3)
HGB BLD-MCNC: 5.8 G/DL (ref 12–17)
HGB BLD-MCNC: 6.9 G/DL (ref 12–17)
HGB BLD-MCNC: 8.1 G/DL (ref 12–17)
IMM GRANULOCYTES # BLD AUTO: 0.01 THOUSAND/UL (ref 0–0.2)
IMM GRANULOCYTES NFR BLD AUTO: 0 % (ref 0–2)
LYMPHOCYTES # BLD AUTO: 0.52 THOUSANDS/ÂΜL (ref 0.6–4.47)
LYMPHOCYTES NFR BLD AUTO: 22 % (ref 14–44)
MCH RBC QN AUTO: 31.7 PG (ref 26.8–34.3)
MCHC RBC AUTO-ENTMCNC: 34.3 G/DL (ref 31.4–37.4)
MCV RBC AUTO: 92 FL (ref 82–98)
MONOCYTES # BLD AUTO: 0.21 THOUSAND/ÂΜL (ref 0.17–1.22)
MONOCYTES NFR BLD AUTO: 9 % (ref 4–12)
NEUTROPHILS # BLD AUTO: 1.58 THOUSANDS/ÂΜL (ref 1.85–7.62)
NEUTS SEG NFR BLD AUTO: 69 % (ref 43–75)
NRBC BLD AUTO-RTO: 0 /100 WBCS
P AXIS: 53 DEGREES
PLATELET # BLD AUTO: 103 THOUSANDS/UL (ref 149–390)
PMV BLD AUTO: 10.1 FL (ref 8.9–12.7)
POTASSIUM SERPL-SCNC: 3.4 MMOL/L (ref 3.5–5.3)
PR INTERVAL: 148 MS
QRS AXIS: 43 DEGREES
QRSD INTERVAL: 96 MS
QT INTERVAL: 372 MS
QTC INTERVAL: 457 MS
RBC # BLD AUTO: 1.83 MILLION/UL (ref 3.88–5.62)
RH BLD: POSITIVE
SODIUM SERPL-SCNC: 142 MMOL/L (ref 135–147)
SPECIMEN EXPIRATION DATE: NORMAL
T WAVE AXIS: 42 DEGREES
VENTRICULAR RATE: 91 BPM
WBC # BLD AUTO: 2.33 THOUSAND/UL (ref 4.31–10.16)

## 2025-02-11 PROCEDURE — 85014 HEMATOCRIT: CPT

## 2025-02-11 PROCEDURE — 86901 BLOOD TYPING SEROLOGIC RH(D): CPT | Performed by: PHYSICIAN ASSISTANT

## 2025-02-11 PROCEDURE — 94660 CPAP INITIATION&MGMT: CPT

## 2025-02-11 PROCEDURE — 94760 N-INVAS EAR/PLS OXIMETRY 1: CPT

## 2025-02-11 PROCEDURE — 85025 COMPLETE CBC W/AUTO DIFF WBC: CPT | Performed by: HOSPITALIST

## 2025-02-11 PROCEDURE — 30233N1 TRANSFUSION OF NONAUTOLOGOUS RED BLOOD CELLS INTO PERIPHERAL VEIN, PERCUTANEOUS APPROACH: ICD-10-PCS | Performed by: HOSPITALIST

## 2025-02-11 PROCEDURE — 82948 REAGENT STRIP/BLOOD GLUCOSE: CPT

## 2025-02-11 PROCEDURE — 99233 SBSQ HOSP IP/OBS HIGH 50: CPT

## 2025-02-11 PROCEDURE — 80048 BASIC METABOLIC PNL TOTAL CA: CPT | Performed by: HOSPITALIST

## 2025-02-11 PROCEDURE — 93010 ELECTROCARDIOGRAM REPORT: CPT | Performed by: INTERNAL MEDICINE

## 2025-02-11 PROCEDURE — 45378 DIAGNOSTIC COLONOSCOPY: CPT | Performed by: STUDENT IN AN ORGANIZED HEALTH CARE EDUCATION/TRAINING PROGRAM

## 2025-02-11 PROCEDURE — 86900 BLOOD TYPING SEROLOGIC ABO: CPT | Performed by: PHYSICIAN ASSISTANT

## 2025-02-11 PROCEDURE — 83036 HEMOGLOBIN GLYCOSYLATED A1C: CPT | Performed by: HOSPITALIST

## 2025-02-11 PROCEDURE — 86850 RBC ANTIBODY SCREEN: CPT | Performed by: PHYSICIAN ASSISTANT

## 2025-02-11 PROCEDURE — 86920 COMPATIBILITY TEST SPIN: CPT

## 2025-02-11 PROCEDURE — 85018 HEMOGLOBIN: CPT

## 2025-02-11 PROCEDURE — P9016 RBC LEUKOCYTES REDUCED: HCPCS

## 2025-02-11 RX ORDER — ECHINACEA PURPUREA EXTRACT 125 MG
1 TABLET ORAL
Status: DISCONTINUED | OUTPATIENT
Start: 2025-02-11 | End: 2025-02-12 | Stop reason: HOSPADM

## 2025-02-11 RX ORDER — PROPOFOL 10 MG/ML
INJECTION, EMULSION INTRAVENOUS AS NEEDED
Status: DISCONTINUED | OUTPATIENT
Start: 2025-02-11 | End: 2025-02-11

## 2025-02-11 RX ORDER — SODIUM CHLORIDE, SODIUM LACTATE, POTASSIUM CHLORIDE, CALCIUM CHLORIDE 600; 310; 30; 20 MG/100ML; MG/100ML; MG/100ML; MG/100ML
INJECTION, SOLUTION INTRAVENOUS CONTINUOUS PRN
Status: DISCONTINUED | OUTPATIENT
Start: 2025-02-11 | End: 2025-02-11

## 2025-02-11 RX ORDER — EPHEDRINE SULFATE 50 MG/ML
INJECTION INTRAVENOUS AS NEEDED
Status: DISCONTINUED | OUTPATIENT
Start: 2025-02-11 | End: 2025-02-11

## 2025-02-11 RX ORDER — LIDOCAINE HYDROCHLORIDE 20 MG/ML
INJECTION, SOLUTION EPIDURAL; INFILTRATION; INTRACAUDAL; PERINEURAL AS NEEDED
Status: DISCONTINUED | OUTPATIENT
Start: 2025-02-11 | End: 2025-02-11

## 2025-02-11 RX ADMIN — SODIUM CHLORIDE, SODIUM LACTATE, POTASSIUM CHLORIDE, AND CALCIUM CHLORIDE: .6; .31; .03; .02 INJECTION, SOLUTION INTRAVENOUS at 10:12

## 2025-02-11 RX ADMIN — PANTOPRAZOLE SODIUM 40 MG: 40 INJECTION, POWDER, FOR SOLUTION INTRAVENOUS at 21:47

## 2025-02-11 RX ADMIN — BRIMONIDINE TARTRATE 1 DROP: 2 SOLUTION/ DROPS OPHTHALMIC at 08:12

## 2025-02-11 RX ADMIN — PROPOFOL 20 MG: 10 INJECTION, EMULSION INTRAVENOUS at 10:24

## 2025-02-11 RX ADMIN — BRIMONIDINE TARTRATE 1 DROP: 2 SOLUTION/ DROPS OPHTHALMIC at 21:47

## 2025-02-11 RX ADMIN — PROPOFOL 40 MG: 10 INJECTION, EMULSION INTRAVENOUS at 10:28

## 2025-02-11 RX ADMIN — PROPOFOL 20 MG: 10 INJECTION, EMULSION INTRAVENOUS at 10:22

## 2025-02-11 RX ADMIN — PROPOFOL 30 MG: 10 INJECTION, EMULSION INTRAVENOUS at 10:40

## 2025-02-11 RX ADMIN — PANTOPRAZOLE SODIUM 40 MG: 40 INJECTION, POWDER, FOR SOLUTION INTRAVENOUS at 12:23

## 2025-02-11 RX ADMIN — ATORVASTATIN CALCIUM 40 MG: 40 TABLET, FILM COATED ORAL at 17:10

## 2025-02-11 RX ADMIN — FUROSEMIDE 40 MG: 10 INJECTION, SOLUTION INTRAMUSCULAR; INTRAVENOUS at 12:23

## 2025-02-11 RX ADMIN — AZELASTINE HYDROCHLORIDE 2 SPRAY: 137 SPRAY, METERED NASAL at 21:47

## 2025-02-11 RX ADMIN — EPHEDRINE SULFATE 20 MG: 50 INJECTION, SOLUTION INTRAVENOUS at 10:25

## 2025-02-11 RX ADMIN — PROPOFOL 20 MG: 10 INJECTION, EMULSION INTRAVENOUS at 10:32

## 2025-02-11 RX ADMIN — PROPOFOL 20 MG: 10 INJECTION, EMULSION INTRAVENOUS at 10:26

## 2025-02-11 RX ADMIN — TAMSULOSIN HYDROCHLORIDE 0.4 MG: 0.4 CAPSULE ORAL at 17:10

## 2025-02-11 RX ADMIN — PROPOFOL 20 MG: 10 INJECTION, EMULSION INTRAVENOUS at 10:35

## 2025-02-11 RX ADMIN — PROPOFOL 80 MG: 10 INJECTION, EMULSION INTRAVENOUS at 10:20

## 2025-02-11 RX ADMIN — LIDOCAINE HYDROCHLORIDE 80 MG: 20 INJECTION, SOLUTION EPIDURAL; INFILTRATION; INTRACAUDAL at 10:20

## 2025-02-11 RX ADMIN — METOPROLOL SUCCINATE 12.5 MG: 25 TABLET, EXTENDED RELEASE ORAL at 12:23

## 2025-02-11 RX ADMIN — AZELASTINE HYDROCHLORIDE 2 SPRAY: 137 SPRAY, METERED NASAL at 08:13

## 2025-02-11 NOTE — ASSESSMENT & PLAN NOTE
PET scan 10/30/2024 with confirmation left base of tongue malignancy  Patient on chemo and radiation therapy  [facility]

## 2025-02-11 NOTE — PLAN OF CARE
Problem: GASTROINTESTINAL - ADULT  Goal: Minimal or absence of nausea and/or vomiting  Description: INTERVENTIONS:  - Administer IV fluids if ordered to ensure adequate hydration  - Maintain NPO status until nausea and vomiting are resolved  - Nasogastric tube if ordered  - Administer ordered antiemetic medications as needed  - Provide nonpharmacologic comfort measures as appropriate  - Advance diet as tolerated, if ordered  - Consider nutrition services referral to assist patient with adequate nutrition and appropriate food choices  Outcome: Progressing  Goal: Maintains or returns to baseline bowel function  Description: INTERVENTIONS:  - Assess bowel function  - Encourage oral fluids to ensure adequate hydration  - Administer IV fluids if ordered to ensure adequate hydration  - Administer ordered medications as needed  - Encourage mobilization and activity  - Consider nutritional services referral to assist patient with adequate nutrition and appropriate food choices  Outcome: Progressing  Goal: Maintains adequate nutritional intake  Description: INTERVENTIONS:  - Monitor percentage of each meal consumed  - Identify factors contributing to decreased intake, treat as appropriate  - Assist with meals as needed  - Monitor I&O, weight, and lab values if indicated  - Obtain nutrition services referral as needed  Outcome: Progressing  Goal: Oral mucous membranes remain intact  Description: INTERVENTIONS  - Assess oral mucosa and hygiene practices  - Implement preventative oral hygiene regimen  - Implement oral medicated treatments as ordered  - Initiate Nutrition services referral as needed  Outcome: Progressing     Problem: METABOLIC, FLUID AND ELECTROLYTES - ADULT  Goal: Electrolytes maintained within normal limits  Description: INTERVENTIONS:  - Monitor labs and assess patient for signs and symptoms of electrolyte imbalances  - Administer electrolyte replacement as ordered  - Monitor response to electrolyte  replacements, including repeat lab results as appropriate  - Instruct patient on fluid and nutrition as appropriate  Outcome: Progressing  Goal: Fluid balance maintained  Description: INTERVENTIONS:  - Monitor labs   - Monitor I/O and WT  - Instruct patient on fluid and nutrition as appropriate  - Assess for signs & symptoms of volume excess or deficit  Outcome: Progressing  Goal: Glucose maintained within target range  Description: INTERVENTIONS:  - Monitor Blood Glucose as ordered  - Assess for signs and symptoms of hyperglycemia and hypoglycemia  - Administer ordered medications to maintain glucose within target range  - Assess nutritional intake and initiate nutrition service referral as needed  Outcome: Progressing     Problem: HEMATOLOGIC - ADULT  Goal: Maintains hematologic stability  Description: INTERVENTIONS  - Assess for signs and symptoms of bleeding or hemorrhage  - Monitor labs  - Administer supportive blood products/factors as ordered and appropriate  Outcome: Progressing     Problem: PAIN - ADULT  Goal: Verbalizes/displays adequate comfort level or baseline comfort level  Description: Interventions:  - Encourage patient to monitor pain and request assistance  - Assess pain using appropriate pain scale  - Administer analgesics based on type and severity of pain and evaluate response  - Implement non-pharmacological measures as appropriate and evaluate response  - Consider cultural and social influences on pain and pain management  - Notify physician/advanced practitioner if interventions unsuccessful or patient reports new pain  Outcome: Progressing     Problem: INFECTION - ADULT  Goal: Absence or prevention of progression during hospitalization  Description: INTERVENTIONS:  - Assess and monitor for signs and symptoms of infection  - Monitor lab/diagnostic results  - Monitor all insertion sites, i.e. indwelling lines, tubes, and drains  - Monitor endotracheal if appropriate and nasal secretions for  changes in amount and color  - [place] appropriate cooling/warming therapies per order  - Administer medications as ordered  - Instruct and encourage patient and family to use good hand hygiene technique  - Identify and instruct in appropriate isolation precautions for identified infection/condition  Outcome: Progressing  Goal: Absence of fever/infection during neutropenic period  Description: INTERVENTIONS:  - Monitor WBC    Outcome: Progressing     Problem: SAFETY ADULT  Goal: Patient will remain free of falls  Description: INTERVENTIONS:  - Educate patient/family on patient safety including physical limitations  - Instruct patient to call for assistance with activity   - Consult OT/PT to assist with strengthening/mobility   - Keep Call bell within reach  - Keep bed low and locked with side rails adjusted as appropriate  - Keep care items and personal belongings within reach  - Initiate and maintain comfort rounds  - Make Fall Risk Sign visible to staff  - Offer Toileting every 2 Hours, in advance of need  - Initiate/Maintain bed/chair alarm  - Apply yellow socks and bracelet for high fall risk patients  - Consider moving patient to room near nurses station  Outcome: Progressing  Goal: Maintain or return to baseline ADL function  Description: INTERVENTIONS:  -  Assess patient's ability to carry out ADLs; assess patient's baseline for ADL function and identify physical deficits which impact ability to perform ADLs (bathing, care of mouth/teeth, toileting, grooming, dressing, etc.)  - Assess/evaluate cause of self-care deficits   - Assess range of motion  - Assess patient's mobility; develop plan if impaired  - Assess patient's need for assistive devices and provide as appropriate  - Encourage maximum independence but intervene and supervise when necessary  - Involve family in performance of ADLs  - Assess for home care needs following discharge   - Consider OT consult to assist with ADL evaluation and planning  for discharge  - Provide patient education as appropriate  Outcome: Progressing  Goal: Maintains/Returns to pre admission functional level  Description: INTERVENTIONS:  - Perform AM-PAC 6 Click Basic Mobility/ Daily Activity assessment daily.  - Set and communicate daily mobility goal to care team and patient/family/caregiver.   - Collaborate with rehabilitation services on mobility goals if consulted  - Perform Range of Motion 3 times a day.  - Reposition patient every 2 hours.  - Dangle patient 3 times a day  - Stand patient 3 times a day  - Ambulate patient 3 times a day  - Out of bed to chair 3 times a day   - Out of bed for meals 3 times a day  - Out of bed for toileting  - Record patient progress and toleration of activity level   Outcome: Progressing     Problem: DISCHARGE PLANNING  Goal: Discharge to home or other facility with appropriate resources  Description: INTERVENTIONS:  - Identify barriers to discharge w/patient and caregiver  - Arrange for needed discharge resources and transportation as appropriate  - Identify discharge learning needs (meds, wound care, etc.)  - Arrange for interpretive services to assist at discharge as needed  - Refer to Case Management Department for coordinating discharge planning if the patient needs post-hospital services based on physician/advanced practitioner order or complex needs related to functional status, cognitive ability, or social support system  Outcome: Progressing     Problem: Knowledge Deficit  Goal: Patient/family/caregiver demonstrates understanding of disease process, treatment plan, medications, and discharge instructions  Description: Complete learning assessment and assess knowledge base.  Interventions:  - Provide teaching at level of understanding  - Provide teaching via preferred learning methods  Outcome: Progressing

## 2025-02-11 NOTE — TRANSPORTATION MEDICAL NECESSITY
"Section I - General Information    Name of Patient: Ean Young                 : 1955    Medicare #: [id number]  Transport Date: 25 (PCS is valid for round trips on this date and for all repetitive trips in the 60-day range as noted below.)  Origin: [facility]                                                         Destination: [facility]  Is the pt's stay covered under Medicare Part A (PPS/DRG)   [x]     Closest appropriate facility? If no, why is transport to more distant facility required? Yes  If hospice pt, is this transport related to pt's terminal illness? NA       Section II - Medical Necessity Questionnaire  Ambulance transportation is medically necessary only if other means of transport are contraindicated or would be potentially harmful to the patient. To meet this requirement, the patient must either be \"bed confined\" or suffer from a condition such that transport by means other than ambulance is contraindicated by the patient's condition. The following questions must be answered by the medical professional signing below for this form to be valid:    1)  Describe the MEDICAL CONDITION (physical and/or mental) of this patient AT THE TIME OF AMBULANCE TRANSPORT that requires the patient to be transported in an ambulance and why transport by other means is contraindicated by the patient's condition:  GI bleed    2) Is the patient \"bed confined\" as defined below?     Yes  To be \"be confined\" the patient must satisfy all three of the following conditions: (1) unable to get up from bed without Assistance; AND (2) unable to ambulate; AND (3) unable to sit in a chair or wheelchair.    3) Can this patient safely be transported by car or wheelchair van (i.e., seated during transport without a medical attendant or monitoring)?   No    4) In addition to completing questions 1-3 above, please check any of the following conditions that apply*:   *Note: " supporting documentation for any boxes checked must be maintained in the patient's medical records.  If hosp-hosp transfer, describe services needed at 2nd facility not available at 1st facility?   IV meds/fluids required   Medical attendant required   Unable to tolerate seated position for time needed to transport   Hemodynamic monitoring required en route      Section III - Signature of Physician or Healthcare Professional  I certify that the above information is true and correct based on my evaluation of this patient, and represent that the patient requires transport by ambulance and that other forms of transport are contraindicated. I understand that this information will be used by the Centers for Medicare and Medicaid Services (CMS) to support the determination of medical necessity for ambulance services, and I represent that I have personal knowledge of the patient's condition at time of transport.    []  If this box is checked, I also certify that the patient is physically or mentally incapable of signing the ambulance service's claim and that the institution with which I am affiliated has furnished care, services, or assistance to the patient.    My signature below is made on behalf of the patient pursuant to 42 CFR §424.36(b)(4). In accordance with 42 CFR §424.37, the specific reason(s) that the patient is physically or mentally incapable of signing the claim form is as follows: .      Signature of Physician* or Healthcare Professional______________________________________________________________  Signature Date 02/11/25 (For scheduled repetitive transports, this form is not valid for transports performed more than 60 days after this date)    Printed Name & Credentials of Physician or Healthcare Professional (MD, DO, RN, etc.)________________________________  *Form must be signed by patient's attending physician for scheduled, repetitive transports. For non-repetitive, unscheduled ambulance transports, if  unable to obtain the signature of the attending physician, any of the following may sign (choose appropriate option below)  [] Physician Assistant []  Clinical Nurse Specialist []  Registered Nurse  []  Nurse Practitioner  [x] Discharge Planner

## 2025-02-11 NOTE — QUICK NOTE
GI BRIEF NOTE:    Plan for colonoscopy today.  N.p.o. for procedure.  Vital signs stable.  Hemoglobin was 5.8.  This may be dilutional.  Receiving blood.  Remainder of labs showing leukopenia and thrombocytopenia.  Further recommendations to follow pending colonoscopy.    Freeman Cary D.O.  [facility]  Division of Gastroenterology & Hepatology  Available on [employer]  [email or web address]

## 2025-02-11 NOTE — ANESTHESIA POSTPROCEDURE EVALUATION
Post-Op Assessment Note    CV Status:  Stable    Pain management: adequate       Mental Status:  Alert and awake   Hydration Status:  Euvolemic   PONV Controlled:  Controlled   Airway Patency:  Patent     Post Op Vitals Reviewed: Yes    No anethesia notable event occurred.    Staff: Anesthesiologist, CRNA           Last Filed PACU Vitals:  Vitals Value Taken Time   Temp 97.8 °F (36.6 °C) 02/11/25 1052   Pulse 74 02/11/25 1057   BP 95/46 02/11/25 1057   Resp 18 02/11/25 1057   SpO2 96 % 02/11/25 1057       Modified Jignesh:     Vitals Value Taken Time   Activity 2 02/11/25 1052   Respiration 2 02/11/25 1052   Circulation 2 02/11/25 1052   Consciousness 2 02/11/25 1052   Oxygen Saturation 2 02/11/25 1052     Modified Jignesh Score: 10

## 2025-02-11 NOTE — CASE MANAGEMENT
Case Management Progress Note    Patient name Ean Young  Location /-01 MRN [id number]  : 1955 Date 2025       LOS (days): 2  Geometric Mean LOS (GMLOS) (days): 2  Days to GMLOS:0.3        OBJECTIVE:        Current admission status: Inpatient  [id number] [address]  Phone: [phone] Fax: [phone]    Primary Care Provider: Timmy Chapman DO    Primary Insurance: [email or web address] REP  Secondary Insurance: [place] [employer] AND [email or web address] [facility]    PROGRESS NOTE:  As per SLIM the pt is going to be transferred to [facility] for a capsule study.  Sent to CM support to see if authorization is needed for transport.  PACS arrange transfer.

## 2025-02-11 NOTE — ANESTHESIA POSTPROCEDURE EVALUATION
Post-Op Assessment Note    CV Status:  Stable    Pain management: adequate       Mental Status:  Alert and awake   Hydration Status:  Euvolemic   PONV Controlled:  Controlled   Airway Patency:  Patent     Post Op Vitals Reviewed: Yes    No anethesia notable event occurred.    Staff: Anesthesiologist, CRNA       Last Filed PACU Vitals:  Vitals Value Taken Time   Temp     Pulse     BP     Resp     SpO2

## 2025-02-11 NOTE — PLAN OF CARE
Problem: GASTROINTESTINAL - ADULT  Goal: Minimal or absence of nausea and/or vomiting  Description: INTERVENTIONS:  - Administer IV fluids if ordered to ensure adequate hydration  - Maintain NPO status until nausea and vomiting are resolved  - Nasogastric tube if ordered  - Administer ordered antiemetic medications as needed  - Provide nonpharmacologic comfort measures as appropriate  - Advance diet as tolerated, if ordered  - Consider nutrition services referral to assist patient with adequate nutrition and appropriate food choices  Outcome: Progressing     Problem: GASTROINTESTINAL - ADULT  Goal: Maintains or returns to baseline bowel function  Description: INTERVENTIONS:  - Assess bowel function  - Encourage oral fluids to ensure adequate hydration  - Administer IV fluids if ordered to ensure adequate hydration  - Administer ordered medications as needed  - Encourage mobilization and activity  - Consider nutritional services referral to assist patient with adequate nutrition and appropriate food choices  Outcome: Progressing     Problem: GASTROINTESTINAL - ADULT  Goal: Maintains adequate nutritional intake  Description: INTERVENTIONS:  - Monitor percentage of each meal consumed  - Identify factors contributing to decreased intake, treat as appropriate  - Assist with meals as needed  - Monitor I&O, weight, and lab values if indicated  - Obtain nutrition services referral as needed  Outcome: Progressing     Problem: PAIN - ADULT  Goal: Verbalizes/displays adequate comfort level or baseline comfort level  Description: Interventions:  - Encourage patient to monitor pain and request assistance  - Assess pain using appropriate pain scale  - Administer analgesics based on type and severity of pain and evaluate response  - Implement non-pharmacological measures as appropriate and evaluate response  - Consider cultural and social influences on pain and pain management  - Notify physician/advanced practitioner if  interventions unsuccessful or patient reports new pain  Outcome: Progressing

## 2025-02-11 NOTE — ASSESSMENT & PLAN NOTE
Patient initially presented with dark tarry stools   Patient diagnosed with an acute on chronic blood loss anemia at time of arrival  Hemoglobin at admission was downtrending, hemoglobin was 8.1 on February 6, and 6.6 at the time of arrival   Status post 2 units of packed red blood cells   Workup thus far:-  #1.CT: No CT evidence of active high-volume gastrointestinal hemorrhage. Linear radiodensity in the distal duodenum suggestive of an endoscopy clip. Correlate with history. No acute intra-abdominal pathology.  #2. S/p EGD 2/5/25: Report reviewed  #3.  Status post a GI evaluation  #4.  Status post EGD earlier today on 2/10/2025-no source of upper GI bleed noted-see the full report for additional details  #5.  2/11 colonoscopy-noted old blood throughout the colon.  Melena throughout ileum.  GI spoke with [facility] who are accepting patient for capsule endoscopy  Hemoglobin this a.m. 5.8-completed unit of PRBCs  Obtaining stat H&H at 1215-planning on further transfusion of PRBCs for hemoglobin less than 7  Patient will be transferred to [facility], awaiting available bed

## 2025-02-11 NOTE — ASSESSMENT & PLAN NOTE
Blood pressure results reviewed  Continue Toprol-XL, Lasix, and tamsulosin  Monitor BP per protocol

## 2025-02-11 NOTE — QUICK NOTE
GI BRIEF NOTE     Spoke with patient and niece about need to transfer to [facility] for capsule study and possible balloon enteroscopy pending results given hematin seen through ileum concerning for small bowel bleeding source. They are in agreement. Appreciate primary team assistance with facilitating transfer. GI team at [facility] aware of patient.

## 2025-02-11 NOTE — ASSESSMENT & PLAN NOTE
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  Will continue to monitor cell lines/counts  Hemoglobin results as above  White blood cell count today is 2.13  Platelet count is down to 103,000  Patient remains afebrile  Continue close CBC monitoring

## 2025-02-11 NOTE — ASSESSMENT & PLAN NOTE
Lab Results   Component Value Date    HGBA1C 5.7 (H) 02/11/2025       Recent Labs     02/10/25  2025 02/11/25  0648 02/11/25  0958 02/11/25  1142   POCGLU 94 84 85 89       Blood Sugar Average: Last 72 hrs:  (P) [id number]  Target blood sugar for the hospital is 140-180  Patient was on clear liquids, n.p.o. for colonoscopy today  Blood sugars well-controlled  Hypoglycemia protocol  BMP in a.m.

## 2025-02-11 NOTE — UTILIZATION REVIEW
NOTIFICATION OF INPATIENT ADMISSION   AUTHORIZATION REQUEST   SERVICING FACILITY:   [place] [address]  Tax ID: [id number]  NPI: [id number]   ATTENDING PROVIDER:  Attending Name and NPI#: Kunal Guallpa Md [[id number]]  Address: [address] [place] [address]  Phone: [phone]     ADMISSION INFORMATION:  Place of Service: Inpatient [facility]  Place of Service Code: 21  Inpatient Admission Date/Time: 2/9/25  7:20 PM  Discharge Date/Time: No discharge date for patient encounter.  Admitting Diagnosis Code/Description:  GI bleed [K92.2]     UTILIZATION REVIEW CONTACT:  Margarita Fraire, Utilization   Network Utilization Review Department  Phone: [phone]  Fax: [phone]  Email: [email or web address]  Contact for approvals/pending authorizations, clinical reviews, and discharge.     PHYSICIAN ADVISORY SERVICES:  Medical Necessity Denial & [employer] Review  Phone: [phone]  Fax: [phone]  Email: [email or web address]     DISCHARGE SUPPORT TEAM:  For Patients Discharge Needs & Updates  Phone: [phone] opt. 2 Fax: [phone]  Email: [email or web address]

## 2025-02-11 NOTE — ANESTHESIA PREPROCEDURE EVALUATION
Procedure:  COLONOSCOPY    Relevant Problems   CARDIO   (+) Essential hypertension   (+) Moderate aortic stenosis      ENDO   (+) Type 2 diabetes mellitus without complication, without long-term current use of insulin (HCC)      GI/HEPATIC   (+) Gastric ulcer   (+) Gastroesophageal reflux disease without esophagitis   (+) Upper GI bleed      /RENAL   (+) Benign prostatic hyperplasia      HEMATOLOGY   (+) Pancytopenia (HCC)      MUSCULOSKELETAL   (+) Cervical spondylosis      NEURO/PSYCH   (+) Anxiety about treatment   (+) Intractable episodic headache      PULMONARY   (+) Chronic obstructive pulmonary disease (HCC)   (+) JM (obstructive sleep apnea)   (+) Obstructive sleep apnea      Ear/Nose/Throat   (+) Laryngopharyngeal reflux (LPR)      Oncology   (+) Head and neck cancer (HCC)      Orthopedic/Musculoskeletal   (+) Bilateral carpal tunnel syndrome   (+) Cervical radiculopathy      Other   (+) Class 2 severe obesity due to excess calories with serious comorbidity and body mass index (BMI) of 37.0 to 37.9 in adult ([facility])   (+) Dyslipidemia        Physical Exam    Airway    Mallampati score: II  TM Distance: >3 FB  Neck ROM: full     Dental       Cardiovascular  Cardiovascular exam normal    Pulmonary  Pulmonary exam normal     Other Findings        Anesthesia Plan  ASA Score- 4     Anesthesia Type- IV sedation with anesthesia with ASA Monitors.         Additional Monitors:     Airway Plan:            Plan Factors-Exercise tolerance (METS): <4 METS.    Chart reviewed. EKG reviewed. Imaging results reviewed. Existing labs reviewed. Patient summary reviewed.    Patient is not a current smoker.              Induction- intravenous.    Postoperative Plan-     Perioperative Resuscitation Plan - Level 1 - Full Code.       Informed Consent- Anesthetic plan and risks discussed with patient.  I personally reviewed this patient with the CRNA. Discussed and agreed on the Anesthesia Plan with the CRNA..      NPO  Status:  Vitals Value Taken Time   Date of last liquid 02/10/25 02/11/25 0936   Time of last liquid 1850 02/11/25 0936   Date of last solid 02/08/25 02/11/25 0936   Time of last solid 1800 02/11/25 0936

## 2025-02-11 NOTE — NURSING NOTE
Patient with blood transfusion in process. Taken off floor to OR for colonoscopy at 0915 so blood transfusion completed by GI team off unit.

## 2025-02-11 NOTE — PROGRESS NOTES
Progress Note - Hospitalist   Name: Ean Young 69 y.o. male I MRN: [id number]  Unit/Bed#: -01 I Date of Admission: 2/9/2025   Date of Service: 2/11/2025 I Hospital Day: 2    Assessment & Plan  Upper GI bleed  Patient initially presented with dark tarry stools   Patient diagnosed with an acute on chronic blood loss anemia at time of arrival  Hemoglobin at admission was downtrending, hemoglobin was 8.1 on February 6, and 6.6 at the time of arrival   Status post 2 units of packed red blood cells   Workup thus far:-  #1.CT: No CT evidence of active high-volume gastrointestinal hemorrhage. Linear radiodensity in the distal duodenum suggestive of an endoscopy clip. Correlate with history. No acute intra-abdominal pathology.  #2. S/p EGD 2/5/25: Report reviewed  #3.  Status post a GI evaluation  #4.  Status post EGD earlier today on 2/10/2025-no source of upper GI bleed noted-see the full report for additional details  #5.  2/11 colonoscopy-noted old blood throughout the colon.  Melena throughout ileum.  GI spoke with [facility] who are accepting patient for capsule endoscopy  Hemoglobin this a.m. 5.8-completed unit of PRBCs  Obtaining stat H&H at 1215-planning on further transfusion of PRBCs for hemoglobin less than 7  Patient will be transferred to [facility], awaiting available bed  Essential hypertension  Blood pressure results reviewed  Continue Toprol-XL, Lasix, and tamsulosin  Monitor BP per protocol  Dyslipidemia  Continue Lipitor  Gastroesophageal reflux disease without esophagitis  Continue IV Protonix  Obstructive sleep apnea  Continue CPAP at at bedtime  Type 2 diabetes mellitus without complication, without long-term current use of insulin ([facility])  Lab Results   Component Value Date    HGBA1C 5.7 (H) 02/11/2025       Recent Labs     02/10/25  2025 02/11/25  0648 02/11/25  0958 02/11/25  1142   POCGLU 94 84 85 89       Blood Sugar Average: Last 72 hrs:  (P) 84.34275259961755121  Target  blood sugar for the hospital is 140-180  Patient was on clear liquids, n.p.o. for colonoscopy today  Blood sugars well-controlled  Hypoglycemia protocol  BMP in a.m.    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  Will continue to monitor cell lines/counts  Hemoglobin results as above  White blood cell count today is 2.13  Platelet count is down to 103,000  Patient remains afebrile  Continue close CBC monitoring    VTE Pharmacologic Prophylaxis: VTE Score: 6 High Risk (Score >/= 5) - Pharmacological DVT Prophylaxis Contraindicated. Sequential Compression Devices Ordered.    Mobility:   Basic Mobility Inpatient Raw Score: 23  JH-HLM Goal: 7: Walk 25 feet or more  JH-HLM Achieved: 5: Stand (1 or more minutes)  JH-HLM Goal NOT achieved. Continue with multidisciplinary rounding and encourage appropriate mobility to improve upon JH-HLM goals.    Patient Centered Rounds: I performed bedside rounds with nursing staff today.   Discussions with Specialists or Other Care Team Provider: GI, nursing, case management    Education and Discussions with Family / Patient: Updated  (niece) via phone.    Current Length of Stay: 2 day(s)  Current Patient Status: Inpatient   Certification Statement: The patient will continue to require additional inpatient hospital stay due to continued management due to GI bleed GI following.  Patient is pending transfer to [facility] awaiting available  bed.    Discharge Plan:  Patient is hemodynamically stable.  Continuing to monitor closely for bleeding.  Pending transfer to [facility] for capsule endoscopy when bed available    Code Status: Level 1 - Full Code    Subjective   Denies any dizziness, lightheadedness, chest pain or palpitations.  Denies pain or discomfort.  Verbalizing needs.    Objective :  Temp:  [97.8 °F (36.6 °C)-99.6 °F (37.6 °C)] 97.8 °F (36.6 °C)  HR:  [61-87] 77  BP: ()/(46-98) 152/59  Resp:  [12-20] 18  SpO2:  [94 %-100 %] 96 %  O2 Device: None (Room air)    Body mass index is 37.76 kg/m².     Input and Output Summary (last 24 hours):     Intake/Output Summary (Last 24 hours) at 2/11/2025 1237  Last data filed at 2/11/2025 1057  Gross per 24 hour   Intake 786.25 ml   Output 3950 ml   Net -3163.75 ml       Physical Exam  Vitals reviewed.   Constitutional:       General: He is not in acute distress.     Appearance: He is well-developed.   HENT:      Head: Normocephalic and atraumatic.   Eyes:      Conjunctiva/sclera: Conjunctivae normal.   Cardiovascular:      Rate and Rhythm: Normal rate and regular rhythm.      Heart sounds: No murmur heard.  Pulmonary:      Effort: Pulmonary effort is normal. No respiratory distress.      Breath sounds: Normal breath sounds. No wheezing or rales.   Abdominal:      General: Bowel sounds are normal. There is no distension.      Palpations: Abdomen is soft.      Tenderness: There is no abdominal tenderness.   Musculoskeletal:         General: No swelling. Normal range of motion.   Skin:     General: Skin is warm and dry.      Capillary Refill: Capillary refill takes less than 2 seconds.   Neurological:      General: No focal deficit present.      Mental Status: He is alert and oriented to person, place, and time. Mental status is at baseline.   Psychiatric:         Mood and Affect: Mood normal.         Behavior: Behavior normal.         Thought Content: Thought content normal.         Judgment: Judgment  normal.           Lines/Drains:              Lab Results: I have reviewed the following results:   Results from last 7 days   Lab Units 02/11/25  0507   WBC Thousand/uL 2.33*   HEMOGLOBIN g/dL 5.8*   HEMATOCRIT % 16.9*   PLATELETS Thousands/uL 103*   SEGS PCT % 69   LYMPHO PCT % 22   MONO PCT % 9   EOS PCT % 0     Results from last 7 days   Lab Units 02/11/25  0507 02/10/25  0518 02/09/25  1037   SODIUM mmol/L 142   < > 141   POTASSIUM mmol/L 3.4*   < > 3.6   CHLORIDE mmol/L 109*   < > 105   CO2 mmol/L 24   < > 24   BUN mg/dL 15   < > 13   CREATININE mg/dL 0.44*   < > 0.47*   ANION GAP mmol/L 9   < > 12   CALCIUM mg/dL 8.3*   < > 8.3*   ALBUMIN g/dL  --   --  3.3*   TOTAL BILIRUBIN mg/dL  --   --  0.79   ALK PHOS U/L  --   --  54   ALT U/L  --   --  28   AST U/L  --   --  21   GLUCOSE RANDOM mg/dL 88   < > 97    < > = values in this interval not displayed.     Results from last 7 days   Lab Units 02/09/25  1037   INR  1.20*     Results from last 7 days   Lab Units 02/11/25  1142 02/11/25  0958 02/11/25  0648 02/10/25  2025 02/10/25  1552 02/10/25  1113 02/10/25  0607 02/09/25  2355 02/09/25  2013 02/06/25  0952 02/06/25  0711 02/05/25  2103   POC GLUCOSE mg/dl 89 85 84 94 80 82 78 86 86 94 81 84     Results from last 7 days   Lab Units 02/11/25  0507   HEMOGLOBIN A1C % 5.7*           Recent Cultures (last 7 days):         Imaging Results Review: I reviewed radiology reports from this admission including: CT abdomen pelvis high-volume bleeding scan.  Other Study Results Review: EKG was reviewed.     Last 24 Hours Medication List:     Current Facility-Administered Medications:     acetaminophen (TYLENOL) tablet 650 mg, Q4H PRN    atorvastatin (LIPITOR) tablet 40 mg, Daily With Dinner    azelastine (ASTELIN) 0.1 % nasal spray 2 spray, BID    brimonidine tartrate 0.2 % ophthalmic solution 1 drop, Q12H    furosemide (LASIX) injection 40 mg, Daily    hydrOXYzine HCL (ATARAX) tablet 25 mg, Q8H PRN    insulin lispro  (HumALOG/ADMELOG) 100 units/mL subcutaneous injection 2-12 Units, TID AC **AND** Fingerstick Glucose (POCT), TID AC    insulin lispro (HumALOG/ADMELOG) 100 units/mL subcutaneous injection 2-12 Units, HS    metoprolol succinate (TOPROL-XL) 24 hr tablet 12.5 mg, Daily    ondansetron (ZOFRAN) injection 4 mg, Q6H PRN    pantoprazole (PROTONIX) injection 40 mg, Q12H HAYLEE    tamsulosin (FLOMAX) capsule 0.4 mg, Daily With Dinner    Administrative Statements   Today, Patient Was Seen By: ESTELA Antonio  I have spent a total time of 40 minutes in caring for this patient on the day of the visit/encounter including Diagnostic results, Counseling / Coordination of care, Documenting in the medical record, Reviewing / ordering tests, medicine, procedures  , Obtaining or reviewing history  , and Communicating with other healthcare professionals .    **Please Note: This note may have been constructed using a voice recognition system.**

## 2025-02-11 NOTE — CASE MANAGEMENT
[facility] received request for transport authorization from Care Manager.   Date of transport:2/11  Type of transport: BLS    Per patient's insurance auth request is not required for BLS transport     Care Manager notified: Melissa BOOGIE    Please reach out to CM for updates on any clinical information.

## 2025-02-12 ENCOUNTER — HOSPITAL ENCOUNTER (INPATIENT)
Facility: HOSPITAL | Age: 70
LOS: 9 days | Discharge: HOME | End: 2025-02-21
Attending: FAMILY MEDICINE | Admitting: FAMILY MEDICINE
Payer: COMMERCIAL

## 2025-02-12 VITALS
OXYGEN SATURATION: 96 % | DIASTOLIC BLOOD PRESSURE: 64 MMHG | HEIGHT: 71 IN | RESPIRATION RATE: 18 BRPM | HEART RATE: 75 BPM | BODY MASS INDEX: 37.9 KG/M2 | WEIGHT: 270.73 LBS | TEMPERATURE: 98.4 F | SYSTOLIC BLOOD PRESSURE: 120 MMHG

## 2025-02-12 DIAGNOSIS — R19.5 OCCULT GI BLEEDING: ICD-10-CM

## 2025-02-12 DIAGNOSIS — K92.2 UPPER GI BLEED: Primary | ICD-10-CM

## 2025-02-12 DIAGNOSIS — D62 ACUTE BLOOD LOSS ANEMIA: ICD-10-CM

## 2025-02-12 DIAGNOSIS — K55.21 ANGIODYSPLASIA OF COLON WITH HEMORRHAGE: ICD-10-CM

## 2025-02-12 PROBLEM — E43 SEVERE PROTEIN-CALORIE MALNUTRITION (HCC): Status: ACTIVE | Noted: 2025-02-12

## 2025-02-12 LAB
ANION GAP SERPL CALCULATED.3IONS-SCNC: 8 MMOL/L (ref 4–13)
BUN SERPL-MCNC: 13 MG/DL (ref 5–25)
CALCIUM SERPL-MCNC: 8 MG/DL (ref 8.4–10.2)
CHLORIDE SERPL-SCNC: 107 MMOL/L (ref 96–108)
CO2 SERPL-SCNC: 26 MMOL/L (ref 21–32)
CREAT SERPL-MCNC: 0.44 MG/DL (ref 0.6–1.3)
ERYTHROCYTE [DISTWIDTH] IN BLOOD BY AUTOMATED COUNT: 17.7 % (ref 11.6–15.1)
GFR SERPL CREATININE-BSD FRML MDRD: 117 ML/MIN/1.73SQ M
GLUCOSE SERPL-MCNC: 103 MG/DL (ref 65–140)
GLUCOSE SERPL-MCNC: 73 MG/DL (ref 65–140)
GLUCOSE SERPL-MCNC: 78 MG/DL (ref 65–140)
GLUCOSE SERPL-MCNC: 81 MG/DL (ref 65–140)
GLUCOSE SERPL-MCNC: 86 MG/DL (ref 65–140)
GLUCOSE SERPL-MCNC: 86 MG/DL (ref 65–140)
GLUCOSE SERPL-MCNC: 91 MG/DL (ref 65–140)
HCT VFR BLD AUTO: 26.1 % (ref 36.5–49.3)
HGB BLD-MCNC: 8.9 G/DL (ref 12–17)
MCH RBC QN AUTO: 31.8 PG (ref 26.8–34.3)
MCHC RBC AUTO-ENTMCNC: 34.1 G/DL (ref 31.4–37.4)
MCV RBC AUTO: 93 FL (ref 82–98)
PLATELET # BLD AUTO: 98 THOUSANDS/UL (ref 149–390)
PMV BLD AUTO: 10.3 FL (ref 8.9–12.7)
POTASSIUM SERPL-SCNC: 3.1 MMOL/L (ref 3.5–5.3)
RBC # BLD AUTO: 2.8 MILLION/UL (ref 3.88–5.62)
SODIUM SERPL-SCNC: 141 MMOL/L (ref 135–147)
WBC # BLD AUTO: 2.38 THOUSAND/UL (ref 4.31–10.16)

## 2025-02-12 PROCEDURE — 82948 REAGENT STRIP/BLOOD GLUCOSE: CPT

## 2025-02-12 PROCEDURE — 94760 N-INVAS EAR/PLS OXIMETRY 1: CPT

## 2025-02-12 PROCEDURE — 80048 BASIC METABOLIC PNL TOTAL CA: CPT

## 2025-02-12 PROCEDURE — 99223 1ST HOSP IP/OBS HIGH 75: CPT | Performed by: FAMILY MEDICINE

## 2025-02-12 PROCEDURE — 99232 SBSQ HOSP IP/OBS MODERATE 35: CPT | Performed by: STUDENT IN AN ORGANIZED HEALTH CARE EDUCATION/TRAINING PROGRAM

## 2025-02-12 PROCEDURE — 85027 COMPLETE CBC AUTOMATED: CPT

## 2025-02-12 PROCEDURE — NC001 PR NO CHARGE

## 2025-02-12 RX ORDER — AZELASTINE 1 MG/ML
2 SPRAY, METERED NASAL 2 TIMES DAILY
Status: CANCELLED | OUTPATIENT
Start: 2025-02-13

## 2025-02-12 RX ORDER — HYDROXYZINE HYDROCHLORIDE 25 MG/1
25 TABLET, FILM COATED ORAL 3 TIMES DAILY
Status: CANCELLED | OUTPATIENT
Start: 2025-02-12

## 2025-02-12 RX ORDER — ONDANSETRON 2 MG/ML
4 INJECTION INTRAMUSCULAR; INTRAVENOUS EVERY 6 HOURS PRN
Status: DISCONTINUED | OUTPATIENT
Start: 2025-02-12 | End: 2025-02-21 | Stop reason: HOSPADM

## 2025-02-12 RX ORDER — CHLORAL HYDRATE 500 MG
1000 CAPSULE ORAL 2 TIMES DAILY
Status: DISCONTINUED | OUTPATIENT
Start: 2025-02-12 | End: 2025-02-21 | Stop reason: HOSPADM

## 2025-02-12 RX ORDER — ATORVASTATIN CALCIUM 40 MG/1
40 TABLET, FILM COATED ORAL
Status: DISCONTINUED | OUTPATIENT
Start: 2025-02-13 | End: 2025-02-21 | Stop reason: HOSPADM

## 2025-02-12 RX ORDER — FLUTICASONE PROPIONATE 50 MCG
2 SPRAY, SUSPENSION (ML) NASAL DAILY
Status: DISCONTINUED | OUTPATIENT
Start: 2025-02-13 | End: 2025-02-21 | Stop reason: HOSPADM

## 2025-02-12 RX ORDER — HYDROXYZINE HYDROCHLORIDE 25 MG/1
25 TABLET, FILM COATED ORAL EVERY 8 HOURS PRN
Status: DISCONTINUED | OUTPATIENT
Start: 2025-02-12 | End: 2025-02-21 | Stop reason: HOSPADM

## 2025-02-12 RX ORDER — AZELASTINE 1 MG/ML
2 SPRAY, METERED NASAL 2 TIMES DAILY
Status: DISCONTINUED | OUTPATIENT
Start: 2025-02-13 | End: 2025-02-21 | Stop reason: HOSPADM

## 2025-02-12 RX ORDER — METOPROLOL SUCCINATE 25 MG/1
12.5 TABLET, EXTENDED RELEASE ORAL DAILY
Status: CANCELLED | OUTPATIENT
Start: 2025-02-13

## 2025-02-12 RX ORDER — ACETAMINOPHEN 325 MG/1
650 TABLET ORAL EVERY 4 HOURS PRN
Status: DISCONTINUED | OUTPATIENT
Start: 2025-02-12 | End: 2025-02-21 | Stop reason: HOSPADM

## 2025-02-12 RX ORDER — TAMSULOSIN HYDROCHLORIDE 0.4 MG/1
0.4 CAPSULE ORAL
Status: DISCONTINUED | OUTPATIENT
Start: 2025-02-13 | End: 2025-02-21 | Stop reason: HOSPADM

## 2025-02-12 RX ORDER — AZELASTINE 1 MG/ML
2 SPRAY, METERED NASAL 2 TIMES DAILY
Status: DISCONTINUED | OUTPATIENT
Start: 2025-02-13 | End: 2025-02-12

## 2025-02-12 RX ORDER — CHLORAL HYDRATE 500 MG
1000 CAPSULE ORAL 2 TIMES DAILY
Status: CANCELLED | OUTPATIENT
Start: 2025-02-12

## 2025-02-12 RX ORDER — FAMOTIDINE 20 MG/1
20 TABLET, FILM COATED ORAL
Status: DISCONTINUED | OUTPATIENT
Start: 2025-02-12 | End: 2025-02-21 | Stop reason: HOSPADM

## 2025-02-12 RX ORDER — INSULIN LISPRO 100 [IU]/ML
2-12 INJECTION, SOLUTION INTRAVENOUS; SUBCUTANEOUS
Status: DISCONTINUED | OUTPATIENT
Start: 2025-02-13 | End: 2025-02-21 | Stop reason: HOSPADM

## 2025-02-12 RX ORDER — POTASSIUM CHLORIDE 1500 MG/1
40 TABLET, EXTENDED RELEASE ORAL ONCE
Status: COMPLETED | OUTPATIENT
Start: 2025-02-12 | End: 2025-02-12

## 2025-02-12 RX ORDER — ECHINACEA PURPUREA EXTRACT 125 MG
1 TABLET ORAL
Status: DISCONTINUED | OUTPATIENT
Start: 2025-02-12 | End: 2025-02-21 | Stop reason: HOSPADM

## 2025-02-12 RX ORDER — BRIMONIDINE TARTRATE 1.5 MG/ML
1 SOLUTION/ DROPS OPHTHALMIC 2 TIMES DAILY
Status: DISCONTINUED | OUTPATIENT
Start: 2025-02-12 | End: 2025-02-12

## 2025-02-12 RX ORDER — FLUTICASONE PROPIONATE 50 MCG
2 SPRAY, SUSPENSION (ML) NASAL DAILY
Status: CANCELLED | OUTPATIENT
Start: 2025-02-13

## 2025-02-12 RX ORDER — METOPROLOL SUCCINATE 25 MG/1
12.5 TABLET, EXTENDED RELEASE ORAL DAILY
Status: DISCONTINUED | OUTPATIENT
Start: 2025-02-13 | End: 2025-02-21 | Stop reason: HOSPADM

## 2025-02-12 RX ORDER — PANTOPRAZOLE SODIUM 40 MG/1
40 TABLET, DELAYED RELEASE ORAL 2 TIMES DAILY
Status: CANCELLED | OUTPATIENT
Start: 2025-02-12

## 2025-02-12 RX ORDER — PANTOPRAZOLE SODIUM 40 MG/1
40 TABLET, DELAYED RELEASE ORAL 2 TIMES DAILY
Status: DISCONTINUED | OUTPATIENT
Start: 2025-02-12 | End: 2025-02-12

## 2025-02-12 RX ORDER — ATORVASTATIN CALCIUM 40 MG/1
40 TABLET, FILM COATED ORAL DAILY
Status: DISCONTINUED | OUTPATIENT
Start: 2025-02-13 | End: 2025-02-12

## 2025-02-12 RX ORDER — TAMSULOSIN HYDROCHLORIDE 0.4 MG/1
0.4 CAPSULE ORAL
Status: DISCONTINUED | OUTPATIENT
Start: 2025-02-13 | End: 2025-02-12

## 2025-02-12 RX ORDER — FAMOTIDINE 20 MG/1
20 TABLET, FILM COATED ORAL
Status: CANCELLED | OUTPATIENT
Start: 2025-02-12

## 2025-02-12 RX ORDER — FUROSEMIDE 10 MG/ML
40 INJECTION INTRAMUSCULAR; INTRAVENOUS DAILY
Status: DISCONTINUED | OUTPATIENT
Start: 2025-02-13 | End: 2025-02-18

## 2025-02-12 RX ORDER — TAMSULOSIN HYDROCHLORIDE 0.4 MG/1
0.4 CAPSULE ORAL
Status: CANCELLED | OUTPATIENT
Start: 2025-02-13

## 2025-02-12 RX ORDER — PANTOPRAZOLE SODIUM 40 MG/10ML
40 INJECTION, POWDER, LYOPHILIZED, FOR SOLUTION INTRAVENOUS EVERY 12 HOURS SCHEDULED
Status: DISCONTINUED | OUTPATIENT
Start: 2025-02-12 | End: 2025-02-18

## 2025-02-12 RX ORDER — HYDROXYZINE HYDROCHLORIDE 25 MG/1
25 TABLET, FILM COATED ORAL 3 TIMES DAILY
Status: DISCONTINUED | OUTPATIENT
Start: 2025-02-12 | End: 2025-02-14

## 2025-02-12 RX ORDER — METOPROLOL SUCCINATE 25 MG/1
12.5 TABLET, EXTENDED RELEASE ORAL DAILY
Status: DISCONTINUED | OUTPATIENT
Start: 2025-02-13 | End: 2025-02-12

## 2025-02-12 RX ORDER — ATORVASTATIN CALCIUM 40 MG/1
40 TABLET, FILM COATED ORAL DAILY
Status: CANCELLED | OUTPATIENT
Start: 2025-02-13

## 2025-02-12 RX ORDER — BRIMONIDINE TARTRATE 2 MG/ML
1 SOLUTION/ DROPS OPHTHALMIC EVERY 12 HOURS
Status: DISCONTINUED | OUTPATIENT
Start: 2025-02-13 | End: 2025-02-21 | Stop reason: HOSPADM

## 2025-02-12 RX ORDER — BRIMONIDINE TARTRATE 1.5 MG/ML
1 SOLUTION/ DROPS OPHTHALMIC 2 TIMES DAILY
Status: CANCELLED | OUTPATIENT
Start: 2025-02-12

## 2025-02-12 RX ORDER — INSULIN LISPRO 100 [IU]/ML
2-12 INJECTION, SOLUTION INTRAVENOUS; SUBCUTANEOUS
Status: DISCONTINUED | OUTPATIENT
Start: 2025-02-12 | End: 2025-02-21 | Stop reason: HOSPADM

## 2025-02-12 RX ADMIN — HYDROXYZINE HYDROCHLORIDE 25 MG: 25 TABLET, FILM COATED ORAL at 22:37

## 2025-02-12 RX ADMIN — PANTOPRAZOLE SODIUM 40 MG: 40 INJECTION, POWDER, FOR SOLUTION INTRAVENOUS at 08:21

## 2025-02-12 RX ADMIN — POTASSIUM CHLORIDE 40 MEQ: 1500 TABLET, EXTENDED RELEASE ORAL at 08:20

## 2025-02-12 RX ADMIN — FUROSEMIDE 40 MG: 10 INJECTION, SOLUTION INTRAMUSCULAR; INTRAVENOUS at 08:21

## 2025-02-12 RX ADMIN — SALINE NASAL SPRAY 1 SPRAY: 1.5 SOLUTION NASAL at 08:08

## 2025-02-12 RX ADMIN — AZELASTINE HYDROCHLORIDE 2 SPRAY: 137 SPRAY, METERED NASAL at 17:29

## 2025-02-12 RX ADMIN — OMEGA-3 FATTY ACIDS CAP 1000 MG 1000 MG: 1000 CAP at 22:37

## 2025-02-12 RX ADMIN — FAMOTIDINE 20 MG: 20 TABLET, FILM COATED ORAL at 22:37

## 2025-02-12 RX ADMIN — BRIMONIDINE TARTRATE 1 DROP: 2 SOLUTION/ DROPS OPHTHALMIC at 08:07

## 2025-02-12 RX ADMIN — AZELASTINE HYDROCHLORIDE 2 SPRAY: 137 SPRAY, METERED NASAL at 08:07

## 2025-02-12 RX ADMIN — ATORVASTATIN CALCIUM 40 MG: 40 TABLET, FILM COATED ORAL at 17:32

## 2025-02-12 RX ADMIN — TAMSULOSIN HYDROCHLORIDE 0.4 MG: 0.4 CAPSULE ORAL at 17:32

## 2025-02-12 RX ADMIN — METOPROLOL SUCCINATE 12.5 MG: 25 TABLET, EXTENDED RELEASE ORAL at 08:20

## 2025-02-12 RX ADMIN — PANTOPRAZOLE SODIUM 40 MG: 40 INJECTION, POWDER, FOR SOLUTION INTRAVENOUS at 23:24

## 2025-02-12 NOTE — PLAN OF CARE
Problem: GASTROINTESTINAL - ADULT  Goal: Minimal or absence of nausea and/or vomiting  Description: INTERVENTIONS:  - Administer IV fluids if ordered to ensure adequate hydration  - Maintain NPO status until nausea and vomiting are resolved  - Nasogastric tube if ordered  - Administer ordered antiemetic medications as needed  - Provide nonpharmacologic comfort measures as appropriate  - Advance diet as tolerated, if ordered  - Consider nutrition services referral to assist patient with adequate nutrition and appropriate food choices  Outcome: Progressing     Problem: GASTROINTESTINAL - ADULT  Goal: Maintains or returns to baseline bowel function  Description: INTERVENTIONS:  - Assess bowel function  - Encourage oral fluids to ensure adequate hydration  - Administer IV fluids if ordered to ensure adequate hydration  - Administer ordered medications as needed  - Encourage mobilization and activity  - Consider nutritional services referral to assist patient with adequate nutrition and appropriate food choices  Outcome: Progressing     Problem: GASTROINTESTINAL - ADULT  Goal: Oral mucous membranes remain intact  Description: INTERVENTIONS  - Assess oral mucosa and hygiene practices  - Implement preventative oral hygiene regimen  - Implement oral medicated treatments as ordered  - Initiate Nutrition services referral as needed  Outcome: Progressing     Problem: PAIN - ADULT  Goal: Verbalizes/displays adequate comfort level or baseline comfort level  Description: Interventions:  - Encourage patient to monitor pain and request assistance  - Assess pain using appropriate pain scale  - Administer analgesics based on type and severity of pain and evaluate response  - Implement non-pharmacological measures as appropriate and evaluate response  - Consider cultural and social influences on pain and pain management  - Notify physician/advanced practitioner if interventions unsuccessful or patient reports new pain  Outcome:  Progressing

## 2025-02-12 NOTE — PROGRESS NOTES
Progress Note - Gastroenterology   Name: Ean Young 69 y.o. 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 Ongoing melena at home prompting presentation to ED, found to have drop in Hb to 6.6. Responded to blood transfusion. HDS. CT bleeding scan negative but continued to have melenic output and required additional transfusion.     Underwent EGD 2/10 and colonoscopy 2/11. EGD without acute findings, previous clips intact and no evidence of bleeding. Colonoscopy with hematin throughout colon and in terminal ileum, up to approximately 30cm. Unable to locate source. He was recommended transfer to [facility] for capsule and likely balloon enteroscopy given suspicion for small bowel bleeding source.     Plan:   - OK for CLD   - awaiting transfer to [facility]    - two large bore IV at all times  - monitor Hb and transfuse prn to maintain >7   - continue IV PPI   - updated patient's niece via phone on plan daily   - awaiting transfer to [facility] for inpatient capsule study and further management   Gastroesophageal reflux disease without esophagitis  PPI as above   Type 2 diabetes mellitus without complication, without long-term current use of insulin (HCC)  Lab Results   Component Value Date    HGBA1C 5.7 (H) 02/11/2025       Recent Labs     02/11/25 2059 02/12/25  0012 02/12/25  0632 02/12/25  0707   POCGLU 80 81 78 73       Blood Sugar Average: Last 72 hrs:  (P) 82.97446342471539083    Class 2 severe obesity due to excess calories with serious comorbidity and body mass index (BMI) of 37.0 to 37.9 in adult (HCC)    Head and neck cancer (HCC)  Ongoing Xrt and chemo tx   Pancytopenia (HCC)      I have discussed the above management plan in detail with the primary service.   Gastroenterology  service will follow.    Subjective   Evaluated at bedside. Feeling tired but okay. No BM overnight. No abdominal pain.     Objective :  Temp:  [97.3 °F (36.3 °C)-98.4 °F (36.9 °C)] 97.7 °F (36.5 °C)  HR:  [71-86] 83  BP: ()/(46-83) 122/64  Resp:  [16-20] 18  SpO2:  [93 %-100 %] 98 %  O2 Device: None (Room air)    Physical Exam  Constitutional:       Appearance: Normal appearance.   HENT:      Head: Normocephalic and atraumatic.      Mouth/Throat:      Mouth: Mucous membranes are moist.   Eyes:      Conjunctiva/sclera: Conjunctivae normal.   Cardiovascular:      Rate and Rhythm: Normal rate and regular rhythm.   Pulmonary:      Effort: Pulmonary effort is normal. No respiratory distress.   Abdominal:      General: There is no distension.      Palpations: Abdomen is soft.      Tenderness: There is no abdominal tenderness.   Skin:     General: Skin is warm and dry.   Neurological:      Mental Status: He is alert. Mental status is at baseline.   Psychiatric:         Mood and Affect: Mood normal.           Lab Results: I have reviewed the following results:CBC/BMP:   .     02/12/25  0606 02/12/25  0834   WBC  --  2.38*   HGB  --  8.9*   HCT  --  26.1*   PLT  --  98*   SODIUM 141  --    K 3.1*  --      --    CO2 26  --    BUN 13  --    CREATININE 0.44*  --    GLUC 86  --         Imaging Results Review: I reviewed radiology reports from this admission including: Ultrasound(s).  Other Study Results Review: No additional pertinent studies reviewed.

## 2025-02-12 NOTE — ASSESSMENT & PLAN NOTE
Patient initially presented with dark tarry stools   Patient diagnosed with an acute on chronic blood loss anemia at time of arrival  Hemoglobin at admission was downtrending, hemoglobin was 8.1 on February 6, and 6.6 at the time of arrival   Workup thus far:-  #1.CT: No CT evidence of active high-volume gastrointestinal hemorrhage. Linear radiodensity in the distal duodenum suggestive of an endoscopy clip. Correlate with history. No acute intra-abdominal pathology.  #2. S/p EGD 2/5/25: Report reviewed  #3.  Status post a GI evaluation  #4.  Status post EGD  on 2/10/2025-no source of upper GI bleed noted-see the full report for additional details  #5.  2/11 colonoscopy-noted old blood throughout the colon.  Melena throughout ileum.  Transferring to [facility] for capsule endoscopy   Received a total of 3 PRBCs while inpatient  Hemoglobin is stable today at 8.9

## 2025-02-12 NOTE — ASSESSMENT & PLAN NOTE
Lab Results   Component Value Date    HGBA1C 5.7 (H) 02/11/2025       Recent Labs     02/12/25  0632 02/12/25  0707 02/12/25  1109 02/12/25  1624   POCGLU 78 73 91 103       Blood Sugar Average: Last 72 hrs:  (P) 84.125  Target blood sugar for the hospital is 140-180  Continue clear liquids per GI  Hypoglycemia protocol  Patient is being transferred to [facility] further monitoring of labs to be determined at receiving facility

## 2025-02-12 NOTE — MALNUTRITION/BMI
This medical record reflects one or more clinical indicators suggestive of malnutrition and/or morbid obesity.    Malnutrition Findings:   Adult Malnutrition type: Chronic illness  Adult Degree of Malnutrition: Other severe protein calorie malnutrition  Malnutrition Characteristics: Inadequate energy, Weight loss                360 Statement: Malnutrition related to chronic illness as evidenced by >10% body weight loss in 6 months, >7.5% body weight loss in 3 months, <75% energy intake compared to estimated energy needs for >1-month.  To treat with oral diet and nutrition supplements as tolerated.    BMI Findings:           Body mass index is 37.76 kg/m².     See Nutrition note dated 2/12/2025 in flow sheets for additional details.  Completed nutrition assessment is viewable in the nutrition documentation.

## 2025-02-12 NOTE — PROGRESS NOTES
Progress Note - Hospitalist   Name: Ean Young 69 y.o. male I MRN: [id number]  Unit/Bed#: -01 I Date of Admission: 2/9/2025   Date of Service: 2/12/2025 I Hospital Day: 3    Assessment & Plan  Upper GI bleed  Patient initially presented with dark tarry stools   Patient diagnosed with an acute on chronic blood loss anemia at time of arrival  Hemoglobin at admission was downtrending, hemoglobin was 8.1 on February 6, and 6.6 at the time of arrival   Workup thus far:-  #1.CT: No CT evidence of active high-volume gastrointestinal hemorrhage. Linear radiodensity in the distal duodenum suggestive of an endoscopy clip. Correlate with history. No acute intra-abdominal pathology.  #2. S/p EGD 2/5/25: Report reviewed  #3.  Status post a GI evaluation  #4.  Status post EGD earlier today on 2/10/2025-no source of upper GI bleed noted-see the full report for additional details  #5.  2/11 colonoscopy-noted old blood throughout the colon.  Melena throughout ileum.  GI spoke with [facility] who are accepting patient for capsule endoscopy-we are awaiting an available bed  Received a total of 3 PRBCs while inpatient  Hemoglobin is stable today at 8.9    Essential hypertension  Blood pressure results reviewed  Continue Toprol-XL, Lasix, and tamsulosin  Monitor BP per protocol  Dyslipidemia  Continue Lipitor  Gastroesophageal reflux disease without esophagitis  Continue IV Protonix  Obstructive sleep apnea  Continue CPAP at at bedtime  Type 2 diabetes mellitus without complication, without long-term current use of insulin ([facility])  Lab Results   Component Value Date    HGBA1C 5.7 (H) 02/11/2025       Recent Labs     02/12/25  0012 02/12/25  0632 02/12/25  0707 02/12/25  1109   POCGLU 81 78 73 91       Blood Sugar Average: Last 72 hrs:  (P) [id number]  Target blood sugar for the hospital is 140-180  Continue clear liquids per GI  And is pending transfer to [facility] awaiting bed availability  Blood sugars  reviewed  Hypoglycemia protocol  BMP in a.m.    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  Will continue to monitor cell lines/counts  Hemoglobin results as above  White blood cell count today is 2.38  Platelet count is 98,000  Patient remains afebrile  Continue close CBC monitoring    VTE Pharmacologic Prophylaxis: VTE Score: 6 High Risk (Score >/= 5) - Pharmacological DVT Prophylaxis Contraindicated. Sequential Compression Devices Ordered.    Mobility:   Basic Mobility Inpatient Raw Score: 23  JH-HLM Goal: 7: Walk 25 feet or more  JH-HLM Achieved: 5: Stand (1 or more minutes)  JH-HLM Goal NOT achieved. Continue with multidisciplinary rounding and encourage appropriate mobility to improve upon JH-HLM goals.    Patient Centered Rounds: I performed bedside rounds with nursing staff today.   Discussions with Specialists or Other Care Team Provider: GI, nursing, case management    Education and Discussions with Family / Patient: Patient declined call to .     Current Length of Stay: 3 day(s)  Current Patient Status: Inpatient   Certification Statement: The patient will continue to require additional inpatient hospital stay due to continued management due to GI bleed GI following.  Patient is pending transfer to [facility] awaiting available bed.    Discharge Plan:  Patient is hemodynamically stable.  Continuing to monitor closely for bleeding.  Hemoglobin is stable  this a.m.  Pending transfer to [facility] for capsule endoscopy when bed available    Code Status: Level 1 - Full Code    Subjective   Denies any dizziness, lightheadedness, chest pain or palpitations.  Denies pain or discomfort.      Objective :  Temp:  [97.3 °F (36.3 °C)-98.4 °F (36.9 °C)] 97.7 °F (36.5 °C)  HR:  [76-86] 83  BP: (119-148)/(61-83) 122/64  Resp:  [16-20] 18  SpO2:  [93 %-100 %] 97 %  O2 Device: None (Room air)    Body mass index is 37.76 kg/m².     Input and Output Summary (last 24 hours):     Intake/Output Summary (Last 24 hours) at 2/12/2025 1335  Last data filed at 2/12/2025 1036  Gross per 24 hour   Intake 310 ml   Output 980 ml   Net -670 ml       Physical Exam  Vitals reviewed.   Constitutional:       General: He is not in acute distress.     Appearance: He is well-developed.   HENT:      Head: Normocephalic and atraumatic.   Eyes:      Conjunctiva/sclera: Conjunctivae normal.   Cardiovascular:      Rate and Rhythm: Normal rate and regular rhythm.      Heart sounds: No murmur heard.  Pulmonary:      Effort: Pulmonary effort is normal. No respiratory distress.      Breath sounds: Normal breath sounds. No wheezing or rales.   Abdominal:      General: Bowel sounds are normal. There is no distension.      Palpations: Abdomen is soft.      Tenderness: There is no abdominal tenderness.   Musculoskeletal:         General: No swelling. Normal range of motion.   Skin:     General: Skin is warm and dry.      Capillary Refill: Capillary refill takes less than 2 seconds.   Neurological:      General: No focal deficit present.      Mental Status: He is alert and oriented to person, place, and time. Mental status is at baseline.   Psychiatric:         Mood and Affect: Mood normal.         Behavior: Behavior normal.         Thought Content: Thought content normal.         Judgment: Judgment normal.           Lines/Drains:              Lab Results: I have reviewed the following results:   Results from last 7 days    Lab Units 02/12/25  0834 02/11/25  1438 02/11/25  0507   WBC Thousand/uL 2.38*  --  2.33*   HEMOGLOBIN g/dL 8.9*   < > 5.8*   HEMATOCRIT % 26.1*   < > 16.9*   PLATELETS Thousands/uL 98*  --  103*   SEGS PCT %  --   --  69   LYMPHO PCT %  --   --  22   MONO PCT %  --   --  9   EOS PCT %  --   --  0    < > = values in this interval not displayed.     Results from last 7 days   Lab Units 02/12/25  0606 02/10/25  0518 02/09/25  1037   SODIUM mmol/L 141   < > 141   POTASSIUM mmol/L 3.1*   < > 3.6   CHLORIDE mmol/L 107   < > 105   CO2 mmol/L 26   < > 24   BUN mg/dL 13   < > 13   CREATININE mg/dL 0.44*   < > 0.47*   ANION GAP mmol/L 8   < > 12   CALCIUM mg/dL 8.0*   < > 8.3*   ALBUMIN g/dL  --   --  3.3*   TOTAL BILIRUBIN mg/dL  --   --  0.79   ALK PHOS U/L  --   --  54   ALT U/L  --   --  28   AST U/L  --   --  21   GLUCOSE RANDOM mg/dL 86   < > 97    < > = values in this interval not displayed.     Results from last 7 days   Lab Units 02/09/25  1037   INR  1.20*     Results from last 7 days   Lab Units 02/12/25  1109 02/12/25  0707 02/12/25  0632 02/12/25  0012 02/11/25  2059 02/11/25  1606 02/11/25  1142 02/11/25  0958 02/11/25  0648 02/10/25  2025 02/10/25  1552 02/10/25  1113   POC GLUCOSE mg/dl 91 73 78 81 80 76 89 85 84 94 80 82     Results from last 7 days   Lab Units 02/11/25  0507   HEMOGLOBIN A1C % 5.7*           Recent Cultures (last 7 days):         Imaging Results Review: I reviewed radiology reports from this admission including: CT abdomen pelvis high-volume bleeding scan.  Other Study Results Review: EKG was reviewed.     Last 24 Hours Medication List:     Current Facility-Administered Medications:     acetaminophen (TYLENOL) tablet 650 mg, Q4H PRN    atorvastatin (LIPITOR) tablet 40 mg, Daily With Dinner    azelastine (ASTELIN) 0.1 % nasal spray 2 spray, BID    brimonidine tartrate 0.2 % ophthalmic solution 1 drop, Q12H    furosemide (LASIX) injection 40 mg, Daily    hydrOXYzine HCL (ATARAX) tablet 25  mg, Q8H PRN    insulin lispro (HumALOG/ADMELOG) 100 units/mL subcutaneous injection 2-12 Units, TID AC **AND** Fingerstick Glucose (POCT), TID AC    insulin lispro (HumALOG/ADMELOG) 100 units/mL subcutaneous injection 2-12 Units, HS    metoprolol succinate (TOPROL-XL) 24 hr tablet 12.5 mg, Daily    ondansetron (ZOFRAN) injection 4 mg, Q6H PRN    pantoprazole (PROTONIX) injection 40 mg, Q12H HAYLEE    sodium chloride (OCEAN) 0.65 % nasal spray 1 spray, Q1H PRN    tamsulosin (FLOMAX) capsule 0.4 mg, Daily With Dinner    Administrative Statements   Today, Patient Was Seen By: ESTELA Antonio  I have spent a total time of 37 minutes in caring for this patient on the day of the visit/encounter including Diagnostic results, Counseling / Coordination of care, Documenting in the medical record, Reviewing / ordering tests, medicine, procedures  , Obtaining or reviewing history  , and Communicating with other healthcare professionals .    **Please Note: This note may have been constructed using a voice recognition system.**

## 2025-02-12 NOTE — H&P
"H&P - Hospitalist   Name: Ean Young 69 y.o. male I MRN: [id number]  Unit/Bed#: [facility]PHP 808-01 I Date of Admission: (Not on file)   Date of Service: 2/12/2025 I Hospital Day: 0     Assessment & Plan  Upper GI bleed  Pt presented to [facility] ED 2/9/25 after having dark stools and feeling lightheaded  He was hospitalized at [facility] 2/4/25 for GI bleeding - EGD with \"Schatzki ring at GE junction, 3cm type I hiatal hernia, erythematous scarred mucosa in duodenal bulb, small angioectasia in second part of duodenum with stigmata oh recent hemorrhage\"  Recommended PPI BID for 3-6 months and GI follow up outpatient  He returned to [facility] normotensive with hgb drop from 8.1 to 6.6 for which he received 2 units of PRBCs  CT with no evidence of high volume GI bleeding; linear radiodensity in distal duodenum suggestive of endoscopy clip - correlate with history; no acute intraabdominal pathology  Given no GI rounding at [facility] for a few days, pt transferred to [facility]  Pt seen by GI at [place] - EGD and colonoscopy done with no evidence of upper GI bleeding but there was note of old blood throughout the colon and melena throughout the ileum.  GI recommended transfer to [facility] for further GI workup with capsule endoscopy and possible balloon enteroscopy  Repeat hgb 2/11/25 was 5.8 - pt transfused again with hgb up to 8.1 prior to transfer  Will consult GI for continuation of care  Monitor stools and hgb - transfuse as necessary; will get consent on arrival to [facility]  Essential hypertension  BP WNL - continue home regimen of Toprol XL, Lasix, tamsulosin  Dyslipidemia  Continue Lipitor  Lipid panel 1mo prior with high triglycerides and low HDL  Gastroesophageal reflux disease without esophagitis  Continue IV protonix  Obstructive sleep apnea  Compliant with CPAP at bedtime, continue while inpatient  Type 2 diabetes mellitus without complication, without long-term current use of insulin ([facility])  Lab Results "   Component Value Date    HGBA1C 5.7 (H) 02/11/2025       Recent Labs     02/12/25  0632 02/12/25  0707 02/12/25  1109 02/12/25  1624   POCGLU 78 73 91 103       Blood Sugar Average: Last 72 hrs:    Target glucose 140-180 while inpatient  Had been clear liquids/NPO for procedures; will resume carb controlled diet  Sugars appear well controlled on sliding scale insulin with meals and at bedtime  Hypoglycemia protocol    Class 2 severe obesity due to excess calories with serious comorbidity and body mass index (BMI) of 37.0 to 37.9 in adult (HCC)  BMI 37  Educated on healthy lifestyle and diet modification  Benign prostatic hyperplasia  Continue flomax  Head and neck cancer (HCC)  H/o squamous cell carcinoma of the tongue with PET scan 10/30/24 with confirmation as such  [facility]  Pancytopenia (HCC)  Known history of this, due to squamous cell carcinoma of the tongue and having received chemo and radiation in the past  Continue to monitor  Follow hgb as noted above  White cell count 2/11/25 2.13, plt 103,000  Monitor for fevers as well      VTE Pharmacologic Prophylaxis:   High Risk (Score >/= 5) - Pharmacological DVT Prophylaxis Contraindicated. Sequential Compression Devices Ordered.  Code Status: Level 1 - Full Code   Discussion with family: Patient declined call to .     Anticipated Length of Stay: Patient will be admitted on an inpatient basis with an anticipated length of stay of greater than 2 midnights secondary to Upper GI bleed.    History of Present Illness   Chief Complaint: Dark stool    Ean Young is a 69 y.o. male with a PMH of hyperlipidemia, hypertension, GERD, head neck cancer, obstructive sleep apnea, type 2 diabetes who presents with dark stool.  Patient suspected to have an upper GI bleed and was transferred to [facility] for further evaluation by advanced gastroenterology.  Patient denies any further complaints at this  time.    Review of Systems   Constitutional:  Positive for fatigue. Negative for chills and fever.   HENT:  Negative for congestion and sore throat.    Eyes:  Negative for visual disturbance.   Respiratory:  Negative for shortness of breath and wheezing.    Cardiovascular:  Negative for chest pain and palpitations.   Gastrointestinal:  Positive for blood in stool. Negative for nausea and vomiting.   Endocrine: Negative for polydipsia and polyphagia.   Genitourinary:  Negative for dysuria and penile discharge.   Musculoskeletal:  Negative for arthralgias and myalgias.   Skin:  Negative for rash and wound.   Neurological:  Negative for syncope and facial asymmetry.   Psychiatric/Behavioral:  Negative for agitation.        Historical Information   Past Medical History:   Diagnosis Date    Cancer (HCC)     Dyslipidemia     GERD (gastroesophageal reflux disease)     HL (hearing loss)     Hypertension     Essential    Moderate aortic stenosis     Obstructive sleep apnea      Past Surgical History:   Procedure Laterality Date    EGD      EYE SURGERY      laser surgery    SHOULDER ARTHROSCOPY Right     TONGUE SURGERY  07/2024    TONSILLECTOMY       Social History     Tobacco Use    Smoking status: Never     Passive exposure: Never    Smokeless tobacco: Never   Vaping Use    Vaping status: Never Used   Substance and Sexual Activity    Alcohol use: Yes     Comment: rare    Drug use: Never    Sexual activity: Not Currently     E-Cigarette/Vaping    E-Cigarette Use Never User      E-Cigarette/Vaping Substances    Nicotine No     THC No     CBD No     Flavoring No      Family history non-contributory  Social History:  Marital Status: Single       Meds/Allergies   I have reviewed home medications using recent Epic encounter.  Prior to Admission medications    Medication Sig Start Date End Date Taking? Authorizing Provider   atorvastatin (LIPITOR) 40 mg tablet Take 1 tablet (40 mg total) by mouth daily 6/3/24   Benjamin Tsang, DO    azelastine (ASTELIN) 0.1 % nasal spray 2 sprays into each nostril 2 (two) times a day 8/9/24   Timmy Chapman DO   brimonidine tartrate 0.2 % ophthalmic solution INSTILL 1 DROP INTO EACH EYE TWICE DAILY 2/19/23   Historical Provider, MD   famotidine (PEPCID) 20 mg tablet Take 1 tablet (20 mg total) by mouth daily at bedtime 2/7/25   ESTELA Hwang   fluticasone (FLONASE) 50 mcg/act nasal spray 2 sprays into each nostril daily 11/11/24   Timmy Chapman DO   hydrOXYzine pamoate (VISTARIL) 25 mg capsule Take 1 capsule (25 mg total) by mouth 3 (three) times a day as needed for anxiety (And headache) 1/2/25   Timmy Chapman DO   metoprolol succinate (TOPROL-XL) 25 mg 24 hr tablet Take 0.5 tablets (12.5 mg total) by mouth daily 2/6/25   My Garcia DO   Omega-3 Fatty Acids (Fish Oil) 1200 MG CAPS Take by mouth 2 (two) times a day    Historical Provider, MD   pantoprazole (PROTONIX) 40 mg tablet Take 1 tablet (40 mg total) by mouth 2 (two) times a day 2/7/25   ESTELA Hwang   tamsulosin (FLOMAX) 0.4 mg Take 1 capsule (0.4 mg total) by mouth daily with dinner 10/22/24   ESTELA Chan     Allergies   Allergen Reactions    Pollen Extract Other (See Comments)       Objective :  Temp:  [97.7 °F (36.5 °C)-98.4 °F (36.9 °C)] 98.4 °F (36.9 °C)  HR:  [75-83] 75  BP: (119-122)/(63-64) 120/64  Resp:  [16-18] 18  SpO2:  [93 %-98 %] 96 %  O2 Device: None (Room air)    Physical Exam  Vitals reviewed.   Constitutional:       General: He is not in acute distress.     Appearance: He is not ill-appearing.   HENT:      Head: Normocephalic.      Nose: No congestion.      Mouth/Throat:      Pharynx: No oropharyngeal exudate or posterior oropharyngeal erythema.   Eyes:      Conjunctiva/sclera: Conjunctivae normal.   Cardiovascular:      Rate and Rhythm: Normal rate and regular rhythm.   Pulmonary:      Effort: Pulmonary effort is normal.   Abdominal:      General: Abdomen is flat.      Palpations: Abdomen is soft.    Skin:     General: Skin is warm and dry.   Neurological:      Mental Status: He is alert and oriented to person, place, and time. Mental status is at baseline.          Lines/Drains:            Lab Results: I have reviewed the following results:  Results from last 7 days   Lab Units 02/12/25  0834 02/11/25  1438 02/11/25  0507   WBC Thousand/uL 2.38*  --  2.33*   HEMOGLOBIN g/dL 8.9*   < > 5.8*   HEMATOCRIT % 26.1*   < > 16.9*   PLATELETS Thousands/uL 98*  --  103*   SEGS PCT %  --   --  69   LYMPHO PCT %  --   --  22   MONO PCT %  --   --  9   EOS PCT %  --   --  0    < > = values in this interval not displayed.     Results from last 7 days   Lab Units 02/12/25  0606 02/10/25  0518 02/09/25  1037   SODIUM mmol/L 141   < > 141   POTASSIUM mmol/L 3.1*   < > 3.6   CHLORIDE mmol/L 107   < > 105   CO2 mmol/L 26   < > 24   BUN mg/dL 13   < > 13   CREATININE mg/dL 0.44*   < > 0.47*   ANION GAP mmol/L 8   < > 12   CALCIUM mg/dL 8.0*   < > 8.3*   ALBUMIN g/dL  --   --  3.3*   TOTAL BILIRUBIN mg/dL  --   --  0.79   ALK PHOS U/L  --   --  54   ALT U/L  --   --  28   AST U/L  --   --  21   GLUCOSE RANDOM mg/dL 86   < > 97    < > = values in this interval not displayed.     Results from last 7 days   Lab Units 02/09/25  1037   INR  1.20*     Results from last 7 days   Lab Units 02/12/25  1624 02/12/25  1109 02/12/25  0707 02/12/25  0632 02/12/25  0012 02/11/25  2059 02/11/25  1606 02/11/25  1142 02/11/25  0958 02/11/25  0648 02/10/25  2025 02/10/25  1552   POC GLUCOSE mg/dl 103 91 73 78 81 80 76 89 85 84 94 80     Lab Results   Component Value Date    HGBA1C 5.7 (H) 02/11/2025    HGBA1C 6.5 11/11/2024    HGBA1C 7.0 (A) 08/09/2024           Imaging Results Review: I reviewed radiology reports from this admission including: CT abdomen/pelvis and Ultrasound(s).  Other Study Results Review: EKG was reviewed.     Administrative Statements   I have spent a total time of 79 minutes in caring for this patient on the day of the  visit/encounter including Diagnostic results, Prognosis, Risks and benefits of tx options, Instructions for management, Importance of tx compliance, Impressions, Counseling / Coordination of care, Documenting in the medical record, Obtaining or reviewing history  , and Communicating with other healthcare professionals .    ** Please Note: This note has been constructed using a voice recognition system. **

## 2025-02-12 NOTE — ASSESSMENT & PLAN NOTE
Lab Results   Component Value Date    HGBA1C 5.7 (H) 02/11/2025       Recent Labs     02/12/25  0632 02/12/25  0707 02/12/25  1109 02/12/25  1624   POCGLU 78 73 91 103       Blood Sugar Average: Last 72 hrs:    Target glucose 140-180 while inpatient  Had been clear liquids/NPO for procedures; will resume carb controlled diet  Sugars appear well controlled on sliding scale insulin with meals and at bedtime  Hypoglycemia protocol

## 2025-02-12 NOTE — ASSESSMENT & PLAN NOTE
Lab Results   Component Value Date    HGBA1C 5.7 (H) 02/11/2025       Recent Labs     02/11/25 2059 02/12/25  0012 02/12/25  0632 02/12/25  0707   POCGLU 80 81 78 73       Blood Sugar Average: Last 72 hrs:  (P) [id number]

## 2025-02-12 NOTE — ASSESSMENT & PLAN NOTE
Malnutrition Findings:   Adult Malnutrition type: Chronic illness  Adult Degree of Malnutrition: Other severe protein calorie malnutrition  Malnutrition Characteristics: Inadequate energy, Weight loss                  360 Statement: Malnutrition related to chronic illness as evidenced by >10% body weight loss in 6 months, >7.5% body weight loss in 3 months, <75% energy intake compared to estimated energy needs for >1-month.  To treat with oral diet and nutrition supplements as tolerated.    BMI Findings:           Body mass index is 37.76 kg/m².

## 2025-02-12 NOTE — ASSESSMENT & PLAN NOTE
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  Will continue to monitor cell lines/counts  Hemoglobin results as above  White blood cell count today is 2.38  Platelet count is 98,000  Patient remains afebrile  Patient is being transferred to [facility], further monitoring to be determined by receiving campus

## 2025-02-12 NOTE — ASSESSMENT & PLAN NOTE
PET scan 10/30/2024 with confirmation left base of tongue malignancy  Patient on chemo and radiation therapy  [facility]

## 2025-02-12 NOTE — ASSESSMENT & PLAN NOTE
Lab Results   Component Value Date    HGBA1C 5.7 (H) 02/11/2025       Recent Labs     02/12/25  0012 02/12/25  0632 02/12/25  0707 02/12/25  1109   POCGLU 81 78 73 91       Blood Sugar Average: Last 72 hrs:  (P) [id number]  Target blood sugar for the hospital is 140-180  Continue clear liquids per GI  And is pending transfer to [facility] awaiting bed availability  Blood sugars reviewed  Hypoglycemia protocol  BMP in a.m.

## 2025-02-12 NOTE — PLAN OF CARE
Problem: GASTROINTESTINAL - ADULT  Goal: Minimal or absence of nausea and/or vomiting  Description: INTERVENTIONS:  - Administer IV fluids if ordered to ensure adequate hydration  - Maintain NPO status until nausea and vomiting are resolved  - Nasogastric tube if ordered  - Administer ordered antiemetic medications as needed  - Provide nonpharmacologic comfort measures as appropriate  - Advance diet as tolerated, if ordered  - Consider nutrition services referral to assist patient with adequate nutrition and appropriate food choices  Outcome: Progressing     Problem: GASTROINTESTINAL - ADULT  Goal: Maintains adequate nutritional intake  Description: INTERVENTIONS:  - Monitor percentage of each meal consumed  - Identify factors contributing to decreased intake, treat as appropriate  - Assist with meals as needed  - Monitor I&O, weight, and lab values if indicated  - Obtain nutrition services referral as needed  Outcome: Progressing     Problem: METABOLIC, FLUID AND ELECTROLYTES - ADULT  Goal: Electrolytes maintained within normal limits  Description: INTERVENTIONS:  - Monitor labs and assess patient for signs and symptoms of electrolyte imbalances  - Administer electrolyte replacement as ordered  - Monitor response to electrolyte replacements, including repeat lab results as appropriate  - Instruct patient on fluid and nutrition as appropriate  Outcome: Progressing     Problem: HEMATOLOGIC - ADULT  Goal: Maintains hematologic stability  Description: INTERVENTIONS  - Assess for signs and symptoms of bleeding or hemorrhage  - Monitor labs  - Administer supportive blood products/factors as ordered and appropriate  Outcome: Progressing     Problem: PAIN - ADULT  Goal: Verbalizes/displays adequate comfort level or baseline comfort level  Description: Interventions:  - Encourage patient to monitor pain and request assistance  - Assess pain using appropriate pain scale  - Administer analgesics based on type and severity  of pain and evaluate response  - Implement non-pharmacological measures as appropriate and evaluate response  - Consider cultural and social influences on pain and pain management  - Notify physician/advanced practitioner if interventions unsuccessful or patient reports new pain  Outcome: Progressing     Problem: SAFETY ADULT  Goal: Maintain or return to baseline ADL function  Description: INTERVENTIONS:  -  Assess patient's ability to carry out ADLs; assess patient's baseline for ADL function and identify physical deficits which impact ability to perform ADLs (bathing, care of mouth/teeth, toileting, grooming, dressing, etc.)  - Assess/evaluate cause of self-care deficits   - Assess range of motion  - Assess patient's mobility; develop plan if impaired  - Assess patient's need for assistive devices and provide as appropriate  - Encourage maximum independence but intervene and supervise when necessary  - Involve family in performance of ADLs  - Assess for home care needs following discharge   - Consider OT consult to assist with ADL evaluation and planning for discharge  - Provide patient education as appropriate  Outcome: Progressing     Problem: DISCHARGE PLANNING  Goal: Discharge to home or other facility with appropriate resources  Description: INTERVENTIONS:  - Identify barriers to discharge w/patient and caregiver  - Arrange for needed discharge resources and transportation as appropriate  - Identify discharge learning needs (meds, wound care, etc.)  - Arrange for interpretive services to assist at discharge as needed  - Refer to Case Management Department for coordinating discharge planning if the patient needs post-hospital services based on physician/advanced practitioner order or complex needs related to functional status, cognitive ability, or social support system  Outcome: Progressing     Problem: Knowledge Deficit  Goal: Patient/family/caregiver demonstrates understanding of disease process, treatment  plan, medications, and discharge instructions  Description: Complete learning assessment and assess knowledge base.  Interventions:  - Provide teaching at level of understanding  - Provide teaching via preferred learning methods  Outcome: Progressing

## 2025-02-12 NOTE — ASSESSMENT & PLAN NOTE
Known history of this, due to squamous cell carcinoma of the tongue and having received chemo and radiation in the past  Continue to monitor  Follow hgb as noted above  White cell count 2/11/25 2.13, plt 103,000  Monitor for fevers as well

## 2025-02-12 NOTE — DISCHARGE SUMMARY
Discharge Summary - Hospitalist   Name: Ean Young 69 y.o. male I MRN: [id number]  Unit/Bed#: [facility]-01 I Date of Admission: 2/9/2025   Date of Service: 2/12/2025 I Hospital Day: 3     Assessment & Plan  Upper GI bleed  Patient initially presented with dark tarry stools   Patient diagnosed with an acute on chronic blood loss anemia at time of arrival  Hemoglobin at admission was downtrending, hemoglobin was 8.1 on February 6, and 6.6 at the time of arrival   Workup thus far:-  #1.CT: No CT evidence of active high-volume gastrointestinal hemorrhage. Linear radiodensity in the distal duodenum suggestive of an endoscopy clip. Correlate with history. No acute intra-abdominal pathology.  #2. S/p EGD 2/5/25: Report reviewed  #3.  Status post a GI evaluation  #4.  Status post EGD  on 2/10/2025-no source of upper GI bleed noted-see the full report for additional details  #5.  2/11 colonoscopy-noted old blood throughout the colon.  Melena throughout ileum.  Transferring to [facility] for capsule endoscopy   Received a total of 3 PRBCs while inpatient  Hemoglobin is stable today at 8.9    Essential hypertension  Blood pressure results reviewed  Continue Toprol-XL, Lasix, and tamsulosin  Monitor BP per protocol  Dyslipidemia  Continue Lipitor  Gastroesophageal reflux disease without esophagitis  Continue IV Protonix  Obstructive sleep apnea  Continue CPAP at at bedtime  Type 2 diabetes mellitus without complication, without long-term current use of insulin ([facility])  Lab Results   Component Value Date    HGBA1C 5.7 (H) 02/11/2025       Recent Labs     02/12/25  0632 02/12/25  0707 02/12/25  1109 02/12/25  1624   POCGLU 78 73 91 103       Blood Sugar Average: Last 72 hrs:  (P) 84.125  Target blood sugar for the hospital is 140-180  Continue clear liquids per GI  Hypoglycemia protocol  Patient is being transferred to 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  Will continue to monitor cell lines/counts  Hemoglobin results as above  White blood cell count today is 2.38  Platelet count is 98,000  Patient remains afebrile  Patient is being transferred to [facility], further monitoring to be determined by receiving campus  Severe protein-calorie malnutrition (HCC)  Malnutrition Findings:   Adult Malnutrition type: Chronic illness  Adult Degree of Malnutrition: Other severe protein calorie malnutrition  Malnutrition Characteristics: Inadequate energy, Weight loss                  360 Statement: Malnutrition related to chronic illness as evidenced by >10% body weight loss in 6 months, >7.5% body weight loss in 3 months, <75% energy intake compared to estimated energy needs for >1-month.  To treat with oral diet and nutrition supplements as tolerated.    BMI Findings:           Body mass index is 37.76 kg/m².        Medical Problems       Resolved Problems  Date Reviewed: 2/12/2025   None       Discharging Physician / Practitioner: ESTELA Antonio  PCP: Timmy Chapman DO  Admission Date:   Admission Orders (From admission, onward)       Ordered        02/09/25 1922  INPATIENT ADMISSION  Once                          Discharge Date: 02/12/25    Consultations During Hospital Stay:  GI    Procedures Performed:   2/10 EGD: 2 cm type I hiatal hernia; erythematous scarred mucosa in the duodenal bulb: Foreign body second part of duodenum; upper third of  the esophagus and middle third of the esophagus appeared normal; fundus of the stomach, body of the stomach, greater curvature the stomach, lesser curvature of the stomach, antrum and prepyloric region normal  2/10 colonoscopy: Significant amount of hematin in the terminal ileum and throughout the colon, which obscured visualization.  Significant amount of blood seen throughout the colon and terminal ileum.  Old blood in the terminal ileum.  Suggestive of more proximal source of possible bleeding.  No fresh blood or active bleeding seen.  Multiple small and medium diverticula in the sigmoid colon and rectosigmoid colon.  Multiple diverticuli seen in the distal colon.  Containing old blood but no fresh blood seen.  Unlikely source of bleeding given its of blood seen more proximal in the bowel.  Internal small hemorrhoids.  2/10 and 2/11-received total of 2 units PRBCs    Significant Findings / Test Results:   See EGD colonoscopy results above  2/9 H/H 6.6/19.6  2/10 H/H7.7/22.8  2/11 H/H 5.8/16.9  2/12H/H 8.9/26.1    Incidental Findings:   None    Test Results Pending at Discharge (will require follow up):   None     Outpatient Tests Requested:  None    Complications: None    Reason for Admission: Upper GI bleed    Hospital Course:   Ean Young is a 69 y.o. male patient who originally presented to the hospital on 2/9/2025 due to complaints of dark stools.  Patient has a past medical history of squamous cell tongue cancer on radiation chemotherapy, hypertension, dyslipidemia, anxiety, JM on CPAP who presented with complaints of dark stools.  Of note had recent hospitalization at [facility] 2/4 for GI bleeding.  He was noted to have an EGD with Schatzki ring at the GE junction, 3 cm type hiatal hernia erythematous scarred mucosa in the duodenal bulb, and small angioectasia in the second part of the duodenum with stigmata of recent hemorrhage.  Was recommended that he be on PPI twice a day for 3 to 6  "months avoid NSAIDs and have outpatient GI follow-up.  On arrival to the ED patient had a hemoglobin of 6.6.  He was typed and crossed for 2 units of PRBCs with order to transfuse 1 unit with follow-up H&H which was above 7.    He had a CT high-volume bleeding scan which was negative.  Hemoglobin was monitored closely throughout and patient received a total of 2 PRBCs while inpatient.  He has been tolerating clear liquids.  GI followed patient throughout and did EGD/colonoscopy see results above.  GI discussed with \[facility]\"" who agreed to transfer patient for capsule endoscopy.  Bed is now available and he is being transferred to \[facility]\"".  He is hemodynamically stable, hemoglobin this a.m. was 8.9 no further signs of bleeding.          Please see above list of diagnoses and related plan for additional information.     Condition at Discharge: good    Discharge Day Visit / Exam:   Subjective: Denies any dizziness, lightness, chest pain or palpitations.  Denies abdominal discomfort or nausea  Vitals: Blood Pressure: 120/64 (02/12/25 1412)  Pulse: 75 (02/12/25 1412)  Temperature: 98.4 °F (36.9 °C) (02/12/25 1412)  Temp Source: Oral (02/12/25 1412)  Respirations: 18 (02/12/25 1412)  Height: 5' 11\" (180.3 cm) (02/09/25 1928)  Weight - Scale: 123 kg (270 lb 11.6 oz) (02/09/25 1928)  SpO2: 96 % (02/12/25 1412)  Physical Exam  Vitals reviewed.   Constitutional:       General: He is not in acute distress.     Appearance: He is well-developed.   HENT:      Head: Normocephalic and atraumatic.   Cardiovascular:      Rate and Rhythm: Normal rate and regular rhythm.      Heart sounds: No murmur heard.  Pulmonary:      Effort: Pulmonary effort is normal. No respiratory distress.      Breath sounds: No wheezing or rales.   Abdominal:      General: There is no distension.      Palpations: Abdomen is soft.      Tenderness: There is no abdominal tenderness. There is no guarding.   Musculoskeletal:         General: No swelling.   Skin:     " General: Skin is warm and dry.      Capillary Refill: Capillary refill takes less than 2 seconds.   Neurological:      General: No focal deficit present.      Mental Status: He is alert and oriented to person, place, and time. Mental status is at baseline.   Psychiatric:         Mood and Affect: Mood normal.         Behavior: Behavior normal.         Thought Content: Thought content normal.         Judgment: Judgment normal.          Discussion with Family: Patient declined call to .     Discharge instructions/Information to patient and family:   See after visit summary for information provided to patient and family.      Provisions for Follow-Up Care:  See after visit summary for information related to follow-up care and any pertinent home health orders.      Mobility at time of Discharge:   Basic Mobility Inpatient Raw Score: 23  JH-HLM Goal: 7: Walk 25 feet or more  JH-HLM Achieved: 7: Walk 25 feet or more  HLM Goal achieved. Continue to encourage appropriate mobility.     Disposition:   Acute Care Hospital Transfer to [facility] in [place]    Planned Readmission: No    Discharge Medications:  See after visit summary for reconciled discharge medications provided to patient and/or family.      Administrative Statements   Discharge Statement:  I have spent a total time of 40 minutes in caring for this patient on the day of the visit/encounter. >30 minutes of time was spent on: Counseling / Coordination of care, Documenting in the medical record, Reviewing / ordering tests, medicine, procedures  , and Communicating with other healthcare professionals .    **Please Note: This note may have been constructed using a voice recognition system**

## 2025-02-12 NOTE — ASSESSMENT & PLAN NOTE
"Pt presented to [facility] ED 2/9/25 after having dark stools and feeling lightheaded  He was hospitalized at [facility] 2/4/25 for GI bleeding - EGD with \"Schatzki ring at GE junction, 3cm type I hiatal hernia, erythematous scarred mucosa in duodenal bulb, small angioectasia in second part of duodenum with stigmata oh recent hemorrhage\"  Recommended PPI BID for 3-6 months and GI follow up outpatient  He returned to [facility] with hgb drop from 8.1 to 6.6 for which he received 2 units of PRBCs  CT with no evidence of high volume GI bleeding; linear radiodensity in distal duodenum suggestive of endoscopy clip - correlate with history; no acute intraabdominal pathology  Given no GI rounding at [facility] for a few days, pt transferred to [facility]  Pt seen by GI at [place] - EGD and colonoscopy done with no evidence of upper GI bleeding but there was note of old blood throughout the colon and melena throughout the ileum.  GI recommended transfer to [facility] for further GI workup with capsule endoscopy and possible balloon enteroscopy  Repeat hgb 2/11/25 was 5.8 - pt transfused again with hgb up to 8.1 prior to transfer  Will consult GI for continuation of care  Monitor stools and hgb - transfuse as necessary; will get consent on arrival to [facility]  "

## 2025-02-12 NOTE — ASSESSMENT & PLAN NOTE
Patient initially presented with dark tarry stools   Patient diagnosed with an acute on chronic blood loss anemia at time of arrival  Hemoglobin at admission was downtrending, hemoglobin was 8.1 on February 6, and 6.6 at the time of arrival   Workup thus far:-  #1.CT: No CT evidence of active high-volume gastrointestinal hemorrhage. Linear radiodensity in the distal duodenum suggestive of an endoscopy clip. Correlate with history. No acute intra-abdominal pathology.  #2. S/p EGD 2/5/25: Report reviewed  #3.  Status post a GI evaluation  #4.  Status post EGD earlier today on 2/10/2025-no source of upper GI bleed noted-see the full report for additional details  #5.  2/11 colonoscopy-noted old blood throughout the colon.  Melena throughout ileum.  GI spoke with [facility] who are accepting patient for capsule endoscopy-we are awaiting an available bed  Received a total of 3 PRBCs while inpatient  Hemoglobin is stable today at 8.9

## 2025-02-12 NOTE — NUTRITION
"   02/12/25 1336   Biochemical Data,Medical Tests, and Procedures   Biochemical Data/Medical Tests/Procedures Lab values reviewed;Meds reviewed   Labs (Comment) 2/12/2025 glucose 91, hemoglobin 8.9, hematocrit 26.1, potassium 3.1, creatinine 0.44, calcium 8.8   Meds (Comment) Atorvastatin, Lasix, insulin, Toprol, Protonix   Nutrition-Focused Physical Exam   Nutrition-Focused Physical Exam Findings RN skin assessment reviewed;Edema;No skin issues documented  (Nonpitting left upper extremity edema, bilateral lower extremity nonpitting edema)   Nutrition-Focused Physical Exam Findings No signs of muscle/adipose loss noted at present   Medical-Related Concerns HTN, GERD, JM, type 2 diabetes, obesity, head and neck cancer, COPD, laryngopharyngeal reflux, JM, gastric ulcer   Adequacy of Intake   Nutrition Modality PO   Feeding Route   PO Independent   Current PO Intake   Current Diet Order Clear liquid   Current Meal Intake Inadequate   Estimated calorie intake compared to estimated need Nutrient needs are not met   PES Statement   Problem Intake   Energy Balance (1) Inadequate energy intake NI-1.2   Related to Catabolic illness;Other (Comment)  (In addition, acute illness)   As evidenced by: NPO/CL > 3 days;Weight loss;Intake < estimated needs   Recommendations/Interventions   Malnutrition/BMI Present Yes   Adult Malnutrition type Chronic illness   Adult Degree of Malnutrition Other severe protein calorie malnutrition   Malnutrition Characteristics Inadequate energy;Weight loss   360 Statement Malnutrition related to chronic illness as evidenced by >10% body weight loss in 6 months, >7.5% body weight loss in 3 months, <75% energy intake compared to estimated energy needs for >1-month.  To treat with oral diet and nutrition supplements as tolerated.   Summary NPO/Clears. Presents with rectal bleeding. Found to have upper GI bleed. EGD 2/10. Colonoscopy 2/11 showing \"old blood throughout the colon.\" Planned transfer to " [place] for capsule endoscopy. Past medical history significant for HTN, GERD, JM, type 2 diabetes, obesity, head and neck cancer, COPD, laryngopharyngeal reflux, JM, gastric ulcer. Weight history reviewed. 5/13/24 339#, 8/9/24 332#, 11/11/24 311#, 12/31/24 286#, 2/9/25 270#. Noted significant 52# decrease in 6 months (16.1%), 41# decrease in 3 months (13.1%). nonpitting left UE edema, BL LE nonpitting edema. No pressure areas.  Diet advanced from NPO to clear liquids this afternoon. Nutrient needs are not met.  Patient reports having 2 meals daily.  Prior to admission he had been consuming primarily protein drinks, Gatorade, smoothies, water X 3-4 weeks.  His niece, Destinee, helps him follow a diabetes friendly diet.  2/11/2025 5.7.  NKFA.  Patient cooks and grocery shops.  States he lacks any taste in setting of radiation treatment.  Endorses difficulty swallowing.  States dairy gives him thick secretions.  Reports weight loss with chemotherapy.  RD discussed importance of lean mass retention.  Patient wishes to speak with RD upon discharge.  Brochure for outpatient nutrition therapy given.  He offers no nutrition questions at this time.  Anticipate diet advancement as clinically indicated.  Consider ST consult as appropriate.   Interventions/Recommendations Diet to advance;Monitor I & O's;Speech/swallow evaluation   Education Assessment   Education Education not indicated at this time   Patient Nutrition Goals   Goal Tolerate PO diet;Meet PO needs;Adequate hydration;Adequate intake   Goal Status Initiated   Timeframe to complete goal by next f/u   Nutrition Complexity Risk   Follow up date 02/14/25

## 2025-02-12 NOTE — ASSESSMENT & PLAN NOTE
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  Will continue to monitor cell lines/counts  Hemoglobin results as above  White blood cell count today is 2.38  Platelet count is 98,000  Patient remains afebrile  Continue close CBC monitoring

## 2025-02-12 NOTE — ASSESSMENT & PLAN NOTE
H/o squamous cell carcinoma of the tongue with PET scan 10/30/24 with confirmation as such  Pt follows with [facility] oncology for chemo and radiation

## 2025-02-12 NOTE — RESPIRATORY THERAPY NOTE
02/11/25 2240   Respiratory Assessment   Resp Comments woke pt for cpap hs pt declined cpap this hs, pt aware if he changes his mind to let rn know to call me

## 2025-02-12 NOTE — ASSESSMENT & PLAN NOTE
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 Ongoing melena at home prompting presentation to ED, found to have drop in Hb to 6.6. Responded to blood transfusion. HDS. CT bleeding scan negative but continued to have melenic output and required additional transfusion.     Underwent EGD 2/10 and colonoscopy 2/11. EGD without acute findings, previous clips intact and no evidence of bleeding. Colonoscopy with hematin throughout colon and in terminal ileum, up to approximately 30cm. Unable to locate source. He was recommended transfer to [facility] for capsule and likely balloon enteroscopy given suspicion for small bowel bleeding source.     Plan:   - OK for CLD   - awaiting transfer to [facility]    - two large bore IV at all times  - monitor Hb and transfuse prn to maintain >7   - continue IV PPI   - updated patient's niece via phone on plan daily   - awaiting transfer to [facility] for inpatient capsule study and further management

## 2025-02-12 NOTE — ASSESSMENT & PLAN NOTE
PET scan 10/30/2024 with confirmation left base of tongue malignancy  Patient on chemo and radiation therapy  [facility]

## 2025-02-13 ENCOUNTER — ANESTHESIA (OUTPATIENT)
Dept: ANESTHESIOLOGY | Facility: HOSPITAL | Age: 70
End: 2025-02-13

## 2025-02-13 ENCOUNTER — APPOINTMENT (INPATIENT)
Dept: GASTROENTEROLOGY | Facility: HOSPITAL | Age: 70
End: 2025-02-13
Payer: COMMERCIAL

## 2025-02-13 ENCOUNTER — ANESTHESIA EVENT (INPATIENT)
Dept: GASTROENTEROLOGY | Facility: HOSPITAL | Age: 70
End: 2025-02-13
Payer: COMMERCIAL

## 2025-02-13 ENCOUNTER — ANESTHESIA EVENT (OUTPATIENT)
Dept: ANESTHESIOLOGY | Facility: HOSPITAL | Age: 70
End: 2025-02-13

## 2025-02-13 ENCOUNTER — ANESTHESIA (INPATIENT)
Dept: GASTROENTEROLOGY | Facility: HOSPITAL | Age: 70
End: 2025-02-13
Payer: COMMERCIAL

## 2025-02-13 LAB
ABO GROUP BLD BPU: NORMAL
ABO GROUP BLD BPU: NORMAL
ALBUMIN SERPL BCG-MCNC: 2.9 G/DL (ref 3.5–5)
ALP SERPL-CCNC: 51 U/L (ref 34–104)
ALT SERPL W P-5'-P-CCNC: 21 U/L (ref 7–52)
ANION GAP SERPL CALCULATED.3IONS-SCNC: 9 MMOL/L (ref 4–13)
AST SERPL W P-5'-P-CCNC: 17 U/L (ref 13–39)
BASOPHILS # BLD AUTO: 0.01 THOUSANDS/ΜL (ref 0–0.1)
BASOPHILS NFR BLD AUTO: 1 % (ref 0–1)
BILIRUB SERPL-MCNC: 0.84 MG/DL (ref 0.2–1)
BPU ID: NORMAL
BPU ID: NORMAL
BUN SERPL-MCNC: 13 MG/DL (ref 5–25)
CALCIUM ALBUM COR SERPL-MCNC: 8.7 MG/DL (ref 8.3–10.1)
CALCIUM SERPL-MCNC: 7.8 MG/DL (ref 8.4–10.2)
CHLORIDE SERPL-SCNC: 105 MMOL/L (ref 96–108)
CO2 SERPL-SCNC: 26 MMOL/L (ref 21–32)
CREAT SERPL-MCNC: 0.52 MG/DL (ref 0.6–1.3)
CROSSMATCH: NORMAL
CROSSMATCH: NORMAL
EOSINOPHIL # BLD AUTO: 0.01 THOUSAND/ΜL (ref 0–0.61)
EOSINOPHIL NFR BLD AUTO: 1 % (ref 0–6)
ERYTHROCYTE [DISTWIDTH] IN BLOOD BY AUTOMATED COUNT: 17.2 % (ref 11.6–15.1)
GFR SERPL CREATININE-BSD FRML MDRD: 109 ML/MIN/1.73SQ M
GLUCOSE SERPL-MCNC: 116 MG/DL (ref 65–140)
GLUCOSE SERPL-MCNC: 78 MG/DL (ref 65–140)
GLUCOSE SERPL-MCNC: 83 MG/DL (ref 65–140)
GLUCOSE SERPL-MCNC: 84 MG/DL (ref 65–140)
GLUCOSE SERPL-MCNC: 84 MG/DL (ref 65–140)
GLUCOSE SERPL-MCNC: 86 MG/DL (ref 65–140)
GLUCOSE SERPL-MCNC: 94 MG/DL (ref 65–140)
HCT VFR BLD AUTO: 23.5 % (ref 36.5–49.3)
HGB BLD-MCNC: 7.7 G/DL (ref 12–17)
IMM GRANULOCYTES # BLD AUTO: 0.01 THOUSAND/UL (ref 0–0.2)
IMM GRANULOCYTES NFR BLD AUTO: 1 % (ref 0–2)
LYMPHOCYTES # BLD AUTO: 0.36 THOUSANDS/ΜL (ref 0.6–4.47)
LYMPHOCYTES NFR BLD AUTO: 20 % (ref 14–44)
MAGNESIUM SERPL-MCNC: 1.3 MG/DL (ref 1.9–2.7)
MCH RBC QN AUTO: 30.6 PG (ref 26.8–34.3)
MCHC RBC AUTO-ENTMCNC: 32.8 G/DL (ref 31.4–37.4)
MCV RBC AUTO: 93 FL (ref 82–98)
MONOCYTES # BLD AUTO: 0.22 THOUSAND/ΜL (ref 0.17–1.22)
MONOCYTES NFR BLD AUTO: 12 % (ref 4–12)
NEUTROPHILS # BLD AUTO: 1.19 THOUSANDS/ΜL (ref 1.85–7.62)
NEUTS SEG NFR BLD AUTO: 65 % (ref 43–75)
NRBC BLD AUTO-RTO: 0 /100 WBCS
PHOSPHATE SERPL-MCNC: 3.5 MG/DL (ref 2.3–4.1)
PLATELET # BLD AUTO: 101 THOUSANDS/UL (ref 149–390)
PMV BLD AUTO: 10.2 FL (ref 8.9–12.7)
POTASSIUM SERPL-SCNC: 3.2 MMOL/L (ref 3.5–5.3)
PROT SERPL-MCNC: 4.7 G/DL (ref 6.4–8.4)
RBC # BLD AUTO: 2.52 MILLION/UL (ref 3.88–5.62)
SODIUM SERPL-SCNC: 140 MMOL/L (ref 135–147)
UNIT DISPENSE STATUS: NORMAL
UNIT DISPENSE STATUS: NORMAL
UNIT PRODUCT CODE: NORMAL
UNIT PRODUCT CODE: NORMAL
UNIT PRODUCT VOLUME: 350 ML
UNIT PRODUCT VOLUME: 350 ML
UNIT RH: NORMAL
UNIT RH: NORMAL
WBC # BLD AUTO: 1.8 THOUSAND/UL (ref 4.31–10.16)

## 2025-02-13 PROCEDURE — 85025 COMPLETE CBC W/AUTO DIFF WBC: CPT | Performed by: FAMILY MEDICINE

## 2025-02-13 PROCEDURE — 84100 ASSAY OF PHOSPHORUS: CPT | Performed by: FAMILY MEDICINE

## 2025-02-13 PROCEDURE — 99232 SBSQ HOSP IP/OBS MODERATE 35: CPT | Performed by: FAMILY MEDICINE

## 2025-02-13 PROCEDURE — 44369 SMALL BOWEL ENDOSCOPY: CPT | Performed by: INTERNAL MEDICINE

## 2025-02-13 PROCEDURE — 80053 COMPREHEN METABOLIC PANEL: CPT | Performed by: FAMILY MEDICINE

## 2025-02-13 PROCEDURE — 82948 REAGENT STRIP/BLOOD GLUCOSE: CPT

## 2025-02-13 PROCEDURE — 0W3P8ZZ CONTROL BLEEDING IN GASTROINTESTINAL TRACT, VIA NATURAL OR ARTIFICIAL OPENING ENDOSCOPIC: ICD-10-PCS | Performed by: INTERNAL MEDICINE

## 2025-02-13 PROCEDURE — 83735 ASSAY OF MAGNESIUM: CPT | Performed by: FAMILY MEDICINE

## 2025-02-13 RX ORDER — POTASSIUM CHLORIDE 14.9 MG/ML
20 INJECTION INTRAVENOUS
Status: COMPLETED | OUTPATIENT
Start: 2025-02-13 | End: 2025-02-13

## 2025-02-13 RX ORDER — MAGNESIUM SULFATE HEPTAHYDRATE 40 MG/ML
2 INJECTION, SOLUTION INTRAVENOUS ONCE
Status: COMPLETED | OUTPATIENT
Start: 2025-02-13 | End: 2025-02-13

## 2025-02-13 RX ORDER — PROPOFOL 10 MG/ML
INJECTION, EMULSION INTRAVENOUS AS NEEDED
Status: DISCONTINUED | OUTPATIENT
Start: 2025-02-13 | End: 2025-02-13

## 2025-02-13 RX ORDER — PROPOFOL 10 MG/ML
INJECTION, EMULSION INTRAVENOUS CONTINUOUS PRN
Status: DISCONTINUED | OUTPATIENT
Start: 2025-02-13 | End: 2025-02-13

## 2025-02-13 RX ORDER — SODIUM CHLORIDE 9 MG/ML
75 INJECTION, SOLUTION INTRAVENOUS CONTINUOUS
Status: DISCONTINUED | OUTPATIENT
Start: 2025-02-13 | End: 2025-02-13

## 2025-02-13 RX ADMIN — PHENYLEPHRINE HYDROCHLORIDE 100 MCG: 10 INJECTION INTRAVENOUS at 15:23

## 2025-02-13 RX ADMIN — POTASSIUM CHLORIDE 20 MEQ: 14.9 INJECTION, SOLUTION INTRAVENOUS at 11:13

## 2025-02-13 RX ADMIN — PANTOPRAZOLE SODIUM 40 MG: 40 INJECTION, POWDER, FOR SOLUTION INTRAVENOUS at 11:18

## 2025-02-13 RX ADMIN — PANTOPRAZOLE SODIUM 40 MG: 40 INJECTION, POWDER, FOR SOLUTION INTRAVENOUS at 22:31

## 2025-02-13 RX ADMIN — PHENYLEPHRINE HYDROCHLORIDE 20 MCG/MIN: 10 INJECTION INTRAVENOUS at 14:58

## 2025-02-13 RX ADMIN — BRIMONIDINE TARTRATE 1 DROP: 2 SOLUTION/ DROPS OPHTHALMIC at 10:00

## 2025-02-13 RX ADMIN — PROPOFOL 100 MCG/KG/MIN: 10 INJECTION, EMULSION INTRAVENOUS at 14:37

## 2025-02-13 RX ADMIN — OMEGA-3 FATTY ACIDS CAP 1000 MG 1000 MG: 1000 CAP at 22:31

## 2025-02-13 RX ADMIN — ATORVASTATIN CALCIUM 40 MG: 40 TABLET, FILM COATED ORAL at 17:52

## 2025-02-13 RX ADMIN — PROPOFOL 50 MG: 10 INJECTION, EMULSION INTRAVENOUS at 14:44

## 2025-02-13 RX ADMIN — PROPOFOL 50 MG: 10 INJECTION, EMULSION INTRAVENOUS at 14:51

## 2025-02-13 RX ADMIN — SODIUM CHLORIDE 75 ML/HR: 0.9 INJECTION, SOLUTION INTRAVENOUS at 11:10

## 2025-02-13 RX ADMIN — HYDROXYZINE HYDROCHLORIDE 25 MG: 25 TABLET, FILM COATED ORAL at 17:52

## 2025-02-13 RX ADMIN — Medication 40 MG: at 14:50

## 2025-02-13 RX ADMIN — FUROSEMIDE 40 MG: 10 INJECTION, SOLUTION INTRAMUSCULAR; INTRAVENOUS at 11:18

## 2025-02-13 RX ADMIN — MAGNESIUM SULFATE HEPTAHYDRATE 2 G: 40 INJECTION, SOLUTION INTRAVENOUS at 17:51

## 2025-02-13 RX ADMIN — POTASSIUM CHLORIDE 20 MEQ: 14.9 INJECTION, SOLUTION INTRAVENOUS at 13:03

## 2025-02-13 RX ADMIN — PROPOFOL 50 MG: 10 INJECTION, EMULSION INTRAVENOUS at 14:37

## 2025-02-13 RX ADMIN — TAMSULOSIN HYDROCHLORIDE 0.4 MG: 0.4 CAPSULE ORAL at 17:52

## 2025-02-13 RX ADMIN — HYDROXYZINE HYDROCHLORIDE 25 MG: 25 TABLET, FILM COATED ORAL at 22:31

## 2025-02-13 RX ADMIN — FAMOTIDINE 20 MG: 20 TABLET, FILM COATED ORAL at 22:31

## 2025-02-13 RX ADMIN — AZELASTINE HYDROCHLORIDE 2 SPRAY: 137 SPRAY, METERED NASAL at 22:30

## 2025-02-13 RX ADMIN — BRIMONIDINE TARTRATE 1 DROP: 2 SOLUTION/ DROPS OPHTHALMIC at 22:30

## 2025-02-13 NOTE — ANESTHESIA PREPROCEDURE EVALUATION
Procedure:  EGD WITH SINGLE BALLOON ENTEROSCOPY  1.CT: No CT evidence of active high-volume gastrointestinal hemorrhage. Linear radiodensity in the distal duodenum suggestive of an endoscopy clip. Correlate with history. No acute intra-abdominal pathology.  #2. S/p EGD 2/5/25: Report reviewed  #3.  Status post a GI evaluation  #4.  Status post EGD  on 2/10/2025-no source of upper GI bleed noted-see the full report for additional details  #5.  2/11 colonoscopy-noted old blood throughout the colon.  Melena throughout ileum.  Transferring to [facility] [place] for capsule endoscopy   Weight Loss of 100lbs in past 5 weeks, [facility] eradiation started.   Relevant Problems   CARDIO   (+) Essential hypertension   (+) Moderate aortic stenosis      ENDO   (+) Type 2 diabetes mellitus without complication, without long-term current use of insulin (HCC)      GI/HEPATIC   (+) Gastric ulcer   (+) Gastroesophageal reflux disease without esophagitis   (+) Upper GI bleed      /RENAL   (+) Benign prostatic hyperplasia      HEMATOLOGY   (+) Pancytopenia (HCC)      MUSCULOSKELETAL   (+) Cervical spondylosis      NEURO/PSYCH   (+) Anxiety about treatment   (+) Intractable episodic headache      PULMONARY   (+) Chronic obstructive pulmonary disease (HCC)   (+) JM (obstructive sleep apnea)   (+) Obstructive sleep apnea      Oncology   (+) Head and neck cancer (HCC) (           Oral cavity/ Oropharynx: There is a 1.9 x 1.9 cm at the left base of tongue,   resulting in partial effacement of the left vallecula..   )   5/24  Left Ventricle: Left ventricular cavity size is normal. Wall thickness is mildly increased. There is concentric remodeling. The left ventricular ejection fraction is 60% by visual estimation. Systolic function is normal. Although no diagnostic regional wall motion abnormality was identified, this possibility cannot be completely excluded on the basis of this study. Diastolic function is mildly abnormal, consistent with grade I  (abnormal) relaxation.    Right Ventricle: Right ventricular cavity size is normal. Systolic function is normal.    Left Atrium: The atrium is mildly dilated.    Right Atrium: The atrium is mildly dilated.    Aortic Valve: The aortic valve is trileaflet. The leaflets are moderately thickened. The leaflets are moderately calcified. There is moderately reduced mobility. There is moderate stenosis. The aortic valve mean gradient is 29 mmHg. The dimensionless velocity index is 0.29. The aortic valve area is 1.09 cm2.     Physical Exam    Airway  Comment: Voice has changed since startd radiation for Tongue Ca. Now low pitched and Muffled.  Tongue limited in motion forward.  Mallampati score: II  TM Distance: >3 FB  Neck ROM: full     Dental   No notable dental hx     Cardiovascular      Pulmonary      Other Findings        Anesthesia Plan  ASA Score- 3     Anesthesia Type- IV sedation with anesthesia with ASA Monitors.         Additional Monitors:     Airway Plan:            Plan Factors-    Chart reviewed. EKG reviewed. Imaging results reviewed. Existing labs reviewed. Patient summary reviewed.    Patient is not a current smoker.              Induction- intravenous.    Postoperative Plan-     Perioperative Resuscitation Plan - Level 1 - Full Code.       Informed Consent- Anesthetic plan and risks discussed with patient.  I personally reviewed this patient with the CRNA. Discussed and agreed on the Anesthesia Plan with the CRNA..      NPO Status:  Vitals Value Taken Time   Date of last liquid 02/12/25 02/13/25 1321   Time of last liquid     Date of last solid 02/11/25 02/13/25 1319   Time of last solid         Lab Results   Component Value Date    GLUC 78 02/13/2025    GLUF 81 02/05/2025    ALT 21 02/13/2025    AST 17 02/13/2025    BUN 13 02/13/2025    CALCIUM 7.8 (L) 02/13/2025     02/13/2025    CO2 26 02/13/2025    CREATININE 0.52 (L) 02/13/2025    HDL 34 (L) 01/03/2025    INR 1.20 (H) 02/09/2025    HCT 23.5 (L)  02/13/2025    HGB 7.7 (L) 02/13/2025    HGBA1C 5.7 (H) 02/11/2025    MG 1.3 (L) 02/13/2025    PHOS 3.5 02/13/2025     (L) 02/13/2025    K 3.2 (L) 02/13/2025    PSA 0.798 09/26/2024    TRIG 231 (H) 01/03/2025    WBC 1.80 (L) 02/13/2025

## 2025-02-13 NOTE — NURSING NOTE
Patient discharged to \[facility]\"" ([place]). Transport team with patient and all belongings with patient. All iv's taken out and pressure applied. Report called to receiving facility nurse, mika.

## 2025-02-13 NOTE — UTILIZATION REVIEW
NOTIFICATION OF ADMISSION DISCHARGE   This is a Notification of Discharge from [facility]. Please be advised that this patient has been discharge from our facility. Below you will find the admission and discharge date and time including the patient’s disposition.   UTILIZATION REVIEW CONTACT:  Jayden Mejia  Utilization   Network Utilization Review Department  Phone: [phone] x carefully listen to the prompts. All voicemails are confidential.  Email: [email or web address]     ADMISSION INFORMATION  PRESENTATION DATE: 2/9/2025  7:20 PM  OBERVATION ADMISSION DATE: N/A  INPATIENT ADMISSION DATE: 2/9/25  7:20 PM   DISCHARGE DATE: 2/12/2025  8:58 PM   DISPOSITION:[facility] Utilization Review Department  ATTENTION: Please call with any questions or concerns to [phone] and carefully listen to the prompts so that you are directed to the right person. All voicemails are confidential.   For Discharge needs, contact Care Management DC Support Team at [phone] opt. 2  Send all requests for admission clinical reviews, approved or denied determinations and any other requests to dedicated fax number below belonging to the campus where the patient is receiving treatment. List of dedicated fax numbers for the Facilities:  FACILITY NAME UR FAX NUMBER   ADMISSION DENIALS (Administrative/Medical Necessity) [phone]   DISCHARGE SUPPORT TEAM ([facility]) [phone]   PARENT CHILD HEALTH (Maternity/NICU/Pediatrics) [phone]   [facility] [phone]   [facility] [phone]   [facility] [phone]   [facility] [phone]   [facility] [phone]   [facility] [phone]   [facility] [phone]   [facility]  [phone]   [facility] [phone]   [facility] [phone]   [facility] [phone]   [facility] [phone]

## 2025-02-13 NOTE — ASSESSMENT & PLAN NOTE
Known history of this, due to squamous cell carcinoma of the tongue and having received chemo and radiation in the past  Continue to monitor  Follow hgb as noted above  White cell count 2/13/25 1.80, plt 101,000  Monitor for fevers as well

## 2025-02-13 NOTE — PLAN OF CARE
Problem: GASTROINTESTINAL - ADULT  Goal: Minimal or absence of nausea and/or vomiting  Description: INTERVENTIONS:  - Administer IV fluids if ordered to ensure adequate hydration  - Maintain NPO status until nausea and vomiting are resolved  - Nasogastric tube if ordered  - Administer ordered antiemetic medications as needed  - Provide nonpharmacologic comfort measures as appropriate  - Advance diet as tolerated, if ordered  - Consider nutrition services referral to assist patient with adequate nutrition and appropriate food choices  Outcome: Progressing  Goal: Maintains or returns to baseline bowel function  Description: INTERVENTIONS:  - Assess bowel function  - Encourage oral fluids to ensure adequate hydration  - Administer IV fluids if ordered to ensure adequate hydration  - Administer ordered medications as needed  - Encourage mobilization and activity  - Consider nutritional services referral to assist patient with adequate nutrition and appropriate food choices  Outcome: Progressing  Goal: Maintains adequate nutritional intake  Description: INTERVENTIONS:  - Monitor percentage of each meal consumed  - Identify factors contributing to decreased intake, treat as appropriate  - Assist with meals as needed  - Monitor I&O, weight, and lab values if indicated  - Obtain nutrition services referral as needed  Outcome: Progressing  Goal: Establish and maintain optimal ostomy function  Description: INTERVENTIONS:  - Assess bowel function  - Encourage oral fluids to ensure adequate hydration  - Administer IV fluids if ordered to ensure adequate hydration   - Administer ordered medications as needed  - Encourage mobilization and activity  - Nutrition services referral to assist patient with appropriate food choices  - Assess stoma site  - Consider wound care consult   Outcome: Progressing  Goal: Oral mucous membranes remain intact  Description: INTERVENTIONS  - Assess oral mucosa and hygiene practices  - Implement  preventative oral hygiene regimen  - Implement oral medicated treatments as ordered  - Initiate Nutrition services referral as needed  Outcome: Progressing     Problem: HEMATOLOGIC - ADULT  Goal: Maintains hematologic stability  Description: INTERVENTIONS  - Assess for signs and symptoms of bleeding or hemorrhage  - Monitor labs  - Administer supportive blood products/factors as ordered and appropriate  Outcome: Progressing     Problem: DISCHARGE PLANNING  Goal: Discharge to home or other facility with appropriate resources  Description: INTERVENTIONS:  - Identify barriers to discharge w/patient and caregiver  - Arrange for needed discharge resources and transportation as appropriate  - Identify discharge learning needs (meds, wound care, etc.)  - Arrange for interpretive services to assist at discharge as needed  - Refer to Case Management Department for coordinating discharge planning if the patient needs post-hospital services based on physician/advanced practitioner order or complex needs related to functional status, cognitive ability, or social support system  Outcome: Progressing     Problem: Knowledge Deficit  Goal: Patient/family/caregiver demonstrates understanding of disease process, treatment plan, medications, and discharge instructions  Description: Complete learning assessment and assess knowledge base.  Interventions:  - Provide teaching at level of understanding  - Provide teaching via preferred learning methods  Outcome: Progressing

## 2025-02-13 NOTE — ASSESSMENT & PLAN NOTE
"Patient reports a 2 week history of dark stools w/o change in number, frequency (once weekly), consistency, or caliber,with no associated nausea or vomiting  Reports a similar episode \"in the 70s\" r/t an ulcer that required prolonged treatment  Denies any family Hx of similar or cancers of GI tract, alcohol use, or smoking  Hgb on presentation 6.6 w/ 2 u PRBCs given, dropping to 5.8 w/ 2u PRBC given  Lipase - negative  2/4 - CTAP - \" diverticulosis w/o diverticulitis\"  2/5 - EGD - \"Schatzki ring in the GE junction , 3 cm sliding hiatal hernia, localized erythematous and scared mucosa in duodenal bulb, focal area of scarred mucosa and mild erythema in proximal duodenal bulb, single small TSAT in the second part of the duodenum, and with stigmata of recent hemorrhage; the lesion was ablated with argon plasma coagulation using circumferential loop and placement of 1 clip with achieved hemostasis.  Possible AVM versus Dieulafoy lesion in the second portion of duodenum\".  2/9 - High volume CT scan - no active bleeding  2/10 - EGD - \"as above, with no signs of bleeding stigmata or hemorrhage.\"  2/11 - Colonoscopy - \"internal hemorrhoids,multiple small and medium diverticulum in sigmoid and rectosigmoid colon, significant hematin in terminal ileum and old blood suggestive of proximal foci of bleeding\"  NSAID use in 12/24 for \"headaches\"  Ddx: PUD vs. AVM/ dieulafoy lesion   Plan:  EGD w/ Push  Consider Capsule endoscopy if EGD unrevealing.  Continue PPI/H2 as below  Monitor h/h, transfuse if Hgb< 7.0  "

## 2025-02-13 NOTE — ANESTHESIA POSTPROCEDURE EVALUATION
Post-Op Assessment Note    CV Status:  Stable  Pain Score: 0    Pain management: adequate       Mental Status:  Alert and awake   Hydration Status:  Euvolemic   PONV Controlled:  None   Airway Patency:  Patent     Post Op Vitals Reviewed: Yes    No anethesia notable event occurred.    Staff: Anesthesiologist, CRNA   Comments: report given to RN; VSS; 2l/minfor transport      Last Filed PACU Vitals:  Vitals Value Taken Time   Temp 96.6 °F (35.9 °C) 02/13/25 1528   Pulse 73 02/13/25 1528   /84 02/13/25 1528   Resp 18 02/13/25 1528   SpO2 98 % 02/13/25 1528       Modified Jignesh:     Vitals Value Taken Time   Activity 2 02/13/25 1528   Respiration 2 02/13/25 1528   Circulation 2 02/13/25 1528   Consciousness 1 02/13/25 1528   Oxygen Saturation 1 02/13/25 1528     Modified Jignesh Score: 8

## 2025-02-13 NOTE — PROGRESS NOTES
"Progress Note - Hospitalist   Name: Ean Young 69 y.o. male I MRN: [id number]  [facility] I Date of Admission: 2/12/2025   Date of Service: 2/13/2025 I Hospital Day: 1    Assessment & Plan  Upper GI bleed  Pt presented to [facility] ED 2/9/25 after having dark stools and feeling lightheaded  He was hospitalized at [facility] 2/4/25 for GI bleeding - EGD with \"Schatzki ring at GE junction, 3cm type I hiatal hernia, erythematous scarred mucosa in duodenal bulb, small angioectasia in second part of duodenum with stigmata oh recent hemorrhage\"  Recommended PPI BID for 3-6 months and GI follow up outpatient  He returned to [facility] normotensive with hgb drop from 8.1 to 6.6 for which he received 2 units of PRBCs  CT with no evidence of high volume GI bleeding; linear radiodensity in distal duodenum suggestive of endoscopy clip - correlate with history; no acute intraabdominal pathology  Given no GI rounding at [facility] for a few days, pt transferred to [facility]  Pt seen by GI at [place] - EGD and colonoscopy done on 2/10 with no evidence of upper GI bleeding but there was note of old blood throughout the colon and melena throughout the ileum.  GI recommended transfer to [facility] for further GI workup with capsule endoscopy and possible balloon enteroscopy  Repeat hgb 2/11/25 was 5.8 - pt transfused again with hgb up to 8.1 prior to transfer  consulted GI for continuation of care- plan for endoscopy later today (2/13)  Monitor stools and hgb - transfuse as necessary to keep Hg>7; Hg 7.7 on 2/13  Essential hypertension  BP WNL - continue home regimen of Toprol XL, Lasix, tamsulosin  Dyslipidemia  Continue Lipitor  Lipid panel 1mo prior with high triglycerides and low HDL  Gastroesophageal reflux disease without esophagitis  Continue IV protonix  Obstructive sleep apnea  Compliant with CPAP at bedtime, continue while inpatient  Type 2 diabetes mellitus without complication, without long-term current " use of insulin ([facility])  Lab Results   Component Value Date    HGBA1C 5.7 (H) 02/11/2025       Recent Labs     02/13/25  0601 02/13/25  0744 02/13/25  1112 02/13/25  1330   POCGLU 84 94 86 84       Blood Sugar Average: Last 72 hrs:  (P) 86.8  Target glucose 140-180 while inpatient  Had been clear liquids/NPO for procedures; will resume carb controlled diet when cleared by GI  Sugars appear well controlled on sliding scale insulin with meals and at bedtime  Hypoglycemia protocol    Class 2 severe obesity due to excess calories with serious comorbidity and body mass index (BMI) of 37.0 to 37.9 in adult ([facility])  BMI 37  Educated on healthy lifestyle and diet modification  Benign prostatic hyperplasia  Continue flomax  Head and neck cancer (HCC)  H/o squamous cell carcinoma of the tongue with PET scan 10/30/24 with confirmation as such  Pt follows with [facility] oncology for chemo and radiation  Pancytopenia (HCC)  Known history of this, due to squamous cell carcinoma of the tongue and having received chemo and radiation in the past  Continue to monitor  Follow hgb as noted above  White cell count 2/13/25 1.80, plt 101,000  Monitor for fevers as well    VTE Pharmacologic Prophylaxis:   Moderate Risk (Score 3-4) - Pharmacological DVT Prophylaxis Contraindicated. Sequential Compression Devices Ordered.    Mobility:   Basic Mobility Inpatient Raw Score: 23  JH-HLM Goal: 7: Walk 25 feet or more  JH-HLM Achieved: 3: Sit at edge of bed  JH-HLM Goal NOT achieved. Continue with multidisciplinary rounding and encourage appropriate mobility to improve upon JH-HLM goals.    Patient Centered Rounds: I performed bedside rounds with nursing staff today.   Discussions with Specialists or Other Care Team Provider: GI, case    Education and Discussions with Family / Patient:  discussed with the patient.     Current Length of Stay: 1 day(s)  Current Patient Status: Inpatient   Certification Statement: The patient will continue to require  additional inpatient hospital stay due to plan for EGD  Discharge Plan:  when cleared by GI    Code Status: Level 1 - Full Code    Subjective   Patient seen and examined.  He denies any specific concerns, but reports having black stool this morning    Objective :  Temp:  [96.6 °F (35.9 °C)-98.9 °F (37.2 °C)] 96.6 °F (35.9 °C)  HR:  [69-83] 73  BP: (109-159)/(58-84) 116/84  Resp:  [17-18] 18  SpO2:  [94 %-98 %] 98 %  O2 Device: Simple mask    Body mass index is 37.66 kg/m².     Input and Output Summary (last 24 hours):     Intake/Output Summary (Last 24 hours) at 2/13/2025 1534  Last data filed at 2/13/2025 1514  Gross per 24 hour   Intake 700 ml   Output --   Net 700 ml       Physical Exam  Vitals reviewed.   Constitutional:       General: He is not in acute distress.     Appearance: He is not ill-appearing.   HENT:      Head: Normocephalic.   Cardiovascular:      Rate and Rhythm: Normal rate and regular rhythm.   Pulmonary:      Effort: Pulmonary effort is normal. No respiratory distress.   Abdominal:      General: Abdomen is flat.      Palpations: Abdomen is soft.      Tenderness: There is no abdominal tenderness.   Skin:     General: Skin is warm and dry.   Neurological:      Mental Status: He is alert and oriented to person, place, and time. Mental status is at baseline.      Comments: Facial asymmetry (right facial droop) noted, patient reports that is his chronic           Lines/Drains:              Lab Results: I have reviewed the following results:   Results from last 7 days   Lab Units 02/13/25  0456   WBC Thousand/uL 1.80*   HEMOGLOBIN g/dL 7.7*   HEMATOCRIT % 23.5*   PLATELETS Thousands/uL 101*   SEGS PCT % 65   LYMPHO PCT % 20   MONO PCT % 12   EOS PCT % 1     Results from last 7 days   Lab Units 02/13/25  0456   SODIUM mmol/L 140   POTASSIUM mmol/L 3.2*   CHLORIDE mmol/L 105   CO2 mmol/L 26   BUN mg/dL 13   CREATININE mg/dL 0.52*   ANION GAP mmol/L 9   CALCIUM mg/dL 7.8*   ALBUMIN g/dL 2.9*   TOTAL  BILIRUBIN mg/dL 0.84   ALK PHOS U/L 51   ALT U/L 21   AST U/L 17   GLUCOSE RANDOM mg/dL 78     Results from last 7 days   Lab Units 02/09/25  1037   INR  1.20*     Results from last 7 days   Lab Units 02/13/25  1330 02/13/25  1112 02/13/25  0744 02/13/25  0601 02/12/25  2153 02/12/25  1624 02/12/25  1109 02/12/25  0707 02/12/25  0632 02/12/25  0012 02/11/25  2059 02/11/25  1606   POC GLUCOSE mg/dl 84 86 94 84 86 103 91 73 78 81 80 76     Results from last 7 days   Lab Units 02/11/25  0507   HEMOGLOBIN A1C % 5.7*           Recent Cultures (last 7 days):               Last 24 Hours Medication List:     Current Facility-Administered Medications:     acetaminophen (TYLENOL) tablet 650 mg, Q4H PRN    atorvastatin (LIPITOR) tablet 40 mg, Daily With Dinner    azelastine (ASTELIN) 0.1 % nasal spray 2 spray, BID    brimonidine tartrate 0.2 % ophthalmic solution 1 drop, Q12H    famotidine (PEPCID) tablet 20 mg, HS    fish oil capsule 1,000 mg, BID    fluticasone (FLONASE) 50 mcg/act nasal spray 2 spray, Daily    furosemide (LASIX) injection 40 mg, Daily    hydrOXYzine HCL (ATARAX) tablet 25 mg, TID    hydrOXYzine HCL (ATARAX) tablet 25 mg, Q8H PRN    insulin lispro (HumALOG/ADMELOG) 100 units/mL subcutaneous injection 2-12 Units, TID AC **AND** Fingerstick Glucose (POCT), TID AC    insulin lispro (HumALOG/ADMELOG) 100 units/mL subcutaneous injection 2-12 Units, HS    magnesium sulfate 2 g/50 mL IVPB (premix) 2 g, Once    metoprolol succinate (TOPROL-XL) 24 hr tablet 12.5 mg, Daily    ondansetron (ZOFRAN) injection 4 mg, Q6H PRN    pantoprazole (PROTONIX) injection 40 mg, Q12H HAYLEE    sodium chloride (OCEAN) 0.65 % nasal spray 1 spray, Q1H PRN    tamsulosin (FLOMAX) capsule 0.4 mg, Daily With Dinner    Administrative Statements   Today, Patient Was Seen By: Hilda Vasques MD      **Please Note: This note may have been constructed using a voice recognition system.**

## 2025-02-13 NOTE — CONSULTS
"Consultation - Gastroenterology   Name: Ean Young 69 y.o. male I MRN: [id number]  Unit/Bed#: [facility] 808-01 I Date of Admission: 2/12/2025   Date of Service: 2/13/2025 I Hospital Day: 1       Inpatient consult to gastroenterology     Date/Time  2/13/2025 10:52 AM     Performed by  Dilip Lechuga MD   Authorized by  Ean Shea MD           Physician Requesting Evaluation: Hilda Vasques MD   Reason for Evaluation / Principal Problem: Melena    Assessment & Plan  Upper GI bleed  Patient reports a 2 week history of dark stools w/o change in number, frequency (once weekly), consistency, or caliber,with no associated nausea or vomiting  Reports a similar episode \"in the 70s\" r/t an ulcer that required prolonged treatment  Denies any family Hx of similar or cancers of GI tract, alcohol use, or smoking  Hgb on presentation 6.6 w/ 2 u PRBCs given, dropping to 5.8 w/ 2u PRBC given  Lipase - negative  2/4 - CTAP - \" diverticulosis w/o diverticulitis\"  2/5 - EGD - \"Schatzki ring in the GE junction , 3 cm sliding hiatal hernia, localized erythematous and scared mucosa in duodenal bulb, focal area of scarred mucosa and mild erythema in proximal duodenal bulb, single small TSAT in the second part of the duodenum, and with stigmata of recent hemorrhage; the lesion was ablated with argon plasma coagulation using circumferential loop and placement of 1 clip with achieved hemostasis.  Possible AVM versus Dieulafoy lesion in the second portion of duodenum\".  2/9 - High volume CT scan - no active bleeding  2/10 - EGD - \"as above, with no signs of bleeding stigmata or hemorrhage.\"  2/11 - Colonoscopy - \"internal hemorrhoids,multiple small and medium diverticulum in sigmoid and rectosigmoid colon, significant hematin in terminal ileum and old blood suggestive of proximal foci of bleeding\"  NSAID use in 12/24 for \"headaches\"  Ddx: PUD vs. AVM/ dieulafoy lesion   Plan:  EGD w/ Push  Consider Capsule endoscopy if EGD " "unrevealing.  Continue PPI/H2 as below  Monitor h/h, transfuse if Hgb< 7.0  Gastroesophageal reflux disease without esophagitis  Patient takes Pepcid 20mg Qhs and protonix 40mg Bid  Plan:  Continue  PTA medications  Head and neck cancer (HCC)  Receives radiation therapy for tongue cancer      History of Present Illness   HPI:  Ean Young is a 69 y.o. male who presents with melena.PMH of HTN, DLD, GERD w/o esophagitis, BPH, Head and neck cancer w/ active radiation and chemo treatments recently started (follows w/ [facility]), JM and aortic stenosis. He reports the darkening of his stools appearing 2 weeks ago, denies increased frequency or volume of stools and he typically \"only has 1 BM per week.\". He reports a distant hx of similar \"in the 70s\" which required a prolonged treatment. He denies any instigating or relieving factors. Originally he presented to [facility] 2/9 w/ subsequent transfer to [facility] 2/10 for GI consult, and was transferred to [facility] 2/12 for capsule endoscopy and higher level of care. He also reports an additional admission for same complaint 2/4 to 2/6 for which OP f/u w/ GI was planned. He endorses naprosyn use in December of 2024 for \"headaches\". Denies any family hx of GI cancers or GIB, alcohol use or smoking.    Review of Systems   Constitutional: Negative.    HENT: Negative.     Respiratory:  Negative for apnea, cough, chest tightness, shortness of breath and wheezing.    Cardiovascular:  Positive for leg swelling. Negative for chest pain and palpitations.        Reports LLE swelling a few days ago that resolved w/ diuresis   Gastrointestinal:  Positive for blood in stool. Negative for abdominal distention, abdominal pain, anal bleeding, constipation, diarrhea, nausea, rectal pain and vomiting.        Small dark stool overnight per patient   Endocrine: Negative.    Genitourinary: Negative.    Musculoskeletal: Negative.    Skin:  Positive for pallor.   Allergic/Immunologic: Negative.    Neurological: " Negative.    Hematological: Negative.    Psychiatric/Behavioral: Negative.       Medical History Review: I have reviewed the patient's PMH, PSH, Social History, Family History, Meds, and Allergies     Objective :  Temp:  [98.2 °F (36.8 °C)-98.4 °F (36.9 °C)] 98.2 °F (36.8 °C)  HR:  [75-83] 76  BP: (109-120)/(58-64) 109/62  Resp:  [17-18] 17  SpO2:  [94 %-96 %] 96 %  O2 Device: None (Room air)    Physical Exam  Vitals reviewed.   Constitutional:       General: He is not in acute distress.     Appearance: Normal appearance. He is obese. He is not ill-appearing, toxic-appearing or diaphoretic.   HENT:      Head: Normocephalic and atraumatic.      Mouth/Throat:      Mouth: Mucous membranes are moist.   Eyes:      Pupils: Pupils are equal, round, and reactive to light.   Cardiovascular:      Rate and Rhythm: Normal rate and regular rhythm.      Pulses: Normal pulses.      Heart sounds: Normal heart sounds.   Pulmonary:      Effort: Pulmonary effort is normal. No respiratory distress.      Breath sounds: Normal breath sounds. No stridor. No wheezing, rhonchi or rales.   Abdominal:      General: Bowel sounds are normal.      Palpations: Abdomen is soft.      Tenderness: There is abdominal tenderness.      Comments: Tenderness to palpation between right hypochondrium and lumbar regions  Woodruff's sign negative   Musculoskeletal:      Right lower leg: No edema.      Left lower leg: No edema.   Skin:     Capillary Refill: Capillary refill takes less than 2 seconds.      Coloration: Skin is pale. Skin is not jaundiced.      Findings: No bruising, erythema, lesion or rash.   Neurological:      General: No focal deficit present.      Mental Status: He is alert and oriented to person, place, and time.      Comments: Moderately [place]           Lab Results: I have reviewed the following results:CBC/BMP:   .     02/13/25  0456   WBC 1.80*   HGB 7.7*   HCT 23.5*   *   SODIUM 140   K 3.2*      CO2 26   BUN 13   CREATININE  "0.52*   GLUC 78   MG 1.3*   PHOS 3.5    , Creatinine Clearance: Estimated Creatinine Clearance: 180.8 mL/min (A) (by C-G formula based on SCr of 0.52 mg/dL (L))., LFTs:   .     02/13/25  0456   AST 17   ALT 21   ALB 2.9*   TBILI 0.84   ALKPHOS 51    , PTT/INR:No new results in last 24 hours. , Lipase: No results found for: \"LIPASE\", Amylase: No results found for: \"AMYLASE\"    Imaging Results Review: I reviewed radiology reports from this admission including: CT abdomen/pelvis and procedure reports.          Portions of the record may have been created with voice recognition software.  Occasional wrong word or \"sound a like\" substitutions may have occurred due to the inherent limitations of voice recognition software.  Read the chart carefully and recognize, using context, where substitutions have occurred.  ==  Dilip Lechuga MD  [facility]  Internal Medicine Residency PGY-1    "

## 2025-02-13 NOTE — CASE MANAGEMENT
Case Management Assessment & Discharge Planning Note    Patient name Ean Young  Location [facility]01 MRN [id number]  : 1955 Date 2025       Current Admission Date: 2025  Current Admission Diagnosis:Upper GI bleed   Patient Active Problem List    Diagnosis Date Noted Date Diagnosed    Severe protein-calorie malnutrition (HCC) 2025     Pancytopenia (HCC) 2025     Gastric ulcer 2025     Upper GI bleed 2025     JM (obstructive sleep apnea) 2025     Intractable episodic headache 2024     Anxiety about treatment 2024     Other insomnia 2024     Head and neck cancer (HCC) 2024     Benign prostatic hyperplasia 2024     Chronic obstructive pulmonary disease ([facility]) 2024     Class 2 severe obesity due to excess calories with serious comorbidity and body mass index (BMI) of 37.0 to 37.9 in adult ([facility]) 2024     Laryngopharyngeal reflux (LPR) 2024     Cervical spondylosis 10/06/2023     Bilateral carpal tunnel syndrome 10/06/2023     Cervical radiculopathy 2023     Moderate aortic stenosis 2022     Decreased hearing of both ears 2022     Urinary frequency 2022     Type 2 diabetes mellitus without complication, without long-term current use of insulin ([facility]) 2022     Essential hypertension 2022     Dyslipidemia 2022     Gastroesophageal reflux disease without esophagitis 2022     Obstructive sleep apnea 2022       LOS (days): 1  Geometric Mean LOS (GMLOS) (days): 4.5  Days to GMLOS:4     OBJECTIVE:  PATIENT READMITTED TO HOSPITAL  Risk of Unplanned Readmission Score: 19.36         Current admission status: Inpatient       Preferred Pharmacy:   [id number] [place] [facility] [place]  [address] [address]  Phone: [phone] Fax: [phone]    Primary Care Provider: Timmy Chapman DO    Primary Insurance: [employer] RE[employer]  Secondary  Insurance: [place] [employer] [facility]    ASSESSMENT:  Active Health Care Proxies    There are no active Health Care Proxies on file.                 Readmission Root Cause  30 Day Readmission: Yes  During your hospital stay, did someone (provider, nurse, ) explain your care to you in a way you could understand?: Yes  Did you feel medically stable to leave the hospital?: Yes  Were you able to pay for your medication at the pharmacy?: Yes  Did you have reliable transportation to take you to your appointments?: Yes  During previous admission, was a post-acute recommendation made?: No  Patient was readmitted due to: GI bleed  Action Plan: capsule endosccopy    Patient Information  Admitted from:: Facility  Mental Status: Alert                                   DISCHARGE DETAILS:    Discharge planning discussed with:: megha

## 2025-02-13 NOTE — UTILIZATION REVIEW
NOTIFICATION OF INPATIENT ADMISSION      AUTHORIZATION REQUEST   SERVICING FACILITY:   [facility]  Address: [address] [place] [address]  Tax ID: [id number]  NPI: [id number] ATTENDING PROVIDER:  Attending Name and NPI#: Hilda Vasques Md [[id number]]  Address: [address] [place] [address]  Phone: [phone]   ADMISSION INFORMATION:  Place of Service: Inpatient [facility]  Place of Service Code: 21  Inpatient Admission Date/Time: 2/12/25  9:11 PM  Discharge Date/Time: No discharge date for patient encounter.  Admitting Diagnosis Code/Description:  Upper GI bleed     UTILIZATION REVIEW CONTACT:  Aria Perez, Utilization   Network Utilization Review Department  Phone: [phone]  Fax: [phone]  Email: [email or web address]  Contact for approvals/pending authorizations, clinical reviews, and discharge.     PHYSICIAN ADVISORY SERVICES:  Medical Necessity Denial & [facility] Review  Phone: [phone]  Fax: [phone]  Email: [email or web address]     DISCHARGE SUPPORT TEAM:  For Patients Discharge Needs & Updates  Phone: [phone] opt. 2 Fax: [phone]  Email: [email or web address]

## 2025-02-13 NOTE — UTILIZATION REVIEW
Initial Clinical Review    The patient was transferred to [facility] on 2/12/25 from [facility], where care began on 2/9/25. 3 midnights have already been surpassed with active ongoing care.     Admission: Date/Time/Statement:   Admission Orders (From admission, onward)       Ordered        02/12/25 2119  INPATIENT ADMISSION  Once                          Orders Placed This Encounter   Procedures    INPATIENT ADMISSION     Standing Status:   Standing     Number of Occurrences:   1     Level of Care:   Med Surg [16]     Estimated length of stay:   More than 2 Midnights     Certification:   I certify that inpatient services are medically necessary for this patient for a duration of greater than two midnights. See H&P and MD Progress Notes for additional information about the patient's course of treatment.       Initial Presentation: 69 y.o. male with PMHx includes HLD, HTN, GERD, head neck cancer, obstructive sleep apnea, T2DM,  presented due to dark stool, suspected upper GI bleed.  At  normotensive with hgb drop from 8.1 to 6.6 for which he received 2 units of PRBCs. CT with no evidence of high volume GI bleeding; linear radiodensity in distal duodenum suggestive of endoscopy clip - correlate with history; no acute intraabdominal pathology. Given no GI rounding at [facility] for a few days, pt transferred to [facility]. Pt seen by GI at [place] - EGD and colonoscopy done with no evidence of upper GI bleeding but there was note of old blood throughout the colon and melena throughout the ileum. Repeat hgb on 2/11 was 5.8, given transfusion. GI recommended transfer to \[facility]\"" for further GI workup with capsule endoscopy and possible balloon enteroscopy.    Date: 2/13  Day 3: Has surpassed a 2nd midnight with active treatments and services. Plan:  Admit to med surg due to Upper GI Bleed :  GI consult, monitor stool and hgb and transfuse as necessary. Monitor VS, labs,  accuchecks, keep NPO.     Anticipated Length of Stay/Certification Statement: Patient will be admitted on an inpatient basis with an anticipated length of stay of greater than 2 midnights secondary to Upper GI bleed.     2/13 Per GI: Upper GI Bleed: plan EGD with Push, consider Capsule endoscopy if EGD unrevealing. Continue PPI, monitor H&H and transfuse hgb less than 7.    2/13 EGD:  IMPRESSION:  The duodenal bulb, 1st part of the duodenum, 2nd part of the duodenum and proximal jejunum appeared normal.  2 small angioectasias in the 3rd part of the duodenum; induced coagulation with argon plasma coagulation  Prior clip in D2 seen. The base had some erythematous mucosa vs clot. APC was applied to this area  Mild erythematous, hemorrhagic mucosa in the lesser curve of the stomach; induced coagulation with argon plasma coagulation  2 cm type I hiatal hernia  The upper third of the esophagus, middle third of the esophagus and lower third of the esophagus appeared normal.  RECOMMENDATION:  Resume diet  Consider capsule endoscopy       Scheduled Medications:  atorvastatin, 40 mg, Oral, Daily With Dinner  azelastine, 2 spray, Each Nare, BID  brimonidine tartrate, 1 drop, Both Eyes, Q12H  famotidine, 20 mg, Oral, HS  fish oil, 1,000 mg, Oral, BID  fluticasone, 2 spray, Nasal, Daily  furosemide, 40 mg, Intravenous, Daily  hydrOXYzine HCL, 25 mg, Oral, TID  insulin lispro, 2-12 Units, Subcutaneous, TID AC  insulin lispro, 2-12 Units, Subcutaneous, HS  magnesium sulfate, 2 g, Intravenous, Once  metoprolol succinate, 12.5 mg, Oral, Daily  pantoprazole, 40 mg, Intravenous, Q12H HAYLEE  potassium chloride, 20 mEq, Intravenous, Q3H  tamsulosin, 0.4 mg, Oral, Daily With Dinner      Continuous IV Infusions:  sodium chloride, 75 mL/hr, Intravenous, Continuous      PRN Meds:  acetaminophen, 650 mg, Oral, Q4H PRN  hydrOXYzine HCL, 25 mg, Oral, Q8H PRN  ondansetron, 4 mg, Intravenous, Q6H PRN  sodium chloride, 1 spray, Each Nare, Q1H  PRN      ED Triage Vitals   Temperature Pulse Respirations Blood Pressure SpO2 Pain Score   02/12/25 2123 02/12/25 2123 02/12/25 2123 02/12/25 2123 02/12/25 2123 02/12/25 2210   98.3 °F (36.8 °C) 83 18 110/58 94 % No Pain     Weight (last 2 days)       Date/Time Weight    02/12/25 2210 122 (270)            Vital Signs (last 3 days)       Date/Time Temp Pulse Resp BP MAP (mmHg) SpO2 O2 Device [place] Coma Scale Score Pain    02/13/25 07:37:12 98.2 °F (36.8 °C) 76 17 109/62 78 96 % -- -- --    02/12/25 2251 -- -- -- -- -- -- -- -- No Pain    02/12/25 2210 -- -- -- -- -- -- None (Room air) 15 No Pain    02/12/25 21:23:57 98.3 °F (36.8 °C) 83 18 110/58 75 94 % -- -- --              Pertinent Labs/Diagnostic Test Results:   Radiology:  No orders to display     Cardiology:  No orders to display     GI:  No orders to display           Results from last 7 days   Lab Units 02/13/25  0456 02/12/25  0834 02/11/25  2040 02/11/25  1438 02/11/25  0507 02/10/25  0004 02/09/25  1037   WBC Thousand/uL 1.80* 2.38*  --   --  2.33*   < > 2.01*   HEMOGLOBIN g/dL 7.7* 8.9* 8.1* 6.9* 5.8*   < > 6.6*   HEMATOCRIT % 23.5* 26.1* 23.7* 20.4* 16.9*   < > 19.6*   PLATELETS Thousands/uL 101* 98*  --   --  103*   < > 94*   TOTAL NEUT ABS Thousands/µL 1.19*  --   --   --  1.58*  --  1.46*    < > = values in this interval not displayed.         Results from last 7 days   Lab Units 02/13/25  0456 02/12/25  0606 02/11/25  0507 02/10/25  0518 02/09/25  1037   SODIUM mmol/L 140 141 142 141 141   POTASSIUM mmol/L 3.2* 3.1* 3.4* 3.6 3.6   CHLORIDE mmol/L 105 107 109* 110* 105   CO2 mmol/L 26 26 24 25 24   ANION GAP mmol/L 9 8 9 6 12   BUN mg/dL 13 13 15 17 13   CREATININE mg/dL 0.52* 0.44* 0.44* 0.40* 0.47*   EGFR ml/min/1.73sq m 109 117 117 122 114   CALCIUM mg/dL 7.8* 8.0* 8.3* 7.8* 8.3*   MAGNESIUM mg/dL 1.3*  --   --   --   --    PHOSPHORUS mg/dL 3.5  --   --   --   --      Results from last 7 days   Lab Units 02/13/25  0456 02/09/25  1037   AST U/L  17 21   ALT U/L 21 28   ALK PHOS U/L 51 54   TOTAL PROTEIN g/dL 4.7* 5.4*   ALBUMIN g/dL 2.9* 3.3*   TOTAL BILIRUBIN mg/dL 0.84 0.79     Results from last 7 days   Lab Units 02/13/25  0744 02/13/25  0601 02/12/25  2153 02/12/25  1624 02/12/25  1109 02/12/25  0707 02/12/25  0632 02/12/25  0012 02/11/25  2059 02/11/25  1606 02/11/25  1142 02/11/25  0958   POC GLUCOSE mg/dl 94 84 86 103 91 73 78 81 80 76 89 85     Results from last 7 days   Lab Units 02/13/25  0456 02/12/25  0606 02/11/25  0507 02/10/25  0518 02/09/25  1037   GLUCOSE RANDOM mg/dL 78 86 88 84 97         Results from last 7 days   Lab Units 02/11/25  0507   HEMOGLOBIN A1C % 5.7*   EAG mg/dl 117     Results from last 7 days   Lab Units 02/09/25  1037   PROTIME seconds 15.7*   INR  1.20*   PTT seconds 34     Results from last 7 days   Lab Units 02/13/25  0530 02/10/25  0633   UNIT PRODUCT CODE  [id number]  [id number]  [id number]   UNIT NUMBER  [id number]  [id number]  [id number]   UNITABO  A  A A  A   UNITRH  POS  POS POS  POS   CROSSMATCH  Compatible  Compatible Compatible  Compatible   UNIT DISPENSE STATUS  Presumed Trans  Presumed Trans Presumed Trans  Presumed Trans   UNIT PRODUCT VOL ml 350  350 350  300         Results from last 7 days   Lab Units 02/09/25  1037   LIPASE u/L 23     Past Medical History:   Diagnosis Date    Cancer (HCC)     Dyslipidemia     GERD (gastroesophageal reflux disease)     HL (hearing loss)     Hypertension     Essential    Moderate aortic stenosis     Obstructive sleep apnea      Present on Admission:   Upper GI bleed   Essential hypertension   Dyslipidemia   Gastroesophageal reflux disease without esophagitis   Obstructive sleep apnea   Type 2 diabetes mellitus without complication, without long-term current use of insulin (HCC)   Benign prostatic hyperplasia   Head and neck cancer (HCC)   Pancytopenia (HCC)      Admitting Diagnosis: Upper GI bleed  Age/Sex: 69 y.o.  male    Network Utilization Review Department  ATTENTION: Please call with any questions or concerns to [phone] and carefully listen to the prompts so that you are directed to the right person. All voicemails are confidential.   For Discharge needs, contact Care Management DC Support Team at [phone] opt. 2  Send all requests for admission clinical reviews, approved or denied determinations and any other requests to dedicated fax number below belonging to the campus where the patient is receiving treatment. List of dedicated fax numbers for the Facilities:  FACILITY NAME UR FAX NUMBER   ADMISSION DENIALS (Administrative/Medical Necessity) [phone]   DISCHARGE SUPPORT TEAM (NETWORK) [phone]   PARENT CHILD HEALTH (Maternity/NICU/Pediatrics) [phone]   [facility] [phone]   [facility] [phone]   [facility] [phone]   [facility] [phone]   [facility] [phone]   [facility] [phone]   [facility] [phone]   [facility] [phone]   [facility] [phone]   [facility] [phone]   [facility] [phone]   [facility] [phone]

## 2025-02-13 NOTE — ASSESSMENT & PLAN NOTE
Lab Results   Component Value Date    HGBA1C 5.7 (H) 02/11/2025       Recent Labs     02/12/25  1624 02/12/25  2153 02/13/25  0601 02/13/25  0744   POCGLU 103 86 84 94       Blood Sugar Average: Last 72 hrs:  (P) 88

## 2025-02-13 NOTE — ASSESSMENT & PLAN NOTE
H/o squamous cell carcinoma of the tongue with PET scan 10/30/24 with confirmation as such  Pt follows with [facility] oncology for chemo and radiation

## 2025-02-13 NOTE — ASSESSMENT & PLAN NOTE
"Pt presented to [facility] ED 2/9/25 after having dark stools and feeling lightheaded  He was hospitalized at [facility] 2/4/25 for GI bleeding - EGD with \"Schatzki ring at GE junction, 3cm type I hiatal hernia, erythematous scarred mucosa in duodenal bulb, small angioectasia in second part of duodenum with stigmata oh recent hemorrhage\"  Recommended PPI BID for 3-6 months and GI follow up outpatient  He returned to [facility] with hgb drop from 8.1 to 6.6 for which he received 2 units of PRBCs  CT with no evidence of high volume GI bleeding; linear radiodensity in distal duodenum suggestive of endoscopy clip - correlate with history; no acute intraabdominal pathology  Given no GI rounding at [facility] for a few days, pt transferred to [facility]  Pt seen by GI at [place] - EGD and colonoscopy done on 2/10 with no evidence of upper GI bleeding but there was note of old blood throughout the colon and melena throughout the ileum.  GI recommended transfer to [facility] for further GI workup with capsule endoscopy and possible balloon enteroscopy  Repeat hgb 2/11/25 was 5.8 - pt transfused again with hgb up to 8.1 prior to transfer  consulted GI for continuation of care- plan for endoscopy later today (2/13)  Monitor stools and hgb - transfuse as necessary to keep Hg>7; Hg 7.7 on 2/13  "

## 2025-02-13 NOTE — ASSESSMENT & PLAN NOTE
Lab Results   Component Value Date    HGBA1C 5.7 (H) 02/11/2025       Recent Labs     02/13/25  0601 02/13/25  0744 02/13/25  1112 02/13/25  1330   POCGLU 84 94 86 84       Blood Sugar Average: Last 72 hrs:  (P) 86.8  Target glucose 140-180 while inpatient  Had been clear liquids/NPO for procedures; will resume carb controlled diet when cleared by GI  Sugars appear well controlled on sliding scale insulin with meals and at bedtime  Hypoglycemia protocol

## 2025-02-13 NOTE — ANESTHESIA PREPROCEDURE EVALUATION
Procedure:  PRE-OP ONLY    Per chart:  Workup thus far:-  #1.CT: No CT evidence of active high-volume gastrointestinal hemorrhage. Linear radiodensity in the distal duodenum suggestive of an endoscopy clip. Correlate with history. No acute intra-abdominal pathology.  #2. S/p EGD 2/5/25: Report reviewed  #3.  Status post a GI evaluation  #4.  Status post EGD  on 2/10/2025-no source of upper GI bleed noted-see the full report for additional details  #5.  2/11 colonoscopy-noted old blood throughout the colon.  Melena throughout ileum.  Transferring to [facility] for capsule endoscopy     Relevant Problems   CARDIO   (+) Essential hypertension   (+) Moderate aortic stenosis      ENDO   (+) Type 2 diabetes mellitus without complication, without long-term current use of insulin (HCC)      GI/HEPATIC   (+) Gastric ulcer   (+) Gastroesophageal reflux disease without esophagitis   (+) Upper GI bleed      /RENAL   (+) Benign prostatic hyperplasia      HEMATOLOGY   (+) Pancytopenia (HCC)      MUSCULOSKELETAL   (+) Cervical spondylosis      NEURO/PSYCH   (+) Anxiety about treatment   (+) Intractable episodic headache      PULMONARY   (+) Chronic obstructive pulmonary disease (HCC)   (+) JM (obstructive sleep apnea)   (+) Obstructive sleep apnea      Oncology   (+) Head and neck cancer (HCC) (Scc of tongue)    Oral cavity/ Oropharynx: There is a 1.9 x 1.9 cm at the left base of tongue,   resulting in partial effacement of the left vallecula..         Recent labs personally reviewed:  Lab Results   Component Value Date    WBC 1.80 (L) 02/13/2025    HGB 7.7 (L) 02/13/2025     (L) 02/13/2025     Lab Results   Component Value Date    K 3.2 (L) 02/13/2025    BUN 13 02/13/2025    CREATININE 0.52 (L) 02/13/2025     Lab Results   Component Value Date    PTT 34 02/09/2025      Lab Results   Component Value Date    INR 1.20 (H) 02/09/2025       Lab Results   Component Value Date    HGBA1C 5.7 (H) 02/11/2025       Type and  Screen:  A    History    Hypertension, dyslipidemia, obstructive sleep apnea, diabetes mellitus type 2, obesity.     Interpretation Summary         Left Ventricle: Left ventricular cavity size is normal. Wall thickness is mildly increased. There is concentric remodeling. The left ventricular ejection fraction is 60% by visual estimation. Systolic function is normal. Although no diagnostic regional wall motion abnormality was identified, this possibility cannot be completely excluded on the basis of this study. Diastolic function is mildly abnormal, consistent with grade I (abnormal) relaxation.    Right Ventricle: Right ventricular cavity size is normal. Systolic function is normal.    Left Atrium: The atrium is mildly dilated.    Right Atrium: The atrium is mildly dilated.    Aortic Valve: The aortic valve is trileaflet. The leaflets are moderately thickened. The leaflets are moderately calcified. There is moderately reduced mobility. There is moderate stenosis. The aortic valve mean gradient is 29 mmHg. The dimensionless velocity index is 0.29. The aortic valve area is 1.09 cm2.         Anesthesia Plan  ASA Score-     Anesthesia Type-     Plan Factors-    Induction-     Postoperative Plan-     Perioperative Resuscitation Plan - Level 1 - Full Code.       Informed Consent-       NPO Status:  No vitals data found for the desired time range.

## 2025-02-14 ENCOUNTER — PREP FOR PROCEDURE (OUTPATIENT)
Dept: GASTROENTEROLOGY | Facility: CLINIC | Age: 70
End: 2025-02-14

## 2025-02-14 DIAGNOSIS — D64.9 ANEMIA, UNSPECIFIED TYPE: Primary | ICD-10-CM

## 2025-02-14 DIAGNOSIS — R19.5 OCCULT GI BLEEDING: ICD-10-CM

## 2025-02-14 LAB
ANION GAP SERPL CALCULATED.3IONS-SCNC: 9 MMOL/L (ref 4–13)
BASOPHILS # BLD AUTO: 0.01 THOUSANDS/ΜL (ref 0–0.1)
BASOPHILS NFR BLD AUTO: 1 % (ref 0–1)
BUN SERPL-MCNC: 14 MG/DL (ref 5–25)
CALCIUM SERPL-MCNC: 7.9 MG/DL (ref 8.4–10.2)
CHLORIDE SERPL-SCNC: 109 MMOL/L (ref 96–108)
CO2 SERPL-SCNC: 25 MMOL/L (ref 21–32)
CREAT SERPL-MCNC: 0.41 MG/DL (ref 0.6–1.3)
EOSINOPHIL # BLD AUTO: 0.01 THOUSAND/ΜL (ref 0–0.61)
EOSINOPHIL NFR BLD AUTO: 1 % (ref 0–6)
ERYTHROCYTE [DISTWIDTH] IN BLOOD BY AUTOMATED COUNT: 17.5 % (ref 11.6–15.1)
GFR SERPL CREATININE-BSD FRML MDRD: 120 ML/MIN/1.73SQ M
GLUCOSE SERPL-MCNC: 82 MG/DL (ref 65–140)
GLUCOSE SERPL-MCNC: 85 MG/DL (ref 65–140)
GLUCOSE SERPL-MCNC: 93 MG/DL (ref 65–140)
GLUCOSE SERPL-MCNC: 95 MG/DL (ref 65–140)
GLUCOSE SERPL-MCNC: 98 MG/DL (ref 65–140)
HCT VFR BLD AUTO: 23.5 % (ref 36.5–49.3)
HGB BLD-MCNC: 7.8 G/DL (ref 12–17)
IMM GRANULOCYTES # BLD AUTO: 0.01 THOUSAND/UL (ref 0–0.2)
IMM GRANULOCYTES NFR BLD AUTO: 1 % (ref 0–2)
LYMPHOCYTES # BLD AUTO: 0.4 THOUSANDS/ΜL (ref 0.6–4.47)
LYMPHOCYTES NFR BLD AUTO: 19 % (ref 14–44)
MCH RBC QN AUTO: 31.1 PG (ref 26.8–34.3)
MCHC RBC AUTO-ENTMCNC: 33.2 G/DL (ref 31.4–37.4)
MCV RBC AUTO: 94 FL (ref 82–98)
MONOCYTES # BLD AUTO: 0.19 THOUSAND/ΜL (ref 0.17–1.22)
MONOCYTES NFR BLD AUTO: 9 % (ref 4–12)
NEUTROPHILS # BLD AUTO: 1.45 THOUSANDS/ΜL (ref 1.85–7.62)
NEUTS SEG NFR BLD AUTO: 69 % (ref 43–75)
NRBC BLD AUTO-RTO: 0 /100 WBCS
PLATELET # BLD AUTO: 109 THOUSANDS/UL (ref 149–390)
PMV BLD AUTO: 10.1 FL (ref 8.9–12.7)
POTASSIUM SERPL-SCNC: 3.4 MMOL/L (ref 3.5–5.3)
RBC # BLD AUTO: 2.51 MILLION/UL (ref 3.88–5.62)
SODIUM SERPL-SCNC: 143 MMOL/L (ref 135–147)
WBC # BLD AUTO: 2.07 THOUSAND/UL (ref 4.31–10.16)

## 2025-02-14 PROCEDURE — 85025 COMPLETE CBC W/AUTO DIFF WBC: CPT | Performed by: FAMILY MEDICINE

## 2025-02-14 PROCEDURE — 99232 SBSQ HOSP IP/OBS MODERATE 35: CPT | Performed by: FAMILY MEDICINE

## 2025-02-14 PROCEDURE — 80048 BASIC METABOLIC PNL TOTAL CA: CPT | Performed by: FAMILY MEDICINE

## 2025-02-14 PROCEDURE — 99232 SBSQ HOSP IP/OBS MODERATE 35: CPT | Performed by: INTERNAL MEDICINE

## 2025-02-14 PROCEDURE — 82948 REAGENT STRIP/BLOOD GLUCOSE: CPT

## 2025-02-14 RX ORDER — POTASSIUM CHLORIDE 1500 MG/1
40 TABLET, EXTENDED RELEASE ORAL ONCE
Status: COMPLETED | OUTPATIENT
Start: 2025-02-14 | End: 2025-02-14

## 2025-02-14 RX ADMIN — TAMSULOSIN HYDROCHLORIDE 0.4 MG: 0.4 CAPSULE ORAL at 17:22

## 2025-02-14 RX ADMIN — PANTOPRAZOLE SODIUM 40 MG: 40 INJECTION, POWDER, FOR SOLUTION INTRAVENOUS at 10:20

## 2025-02-14 RX ADMIN — BRIMONIDINE TARTRATE 1 DROP: 2 SOLUTION/ DROPS OPHTHALMIC at 10:19

## 2025-02-14 RX ADMIN — FLUTICASONE PROPIONATE 2 SPRAY: 50 SPRAY, METERED NASAL at 10:19

## 2025-02-14 RX ADMIN — FAMOTIDINE 20 MG: 20 TABLET, FILM COATED ORAL at 21:11

## 2025-02-14 RX ADMIN — FUROSEMIDE 40 MG: 10 INJECTION, SOLUTION INTRAMUSCULAR; INTRAVENOUS at 10:20

## 2025-02-14 RX ADMIN — BRIMONIDINE TARTRATE 1 DROP: 2 SOLUTION/ DROPS OPHTHALMIC at 21:11

## 2025-02-14 RX ADMIN — AZELASTINE HYDROCHLORIDE 2 SPRAY: 137 SPRAY, METERED NASAL at 21:11

## 2025-02-14 RX ADMIN — OMEGA-3 FATTY ACIDS CAP 1000 MG 1000 MG: 1000 CAP at 21:11

## 2025-02-14 RX ADMIN — AZELASTINE HYDROCHLORIDE 2 SPRAY: 137 SPRAY, METERED NASAL at 10:19

## 2025-02-14 RX ADMIN — POTASSIUM CHLORIDE 40 MEQ: 1500 TABLET, EXTENDED RELEASE ORAL at 10:20

## 2025-02-14 RX ADMIN — PANTOPRAZOLE SODIUM 40 MG: 40 INJECTION, POWDER, FOR SOLUTION INTRAVENOUS at 21:11

## 2025-02-14 RX ADMIN — HYDROXYZINE HYDROCHLORIDE 25 MG: 25 TABLET, FILM COATED ORAL at 17:21

## 2025-02-14 RX ADMIN — METOPROLOL SUCCINATE 12.5 MG: 25 TABLET, EXTENDED RELEASE ORAL at 10:20

## 2025-02-14 RX ADMIN — ATORVASTATIN CALCIUM 40 MG: 40 TABLET, FILM COATED ORAL at 17:22

## 2025-02-14 RX ADMIN — OMEGA-3 FATTY ACIDS CAP 1000 MG 1000 MG: 1000 CAP at 10:20

## 2025-02-14 RX ADMIN — HYDROXYZINE HYDROCHLORIDE 25 MG: 25 TABLET, FILM COATED ORAL at 10:20

## 2025-02-14 NOTE — ASSESSMENT & PLAN NOTE
Known history of this, due to squamous cell carcinoma of the tongue and having received chemo and radiation in the past  Continue to monitor  Follow hgb as noted above  White cell count 2/14/25 2.07, plt 109,000  Monitor for fevers as well

## 2025-02-14 NOTE — ASSESSMENT & PLAN NOTE
H/o squamous cell carcinoma of the tongue with PET scan 10/30/24 with confirmation as such  Pt follows with [facility] oncology for chemo and radiation

## 2025-02-14 NOTE — QUICK NOTE
GASTROENTEROLOGY QUICK NOTE:     Patient seen and examined at bedside with GI team and patient's family member on the phone.  We discussed this plan of care going forward.  If hemoglobin remained stable, patient is stable for discharge from GI standpoint.  We have arranged for outpatient capsule endoscopy to be completed within the next 2 weeks.  Patient voiced that he will likely have difficulty with transportation, so I have asked our staff to help arrange transportation.  CBC should be checked next week to monitor hemoglobin.  No objection to discharge from GI standpoint.    DO SAM Gomes Gastroenterology Fellow, PGY-4

## 2025-02-14 NOTE — ASSESSMENT & PLAN NOTE
Lab Results   Component Value Date    HGBA1C 5.7 (H) 02/11/2025       Recent Labs     02/13/25  1611 02/13/25  2105 02/14/25  0729 02/14/25  1148   POCGLU 83 116 95 93       Blood Sugar Average: Last 72 hrs:  (P) 91.02734986512576757  Target glucose 140-180 while inpatient  Had been clear liquids/NPO for procedures; will resume carb controlled diet when cleared by GI  Sugars appear well controlled on sliding scale insulin with meals and at bedtime  Hypoglycemia protocol

## 2025-02-14 NOTE — PROGRESS NOTES
"Progress Note - Gastroenterology   Name: Ean Young 69 y.o. male I MRN: [id number]  Unit/Bed#: [facility] 808-01 I Date of Admission: 2/12/2025   Date of Service: 2/14/2025 I Hospital Day: 2    Assessment & Plan  Upper GI bleed  Patient reports a 2 week history of dark stools w/o change in number, frequency (once weekly), consistency, or caliber,with no associated nausea or vomiting  Reports a similar episode \"in the 70s\" r/t an ulcer that required prolonged treatment  Denies any family Hx of similar or cancers of GI tract, alcohol use, or smoking  Hgb on presentation 6.6 w/ 2 u PRBCs given, dropping to 5.8 w/ 2u PRBC given  Lipase - negative  2/4 - CTAP - \" diverticulosis w/o diverticulitis\"  2/5 - EGD - \"Schatzki ring in the GE junction , 3 cm sliding hiatal hernia, localized erythematous and scared mucosa in duodenal bulb, focal area of scarred mucosa and mild erythema in proximal duodenal bulb, single small TSAT in the second part of the duodenum, and with stigmata of recent hemorrhage; the lesion was ablated with argon plasma coagulation using circumferential loop and placement of 1 clip with achieved hemostasis.  Possible AVM versus Dieulafoy lesion in the second portion of duodenum\".  2/9 - High volume CT scan - no active bleeding  2/10 - EGD - \"as above, with no signs of bleeding stigmata or hemorrhage.\"  2/11 - Colonoscopy - \"internal hemorrhoids,multiple small and medium diverticulum in sigmoid and rectosigmoid colon, significant hematin in terminal ileum and old blood suggestive of proximal foci of bleeding\"  NSAID use in 12/24 for \"headaches\"  2/13 - EGD - normal esophagus, 1st and 2nd duodenum and proximal jejunum. 2 small telangiectasias in 3rd part of jejunum coagulation w/ argon plasma, mild erthematous hemorrhagic mucosa in lesser curve of stomach.  Ddx: PUD vs. AVM/ dieulafoy lesion   Plan:  Consider Capsule endoscopy   Continue PPI/H2 as below  Monitor h/h, transfuse if Hgb< 7.0  Gastroesophageal " "reflux disease without esophagitis  Patient takes Pepcid 20mg Qhs and protonix 40mg Bid  Plan:  Continue  PTA medications  Head and neck cancer (HCC)  Receives radiation therapy for tongue cancer        Subjective   Denies any bowel movements overnight, reports feeling \"fine\". Updated on plan of care, questions answered.    Objective :  Temp:  [96.6 °F (35.9 °C)-98.9 °F (37.2 °C)] 98.4 °F (36.9 °C)  HR:  [63-86] 86  BP: (101-159)/(62-84) 114/62  Resp:  [16-20] 20  SpO2:  [92 %-98 %] 97 %  O2 Device: None (Room air)    Physical Exam  Vitals reviewed.   Constitutional:       General: He is not in acute distress.     Appearance: Normal appearance. He is obese. He is not ill-appearing, toxic-appearing or diaphoretic.   HENT:      Head: Normocephalic and atraumatic.   Eyes:      Pupils: Pupils are equal, round, and reactive to light.   Cardiovascular:      Rate and Rhythm: Normal rate and regular rhythm.      Pulses: Normal pulses.   Pulmonary:      Effort: Pulmonary effort is normal.   Abdominal:      General: Bowel sounds are normal. There is no distension.      Palpations: Abdomen is soft. There is no mass.      Tenderness: There is no abdominal tenderness. There is no guarding or rebound.      Hernia: No hernia is present.   Skin:     General: Skin is warm and dry.      Capillary Refill: Capillary refill takes less than 2 seconds.      Coloration: Skin is pale.   Neurological:      Mental Status: He is alert and oriented to person, place, and time.           Lab Results: I have reviewed the following results:CBC/BMP:   .     02/14/25  0532   WBC 2.07*   HGB 7.8*   HCT 23.5*   *   SODIUM 143   K 3.4*   *   CO2 25   BUN 14   CREATININE 0.41*   GLUC 82    , Creatinine Clearance: Estimated Creatinine Clearance: 229.3 mL/min (A) (by C-G formula based on SCr of 0.41 mg/dL (L))., LFTs: No new results in last 24 hours.               Portions of the record may have been created with voice recognition software.  " "Occasional wrong word or \"sound a like\" substitutions may have occurred due to the inherent limitations of voice recognition software.  Read the chart carefully and recognize, using context, where substitutions have occurred.  ==  Dilip Lechuga MD  [facility]  Internal Medicine Residency PGY-1   "

## 2025-02-14 NOTE — ASSESSMENT & PLAN NOTE
"Pt presented to [facility] ED 2/9/25 after having dark stools and feeling lightheaded  He was hospitalized at [facility] 2/4/25 for GI bleeding - EGD with \"Schatzki ring at GE junction, 3cm type I hiatal hernia, erythematous scarred mucosa in duodenal bulb, small angioectasia in second part of duodenum with stigmata oh recent hemorrhage\"  Recommended PPI BID for 3-6 months and GI follow up outpatient  He returned to [facility] with hgb drop from 8.1 to 6.6 for which he received 2 units of PRBCs  CT with no evidence of high volume GI bleeding; linear radiodensity in distal duodenum suggestive of endoscopy clip - correlate with history; no acute intraabdominal pathology  Given no GI rounding at [facility] for a few days, pt transferred to [facility]  Pt seen by GI at [place] - EGD and colonoscopy done on 2/10 with no evidence of upper GI bleeding but there was note of old blood throughout the colon and melena throughout the ileum.  GI recommended transfer to [facility] for further GI workup with capsule endoscopy and possible balloon enteroscopy  Repeat hgb 2/11/25 was 5.8 - pt transfused again with hgb up to 8.1 prior to transfer  consulted GI for continuation of care  Monitor stools and hgb - transfuse as necessary to keep Hg>7; Hg 7.7 on 2/13  Status post EGD with balloon enteroplasty on 2/13- 2 small angioectasias in the 3rd part of the duodenum; induced coagulation with argon plasma coagulation   GI involved-plan for possible capsule endoscopy today  "

## 2025-02-14 NOTE — ASSESSMENT & PLAN NOTE
"Patient reports a 2 week history of dark stools w/o change in number, frequency (once weekly), consistency, or caliber,with no associated nausea or vomiting  Reports a similar episode \"in the 70s\" r/t an ulcer that required prolonged treatment  Denies any family Hx of similar or cancers of GI tract, alcohol use, or smoking  Hgb on presentation 6.6 w/ 2 u PRBCs given, dropping to 5.8 w/ 2u PRBC given  Lipase - negative  2/4 - CTAP - \" diverticulosis w/o diverticulitis\"  2/5 - EGD - \"Schatzki ring in the GE junction , 3 cm sliding hiatal hernia, localized erythematous and scared mucosa in duodenal bulb, focal area of scarred mucosa and mild erythema in proximal duodenal bulb, single small TSAT in the second part of the duodenum, and with stigmata of recent hemorrhage; the lesion was ablated with argon plasma coagulation using circumferential loop and placement of 1 clip with achieved hemostasis.  Possible AVM versus Dieulafoy lesion in the second portion of duodenum\".  2/9 - High volume CT scan - no active bleeding  2/10 - EGD - \"as above, with no signs of bleeding stigmata or hemorrhage.\"  2/11 - Colonoscopy - \"internal hemorrhoids,multiple small and medium diverticulum in sigmoid and rectosigmoid colon, significant hematin in terminal ileum and old blood suggestive of proximal foci of bleeding\"  NSAID use in 12/24 for \"headaches\"  2/13 - EGD - normal esophagus, 1st and 2nd duodenum and proximal jejunum. 2 small telangiectasias in 3rd part of jejunum coagulation w/ argon plasma, mild erthematous hemorrhagic mucosa in lesser curve of stomach.  Ddx: PUD vs. AVM/ dieulafoy lesion   Plan:  Consider Capsule endoscopy   Continue PPI/H2 as below  Monitor h/h, transfuse if Hgb< 7.0  "

## 2025-02-14 NOTE — PROGRESS NOTES
"Progress Note - Hospitalist   Name: Ean Young 69 y.o. male I MRN: [id number]  [facility] I Date of Admission: 2/12/2025   Date of Service: 2/14/2025 I Hospital Day: 2    Assessment & Plan  Upper GI bleed  Pt presented to [facility] ED 2/9/25 after having dark stools and feeling lightheaded  He was hospitalized at [facility] 2/4/25 for GI bleeding - EGD with \"Schatzki ring at GE junction, 3cm type I hiatal hernia, erythematous scarred mucosa in duodenal bulb, small angioectasia in second part of duodenum with stigmata oh recent hemorrhage\"  Recommended PPI BID for 3-6 months and GI follow up outpatient  He returned to [facility] normotensive with hgb drop from 8.1 to 6.6 for which he received 2 units of PRBCs  CT with no evidence of high volume GI bleeding; linear radiodensity in distal duodenum suggestive of endoscopy clip - correlate with history; no acute intraabdominal pathology  Given no GI rounding at [facility] for a few days, pt transferred to [facility]  Pt seen by GI at [place] - EGD and colonoscopy done on 2/10 with no evidence of upper GI bleeding but there was note of old blood throughout the colon and melena throughout the ileum.  GI recommended transfer to [facility] for further GI workup with capsule endoscopy and possible balloon enteroscopy  Repeat hgb 2/11/25 was 5.8 - pt transfused again with hgb up to 8.1 prior to transfer  consulted GI for continuation of care  Monitor stools and hgb - transfuse as necessary to keep Hg>7; Hg 7.7 on 2/13  Status post EGD with balloon enteroplasty on 2/13- 2 small angioectasias in the 3rd part of the duodenum; induced coagulation with argon plasma coagulation   GI involved-plan for possible capsule endoscopy today  Essential hypertension  BP WNL - continue home regimen of Toprol XL, Lasix, tamsulosin  Dyslipidemia  Continue Lipitor  Lipid panel 1mo prior with high triglycerides and low HDL  Gastroesophageal reflux disease without " esophagitis  Continue IV protonix  Obstructive sleep apnea  Compliant with CPAP at bedtime, continue while inpatient  Type 2 diabetes mellitus without complication, without long-term current use of insulin ([facility])  Lab Results   Component Value Date    HGBA1C 5.7 (H) 02/11/2025       Recent Labs     02/13/25  1611 02/13/25  2105 02/14/25  0729 02/14/25  1148   POCGLU 83 116 95 93       Blood Sugar Average: Last 72 hrs:  (P) [id number]  Target glucose 140-180 while inpatient  Had been clear liquids/NPO for procedures; will resume carb controlled diet when cleared by GI  Sugars appear well controlled on sliding scale insulin with meals and at bedtime  Hypoglycemia protocol    Class 2 severe obesity due to excess calories with serious comorbidity and body mass index (BMI) of 37.0 to 37.9 in adult ([facility])  BMI 37  Educated on healthy lifestyle and diet modification  Benign prostatic hyperplasia  Continue flomax  Head and neck cancer (HCC)  H/o squamous cell carcinoma of the tongue with PET scan 10/30/24 with confirmation as such  [facility]  Pancytopenia (HCC)  Known history of this, due to squamous cell carcinoma of the tongue and having received chemo and radiation in the past  Continue to monitor  Follow hgb as noted above  White cell count 2/14/25 2.07, plt 109,000  Monitor for fevers as well    VTE Pharmacologic Prophylaxis:   Moderate Risk (Score 3-4) - Pharmacological DVT Prophylaxis Contraindicated. Sequential Compression Devices Ordered.    Mobility:   Basic Mobility Inpatient Raw Score: 23  JH-HLM Goal: 7: Walk 25 feet or more  JH-HLM Achieved: 7: Walk 25 feet or more  JH-HLM Goal NOT achieved. Continue with multidisciplinary rounding and encourage appropriate mobility to improve upon JH-HLM goals.    Patient Centered Rounds: I performed bedside rounds with nursing staff today.   Discussions with Specialists or Other Care Team Provider: case  management    Education and Discussions with Family / Patient:  discussed with the patient.     Current Length of Stay: 2 day(s)  Current Patient Status: Inpatient   Certification Statement: The patient will continue to require additional inpatient hospital stay due to ongoing GI work up  Discharge Plan:  when cleared by GI    Code Status: Level 1 - Full Code    Subjective   Patient seen and examined.  He denies any specific concerns, reports feeling hungry    Objective :  Temp:  [96.6 °F (35.9 °C)-98.4 °F (36.9 °C)] 98.4 °F (36.9 °C)  HR:  [63-86] 86  BP: (101-155)/(62-84) 114/62  Resp:  [16-20] 20  SpO2:  [92 %-98 %] 97 %  O2 Device: None (Room air)    Body mass index is 37.66 kg/m².     Input and Output Summary (last 24 hours):     Intake/Output Summary (Last 24 hours) at 2/14/2025 1432  Last data filed at 2/13/2025 1801  Gross per 24 hour   Intake 940 ml   Output --   Net 940 ml       Physical Exam  Vitals reviewed.   Constitutional:       General: He is not in acute distress.     Appearance: He is not ill-appearing.   HENT:      Head: Normocephalic.   Cardiovascular:      Rate and Rhythm: Normal rate and regular rhythm.   Pulmonary:      Effort: Pulmonary effort is normal. No respiratory distress.   Abdominal:      General: Abdomen is flat.      Palpations: Abdomen is soft.      Tenderness: There is no abdominal tenderness.   Skin:     General: Skin is warm and dry.   Neurological:      Mental Status: He is alert and oriented to person, place, and time. Mental status is at baseline.      Comments: Facial asymmetry (right facial droop) noted, patient reports that is his chronic           Lines/Drains:              Lab Results: I have reviewed the following results:   Results from last 7 days   Lab Units 02/14/25  0532   WBC Thousand/uL 2.07*   HEMOGLOBIN g/dL 7.8*   HEMATOCRIT % 23.5*   PLATELETS Thousands/uL 109*   SEGS PCT % 69   LYMPHO PCT % 19   MONO PCT % 9   EOS PCT % 1     Results from last 7 days   Lab  Units 02/14/25  0532 02/13/25  0456   SODIUM mmol/L 143 140   POTASSIUM mmol/L 3.4* 3.2*   CHLORIDE mmol/L 109* 105   CO2 mmol/L 25 26   BUN mg/dL 14 13   CREATININE mg/dL 0.41* 0.52*   ANION GAP mmol/L 9 9   CALCIUM mg/dL 7.9* 7.8*   ALBUMIN g/dL  --  2.9*   TOTAL BILIRUBIN mg/dL  --  0.84   ALK PHOS U/L  --  51   ALT U/L  --  21   AST U/L  --  17   GLUCOSE RANDOM mg/dL 82 78     Results from last 7 days   Lab Units 02/09/25  1037   INR  1.20*     Results from last 7 days   Lab Units 02/14/25  1148 02/14/25  0729 02/13/25  2105 02/13/25  1611 02/13/25  1330 02/13/25  1112 02/13/25  0744 02/13/25  0601 02/12/25  2153 02/12/25  1624 02/12/25  1109 02/12/25  0707   POC GLUCOSE mg/dl 93 95 116 83 84 86 94 84 86 103 91 73     Results from last 7 days   Lab Units 02/11/25  0507   HEMOGLOBIN A1C % 5.7*           Recent Cultures (last 7 days):               Last 24 Hours Medication List:     Current Facility-Administered Medications:     acetaminophen (TYLENOL) tablet 650 mg, Q4H PRN    atorvastatin (LIPITOR) tablet 40 mg, Daily With Dinner    azelastine (ASTELIN) 0.1 % nasal spray 2 spray, BID    brimonidine tartrate 0.2 % ophthalmic solution 1 drop, Q12H    famotidine (PEPCID) tablet 20 mg, HS    fish oil capsule 1,000 mg, BID    fluticasone (FLONASE) 50 mcg/act nasal spray 2 spray, Daily    furosemide (LASIX) injection 40 mg, Daily    hydrOXYzine HCL (ATARAX) tablet 25 mg, TID    hydrOXYzine HCL (ATARAX) tablet 25 mg, Q8H PRN    insulin lispro (HumALOG/ADMELOG) 100 units/mL subcutaneous injection 2-12 Units, TID AC **AND** Fingerstick Glucose (POCT), TID AC    insulin lispro (HumALOG/ADMELOG) 100 units/mL subcutaneous injection 2-12 Units, HS    metoprolol succinate (TOPROL-XL) 24 hr tablet 12.5 mg, Daily    ondansetron (ZOFRAN) injection 4 mg, Q6H PRN    pantoprazole (PROTONIX) injection 40 mg, Q12H HAYLEE    sodium chloride (OCEAN) 0.65 % nasal spray 1 spray, Q1H PRN    tamsulosin (FLOMAX) capsule 0.4 mg, Daily With  Dinner    Administrative Statements   Today, Patient Was Seen By: Hilda Vasques MD      **Please Note: This note may have been constructed using a voice recognition system.**

## 2025-02-14 NOTE — ANESTHESIA POSTPROCEDURE EVALUATION
Post-Op Assessment Note    CV Status:  Stable  Pain Score: 0    Pain management: adequate       Mental Status:  Alert and awake   Hydration Status:  Euvolemic   PONV Controlled:  Controlled   Airway Patency:  Patent     Post Op Vitals Reviewed: Yes    No anethesia notable event occurred.    Staff: Anesthesiologist           Last Filed PACU Vitals:  Vitals Value Taken Time   Temp 96.6 °F (35.9 °C) 02/13/25 1528   Pulse 63 02/13/25 1546   /63 02/13/25 1546   Resp 18 02/13/25 1528   SpO2 93 % 02/13/25 1546       Modified Jignesh:     Vitals Value Taken Time   Activity 2 02/13/25 1546   Respiration 2 02/13/25 1546   Circulation 2 02/13/25 1546   Consciousness 2 02/13/25 1546   Oxygen Saturation 2 02/13/25 1546     Modified Jignesh Score: 10             Regardinyr-Missed MDs call  ----- Message from Alexandra Eaton sent at 2018  5:26 PM CDT -----  Patient Name: Nicky PELAYO [place]onto  Specialist or PCP: Dr. Amaro  Pregnant (If Yes, how long?): No  Symptoms: Pt states she missed a call from MD's office  Call Back #: [phone]  Is the patient’s permanent residence located in [place], [place], or a [facility]? [place]  Call Center Account #: [phone]

## 2025-02-15 LAB
ANION GAP SERPL CALCULATED.3IONS-SCNC: 8 MMOL/L (ref 4–13)
BASOPHILS # BLD AUTO: 0 THOUSANDS/ΜL (ref 0–0.1)
BASOPHILS NFR BLD AUTO: 0 % (ref 0–1)
BUN SERPL-MCNC: 15 MG/DL (ref 5–25)
CALCIUM SERPL-MCNC: 8.6 MG/DL (ref 8.4–10.2)
CHLORIDE SERPL-SCNC: 104 MMOL/L (ref 96–108)
CO2 SERPL-SCNC: 28 MMOL/L (ref 21–32)
CREAT SERPL-MCNC: 0.56 MG/DL (ref 0.6–1.3)
EOSINOPHIL # BLD AUTO: 0 THOUSAND/ΜL (ref 0–0.61)
EOSINOPHIL NFR BLD AUTO: 0 % (ref 0–6)
ERYTHROCYTE [DISTWIDTH] IN BLOOD BY AUTOMATED COUNT: 18.2 % (ref 11.6–15.1)
GFR SERPL CREATININE-BSD FRML MDRD: 106 ML/MIN/1.73SQ M
GLUCOSE SERPL-MCNC: 104 MG/DL (ref 65–140)
GLUCOSE SERPL-MCNC: 122 MG/DL (ref 65–140)
GLUCOSE SERPL-MCNC: 128 MG/DL (ref 65–140)
GLUCOSE SERPL-MCNC: 140 MG/DL (ref 65–140)
GLUCOSE SERPL-MCNC: 87 MG/DL (ref 65–140)
HCT VFR BLD AUTO: 26 % (ref 36.5–49.3)
HGB BLD-MCNC: 8.7 G/DL (ref 12–17)
IMM GRANULOCYTES # BLD AUTO: 0.01 THOUSAND/UL (ref 0–0.2)
IMM GRANULOCYTES NFR BLD AUTO: 0 % (ref 0–2)
LYMPHOCYTES # BLD AUTO: 0.46 THOUSANDS/ΜL (ref 0.6–4.47)
LYMPHOCYTES NFR BLD AUTO: 20 % (ref 14–44)
MCH RBC QN AUTO: 30.7 PG (ref 26.8–34.3)
MCHC RBC AUTO-ENTMCNC: 33.5 G/DL (ref 31.4–37.4)
MCV RBC AUTO: 92 FL (ref 82–98)
MONOCYTES # BLD AUTO: 0.17 THOUSAND/ΜL (ref 0.17–1.22)
MONOCYTES NFR BLD AUTO: 7 % (ref 4–12)
NEUTROPHILS # BLD AUTO: 1.7 THOUSANDS/ΜL (ref 1.85–7.62)
NEUTS SEG NFR BLD AUTO: 73 % (ref 43–75)
NRBC BLD AUTO-RTO: 0 /100 WBCS
PLATELET # BLD AUTO: 141 THOUSANDS/UL (ref 149–390)
PMV BLD AUTO: 9.9 FL (ref 8.9–12.7)
POTASSIUM SERPL-SCNC: 3.3 MMOL/L (ref 3.5–5.3)
RBC # BLD AUTO: 2.83 MILLION/UL (ref 3.88–5.62)
SODIUM SERPL-SCNC: 140 MMOL/L (ref 135–147)
WBC # BLD AUTO: 2.34 THOUSAND/UL (ref 4.31–10.16)

## 2025-02-15 PROCEDURE — 82948 REAGENT STRIP/BLOOD GLUCOSE: CPT

## 2025-02-15 PROCEDURE — 85025 COMPLETE CBC W/AUTO DIFF WBC: CPT | Performed by: FAMILY MEDICINE

## 2025-02-15 PROCEDURE — 80048 BASIC METABOLIC PNL TOTAL CA: CPT | Performed by: FAMILY MEDICINE

## 2025-02-15 PROCEDURE — 99232 SBSQ HOSP IP/OBS MODERATE 35: CPT | Performed by: FAMILY MEDICINE

## 2025-02-15 RX ORDER — POTASSIUM CHLORIDE 1500 MG/1
40 TABLET, EXTENDED RELEASE ORAL ONCE
Status: COMPLETED | OUTPATIENT
Start: 2025-02-15 | End: 2025-02-15

## 2025-02-15 RX ADMIN — PANTOPRAZOLE SODIUM 40 MG: 40 INJECTION, POWDER, FOR SOLUTION INTRAVENOUS at 21:29

## 2025-02-15 RX ADMIN — AZELASTINE HYDROCHLORIDE 2 SPRAY: 137 SPRAY, METERED NASAL at 21:29

## 2025-02-15 RX ADMIN — PANTOPRAZOLE SODIUM 40 MG: 40 INJECTION, POWDER, FOR SOLUTION INTRAVENOUS at 08:10

## 2025-02-15 RX ADMIN — POTASSIUM CHLORIDE 40 MEQ: 1500 TABLET, EXTENDED RELEASE ORAL at 14:45

## 2025-02-15 RX ADMIN — METOPROLOL SUCCINATE 12.5 MG: 25 TABLET, EXTENDED RELEASE ORAL at 08:11

## 2025-02-15 RX ADMIN — OMEGA-3 FATTY ACIDS CAP 1000 MG 1000 MG: 1000 CAP at 08:10

## 2025-02-15 RX ADMIN — ATORVASTATIN CALCIUM 40 MG: 40 TABLET, FILM COATED ORAL at 17:13

## 2025-02-15 RX ADMIN — TAMSULOSIN HYDROCHLORIDE 0.4 MG: 0.4 CAPSULE ORAL at 17:13

## 2025-02-15 RX ADMIN — OMEGA-3 FATTY ACIDS CAP 1000 MG 1000 MG: 1000 CAP at 21:29

## 2025-02-15 RX ADMIN — BRIMONIDINE TARTRATE 1 DROP: 2 SOLUTION/ DROPS OPHTHALMIC at 08:11

## 2025-02-15 RX ADMIN — FUROSEMIDE 40 MG: 10 INJECTION, SOLUTION INTRAMUSCULAR; INTRAVENOUS at 08:10

## 2025-02-15 RX ADMIN — AZELASTINE HYDROCHLORIDE 2 SPRAY: 137 SPRAY, METERED NASAL at 08:11

## 2025-02-15 RX ADMIN — FAMOTIDINE 20 MG: 20 TABLET, FILM COATED ORAL at 21:29

## 2025-02-15 RX ADMIN — BRIMONIDINE TARTRATE 1 DROP: 2 SOLUTION/ DROPS OPHTHALMIC at 21:29

## 2025-02-15 RX ADMIN — FLUTICASONE PROPIONATE 2 SPRAY: 50 SPRAY, METERED NASAL at 08:11

## 2025-02-15 NOTE — ASSESSMENT & PLAN NOTE
improving  Known history of this, due to squamous cell carcinoma of the tongue and having received chemo and radiation in the past  Continue to monitor  Follow hgb as noted above  White cell count 2/14/25 2.34, plt 141,000  Monitor for fevers as well

## 2025-02-15 NOTE — PROGRESS NOTES
"Progress Note - Hospitalist   Name: Ean Young 69 y.o. male I MRN: [id number]  [facility] I Date of Admission: 2/12/2025   Date of Service: 2/15/2025 I Hospital Day: 3    Assessment & Plan  Upper GI bleed  Pt presented to [facility] ED 2/9/25 after having dark stools and feeling lightheaded  He was hospitalized at [facility] 2/4/25 for GI bleeding - EGD with \"Schatzki ring at GE junction, 3cm type I hiatal hernia, erythematous scarred mucosa in duodenal bulb, small angioectasia in second part of duodenum with stigmata oh recent hemorrhage\"  Recommended PPI BID for 3-6 months and GI follow up outpatient  He returned to [facility] normotensive with hgb drop from 8.1 to 6.6 for which he received 2 units of PRBCs  CT with no evidence of high volume GI bleeding; linear radiodensity in distal duodenum suggestive of endoscopy clip - correlate with history; no acute intraabdominal pathology  Given no GI rounding at [facility] for a few days, pt transferred to [facility]  Pt seen by GI at [place] - EGD and colonoscopy done on 2/10 with no evidence of upper GI bleeding but there was note of old blood throughout the colon and melena throughout the ileum.  GI recommended transfer to [facility] for further GI workup with capsule endoscopy and possible balloon enteroscopy  Repeat hgb 2/11/25 was 5.8 - pt transfused again with hgb up to 8.1 prior to transfer  consulted GI for continuation of care  Monitor stools and hgb - transfuse as necessary to keep Hg>7; Hg 7.7 on 2/13  Status post EGD with balloon enteroplasty on 2/13- 2 small angioectasias in the 3rd part of the duodenum; induced coagulation with argon plasma coagulation   GI involved, recommendations appreciated; as per GI capsule endoscopy can be done outpatient, but patient is very concerned due to transportation issues and ongoing black stools  Essential hypertension  BP WNL - continue home regimen of Toprol XL, Lasix, tamsulosin  Dyslipidemia  Continue " Lipitor  Lipid panel 1mo prior with high triglycerides and low HDL  Gastroesophageal reflux disease without esophagitis  Continue IV protonix  Obstructive sleep apnea  Compliant with CPAP at bedtime, continue while inpatient  Type 2 diabetes mellitus without complication, without long-term current use of insulin ([facility])  Lab Results   Component Value Date    HGBA1C 5.7 (H) 02/11/2025       Recent Labs     02/14/25  1602 02/14/25  2114 02/15/25  0733 02/15/25  1047   POCGLU 85 98 87 122       Blood Sugar Average: Last 72 hrs:  (P) [id number]  Target glucose 140-180 while inpatient  Had been clear liquids/NPO for procedures; will resume carb controlled diet when cleared by GI  Sugars appear well controlled on sliding scale insulin with meals and at bedtime  Hypoglycemia protocol    Class 2 severe obesity due to excess calories with serious comorbidity and body mass index (BMI) of 37.0 to 37.9 in adult ([facility])  BMI 37  Educated on healthy lifestyle and diet modification  Benign prostatic hyperplasia  Continue flomax  Head and neck cancer (HCC)  H/o squamous cell carcinoma of the tongue with PET scan 10/30/24 with confirmation as such  [facility]  Pancytopenia (HCC)  improving  Known history of this, due to squamous cell carcinoma of the tongue and having received chemo and radiation in the past  Continue to monitor  Follow hgb as noted above  White cell count 2/14/25 2.34, plt 141,000  Monitor for fevers as well    VTE Pharmacologic Prophylaxis:   Moderate Risk (Score 3-4) - Pharmacological DVT Prophylaxis Contraindicated. Sequential Compression Devices Ordered.    Mobility:   Basic Mobility Inpatient Raw Score: 24  JH-HLM Goal: 8: Walk 250 feet or more  JH-HLM Achieved: 8: Walk 250 feet ot more  JH-HLM Goal NOT achieved. Continue with multidisciplinary rounding and encourage appropriate mobility to improve upon JH-HLM goals.    Patient Centered Rounds: I performed  bedside rounds with nursing staff today.   Discussions with Specialists or Other Care Team Provider: case management    Education and Discussions with Family / Patient:  discussed with the patient and niece over the phone.     Current Length of Stay: 3 day(s)  Current Patient Status: Inpatient   Certification Statement: The patient will continue to require additional inpatient hospital stay due to ongoing GI work up  Discharge Plan:  when cleared by GI    Code Status: Level 1 - Full Code    Subjective   Patient seen and examined.  He had large black liquid bowel movement this morning and he is very concerned about that    Objective :  Temp:  [37.4 °F (3 °C)-99 °F (37.2 °C)] 98.7 °F (37.1 °C)  HR:  [79-86] 86  BP: (110-135)/(61-64) 135/61  Resp:  [16] 16  SpO2:  [92 %-98 %] 97 %    Body mass index is 37.66 kg/m².     Input and Output Summary (last 24 hours):     Intake/Output Summary (Last 24 hours) at 2/15/2025 1608  Last data filed at 2/15/2025 0900  Gross per 24 hour   Intake 300 ml   Output 0 ml   Net 300 ml       Physical Exam  Vitals reviewed.   Constitutional:       General: He is not in acute distress.     Appearance: He is not ill-appearing.   HENT:      Head: Normocephalic.   Cardiovascular:      Rate and Rhythm: Normal rate and regular rhythm.   Pulmonary:      Effort: Pulmonary effort is normal. No respiratory distress.   Abdominal:      General: Abdomen is flat.      Palpations: Abdomen is soft.      Tenderness: There is no abdominal tenderness.   Skin:     General: Skin is warm and dry.   Neurological:      Mental Status: He is alert and oriented to person, place, and time. Mental status is at baseline.      Comments: Facial asymmetry (right facial droop) noted, patient reports that is his chronic           Lines/Drains:              Lab Results: I have reviewed the following results:   Results from last 7 days   Lab Units 02/15/25  0949   WBC Thousand/uL 2.34*   HEMOGLOBIN g/dL 8.7*   HEMATOCRIT %  26.0*   PLATELETS Thousands/uL 141*   SEGS PCT % 73   LYMPHO PCT % 20   MONO PCT % 7   EOS PCT % 0     Results from last 7 days   Lab Units 02/15/25  0949 02/14/25  0532 02/13/25  0456   SODIUM mmol/L 140   < > 140   POTASSIUM mmol/L 3.3*   < > 3.2*   CHLORIDE mmol/L 104   < > 105   CO2 mmol/L 28   < > 26   BUN mg/dL 15   < > 13   CREATININE mg/dL 0.56*   < > 0.52*   ANION GAP mmol/L 8   < > 9   CALCIUM mg/dL 8.6   < > 7.8*   ALBUMIN g/dL  --   --  2.9*   TOTAL BILIRUBIN mg/dL  --   --  0.84   ALK PHOS U/L  --   --  51   ALT U/L  --   --  21   AST U/L  --   --  17   GLUCOSE RANDOM mg/dL 140   < > 78    < > = values in this interval not displayed.     Results from last 7 days   Lab Units 02/09/25  1037   INR  1.20*     Results from last 7 days   Lab Units 02/15/25  1047 02/15/25  0733 02/14/25  2114 02/14/25  1602 02/14/25  1148 02/14/25  0729 02/13/25  2105 02/13/25  1611 02/13/25  1330 02/13/25  1112 02/13/25  0744 02/13/25  0601   POC GLUCOSE mg/dl 122 87 98 85 93 95 116 83 84 86 94 84     Results from last 7 days   Lab Units 02/11/25  0507   HEMOGLOBIN A1C % 5.7*           Recent Cultures (last 7 days):               Last 24 Hours Medication List:     Current Facility-Administered Medications:     acetaminophen (TYLENOL) tablet 650 mg, Q4H PRN    atorvastatin (LIPITOR) tablet 40 mg, Daily With Dinner    azelastine (ASTELIN) 0.1 % nasal spray 2 spray, BID    brimonidine tartrate 0.2 % ophthalmic solution 1 drop, Q12H    famotidine (PEPCID) tablet 20 mg, HS    fish oil capsule 1,000 mg, BID    fluticasone (FLONASE) 50 mcg/act nasal spray 2 spray, Daily    furosemide (LASIX) injection 40 mg, Daily    hydrOXYzine HCL (ATARAX) tablet 25 mg, Q8H PRN    insulin lispro (HumALOG/ADMELOG) 100 units/mL subcutaneous injection 2-12 Units, TID AC **AND** Fingerstick Glucose (POCT), TID AC    insulin lispro (HumALOG/ADMELOG) 100 units/mL subcutaneous injection 2-12 Units, HS    metoprolol succinate (TOPROL-XL) 24 hr tablet 12.5  mg, Daily    ondansetron (ZOFRAN) injection 4 mg, Q6H PRN    pantoprazole (PROTONIX) injection 40 mg, Q12H HAYLEE    sodium chloride (OCEAN) 0.65 % nasal spray 1 spray, Q1H PRN    tamsulosin (FLOMAX) capsule 0.4 mg, Daily With Dinner    Administrative Statements   Today, Patient Was Seen By: Hilda Vasques MD      **Please Note: This note may have been constructed using a voice recognition system.**

## 2025-02-15 NOTE — ASSESSMENT & PLAN NOTE
"Pt presented to [facility] ED 2/9/25 after having dark stools and feeling lightheaded  He was hospitalized at [facility] 2/4/25 for GI bleeding - EGD with \"Schatzki ring at GE junction, 3cm type I hiatal hernia, erythematous scarred mucosa in duodenal bulb, small angioectasia in second part of duodenum with stigmata oh recent hemorrhage\"  Recommended PPI BID for 3-6 months and GI follow up outpatient  He returned to [facility] with hgb drop from 8.1 to 6.6 for which he received 2 units of PRBCs  CT with no evidence of high volume GI bleeding; linear radiodensity in distal duodenum suggestive of endoscopy clip - correlate with history; no acute intraabdominal pathology  Given no GI rounding at [facility] for a few days, pt transferred to [facility]  Pt seen by GI at [place] - EGD and colonoscopy done on 2/10 with no evidence of upper GI bleeding but there was note of old blood throughout the colon and melena throughout the ileum.  GI recommended transfer to [facility] for further GI workup with capsule endoscopy and possible balloon enteroscopy  Repeat hgb 2/11/25 was 5.8 - pt transfused again with hgb up to 8.1 prior to transfer  consulted GI for continuation of care  Monitor stools and hgb - transfuse as necessary to keep Hg>7; Hg 7.7 on 2/13  Status post EGD with balloon enteroplasty on 2/13- 2 small angioectasias in the 3rd part of the duodenum; induced coagulation with argon plasma coagulation   GI involved, recommendations appreciated; as per GI capsule endoscopy can be done outpatient, but patient is very concerned due to transportation issues and ongoing black stools  "

## 2025-02-15 NOTE — PLAN OF CARE
Problem: GASTROINTESTINAL - ADULT  Goal: Minimal or absence of nausea and/or vomiting  Description: INTERVENTIONS:  - Administer IV fluids if ordered to ensure adequate hydration  - Maintain NPO status until nausea and vomiting are resolved  - Nasogastric tube if ordered  - Administer ordered antiemetic medications as needed  - Provide nonpharmacologic comfort measures as appropriate  - Advance diet as tolerated, if ordered  - Consider nutrition services referral to assist patient with adequate nutrition and appropriate food choices  Outcome: Progressing     Problem: GASTROINTESTINAL - ADULT  Goal: Maintains or returns to baseline bowel function  Description: INTERVENTIONS:  - Assess bowel function  - Encourage oral fluids to ensure adequate hydration  - Administer IV fluids if ordered to ensure adequate hydration  - Administer ordered medications as needed  - Encourage mobilization and activity  - Consider nutritional services referral to assist patient with adequate nutrition and appropriate food choices  Outcome: Progressing     Problem: GASTROINTESTINAL - ADULT  Goal: Maintains adequate nutritional intake  Description: INTERVENTIONS:  - Monitor percentage of each meal consumed  - Identify factors contributing to decreased intake, treat as appropriate  - Assist with meals as needed  - Monitor I&O, weight, and lab values if indicated  - Obtain nutrition services referral as needed  Outcome: Progressing     Problem: HEMATOLOGIC - ADULT  Goal: Maintains hematologic stability  Description: INTERVENTIONS  - Assess for signs and symptoms of bleeding or hemorrhage  - Monitor labs  - Administer supportive blood products/factors as ordered and appropriate  Outcome: Progressing     Problem: DISCHARGE PLANNING  Goal: Discharge to home or other facility with appropriate resources  Description: INTERVENTIONS:  - Identify barriers to discharge w/patient and caregiver  - Arrange for needed discharge resources and  transportation as appropriate  - Identify discharge learning needs (meds, wound care, etc.)  - Arrange for interpretive services to assist at discharge as needed  - Refer to Case Management Department for coordinating discharge planning if the patient needs post-hospital services based on physician/advanced practitioner order or complex needs related to functional status, cognitive ability, or social support system  Outcome: Progressing     Problem: Knowledge Deficit  Goal: Patient/family/caregiver demonstrates understanding of disease process, treatment plan, medications, and discharge instructions  Description: Complete learning assessment and assess knowledge base.  Interventions:  - Provide teaching at level of understanding  - Provide teaching via preferred learning methods  Outcome: Progressing     Problem: CARDIOVASCULAR - ADULT  Goal: Maintains optimal cardiac output and hemodynamic stability  Description: INTERVENTIONS:  - Monitor I/O, vital signs and rhythm  - Monitor for S/S and trends of decreased cardiac output  - Administer and titrate ordered vasoactive medications to optimize hemodynamic stability  - Assess quality of pulses, skin color and temperature  - Assess for signs of decreased coronary artery perfusion  - Instruct patient to report change in severity of symptoms  Outcome: Progressing     Problem: CARDIOVASCULAR - ADULT  Goal: Absence of cardiac dysrhythmias or at baseline rhythm  Description: INTERVENTIONS:  - Continuous cardiac monitoring, vital signs, obtain 12 lead EKG if ordered  - Administer antiarrhythmic and heart rate control medications as ordered  - Monitor electrolytes and administer replacement therapy as ordered  Outcome: Progressing     Problem: RESPIRATORY - ADULT  Goal: Achieves optimal ventilation and oxygenation  Description: INTERVENTIONS:  - Assess for changes in respiratory status  - Assess for changes in mentation and behavior  - Position to facilitate oxygenation and  minimize respiratory effort  - Oxygen administered by appropriate delivery if ordered  - Initiate smoking cessation education as indicated  - Encourage broncho-pulmonary hygiene including cough, deep breathe, Incentive Spirometry  - Assess the need for suctioning and aspirate as needed  - Assess and instruct to report SOB or any respiratory difficulty  - Respiratory Therapy support as indicated  Outcome: Progressing     Problem: METABOLIC, FLUID AND ELECTROLYTES - ADULT  Goal: Electrolytes maintained within normal limits  Description: INTERVENTIONS:  - Monitor labs and assess patient for signs and symptoms of electrolyte imbalances  - Administer electrolyte replacement as ordered  - Monitor response to electrolyte replacements, including repeat lab results as appropriate  - Instruct patient on fluid and nutrition as appropriate  Outcome: Progressing     Problem: METABOLIC, FLUID AND ELECTROLYTES - ADULT  Goal: Fluid balance maintained  Description: INTERVENTIONS:  - Monitor labs   - Monitor I/O and WT  - Instruct patient on fluid and nutrition as appropriate  - Assess for signs & symptoms of volume excess or deficit  Outcome: Progressing     Problem: PAIN - ADULT  Goal: Verbalizes/displays adequate comfort level or baseline comfort level  Description: Interventions:  - Encourage patient to monitor pain and request assistance  - Assess pain using appropriate pain scale  - Administer analgesics based on type and severity of pain and evaluate response  - Implement non-pharmacological measures as appropriate and evaluate response  - Consider cultural and social influences on pain and pain management  - Notify physician/advanced practitioner if interventions unsuccessful or patient reports new pain  Outcome: Progressing     Problem: INFECTION - ADULT  Goal: Absence or prevention of progression during hospitalization  Description: INTERVENTIONS:  - Assess and monitor for signs and symptoms of infection  - Monitor  lab/diagnostic results  - Monitor all insertion sites, i.e. indwelling lines, tubes, and drains  - Monitor endotracheal if appropriate and nasal secretions for changes in amount and color  - [place] appropriate cooling/warming therapies per order  - Administer medications as ordered  - Instruct and encourage patient and family to use good hand hygiene technique  - Identify and instruct in appropriate isolation precautions for identified infection/condition  Outcome: Progressing     Problem: SAFETY ADULT  Goal: Patient will remain free of falls  Description: INTERVENTIONS:  - Educate patient/family on patient safety including physical limitations  - Instruct patient to call for assistance with activity   - Consult OT/PT to assist with strengthening/mobility   - Keep Call bell within reach  - Keep bed low and locked with side rails adjusted as appropriate  - Keep care items and personal belongings within reach  - Initiate and maintain comfort rounds  - Make Fall Risk Sign visible to staff  - Apply yellow socks and bracelet for high fall risk patients  - Consider moving patient to room near nurses station  Outcome: Progressing

## 2025-02-15 NOTE — ASSESSMENT & PLAN NOTE
Lab Results   Component Value Date    HGBA1C 5.7 (H) 02/11/2025       Recent Labs     02/14/25  1602 02/14/25  2114 02/15/25  0733 02/15/25  1047   POCGLU 85 98 87 122       Blood Sugar Average: Last 72 hrs:  (P) [id number]  Target glucose 140-180 while inpatient  Had been clear liquids/NPO for procedures; will resume carb controlled diet when cleared by GI  Sugars appear well controlled on sliding scale insulin with meals and at bedtime  Hypoglycemia protocol

## 2025-02-15 NOTE — ASSESSMENT & PLAN NOTE
H/o squamous cell carcinoma of the tongue with PET scan 10/30/24 with confirmation as such  Pt follows with [facility] oncology for chemo and radiation

## 2025-02-15 NOTE — PLAN OF CARE
Problem: GASTROINTESTINAL - ADULT  Goal: Minimal or absence of nausea and/or vomiting  Description: INTERVENTIONS:  - Administer IV fluids if ordered to ensure adequate hydration  - Maintain NPO status until nausea and vomiting are resolved  - Nasogastric tube if ordered  - Administer ordered antiemetic medications as needed  - Provide nonpharmacologic comfort measures as appropriate  - Advance diet as tolerated, if ordered  - Consider nutrition services referral to assist patient with adequate nutrition and appropriate food choices  Outcome: Progressing     Problem: HEMATOLOGIC - ADULT  Goal: Maintains hematologic stability  Description: INTERVENTIONS  - Assess for signs and symptoms of bleeding or hemorrhage  - Monitor labs  - Administer supportive blood products/factors as ordered and appropriate  Outcome: Progressing

## 2025-02-16 LAB
ALBUMIN SERPL BCG-MCNC: 2.9 G/DL (ref 3.5–5)
ALP SERPL-CCNC: 64 U/L (ref 34–104)
ALT SERPL W P-5'-P-CCNC: 18 U/L (ref 7–52)
ANION GAP SERPL CALCULATED.3IONS-SCNC: 7 MMOL/L (ref 4–13)
AST SERPL W P-5'-P-CCNC: 14 U/L (ref 13–39)
BASOPHILS # BLD AUTO: 0.01 THOUSANDS/ΜL (ref 0–0.1)
BASOPHILS NFR BLD AUTO: 1 % (ref 0–1)
BILIRUB SERPL-MCNC: 0.72 MG/DL (ref 0.2–1)
BUN SERPL-MCNC: 13 MG/DL (ref 5–25)
CALCIUM ALBUM COR SERPL-MCNC: 8.9 MG/DL (ref 8.3–10.1)
CALCIUM SERPL-MCNC: 8 MG/DL (ref 8.4–10.2)
CHLORIDE SERPL-SCNC: 106 MMOL/L (ref 96–108)
CO2 SERPL-SCNC: 28 MMOL/L (ref 21–32)
CREAT SERPL-MCNC: 0.57 MG/DL (ref 0.6–1.3)
EOSINOPHIL # BLD AUTO: 0.01 THOUSAND/ΜL (ref 0–0.61)
EOSINOPHIL NFR BLD AUTO: 1 % (ref 0–6)
ERYTHROCYTE [DISTWIDTH] IN BLOOD BY AUTOMATED COUNT: 18.4 % (ref 11.6–15.1)
GFR SERPL CREATININE-BSD FRML MDRD: 105 ML/MIN/1.73SQ M
GLUCOSE SERPL-MCNC: 103 MG/DL (ref 65–140)
GLUCOSE SERPL-MCNC: 109 MG/DL (ref 65–140)
GLUCOSE SERPL-MCNC: 111 MG/DL (ref 65–140)
GLUCOSE SERPL-MCNC: 94 MG/DL (ref 65–140)
GLUCOSE SERPL-MCNC: 99 MG/DL (ref 65–140)
HCT VFR BLD AUTO: 23.4 % (ref 36.5–49.3)
HGB BLD-MCNC: 7.8 G/DL (ref 12–17)
IMM GRANULOCYTES # BLD AUTO: 0.01 THOUSAND/UL (ref 0–0.2)
IMM GRANULOCYTES NFR BLD AUTO: 1 % (ref 0–2)
LYMPHOCYTES # BLD AUTO: 0.57 THOUSANDS/ΜL (ref 0.6–4.47)
LYMPHOCYTES NFR BLD AUTO: 30 % (ref 14–44)
MCH RBC QN AUTO: 31.2 PG (ref 26.8–34.3)
MCHC RBC AUTO-ENTMCNC: 33.3 G/DL (ref 31.4–37.4)
MCV RBC AUTO: 94 FL (ref 82–98)
MONOCYTES # BLD AUTO: 0.19 THOUSAND/ΜL (ref 0.17–1.22)
MONOCYTES NFR BLD AUTO: 10 % (ref 4–12)
NEUTROPHILS # BLD AUTO: 1.11 THOUSANDS/ΜL (ref 1.85–7.62)
NEUTS SEG NFR BLD AUTO: 57 % (ref 43–75)
NRBC BLD AUTO-RTO: 0 /100 WBCS
PLATELET # BLD AUTO: 132 THOUSANDS/UL (ref 149–390)
PMV BLD AUTO: 9.7 FL (ref 8.9–12.7)
POTASSIUM SERPL-SCNC: 3.3 MMOL/L (ref 3.5–5.3)
PROT SERPL-MCNC: 5.1 G/DL (ref 6.4–8.4)
RBC # BLD AUTO: 2.5 MILLION/UL (ref 3.88–5.62)
SODIUM SERPL-SCNC: 141 MMOL/L (ref 135–147)
WBC # BLD AUTO: 1.9 THOUSAND/UL (ref 4.31–10.16)

## 2025-02-16 PROCEDURE — 80053 COMPREHEN METABOLIC PANEL: CPT | Performed by: FAMILY MEDICINE

## 2025-02-16 PROCEDURE — 99232 SBSQ HOSP IP/OBS MODERATE 35: CPT | Performed by: FAMILY MEDICINE

## 2025-02-16 PROCEDURE — 85025 COMPLETE CBC W/AUTO DIFF WBC: CPT | Performed by: FAMILY MEDICINE

## 2025-02-16 PROCEDURE — 82948 REAGENT STRIP/BLOOD GLUCOSE: CPT

## 2025-02-16 PROCEDURE — 99232 SBSQ HOSP IP/OBS MODERATE 35: CPT | Performed by: INTERNAL MEDICINE

## 2025-02-16 RX ORDER — CALCIUM CARBONATE 500 MG/1
500 TABLET, CHEWABLE ORAL 2 TIMES DAILY PRN
Status: DISCONTINUED | OUTPATIENT
Start: 2025-02-16 | End: 2025-02-21 | Stop reason: HOSPADM

## 2025-02-16 RX ORDER — POLYETHYLENE GLYCOL 3350 17 G/17G
17 POWDER, FOR SOLUTION ORAL ONCE
Status: COMPLETED | OUTPATIENT
Start: 2025-02-16 | End: 2025-02-16

## 2025-02-16 RX ORDER — POTASSIUM CHLORIDE 1500 MG/1
40 TABLET, EXTENDED RELEASE ORAL ONCE
Status: COMPLETED | OUTPATIENT
Start: 2025-02-16 | End: 2025-02-16

## 2025-02-16 RX ADMIN — BRIMONIDINE TARTRATE 1 DROP: 2 SOLUTION/ DROPS OPHTHALMIC at 21:15

## 2025-02-16 RX ADMIN — CALCIUM CARBONATE (ANTACID) CHEW TAB 500 MG 500 MG: 500 CHEW TAB at 15:27

## 2025-02-16 RX ADMIN — POTASSIUM CHLORIDE 40 MEQ: 1500 TABLET, EXTENDED RELEASE ORAL at 13:02

## 2025-02-16 RX ADMIN — METOPROLOL SUCCINATE 12.5 MG: 25 TABLET, EXTENDED RELEASE ORAL at 08:00

## 2025-02-16 RX ADMIN — POLYETHYLENE GLYCOL 3350 17 G: 17 POWDER, FOR SOLUTION ORAL at 21:19

## 2025-02-16 RX ADMIN — TAMSULOSIN HYDROCHLORIDE 0.4 MG: 0.4 CAPSULE ORAL at 17:22

## 2025-02-16 RX ADMIN — PANTOPRAZOLE SODIUM 40 MG: 40 INJECTION, POWDER, FOR SOLUTION INTRAVENOUS at 21:19

## 2025-02-16 RX ADMIN — BRIMONIDINE TARTRATE 1 DROP: 2 SOLUTION/ DROPS OPHTHALMIC at 08:00

## 2025-02-16 RX ADMIN — FUROSEMIDE 40 MG: 10 INJECTION, SOLUTION INTRAMUSCULAR; INTRAVENOUS at 08:00

## 2025-02-16 RX ADMIN — OMEGA-3 FATTY ACIDS CAP 1000 MG 1000 MG: 1000 CAP at 08:00

## 2025-02-16 RX ADMIN — ATORVASTATIN CALCIUM 40 MG: 40 TABLET, FILM COATED ORAL at 17:22

## 2025-02-16 RX ADMIN — POLYETHYLENE GLYCOL 3350 17 G: 17 POWDER, FOR SOLUTION ORAL at 17:22

## 2025-02-16 RX ADMIN — FLUTICASONE PROPIONATE 2 SPRAY: 50 SPRAY, METERED NASAL at 08:00

## 2025-02-16 RX ADMIN — OMEGA-3 FATTY ACIDS CAP 1000 MG 1000 MG: 1000 CAP at 21:19

## 2025-02-16 RX ADMIN — AZELASTINE HYDROCHLORIDE 2 SPRAY: 137 SPRAY, METERED NASAL at 08:00

## 2025-02-16 RX ADMIN — AZELASTINE HYDROCHLORIDE 2 SPRAY: 137 SPRAY, METERED NASAL at 21:15

## 2025-02-16 RX ADMIN — PANTOPRAZOLE SODIUM 40 MG: 40 INJECTION, POWDER, FOR SOLUTION INTRAVENOUS at 08:00

## 2025-02-16 RX ADMIN — FAMOTIDINE 20 MG: 20 TABLET, FILM COATED ORAL at 21:19

## 2025-02-16 NOTE — ASSESSMENT & PLAN NOTE
Known history of this, due to squamous cell carcinoma of the tongue and having received chemo and radiation in the past  Continue to monitor  Follow hgb as noted above  White cell count 2/14/25 1.90, plt 132,000  Monitor for fevers as well

## 2025-02-16 NOTE — PROGRESS NOTES
"Progress Note - Gastroenterology   Name: Ean Young 69 y.o. male I MRN: [id number]  [facility] Date of Admission: 2/12/2025   Date of Service: 2/16/2025 I Hospital Day: 4    Assessment & Plan  Upper GI bleed  Patient reports a 2 week history of dark stools w/o change in number, frequency (once weekly), consistency, or caliber,with no associated nausea or vomiting  Reports a similar episode \"in the 70s\" r/t an ulcer that required prolonged treatment  Denies any family Hx of similar or cancers of GI tract, alcohol use, or smoking  Hgb on presentation 6.6 w/ 2 u PRBCs given, dropping to 5.8 w/ 2u PRBC given  Lipase - negative    CT abdomen and pelvis showed no evidence of acute bleeding but diverticulosis without diverticulitis.  He is undergoing multiple EGD within the past 2 weeks below    2/5 - EGD - \"Schatzki ring in the GE junction , 3 cm sliding hiatal hernia, localized erythematous and scared mucosa in duodenal bulb, focal area of scarred mucosa and mild erythema in proximal duodenal bulb, single small TSAT in the second part of the duodenum, and with stigmata of recent hemorrhage; the lesion was ablated with argon plasma coagulation using circumferential loop and placement of 1 clip with achieved hemostasis.  Possible AVM versus Dieulafoy lesion in the second portion of duodenum\".  2/10 - EGD - \"as above, with no signs of bleeding stigmata or hemorrhage.\"  2/11 - Colonoscopy - \"internal hemorrhoids,multiple small and medium diverticulum in sigmoid and rectosigmoid colon, significant hematin in terminal ileum and old blood suggestive of proximal foci of bleeding\"  NSAID use in 12/24 for \"headaches\"      2/13 - EGD - normal esophagus, 1st and 2nd duodenum and proximal jejunum. 2 small telangiectasias in 3rd part of jejunum coagulation w/ argon plasma, mild erthematous hemorrhagic mucosa in lesser curve of stomach.    He is still having melena. He has difficulty with transportation and fears if " he goes home, he will return with symptomatic anemia      Plan:  - NPO at midnight. Plan for tentative inpatient capsule endoscopy tomorrow or early this week pending scheduling availability  -  Continue PPI  - Monitor h/h, transfuse if Hgb< 7.0  Gastroesophageal reflux disease without esophagitis  Patient takes Pepcid 20mg Qhs and protonix 40mg Bid    Plan:  Continue  PTA medications  Head and neck cancer (HCC)  Receives radiation therapy for tongue cancer  Essential hypertension    Dyslipidemia    Obstructive sleep apnea    Type 2 diabetes mellitus without complication, without long-term current use of insulin ([facility])  Lab Results   Component Value Date    HGBA1C 5.7 (H) 02/11/2025       Recent Labs     02/15/25  1047 02/15/25  1612 02/15/25  2040 02/16/25  0726   POCGLU 122 128 104 94       Blood Sugar Average: Last 72 hrs:  (P) 96.5800735744308690    Class 2 severe obesity due to excess calories with serious comorbidity and body mass index (BMI) of 37.0 to 37.9 in adult ([facility])    Benign prostatic hyperplasia    Pancytopenia ([facility])          Subjective   No  acute events overnight. Still having Melena    Objective :  Temp:  [98.7 °F (37.1 °C)-99.3 °F (37.4 °C)] 98.7 °F (37.1 °C)  HR:  [74-94] 74  BP: (115-135)/(61-63) 115/63  Resp:  [16-20] 20  SpO2:  [95 %-97 %] 96 %  O2 Device: None (Room air)    Physical Exam  Vitals and nursing note reviewed.   Constitutional:       General: He is not in acute distress.     Appearance: He is well-developed.   HENT:      Head: Normocephalic and atraumatic.   Eyes:      Conjunctiva/sclera: Conjunctivae normal.   Cardiovascular:      Rate and Rhythm: Normal rate and regular rhythm.      Heart sounds: No murmur heard.  Pulmonary:      Effort: Pulmonary effort is normal. No respiratory distress.      Breath sounds: Normal breath sounds.   Abdominal:      Palpations: Abdomen is soft.      Tenderness: There is no abdominal tenderness.   Musculoskeletal:         General: No swelling.       Cervical back: Neck supple.   Skin:     General: Skin is warm and dry.      Capillary Refill: Capillary refill takes less than 2 seconds.   Neurological:      Mental Status: He is alert.   Psychiatric:         Mood and Affect: Mood normal.           Lab Results: I have reviewed the following results:CBC/BMP:   .     02/16/25  0508   WBC 1.90*   HGB 7.8*   HCT 23.4*   *   SODIUM 141   K 3.3*      CO2 28   BUN 13   CREATININE 0.57*   GLUC 99        Imaging Results Review: I reviewed radiology reports from this admission including: CT abdomen/pelvis.  Other Study Results Review: No additional pertinent studies reviewed.    Neela Ortiz MD  Gastroenterology Fellow, PGY- 4  Available on EPIC Secure Chat  2/16/2025 10:23 AM

## 2025-02-16 NOTE — ASSESSMENT & PLAN NOTE
"Pt presented to [facility] ED 2/9/25 after having dark stools and feeling lightheaded  He was hospitalized at [facility] 2/4/25 for GI bleeding - EGD with \"Schatzki ring at GE junction, 3cm type I hiatal hernia, erythematous scarred mucosa in duodenal bulb, small angioectasia in second part of duodenum with stigmata oh recent hemorrhage\"  Recommended PPI BID for 3-6 months and GI follow up outpatient  He returned to [facility] with hgb drop from 8.1 to 6.6 for which he received 2 units of PRBCs  CT with no evidence of high volume GI bleeding; linear radiodensity in distal duodenum suggestive of endoscopy clip - correlate with history; no acute intraabdominal pathology  Given no GI rounding at [facility] for a few days, pt transferred to [facility]  Pt seen by GI at [place] - EGD and colonoscopy done on 2/10 with no evidence of upper GI bleeding but there was note of old blood throughout the colon and melena throughout the ileum.  GI recommended transfer to [facility] for further GI workup with capsule endoscopy and possible balloon enteroscopy  Repeat hgb 2/11/25 was 5.8 - pt transfused again with hgb up to 8.1 prior to transfer  consulted GI for continuation of care  Monitor stools and hgb - transfuse as necessary to keep Hg>7; Hg 7.7 on 2/13  Status post EGD with balloon enteroplasty on 2/13- 2 small angioectasias in the 3rd part of the duodenum; induced coagulation with argon plasma coagulation   GI involved, recommendations appreciated; as per GI capsule endoscopy can be done outpatient, but patient is very concerned due to transportation issues and ongoing black stools  Plan for capsule endoscopy tomorrow, 2/17, n.p.o. after midnight  "

## 2025-02-16 NOTE — ASSESSMENT & PLAN NOTE
H/o squamous cell carcinoma of the tongue with PET scan 10/30/24 with confirmation as such  Pt follows with [facility] oncology for chemo and radiation

## 2025-02-16 NOTE — PLAN OF CARE
Problem: GASTROINTESTINAL - ADULT  Goal: Minimal or absence of nausea and/or vomiting  Description: INTERVENTIONS:  - Administer IV fluids if ordered to ensure adequate hydration  - Maintain NPO status until nausea and vomiting are resolved  - Nasogastric tube if ordered  - Administer ordered antiemetic medications as needed  - Provide nonpharmacologic comfort measures as appropriate  - Advance diet as tolerated, if ordered  - Consider nutrition services referral to assist patient with adequate nutrition and appropriate food choices  Outcome: Progressing     Problem: GASTROINTESTINAL - ADULT  Goal: Maintains or returns to baseline bowel function  Description: INTERVENTIONS:  - Assess bowel function  - Encourage oral fluids to ensure adequate hydration  - Administer IV fluids if ordered to ensure adequate hydration  - Administer ordered medications as needed  - Encourage mobilization and activity  - Consider nutritional services referral to assist patient with adequate nutrition and appropriate food choices  Outcome: Progressing     Problem: GASTROINTESTINAL - ADULT  Goal: Maintains adequate nutritional intake  Description: INTERVENTIONS:  - Monitor percentage of each meal consumed  - Identify factors contributing to decreased intake, treat as appropriate  - Assist with meals as needed  - Monitor I&O, weight, and lab values if indicated  - Obtain nutrition services referral as needed  Outcome: Progressing     Problem: HEMATOLOGIC - ADULT  Goal: Maintains hematologic stability  Description: INTERVENTIONS  - Assess for signs and symptoms of bleeding or hemorrhage  - Monitor labs  - Administer supportive blood products/factors as ordered and appropriate  Outcome: Progressing     Problem: DISCHARGE PLANNING  Goal: Discharge to home or other facility with appropriate resources  Description: INTERVENTIONS:  - Identify barriers to discharge w/patient and caregiver  - Arrange for needed discharge resources and  transportation as appropriate  - Identify discharge learning needs (meds, wound care, etc.)  - Arrange for interpretive services to assist at discharge as needed  - Refer to Case Management Department for coordinating discharge planning if the patient needs post-hospital services based on physician/advanced practitioner order or complex needs related to functional status, cognitive ability, or social support system  Outcome: Progressing     Problem: Knowledge Deficit  Goal: Patient/family/caregiver demonstrates understanding of disease process, treatment plan, medications, and discharge instructions  Description: Complete learning assessment and assess knowledge base.  Interventions:  - Provide teaching at level of understanding  - Provide teaching via preferred learning methods  Outcome: Progressing     Problem: CARDIOVASCULAR - ADULT  Goal: Maintains optimal cardiac output and hemodynamic stability  Description: INTERVENTIONS:  - Monitor I/O, vital signs and rhythm  - Monitor for S/S and trends of decreased cardiac output  - Administer and titrate ordered vasoactive medications to optimize hemodynamic stability  - Assess quality of pulses, skin color and temperature  - Assess for signs of decreased coronary artery perfusion  - Instruct patient to report change in severity of symptoms  Outcome: Progressing     Problem: CARDIOVASCULAR - ADULT  Goal: Absence of cardiac dysrhythmias or at baseline rhythm  Description: INTERVENTIONS:  - Continuous cardiac monitoring, vital signs, obtain 12 lead EKG if ordered  - Administer antiarrhythmic and heart rate control medications as ordered  - Monitor electrolytes and administer replacement therapy as ordered  Outcome: Progressing     Problem: RESPIRATORY - ADULT  Goal: Achieves optimal ventilation and oxygenation  Description: INTERVENTIONS:  - Assess for changes in respiratory status  - Assess for changes in mentation and behavior  - Position to facilitate oxygenation and  minimize respiratory effort  - Oxygen administered by appropriate delivery if ordered  - Initiate smoking cessation education as indicated  - Encourage broncho-pulmonary hygiene including cough, deep breathe, Incentive Spirometry  - Assess the need for suctioning and aspirate as needed  - Assess and instruct to report SOB or any respiratory difficulty  - Respiratory Therapy support as indicated  Outcome: Progressing     Problem: METABOLIC, FLUID AND ELECTROLYTES - ADULT  Goal: Electrolytes maintained within normal limits  Description: INTERVENTIONS:  - Monitor labs and assess patient for signs and symptoms of electrolyte imbalances  - Administer electrolyte replacement as ordered  - Monitor response to electrolyte replacements, including repeat lab results as appropriate  - Instruct patient on fluid and nutrition as appropriate  Outcome: Progressing     Problem: METABOLIC, FLUID AND ELECTROLYTES - ADULT  Goal: Fluid balance maintained  Description: INTERVENTIONS:  - Monitor labs   - Monitor I/O and WT  - Instruct patient on fluid and nutrition as appropriate  - Assess for signs & symptoms of volume excess or deficit  Outcome: Progressing     Problem: PAIN - ADULT  Goal: Verbalizes/displays adequate comfort level or baseline comfort level  Description: Interventions:  - Encourage patient to monitor pain and request assistance  - Assess pain using appropriate pain scale  - Administer analgesics based on type and severity of pain and evaluate response  - Implement non-pharmacological measures as appropriate and evaluate response  - Consider cultural and social influences on pain and pain management  - Notify physician/advanced practitioner if interventions unsuccessful or patient reports new pain  Outcome: Progressing     Problem: INFECTION - ADULT  Goal: Absence or prevention of progression during hospitalization  Description: INTERVENTIONS:  - Assess and monitor for signs and symptoms of infection  - Monitor  lab/diagnostic results  - Monitor all insertion sites, i.e. indwelling lines, tubes, and drains  - Monitor endotracheal if appropriate and nasal secretions for changes in amount and color  - [place] appropriate cooling/warming therapies per order  - Administer medications as ordered  - Instruct and encourage patient and family to use good hand hygiene technique  - Identify and instruct in appropriate isolation precautions for identified infection/condition  Outcome: Progressing     Problem: SAFETY ADULT  Goal: Patient will remain free of falls  Description: INTERVENTIONS:  - Educate patient/family on patient safety including physical limitations  - Instruct patient to call for assistance with activity   - Consult OT/PT to assist with strengthening/mobility   - Keep Call bell within reach  - Keep bed low and locked with side rails adjusted as appropriate  - Keep care items and personal belongings within reach  - Initiate and maintain comfort rounds  - Make Fall Risk Sign visible to staff  - Apply yellow socks and bracelet for high fall risk patients  - Consider moving patient to room near nurses station  Outcome: Progressing

## 2025-02-16 NOTE — ASSESSMENT & PLAN NOTE
"Patient reports a 2 week history of dark stools w/o change in number, frequency (once weekly), consistency, or caliber,with no associated nausea or vomiting  Reports a similar episode \"in the 70s\" r/t an ulcer that required prolonged treatment  Denies any family Hx of similar or cancers of GI tract, alcohol use, or smoking  Hgb on presentation 6.6 w/ 2 u PRBCs given, dropping to 5.8 w/ 2u PRBC given  Lipase - negative    CT abdomen and pelvis showed no evidence of acute bleeding but diverticulosis without diverticulitis.  He is undergoing multiple EGD within the past 2 weeks below    2/5 - EGD - \"Schatzki ring in the GE junction , 3 cm sliding hiatal hernia, localized erythematous and scared mucosa in duodenal bulb, focal area of scarred mucosa and mild erythema in proximal duodenal bulb, single small TSAT in the second part of the duodenum, and with stigmata of recent hemorrhage; the lesion was ablated with argon plasma coagulation using circumferential loop and placement of 1 clip with achieved hemostasis.  Possible AVM versus Dieulafoy lesion in the second portion of duodenum\".  2/10 - EGD - \"as above, with no signs of bleeding stigmata or hemorrhage.\"  2/11 - Colonoscopy - \"internal hemorrhoids,multiple small and medium diverticulum in sigmoid and rectosigmoid colon, significant hematin in terminal ileum and old blood suggestive of proximal foci of bleeding\"  NSAID use in 12/24 for \"headaches\"      2/13 - EGD - normal esophagus, 1st and 2nd duodenum and proximal jejunum. 2 small telangiectasias in 3rd part of jejunum coagulation w/ argon plasma, mild erthematous hemorrhagic mucosa in lesser curve of stomach.    He is still having melena. He has difficulty with transportation and fears if he goes home, he will return with symptomatic anemia      Plan:  - NPO at midnight. Plan for tentative inpatient capsule endoscopy tomorrow or early this week pending scheduling availability  -  Continue PPI  - Monitor h/h, " transfuse if Hgb< 7.0

## 2025-02-16 NOTE — PLAN OF CARE
Problem: HEMATOLOGIC - ADULT  Goal: Maintains hematologic stability  Description: INTERVENTIONS  - Assess for signs and symptoms of bleeding or hemorrhage  - Monitor labs  - Administer supportive blood products/factors as ordered and appropriate  Outcome: Progressing     Problem: GASTROINTESTINAL - ADULT  Goal: Minimal or absence of nausea and/or vomiting  Description: INTERVENTIONS:  - Administer IV fluids if ordered to ensure adequate hydration  - Maintain NPO status until nausea and vomiting are resolved  - Nasogastric tube if ordered  - Administer ordered antiemetic medications as needed  - Provide nonpharmacologic comfort measures as appropriate  - Advance diet as tolerated, if ordered  - Consider nutrition services referral to assist patient with adequate nutrition and appropriate food choices  Outcome: Progressing

## 2025-02-16 NOTE — ASSESSMENT & PLAN NOTE
Lab Results   Component Value Date    HGBA1C 5.7 (H) 02/11/2025       Recent Labs     02/15/25  1612 02/15/25  2040 02/16/25  0726 02/16/25  1055   POCGLU 128 104 94 109       Blood Sugar Average: Last 72 hrs:  (P) 97.625  Target glucose 140-180 while inpatient  Had been clear liquids/NPO for procedures; will resume carb controlled diet when cleared by GI  Sugars appear well controlled on sliding scale insulin with meals and at bedtime  Hypoglycemia protocol

## 2025-02-16 NOTE — PROGRESS NOTES
"Progress Note - Hospitalist   Name: Ean Young 69 y.o. male I MRN: [id number]  [facility] Date of Admission: 2/12/2025   Date of Service: 2/16/2025 I Hospital Day: 4    Assessment & Plan  Upper GI bleed  Pt presented to [facility] ED 2/9/25 after having dark stools and feeling lightheaded  He was hospitalized at [facility] 2/4/25 for GI bleeding - EGD with \"Schatzki ring at GE junction, 3cm type I hiatal hernia, erythematous scarred mucosa in duodenal bulb, small angioectasia in second part of duodenum with stigmata oh recent hemorrhage\"  Recommended PPI BID for 3-6 months and GI follow up outpatient  He returned to [facility] normotensive with hgb drop from 8.1 to 6.6 for which he received 2 units of PRBCs  CT with no evidence of high volume GI bleeding; linear radiodensity in distal duodenum suggestive of endoscopy clip - correlate with history; no acute intraabdominal pathology  Given no GI rounding at [facility] for a few days, pt transferred to [facility]  Pt seen by GI at [place] - EGD and colonoscopy done on 2/10 with no evidence of upper GI bleeding but there was note of old blood throughout the colon and melena throughout the ileum.  GI recommended transfer to [facility] for further GI workup with capsule endoscopy and possible balloon enteroscopy  Repeat hgb 2/11/25 was 5.8 - pt transfused again with hgb up to 8.1 prior to transfer  consulted GI for continuation of care  Monitor stools and hgb - transfuse as necessary to keep Hg>7; Hg 7.7 on 2/13  Status post EGD with balloon enteroplasty on 2/13- 2 small angioectasias in the 3rd part of the duodenum; induced coagulation with argon plasma coagulation   GI involved, recommendations appreciated; as per GI capsule endoscopy can be done outpatient, but patient is very concerned due to transportation issues and ongoing black stools  Plan for capsule endoscopy tomorrow, 2/17, n.p.o. after midnight  Essential hypertension  BP WNL - continue home " regimen of Toprol XL, Lasix, tamsulosin  Dyslipidemia  Continue Lipitor  Lipid panel 1mo prior with high triglycerides and low HDL  Gastroesophageal reflux disease without esophagitis  Continue IV protonix  Obstructive sleep apnea  Compliant with CPAP at bedtime, continue while inpatient  Type 2 diabetes mellitus without complication, without long-term current use of insulin ([facility])  Lab Results   Component Value Date    HGBA1C 5.7 (H) 02/11/2025       Recent Labs     02/15/25  1612 02/15/25  2040 02/16/25  0726 02/16/25  1055   POCGLU 128 104 94 109       Blood Sugar Average: Last 72 hrs:  (P) 97.625  Target glucose 140-180 while inpatient  Had been clear liquids/NPO for procedures; will resume carb controlled diet when cleared by GI  Sugars appear well controlled on sliding scale insulin with meals and at bedtime  Hypoglycemia protocol    Class 2 severe obesity due to excess calories with serious comorbidity and body mass index (BMI) of 37.0 to 37.9 in adult ([facility])  BMI 37  Educated on healthy lifestyle and diet modification  Benign prostatic hyperplasia  Continue flomax  Head and neck cancer (HCC)  H/o squamous cell carcinoma of the tongue with PET scan 10/30/24 with confirmation as such  Pt follows with [facility] oncology for chemo and radiation  Pancytopenia (HCC)  Known history of this, due to squamous cell carcinoma of the tongue and having received chemo and radiation in the past  Continue to monitor  Follow hgb as noted above  White cell count 2/14/25 1.90, plt 132,000  Monitor for fevers as well    VTE Pharmacologic Prophylaxis:   Moderate Risk (Score 3-4) - Pharmacological DVT Prophylaxis Contraindicated. Sequential Compression Devices Ordered.    Mobility:   Basic Mobility Inpatient Raw Score: 24  JH-HLM Goal: 8: Walk 250 feet or more  JH-HLM Achieved: 8: Walk 250 feet ot more  JH-HLM Goal NOT achieved. Continue with multidisciplinary rounding and encourage appropriate mobility to improve upon JH-HLM  goals.    Patient Centered Rounds: I performed bedside rounds with nursing staff today.   Discussions with Specialists or Other Care Team Provider:     Education and Discussions with Family / Patient:  discussed with the patient and niece over the phone on 2/15.     Current Length of Stay: 4 day(s)  Current Patient Status: Inpatient   Certification Statement: The patient will continue to require additional inpatient hospital stay due to ongoing GI work up  Discharge Plan:  when cleared by GI    Code Status: Level 1 - Full Code    Subjective   Patient seen and examined, reports liquid black stool yesterday morning as well as 1 smear overnight  Objective :  Temp:  [98.7 °F (37.1 °C)-99.3 °F (37.4 °C)] 98.7 °F (37.1 °C)  HR:  [74-94] 74  BP: (115-135)/(61-63) 115/63  Resp:  [16-20] 20  SpO2:  [95 %-97 %] 96 %  O2 Device: None (Room air)    Body mass index is 37.66 kg/m².     Input and Output Summary (last 24 hours):     Intake/Output Summary (Last 24 hours) at 2/16/2025 1341  Last data filed at 2/16/2025 0900  Gross per 24 hour   Intake 290 ml   Output 0 ml   Net 290 ml       Physical Exam  Vitals reviewed.   Constitutional:       General: He is not in acute distress.     Appearance: He is not ill-appearing.   HENT:      Head: Normocephalic.   Cardiovascular:      Rate and Rhythm: Normal rate and regular rhythm.   Pulmonary:      Effort: Pulmonary effort is normal. No respiratory distress.   Abdominal:      General: Abdomen is flat.      Palpations: Abdomen is soft.      Tenderness: There is no abdominal tenderness.   Skin:     General: Skin is warm and dry.   Neurological:      Mental Status: He is alert and oriented to person, place, and time. Mental status is at baseline.      Comments: Facial asymmetry (right facial droop) noted, patient reports that is his chronic           Lines/Drains:              Lab Results: I have reviewed the following results:   Results from last 7 days   Lab Units 02/16/25  0508   WBC  Thousand/uL 1.90*   HEMOGLOBIN g/dL 7.8*   HEMATOCRIT % 23.4*   PLATELETS Thousands/uL 132*   SEGS PCT % 57   LYMPHO PCT % 30   MONO PCT % 10   EOS PCT % 1     Results from last 7 days   Lab Units 02/16/25  0508   SODIUM mmol/L 141   POTASSIUM mmol/L 3.3*   CHLORIDE mmol/L 106   CO2 mmol/L 28   BUN mg/dL 13   CREATININE mg/dL 0.57*   ANION GAP mmol/L 7   CALCIUM mg/dL 8.0*   ALBUMIN g/dL 2.9*   TOTAL BILIRUBIN mg/dL 0.72   ALK PHOS U/L 64   ALT U/L 18   AST U/L 14   GLUCOSE RANDOM mg/dL 99           Results from last 7 days   Lab Units 02/16/25  1055 02/16/25  0726 02/15/25  2040 02/15/25  1612 02/15/25  1047 02/15/25  0733 02/14/25  2114 02/14/25  1602 02/14/25  1148 02/14/25  0729 02/13/25  2105 02/13/25  1611   POC GLUCOSE mg/dl 109 94 104 128 122 87 98 85 93 95 116 83     Results from last 7 days   Lab Units 02/11/25  0507   HEMOGLOBIN A1C % 5.7*           Recent Cultures (last 7 days):               Last 24 Hours Medication List:     Current Facility-Administered Medications:     acetaminophen (TYLENOL) tablet 650 mg, Q4H PRN    atorvastatin (LIPITOR) tablet 40 mg, Daily With Dinner    azelastine (ASTELIN) 0.1 % nasal spray 2 spray, BID    brimonidine tartrate 0.2 % ophthalmic solution 1 drop, Q12H    famotidine (PEPCID) tablet 20 mg, HS    fish oil capsule 1,000 mg, BID    fluticasone (FLONASE) 50 mcg/act nasal spray 2 spray, Daily    furosemide (LASIX) injection 40 mg, Daily    hydrOXYzine HCL (ATARAX) tablet 25 mg, Q8H PRN    insulin lispro (HumALOG/ADMELOG) 100 units/mL subcutaneous injection 2-12 Units, TID AC **AND** Fingerstick Glucose (POCT), TID AC    insulin lispro (HumALOG/ADMELOG) 100 units/mL subcutaneous injection 2-12 Units, HS    metoprolol succinate (TOPROL-XL) 24 hr tablet 12.5 mg, Daily    ondansetron (ZOFRAN) injection 4 mg, Q6H PRN    pantoprazole (PROTONIX) injection 40 mg, Q12H HAYLEE    sodium chloride (OCEAN) 0.65 % nasal spray 1 spray, Q1H PRN    tamsulosin (FLOMAX) capsule 0.4 mg, Daily  With Dinner    Administrative Statements   Today, Patient Was Seen By: Hilda Vasques MD      **Please Note: This note may have been constructed using a voice recognition system.**

## 2025-02-16 NOTE — ASSESSMENT & PLAN NOTE
Lab Results   Component Value Date    HGBA1C 5.7 (H) 02/11/2025       Recent Labs     02/15/25  1047 02/15/25  1612 02/15/25  2040 02/16/25  0726   POCGLU 122 128 104 94       Blood Sugar Average: Last 72 hrs:  (P) [id number]

## 2025-02-17 ENCOUNTER — TELEPHONE (OUTPATIENT)
Dept: GASTROENTEROLOGY | Facility: CLINIC | Age: 70
End: 2025-02-17

## 2025-02-17 ENCOUNTER — APPOINTMENT (INPATIENT)
Dept: GASTROENTEROLOGY | Facility: HOSPITAL | Age: 70
End: 2025-02-17
Payer: COMMERCIAL

## 2025-02-17 DIAGNOSIS — I10 ESSENTIAL HYPERTENSION: ICD-10-CM

## 2025-02-17 LAB
ALBUMIN SERPL BCG-MCNC: 2.8 G/DL (ref 3.5–5)
ALP SERPL-CCNC: 63 U/L (ref 34–104)
ALT SERPL W P-5'-P-CCNC: 18 U/L (ref 7–52)
ANION GAP SERPL CALCULATED.3IONS-SCNC: 5 MMOL/L (ref 4–13)
AST SERPL W P-5'-P-CCNC: 16 U/L (ref 13–39)
BASOPHILS # BLD AUTO: 0.01 THOUSANDS/ΜL (ref 0–0.1)
BASOPHILS NFR BLD AUTO: 1 % (ref 0–1)
BILIRUB SERPL-MCNC: 0.65 MG/DL (ref 0.2–1)
BUN SERPL-MCNC: 12 MG/DL (ref 5–25)
CALCIUM ALBUM COR SERPL-MCNC: 9 MG/DL (ref 8.3–10.1)
CALCIUM SERPL-MCNC: 8 MG/DL (ref 8.4–10.2)
CHLORIDE SERPL-SCNC: 106 MMOL/L (ref 96–108)
CO2 SERPL-SCNC: 29 MMOL/L (ref 21–32)
CREAT SERPL-MCNC: 0.56 MG/DL (ref 0.6–1.3)
EOSINOPHIL # BLD AUTO: 0.01 THOUSAND/ΜL (ref 0–0.61)
EOSINOPHIL NFR BLD AUTO: 1 % (ref 0–6)
ERYTHROCYTE [DISTWIDTH] IN BLOOD BY AUTOMATED COUNT: 19 % (ref 11.6–15.1)
GFR SERPL CREATININE-BSD FRML MDRD: 106 ML/MIN/1.73SQ M
GLUCOSE SERPL-MCNC: 101 MG/DL (ref 65–140)
GLUCOSE SERPL-MCNC: 142 MG/DL (ref 65–140)
GLUCOSE SERPL-MCNC: 89 MG/DL (ref 65–140)
GLUCOSE SERPL-MCNC: 95 MG/DL (ref 65–140)
GLUCOSE SERPL-MCNC: 96 MG/DL (ref 65–140)
GLUCOSE SERPL-MCNC: 97 MG/DL (ref 65–140)
HCT VFR BLD AUTO: 23.6 % (ref 36.5–49.3)
HGB BLD-MCNC: 7.8 G/DL (ref 12–17)
IMM GRANULOCYTES # BLD AUTO: 0.01 THOUSAND/UL (ref 0–0.2)
IMM GRANULOCYTES NFR BLD AUTO: 1 % (ref 0–2)
LYMPHOCYTES # BLD AUTO: 0.38 THOUSANDS/ΜL (ref 0.6–4.47)
LYMPHOCYTES NFR BLD AUTO: 21 % (ref 14–44)
MCH RBC QN AUTO: 31.5 PG (ref 26.8–34.3)
MCHC RBC AUTO-ENTMCNC: 33.1 G/DL (ref 31.4–37.4)
MCV RBC AUTO: 95 FL (ref 82–98)
MONOCYTES # BLD AUTO: 0.19 THOUSAND/ΜL (ref 0.17–1.22)
MONOCYTES NFR BLD AUTO: 10 % (ref 4–12)
NEUTROPHILS # BLD AUTO: 1.24 THOUSANDS/ΜL (ref 1.85–7.62)
NEUTS SEG NFR BLD AUTO: 66 % (ref 43–75)
NRBC BLD AUTO-RTO: 0 /100 WBCS
PLATELET # BLD AUTO: 136 THOUSANDS/UL (ref 149–390)
PMV BLD AUTO: 9.8 FL (ref 8.9–12.7)
POTASSIUM SERPL-SCNC: 3.5 MMOL/L (ref 3.5–5.3)
PROT SERPL-MCNC: 5 G/DL (ref 6.4–8.4)
RBC # BLD AUTO: 2.48 MILLION/UL (ref 3.88–5.62)
SODIUM SERPL-SCNC: 140 MMOL/L (ref 135–147)
WBC # BLD AUTO: 1.84 THOUSAND/UL (ref 4.31–10.16)

## 2025-02-17 PROCEDURE — 91110 GI TRC IMG INTRAL ESOPH-ILE: CPT

## 2025-02-17 PROCEDURE — 99232 SBSQ HOSP IP/OBS MODERATE 35: CPT | Performed by: FAMILY MEDICINE

## 2025-02-17 PROCEDURE — 82948 REAGENT STRIP/BLOOD GLUCOSE: CPT

## 2025-02-17 PROCEDURE — 80053 COMPREHEN METABOLIC PANEL: CPT | Performed by: FAMILY MEDICINE

## 2025-02-17 PROCEDURE — 99232 SBSQ HOSP IP/OBS MODERATE 35: CPT | Performed by: INTERNAL MEDICINE

## 2025-02-17 PROCEDURE — 0DJ07ZZ INSPECTION OF UPPER INTESTINAL TRACT, VIA NATURAL OR ARTIFICIAL OPENING: ICD-10-PCS | Performed by: STUDENT IN AN ORGANIZED HEALTH CARE EDUCATION/TRAINING PROGRAM

## 2025-02-17 PROCEDURE — 85025 COMPLETE CBC W/AUTO DIFF WBC: CPT | Performed by: FAMILY MEDICINE

## 2025-02-17 RX ADMIN — FLUTICASONE PROPIONATE 2 SPRAY: 50 SPRAY, METERED NASAL at 08:32

## 2025-02-17 RX ADMIN — OMEGA-3 FATTY ACIDS CAP 1000 MG 1000 MG: 1000 CAP at 08:30

## 2025-02-17 RX ADMIN — BRIMONIDINE TARTRATE 1 DROP: 2 SOLUTION/ DROPS OPHTHALMIC at 08:32

## 2025-02-17 RX ADMIN — PANTOPRAZOLE SODIUM 40 MG: 40 INJECTION, POWDER, FOR SOLUTION INTRAVENOUS at 20:39

## 2025-02-17 RX ADMIN — OMEGA-3 FATTY ACIDS CAP 1000 MG 1000 MG: 1000 CAP at 20:39

## 2025-02-17 RX ADMIN — TAMSULOSIN HYDROCHLORIDE 0.4 MG: 0.4 CAPSULE ORAL at 18:07

## 2025-02-17 RX ADMIN — BRIMONIDINE TARTRATE 1 DROP: 2 SOLUTION/ DROPS OPHTHALMIC at 21:10

## 2025-02-17 RX ADMIN — ATORVASTATIN CALCIUM 40 MG: 40 TABLET, FILM COATED ORAL at 18:07

## 2025-02-17 RX ADMIN — AZELASTINE HYDROCHLORIDE 2 SPRAY: 137 SPRAY, METERED NASAL at 18:07

## 2025-02-17 RX ADMIN — FAMOTIDINE 20 MG: 20 TABLET, FILM COATED ORAL at 21:21

## 2025-02-17 RX ADMIN — AZELASTINE HYDROCHLORIDE 2 SPRAY: 137 SPRAY, METERED NASAL at 08:33

## 2025-02-17 RX ADMIN — PANTOPRAZOLE SODIUM 40 MG: 40 INJECTION, POWDER, FOR SOLUTION INTRAVENOUS at 08:32

## 2025-02-17 NOTE — ASSESSMENT & PLAN NOTE
"Pt presented to [facility] ED 2/9/25 after having dark stools and feeling lightheaded  He was hospitalized at [facility] 2/4/25 for GI bleeding - EGD with \"Schatzki ring at GE junction, 3cm type I hiatal hernia, erythematous scarred mucosa in duodenal bulb, small angioectasia in second part of duodenum with stigmata oh recent hemorrhage\"  Recommended PPI BID for 3-6 months and GI follow up outpatient  He returned to [facility] with hgb drop from 8.1 to 6.6 for which he received 2 units of PRBCs  CT with no evidence of high volume GI bleeding; linear radiodensity in distal duodenum suggestive of endoscopy clip - correlate with history; no acute intraabdominal pathology  Given no GI rounding at [facility] for a few days, pt transferred to [facility]  Pt seen by GI at [place] - EGD and colonoscopy done on 2/10 with no evidence of upper GI bleeding but there was note of old blood throughout the colon and melena throughout the ileum.  GI recommended transfer to \[facility]\"" for further GI workup with capsule endoscopy and possible balloon enteroscopy  Repeat hgb 2/11/25 was 5.8 - pt transfused again with hgb up to 8.1 prior to transfer  consulted GI for continuation of care  Monitor stools and hgb - transfuse as necessary to keep Hg>7; Hg 7.7 on 2/13  Status post EGD with balloon enteroplasty on 2/13- 2 small angioectasias in the 3rd part of the duodenum; induced coagulation with argon plasma coagulation   GI involved, recommendations appreciated; capsule endoscopy on 2/17  "

## 2025-02-17 NOTE — PROGRESS NOTES
"Progress Note - Gastroenterology   Name: Ean Young 69 y.o. male I MRN: [id number]  [facility] I Date of Admission: 2/12/2025   Date of Service: 2/17/2025 I Hospital Day: 5     Assessment & Plan  Upper GI bleed  Patient reports a 2 week history of dark stools w/o change in number, frequency (once weekly), consistency, or caliber,with no associated nausea or vomiting  Reports a similar episode \"in the 70s\" r/t an ulcer that required prolonged treatment  Denies any family Hx of similar or cancers of GI tract, alcohol use, or smoking  Hgb on presentation 6.6 w/ 2 u PRBCs given, dropping to 5.8 w/ 2u PRBC given  Lipase - negative    CT abdomen and pelvis showed no evidence of acute bleeding but diverticulosis without diverticulitis.  He is undergoing multiple EGD within the past 2 weeks below    2/5 - EGD - \"Schatzki ring in the GE junction , 3 cm sliding hiatal hernia, localized erythematous and scared mucosa in duodenal bulb, focal area of scarred mucosa and mild erythema in proximal duodenal bulb, single small TSAT in the second part of the duodenum, and with stigmata of recent hemorrhage; the lesion was ablated with argon plasma coagulation using circumferential loop and placement of 1 clip with achieved hemostasis.  Possible AVM versus Dieulafoy lesion in the second portion of duodenum\".  2/10 - EGD - \"as above, with no signs of bleeding stigmata or hemorrhage.\"  2/11 - Colonoscopy - \"internal hemorrhoids,multiple small and medium diverticulum in sigmoid and rectosigmoid colon, significant hematin in terminal ileum and old blood suggestive of proximal foci of bleeding\"  NSAID use in 12/24 for \"headaches\"      2/13 - EGD - normal esophagus, 1st and 2nd duodenum and proximal jejunum. 2 small telangiectasias in 3rd part of jejunum coagulation w/ argon plasma, mild erthematous hemorrhagic mucosa in lesser curve of stomach.    He is still having melena.     PillCam planned for today    Denies any acute " distress  Gastroesophageal reflux disease without esophagitis  Patient takes Pepcid 20mg Qhs and protonix 40mg Bid    Plan:  Continue  PTA medications  Head and neck cancer ([facility])  Receives radiation therapy for tongue cancer  Type 2 diabetes mellitus without complication, without long-term current use of insulin ([facility])  Lab Results   Component Value Date    HGBA1C 5.7 (H) 02/11/2025       Recent Labs     02/17/25  0555 02/17/25  0730 02/17/25  1103 02/17/25  1646   POCGLU 95 101 96 89       Blood Sugar Average: Last 72 hrs:  (P) 100.625    Class 2 severe obesity due to excess calories with serious comorbidity and body mass index (BMI) of 37.0 to 37.9 in adult ([facility])

## 2025-02-17 NOTE — ASSESSMENT & PLAN NOTE
"Patient reports a 2 week history of dark stools w/o change in number, frequency (once weekly), consistency, or caliber,with no associated nausea or vomiting  Reports a similar episode \"in the 70s\" r/t an ulcer that required prolonged treatment  Denies any family Hx of similar or cancers of GI tract, alcohol use, or smoking  Hgb on presentation 6.6 w/ 2 u PRBCs given, dropping to 5.8 w/ 2u PRBC given  Lipase - negative    CT abdomen and pelvis showed no evidence of acute bleeding but diverticulosis without diverticulitis.  He is undergoing multiple EGD within the past 2 weeks below    2/5 - EGD - \"Schatzki ring in the GE junction , 3 cm sliding hiatal hernia, localized erythematous and scared mucosa in duodenal bulb, focal area of scarred mucosa and mild erythema in proximal duodenal bulb, single small TSAT in the second part of the duodenum, and with stigmata of recent hemorrhage; the lesion was ablated with argon plasma coagulation using circumferential loop and placement of 1 clip with achieved hemostasis.  Possible AVM versus Dieulafoy lesion in the second portion of duodenum\".  2/10 - EGD - \"as above, with no signs of bleeding stigmata or hemorrhage.\"  2/11 - Colonoscopy - \"internal hemorrhoids,multiple small and medium diverticulum in sigmoid and rectosigmoid colon, significant hematin in terminal ileum and old blood suggestive of proximal foci of bleeding\"  NSAID use in 12/24 for \"headaches\"      2/13 - EGD - normal esophagus, 1st and 2nd duodenum and proximal jejunum. 2 small telangiectasias in 3rd part of jejunum coagulation w/ argon plasma, mild erthematous hemorrhagic mucosa in lesser curve of stomach.    He is still having melena.     PillCam planned for today    Denies any acute distress  "

## 2025-02-17 NOTE — ASSESSMENT & PLAN NOTE
H/o squamous cell carcinoma of the tongue with PET scan 10/30/24 with confirmation as such  Pt follows with [facility] oncology for chemo and radiation

## 2025-02-17 NOTE — PROGRESS NOTES
"Progress Note - Hospitalist   Name: Ean Young 69 y.o. male I MRN: [id number]  [facility] Date of Admission: 2/12/2025   Date of Service: 2/17/2025 I Hospital Day: 5    Assessment & Plan  Upper GI bleed  Pt presented to [facility] ED 2/9/25 after having dark stools and feeling lightheaded  He was hospitalized at [facility] 2/4/25 for GI bleeding - EGD with \"Schatzki ring at GE junction, 3cm type I hiatal hernia, erythematous scarred mucosa in duodenal bulb, small angioectasia in second part of duodenum with stigmata oh recent hemorrhage\"  Recommended PPI BID for 3-6 months and GI follow up outpatient  He returned to [facility] normotensive with hgb drop from 8.1 to 6.6 for which he received 2 units of PRBCs  CT with no evidence of high volume GI bleeding; linear radiodensity in distal duodenum suggestive of endoscopy clip - correlate with history; no acute intraabdominal pathology  Given no GI rounding at [facility] for a few days, pt transferred to [facility]  Pt seen by GI at [place] - EGD and colonoscopy done on 2/10 with no evidence of upper GI bleeding but there was note of old blood throughout the colon and melena throughout the ileum.  GI recommended transfer to [facility]\"" for further GI workup with capsule endoscopy and possible balloon enteroscopy  Repeat hgb 2/11/25 was 5.8 - pt transfused again with hgb up to 8.1 prior to transfer  consulted GI for continuation of care  Monitor stools and hgb - transfuse as necessary to keep Hg>7; Hg 7.7 on 2/13  Status post EGD with balloon enteroplasty on 2/13- 2 small angioectasias in the 3rd part of the duodenum; induced coagulation with argon plasma coagulation   GI involved, recommendations appreciated; capsule endoscopy on 2/17  Essential hypertension  BP WNL - continue home regimen of Toprol XL, Lasix, tamsulosin  Dyslipidemia  Continue Lipitor  Lipid panel 1 mo prior with high triglycerides and low HDL  Gastroesophageal reflux disease without " esophagitis  Continue IV protonix  Obstructive sleep apnea  Compliant with CPAP at bedtime, continue while inpatient  Type 2 diabetes mellitus without complication, without long-term current use of insulin ([facility])  Lab Results   Component Value Date    HGBA1C 5.7 (H) 02/11/2025       Recent Labs     02/16/25  2101 02/17/25  0555 02/17/25  0730 02/17/25  1103   POCGLU 103 95 101 96       Blood Sugar Average: Last 72 hrs:  (P) 101.4  Target glucose 140-180 while inpatient  Had been clear liquids/NPO for procedures; will resume carb controlled diet when cleared by GI  Sugars appear well controlled on sliding scale insulin with meals and at bedtime  Hypoglycemia protocol    Class 2 severe obesity due to excess calories with serious comorbidity and body mass index (BMI) of 37.0 to 37.9 in adult ([facility])  BMI 37  Educated on healthy lifestyle and diet modification  Benign prostatic hyperplasia  Continue flomax  Head and neck cancer (HCC)  H/o squamous cell carcinoma of the tongue with PET scan 10/30/24 with confirmation as such  Pt follows with [facility] oncology for chemo and radiation  Pancytopenia ([facility])  Known history of this, due to squamous cell carcinoma of the tongue and having received chemo and radiation in the past  Continue to monitor  Follow hgb as noted above  Monitor for fevers    VTE Pharmacologic Prophylaxis:   Moderate Risk (Score 3-4) - Pharmacological DVT Prophylaxis Contraindicated. Sequential Compression Devices Ordered.    Mobility:   Basic Mobility Inpatient Raw Score: 24  JH-HLM Goal: 8: Walk 250 feet or more  JH-HLM Achieved: 8: Walk 250 feet ot more  JH-HLM Goal NOT achieved. Continue with multidisciplinary rounding and encourage appropriate mobility to improve upon JH-HLM goals.    Patient Centered Rounds: I performed bedside rounds with nursing staff today.   Discussions with Specialists or Other Care Team Provider: RN    Education and Discussions with Family / Patient:  discussed with the patient .      Current Length of Stay: 5 day(s)  Current Patient Status: Inpatient   Certification Statement: The patient will continue to require additional inpatient hospital stay due to ongoing GI work up  Discharge Plan:  when cleared by GI    Code Status: Level 1 - Full Code    Subjective   Patient seen and examined, reports very small 2 episodes of liquid black stool last night    Objective :  Temp:  [98.3 °F (36.8 °C)-99 °F (37.2 °C)] 98.3 °F (36.8 °C)  HR:  [72-84] 82  BP: (102-108)/(57-66) 108/57  Resp:  [16] 16  SpO2:  [96 %-97 %] 96 %  O2 Device: None (Room air)    Body mass index is 37.66 kg/m².     Input and Output Summary (last 24 hours):     Intake/Output Summary (Last 24 hours) at 2/17/2025 1420  Last data filed at 2/17/2025 1324  Gross per 24 hour   Intake 600 ml   Output --   Net 600 ml       Physical Exam  Vitals reviewed.   Constitutional:       General: He is not in acute distress.     Appearance: He is not ill-appearing.   HENT:      Head: Normocephalic.   Cardiovascular:      Rate and Rhythm: Normal rate and regular rhythm.   Pulmonary:      Effort: Pulmonary effort is normal. No respiratory distress.   Abdominal:      General: Abdomen is flat.      Palpations: Abdomen is soft.      Tenderness: There is no abdominal tenderness.   Skin:     General: Skin is warm and dry.   Neurological:      Mental Status: He is alert and oriented to person, place, and time. Mental status is at baseline.      Comments: Facial asymmetry (right facial droop) noted, patient reports that is his chronic           Lines/Drains:              Lab Results: I have reviewed the following results:   Results from last 7 days   Lab Units 02/17/25  0553   WBC Thousand/uL 1.84*   HEMOGLOBIN g/dL 7.8*   HEMATOCRIT % 23.6*   PLATELETS Thousands/uL 136*   SEGS PCT % 66   LYMPHO PCT % 21   MONO PCT % 10   EOS PCT % 1     Results from last 7 days   Lab Units 02/17/25  0553   SODIUM mmol/L 140   POTASSIUM mmol/L 3.5   CHLORIDE mmol/L 106   CO2  mmol/L 29   BUN mg/dL 12   CREATININE mg/dL 0.56*   ANION GAP mmol/L 5   CALCIUM mg/dL 8.0*   ALBUMIN g/dL 2.8*   TOTAL BILIRUBIN mg/dL 0.65   ALK PHOS U/L 63   ALT U/L 18   AST U/L 16   GLUCOSE RANDOM mg/dL 97           Results from last 7 days   Lab Units 02/17/25  1103 02/17/25  0730 02/17/25  0555 02/16/25  2101 02/16/25  1617 02/16/25  1055 02/16/25  0726 02/15/25  2040 02/15/25  1612 02/15/25  1047 02/15/25  0733 02/14/25  2114   POC GLUCOSE mg/dl 96 101 95 103 111 109 94 104 128 122 87 98     Results from last 7 days   Lab Units 02/11/25  0507   HEMOGLOBIN A1C % 5.7*           Recent Cultures (last 7 days):               Last 24 Hours Medication List:     Current Facility-Administered Medications:     acetaminophen (TYLENOL) tablet 650 mg, Q4H PRN    atorvastatin (LIPITOR) tablet 40 mg, Daily With Dinner    azelastine (ASTELIN) 0.1 % nasal spray 2 spray, BID    brimonidine tartrate 0.2 % ophthalmic solution 1 drop, Q12H    calcium carbonate (TUMS) chewable tablet 500 mg, BID PRN    famotidine (PEPCID) tablet 20 mg, HS    fish oil capsule 1,000 mg, BID    fluticasone (FLONASE) 50 mcg/act nasal spray 2 spray, Daily    furosemide (LASIX) injection 40 mg, Daily    hydrOXYzine HCL (ATARAX) tablet 25 mg, Q8H PRN    insulin lispro (HumALOG/ADMELOG) 100 units/mL subcutaneous injection 2-12 Units, TID AC **AND** Fingerstick Glucose (POCT), TID AC    insulin lispro (HumALOG/ADMELOG) 100 units/mL subcutaneous injection 2-12 Units, HS    metoprolol succinate (TOPROL-XL) 24 hr tablet 12.5 mg, Daily    ondansetron (ZOFRAN) injection 4 mg, Q6H PRN    pantoprazole (PROTONIX) injection 40 mg, Q12H HAYLEE    sodium chloride (OCEAN) 0.65 % nasal spray 1 spray, Q1H PRN    tamsulosin (FLOMAX) capsule 0.4 mg, Daily With Dinner    Administrative Statements   Today, Patient Was Seen By: Hilda Vasques MD      **Please Note: This note may have been constructed using a voice recognition system.**

## 2025-02-17 NOTE — ASSESSMENT & PLAN NOTE
Known history of this, due to squamous cell carcinoma of the tongue and having received chemo and radiation in the past  Continue to monitor  Follow hgb as noted above  Monitor for fevers

## 2025-02-17 NOTE — TELEPHONE ENCOUNTER
----- Message from Lasha Barrett DO sent at 2/14/2025  5:45 PM EST -----  Regarding: Hospital follow-up  Hello.  This patient was admitted with GI bleeding.  Can we please arrange for capsule endoscopy within the next 2 weeks of discharge.  I will place order now.  He says that he is unable to get transportation, so Dr. Chandler requested if we could arrange transportation for him if that would be possible at all.  Thank you.    Lasha Barrett D.O.  Gastroenterology Fellow, PGY-4  [facility]

## 2025-02-17 NOTE — ANESTHESIA POSTPROCEDURE EVALUATION
Post-Op Assessment Note    CV Status:  Stable  Pain Score: 0    Pain management: adequate       Mental Status:  Alert and awake   Hydration Status:  Euvolemic   PONV Controlled:  Controlled   Airway Patency:  Patent     Post Op Vitals Reviewed: Yes    No anethesia notable event occurred.    Staff: Anesthesiologist           Last Filed PACU Vitals:  Vitals Value Taken Time   Temp 96.6 °F (35.9 °C) 02/13/25 1528   Pulse 63 02/13/25 1546   /63 02/13/25 1546   Resp 18 02/13/25 1528   SpO2 93 % 02/13/25 1546       Modified Jignesh:     Vitals Value Taken Time   Activity 2 02/13/25 1546   Respiration 2 02/13/25 1546   Circulation 2 02/13/25 1546   Consciousness 2 02/13/25 1546   Oxygen Saturation 2 02/13/25 1546     Modified Jignesh Score: 10        Post-Op Assessment Note            No anethesia notable event occurred.    Staff: Anesthesiologist           Last Filed PACU Vitals:  Vitals Value Taken Time   Temp 97.7 °F (36.5 °C) 02/05/25 1423   Pulse 79 02/05/25 1423   /65 02/05/25 1423   Resp 20 02/05/25 1423   SpO2 95 % 02/05/25 1423       Modified Jignesh:     Vitals Value Taken Time   Activity 2 02/05/25 1423   Respiration 2 02/05/25 1423   Circulation 2 02/05/25 1423   Consciousness 2 02/05/25 1423   Oxygen Saturation 2 02/05/25 1423     Modified Jignesh Score: 10

## 2025-02-17 NOTE — PLAN OF CARE
Problem: PAIN - ADULT  Goal: Verbalizes/displays adequate comfort level or baseline comfort level  Description: Interventions:  - Encourage patient to monitor pain and request assistance  - Assess pain using appropriate pain scale  - Administer analgesics based on type and severity of pain and evaluate response  - Implement non-pharmacological measures as appropriate and evaluate response  - Consider cultural and social influences on pain and pain management  - Notify physician/advanced practitioner if interventions unsuccessful or patient reports new pain  Outcome: Progressing     Problem: INFECTION - ADULT  Goal: Absence or prevention of progression during hospitalization  Description: INTERVENTIONS:  - Assess and monitor for signs and symptoms of infection  - Monitor lab/diagnostic results  - Monitor all insertion sites, i.e. indwelling lines, tubes, and drains  - Monitor endotracheal if appropriate and nasal secretions for changes in amount and color  - [place] appropriate cooling/warming therapies per order  - Administer medications as ordered  - Instruct and encourage patient and family to use good hand hygiene technique  - Identify and instruct in appropriate isolation precautions for identified infection/condition  Outcome: Progressing

## 2025-02-17 NOTE — ASSESSMENT & PLAN NOTE
Lab Results   Component Value Date    HGBA1C 5.7 (H) 02/11/2025       Recent Labs     02/16/25  2101 02/17/25  0555 02/17/25  0730 02/17/25  1103   POCGLU 103 95 101 96       Blood Sugar Average: Last 72 hrs:  (P) 101.4  Target glucose 140-180 while inpatient  Had been clear liquids/NPO for procedures; will resume carb controlled diet when cleared by GI  Sugars appear well controlled on sliding scale insulin with meals and at bedtime  Hypoglycemia protocol

## 2025-02-17 NOTE — ASSESSMENT & PLAN NOTE
Lab Results   Component Value Date    HGBA1C 5.7 (H) 02/11/2025       Recent Labs     02/17/25  0555 02/17/25  0730 02/17/25  1103 02/17/25  1646   POCGLU 95 101 96 89       Blood Sugar Average: Last 72 hrs:  (P) 100.625

## 2025-02-18 PROBLEM — D62 ACUTE BLOOD LOSS ANEMIA: Status: ACTIVE | Noted: 2025-02-18

## 2025-02-18 LAB
ANION GAP SERPL CALCULATED.3IONS-SCNC: 7 MMOL/L (ref 4–13)
BASOPHILS # BLD AUTO: 0.01 THOUSANDS/ΜL (ref 0–0.1)
BASOPHILS NFR BLD AUTO: 1 % (ref 0–1)
BUN SERPL-MCNC: 11 MG/DL (ref 5–25)
CALCIUM SERPL-MCNC: 7.9 MG/DL (ref 8.4–10.2)
CHLORIDE SERPL-SCNC: 105 MMOL/L (ref 96–108)
CO2 SERPL-SCNC: 27 MMOL/L (ref 21–32)
CREAT SERPL-MCNC: 0.57 MG/DL (ref 0.6–1.3)
EOSINOPHIL # BLD AUTO: 0.01 THOUSAND/ΜL (ref 0–0.61)
EOSINOPHIL NFR BLD AUTO: 1 % (ref 0–6)
ERYTHROCYTE [DISTWIDTH] IN BLOOD BY AUTOMATED COUNT: 18.8 % (ref 11.6–15.1)
GFR SERPL CREATININE-BSD FRML MDRD: 105 ML/MIN/1.73SQ M
GLUCOSE SERPL-MCNC: 104 MG/DL (ref 65–140)
GLUCOSE SERPL-MCNC: 119 MG/DL (ref 65–140)
GLUCOSE SERPL-MCNC: 126 MG/DL (ref 65–140)
GLUCOSE SERPL-MCNC: 92 MG/DL (ref 65–140)
GLUCOSE SERPL-MCNC: 92 MG/DL (ref 65–140)
HCT VFR BLD AUTO: 22.5 % (ref 36.5–49.3)
HGB BLD-MCNC: 7.5 G/DL (ref 12–17)
IMM GRANULOCYTES # BLD AUTO: 0 THOUSAND/UL (ref 0–0.2)
IMM GRANULOCYTES NFR BLD AUTO: 0 % (ref 0–2)
LYMPHOCYTES # BLD AUTO: 0.47 THOUSANDS/ΜL (ref 0.6–4.47)
LYMPHOCYTES NFR BLD AUTO: 25 % (ref 14–44)
MCH RBC QN AUTO: 31.5 PG (ref 26.8–34.3)
MCHC RBC AUTO-ENTMCNC: 33.3 G/DL (ref 31.4–37.4)
MCV RBC AUTO: 95 FL (ref 82–98)
MONOCYTES # BLD AUTO: 0.19 THOUSAND/ΜL (ref 0.17–1.22)
MONOCYTES NFR BLD AUTO: 10 % (ref 4–12)
NEUTROPHILS # BLD AUTO: 1.21 THOUSANDS/ΜL (ref 1.85–7.62)
NEUTS SEG NFR BLD AUTO: 63 % (ref 43–75)
NRBC BLD AUTO-RTO: 0 /100 WBCS
PLATELET # BLD AUTO: 158 THOUSANDS/UL (ref 149–390)
PMV BLD AUTO: 10.1 FL (ref 8.9–12.7)
POTASSIUM SERPL-SCNC: 3.5 MMOL/L (ref 3.5–5.3)
RBC # BLD AUTO: 2.38 MILLION/UL (ref 3.88–5.62)
SODIUM SERPL-SCNC: 139 MMOL/L (ref 135–147)
WBC # BLD AUTO: 1.89 THOUSAND/UL (ref 4.31–10.16)

## 2025-02-18 PROCEDURE — 82948 REAGENT STRIP/BLOOD GLUCOSE: CPT

## 2025-02-18 PROCEDURE — 85025 COMPLETE CBC W/AUTO DIFF WBC: CPT | Performed by: FAMILY MEDICINE

## 2025-02-18 PROCEDURE — 80048 BASIC METABOLIC PNL TOTAL CA: CPT | Performed by: FAMILY MEDICINE

## 2025-02-18 PROCEDURE — 99232 SBSQ HOSP IP/OBS MODERATE 35: CPT | Performed by: INTERNAL MEDICINE

## 2025-02-18 PROCEDURE — 99233 SBSQ HOSP IP/OBS HIGH 50: CPT | Performed by: INTERNAL MEDICINE

## 2025-02-18 RX ORDER — PANTOPRAZOLE SODIUM 40 MG/1
40 TABLET, DELAYED RELEASE ORAL
Status: DISCONTINUED | OUTPATIENT
Start: 2025-02-18 | End: 2025-02-21 | Stop reason: HOSPADM

## 2025-02-18 RX ORDER — POLYETHYLENE GLYCOL 3350 17 G/17G
119 POWDER, FOR SOLUTION ORAL ONCE
Status: COMPLETED | OUTPATIENT
Start: 2025-02-18 | End: 2025-02-18

## 2025-02-18 RX ADMIN — POLYETHYLENE GLYCOL 3350 119 G: 17 POWDER, FOR SOLUTION ORAL at 18:10

## 2025-02-18 RX ADMIN — BRIMONIDINE TARTRATE 1 DROP: 2 SOLUTION/ DROPS OPHTHALMIC at 09:36

## 2025-02-18 RX ADMIN — PANTOPRAZOLE SODIUM 40 MG: 40 TABLET, DELAYED RELEASE ORAL at 09:35

## 2025-02-18 RX ADMIN — AZELASTINE HYDROCHLORIDE 2 SPRAY: 137 SPRAY, METERED NASAL at 09:35

## 2025-02-18 RX ADMIN — OMEGA-3 FATTY ACIDS CAP 1000 MG 1000 MG: 1000 CAP at 21:56

## 2025-02-18 RX ADMIN — FLUTICASONE PROPIONATE 2 SPRAY: 50 SPRAY, METERED NASAL at 09:35

## 2025-02-18 RX ADMIN — PANTOPRAZOLE SODIUM 40 MG: 40 TABLET, DELAYED RELEASE ORAL at 18:08

## 2025-02-18 RX ADMIN — BRIMONIDINE TARTRATE 1 DROP: 2 SOLUTION/ DROPS OPHTHALMIC at 21:56

## 2025-02-18 RX ADMIN — TAMSULOSIN HYDROCHLORIDE 0.4 MG: 0.4 CAPSULE ORAL at 18:08

## 2025-02-18 RX ADMIN — FAMOTIDINE 20 MG: 20 TABLET, FILM COATED ORAL at 21:56

## 2025-02-18 RX ADMIN — AZELASTINE HYDROCHLORIDE 2 SPRAY: 137 SPRAY, METERED NASAL at 18:09

## 2025-02-18 RX ADMIN — ATORVASTATIN CALCIUM 40 MG: 40 TABLET, FILM COATED ORAL at 18:08

## 2025-02-18 RX ADMIN — OMEGA-3 FATTY ACIDS CAP 1000 MG 1000 MG: 1000 CAP at 09:35

## 2025-02-18 NOTE — PLAN OF CARE
Problem: PAIN - ADULT  Goal: Verbalizes/displays adequate comfort level or baseline comfort level  Description: Interventions:  - Encourage patient to monitor pain and request assistance  - Assess pain using appropriate pain scale  - Administer analgesics based on type and severity of pain and evaluate response  - Implement non-pharmacological measures as appropriate and evaluate response  - Consider cultural and social influences on pain and pain management  - Notify physician/advanced practitioner if interventions unsuccessful or patient reports new pain  Outcome: Progressing     Problem: SAFETY ADULT  Goal: Patient will remain free of falls  Description: INTERVENTIONS:  - Educate patient/family on patient safety including physical limitations  - Instruct patient to call for assistance with activity   - Consult OT/PT to assist with strengthening/mobility   - Keep Call bell within reach  - Keep bed low and locked with side rails adjusted as appropriate  - Keep care items and personal belongings within reach  - Initiate and maintain comfort rounds  - Make Fall Risk Sign visible to staff  - Offer Toileting every 2 Hours, in advance of need  - Obtain necessary fall risk management equipment  - Apply yellow socks and bracelet for high fall risk patients  - Consider moving patient to room near nurses station  Outcome: Progressing     Problem: INFECTION - ADULT  Goal: Absence or prevention of progression during hospitalization  Description: INTERVENTIONS:  - Assess and monitor for signs and symptoms of infection  - Monitor lab/diagnostic results  - Monitor all insertion sites, i.e. indwelling lines, tubes, and drains  - Monitor endotracheal if appropriate and nasal secretions for changes in amount and color  - [place] appropriate cooling/warming therapies per order  - Administer medications as ordered  - Instruct and encourage patient and family to use good hand hygiene technique  - Identify and instruct in  appropriate isolation precautions for identified infection/condition  Outcome: Progressing     Problem: MUSCULOSKELETAL - ADULT  Goal: Maintain proper alignment of affected body part  Description: INTERVENTIONS:  - Support, maintain and protect limb and body alignment  - Provide patient/ family with appropriate education  Outcome: Progressing     Problem: METABOLIC, FLUID AND ELECTROLYTES - ADULT  Goal: Fluid balance maintained  Description: INTERVENTIONS:  - Monitor labs   - Monitor I/O and WT  - Instruct patient on fluid and nutrition as appropriate  - Assess for signs & symptoms of volume excess or deficit  Outcome: Progressing     Problem: RESPIRATORY - ADULT  Goal: Achieves optimal ventilation and oxygenation  Description: INTERVENTIONS:  - Assess for changes in respiratory status  - Assess for changes in mentation and behavior  - Position to facilitate oxygenation and minimize respiratory effort  - Oxygen administered by appropriate delivery if ordered  - Initiate smoking cessation education as indicated  - Encourage broncho-pulmonary hygiene including cough, deep breathe, Incentive Spirometry  - Assess the need for suctioning and aspirate as needed  - Assess and instruct to report SOB or any respiratory difficulty  - Respiratory Therapy support as indicated  Outcome: Progressing     Problem: HEMATOLOGIC - ADULT  Goal: Maintains hematologic stability  Description: INTERVENTIONS  - Assess for signs and symptoms of bleeding or hemorrhage  - Monitor labs  - Administer supportive blood products/factors as ordered and appropriate  Outcome: Progressing     Problem: GASTROINTESTINAL - ADULT  Goal: Minimal or absence of nausea and/or vomiting  Description: INTERVENTIONS:  - Administer IV fluids if ordered to ensure adequate hydration  - Maintain NPO status until nausea and vomiting are resolved  - Nasogastric tube if ordered  - Administer ordered antiemetic medications as needed  - Provide nonpharmacologic comfort  measures as appropriate  - Advance diet as tolerated, if ordered  - Consider nutrition services referral to assist patient with adequate nutrition and appropriate food choices  Outcome: Progressing     Problem: GASTROINTESTINAL - ADULT  Goal: Maintains or returns to baseline bowel function  Description: INTERVENTIONS:  - Assess bowel function  - Encourage oral fluids to ensure adequate hydration  - Administer IV fluids if ordered to ensure adequate hydration  - Administer ordered medications as needed  - Encourage mobilization and activity  - Consider nutritional services referral to assist patient with adequate nutrition and appropriate food choices  Outcome: Progressing     Problem: GASTROINTESTINAL - ADULT  Goal: Maintains adequate nutritional intake  Description: INTERVENTIONS:  - Monitor percentage of each meal consumed  - Identify factors contributing to decreased intake, treat as appropriate  - Assist with meals as needed  - Monitor I&O, weight, and lab values if indicated  - Obtain nutrition services referral as needed  Outcome: Progressing     Problem: Knowledge Deficit  Goal: Patient/family/caregiver demonstrates understanding of disease process, treatment plan, medications, and discharge instructions  Description: Complete learning assessment and assess knowledge base.  Interventions:  - Provide teaching at level of understanding  - Provide teaching via preferred learning methods  Outcome: Progressing     Problem: DISCHARGE PLANNING  Goal: Discharge to home or other facility with appropriate resources  Description: INTERVENTIONS:  - Identify barriers to discharge w/patient and caregiver  - Arrange for needed discharge resources and transportation as appropriate  - Identify discharge learning needs (meds, wound care, etc.)  - Arrange for interpretive services to assist at discharge as needed  - Refer to Case Management Department for coordinating discharge planning if the patient needs post-hospital  services based on physician/advanced practitioner order or complex needs related to functional status, cognitive ability, or social support system  Outcome: Progressing

## 2025-02-18 NOTE — ASSESSMENT & PLAN NOTE
Secondary to above  Required RBC transfusion  Iron panel reviewed  Check folic acid and B12  Monitor and transfuse for hemoglobin below 7

## 2025-02-18 NOTE — ASSESSMENT & PLAN NOTE
H/o squamous cell carcinoma of the tongue with PET scan 10/30/24 with confirmation as such  Pt follows with [facility] oncology for chemo and radiation

## 2025-02-18 NOTE — PROGRESS NOTES
"Progress Note - Hospitalist   Name: Ean Young 69 y.o. male I MRN: [id number]  [facility] I Date of Admission: 2/12/2025   Date of Service: 2/18/2025 I Hospital Day: 6    Assessment & Plan  Upper GI bleed  Pt presented to [facility] ED 2/9/25 after having dark stools and feeling lightheaded  He was hospitalized at [facility] 2/4/25 for GI bleeding - EGD with \"Schatzki ring at GE junction, 3cm type I hiatal hernia, erythematous scarred mucosa in duodenal bulb, small angioectasia in second part of duodenum with stigmata oh recent hemorrhage\"  Recommended PPI BID for 3-6 months and GI follow up outpatient  He returned to [facility][1] with hgb drop from 8.1 to 6.6 for which he received 2 units of PRBCs  CT with no evidence of high volume GI bleeding; linear radiodensity in distal duodenum suggestive of endoscopy clip - no acute intraabdominal pathology  Given no GI rounding at [facility] for a few days, pt transferred to [facility]  Pt seen by GI at [place] - EGD and colonoscopy done on 2/10 with no evidence of upper GI bleeding but there was note of old blood throughout the colon and melena throughout the ileum.  GI recommended transfer to [facility] for further GI workup with capsule endoscopy and possible balloon enteroscopy  Repeat hgb 2/11/25 was 5.8 - pt transfused again with hgb up to 8.1 prior to transfer  consulted GI for continuation of care  Status post EGD with balloon enteroplasty on 2/13- 2 small angioectasias in the 3rd part of the duodenum; induced coagulation with argon plasma coagulation   Status post capsule endoscopy on 2/17  Patient still with melena  GI is following  Continue with PPI  Acute blood loss anemia  Secondary to above  Required RBC transfusion  Iron panel reviewed  Check folic acid and B12  Monitor and transfuse for hemoglobin below 7  Pancytopenia (HCC)  Known history of this, due to squamous cell carcinoma of the tongue and having received chemo and radiation " in the past  Continue to monitor  Head and neck cancer (HCC)  H/o squamous cell carcinoma of the tongue with PET scan 10/30/24 with confirmation as such  [facility]  Essential hypertension  BP WNL - continue home regimen of Toprol XL  Dyslipidemia  Continue Lipitor  Gastroesophageal reflux disease without esophagitis  Continue  protonix  Obstructive sleep apnea  Compliant with CPAP at bedtime, continue while inpatient  Type 2 diabetes mellitus without complication, without long-term current use of insulin ([facility])  Lab Results   Component Value Date    HGBA1C 5.7 (H) 02/11/2025       Recent Labs     02/17/25  1646 02/17/25  2136 02/18/25  0752 02/18/25  1106   POCGLU 89 142* 104 92       Blood Sugar Average: Last 72 hrs:  (P) 105.3712804328665440  Target glucose 140-180 while inpatient  Sugars appear well controlled on sliding scale insulin with meals and at bedtime  Hypoglycemia protocol    Class 2 severe obesity due to excess calories with serious comorbidity and body mass index (BMI) of 37.0 to 37.9 in adult (HCC)  BMI 37  Educated on healthy lifestyle and diet modification  Benign prostatic hyperplasia  Continue flomax  Severe protein-calorie malnutrition (HCC)  Malnutrition Findings:      BMI Findings:           Body mass index is 37.66 kg/m².   Continue with regular diet    VTE Pharmacologic Prophylaxis: VTE Score: 7 High Risk (Score >/= 5) - Pharmacological DVT Prophylaxis Contraindicated. Sequential Compression Devices Ordered.    Mobility:   Basic Mobility Inpatient Raw Score: 23  JH-HLM Goal: 7: Walk 25 feet or more  JH-HLM Achieved: 8: Walk 250 feet ot more  JH-HLM Goal achieved. Continue to encourage appropriate mobility.    Patient Centered Rounds: I performed bedside rounds with nursing staff today.   Discussions with Specialists or Other Care Team Provider: MASON    Education and Discussions with Family / Patient: Updated  (niece) via  phone.    Current Length of Stay: 6 day(s)  Current Patient Status: Inpatient   Certification Statement: The patient will continue to require additional inpatient hospital stay due to GI bleed  Discharge Plan: Anticipate discharge in 24-48 hrs to home.    Code Status: Level 1 - Full Code    Subjective   Patient seen and examined  Comfortable in chair  No chest pain or shortness of breath  Continue to have small melena    Objective :  Temp:  [98.4 °F (36.9 °C)-99.2 °F (37.3 °C)] 98.4 °F (36.9 °C)  HR:  [80-91] 80  BP: (104-112)/(65-67) 104/65  Resp:  [16-17] 16  SpO2:  [95 %-97 %] 95 %  O2 Device: None (Room air)    Body mass index is 37.66 kg/m².     Input and Output Summary (last 24 hours):     Intake/Output Summary (Last 24 hours) at 2/18/2025 1430  Last data filed at 2/18/2025 1300  Gross per 24 hour   Intake 330 ml   Output --   Net 330 ml       Physical Exam  Patient is awake alert oriented in no acute distress  Comfortable sitting in chair   Lung clear to auscultation bilateral  Heart positive S1-S2 no murmur  Abdomen soft nontender   Lower extremities no edema      Lines/Drains:          Lab Results: I have reviewed the following results:   Results from last 7 days   Lab Units 02/18/25  0509   WBC Thousand/uL 1.89*   HEMOGLOBIN g/dL 7.5*   HEMATOCRIT % 22.5*   PLATELETS Thousands/uL 158   SEGS PCT % 63   LYMPHO PCT % 25   MONO PCT % 10   EOS PCT % 1     Results from last 7 days   Lab Units 02/18/25  0509 02/17/25  0553   SODIUM mmol/L 139 140   POTASSIUM mmol/L 3.5 3.5   CHLORIDE mmol/L 105 106   CO2 mmol/L 27 29   BUN mg/dL 11 12   CREATININE mg/dL 0.57* 0.56*   ANION GAP mmol/L 7 5   CALCIUM mg/dL 7.9* 8.0*   ALBUMIN g/dL  --  2.8*   TOTAL BILIRUBIN mg/dL  --  0.65   ALK PHOS U/L  --  63   ALT U/L  --  18   AST U/L  --  16   GLUCOSE RANDOM mg/dL 92 97         Results from last 7 days   Lab Units 02/18/25  1106 02/18/25  0752 02/17/25  2136 02/17/25  1646 02/17/25  1103 02/17/25  0730 02/17/25  0555  02/16/25  2101 02/16/25  1617 02/16/25  1055 02/16/25  0726 02/15/25  2040   POC GLUCOSE mg/dl 92 104 142* 89 96 101 95 103 111 109 94 104               Recent Cultures (last 7 days):         Imaging Results Review: No pertinent imaging studies reviewed.  Other Study Results Review: No additional pertinent studies reviewed.    Last 24 Hours Medication List:     Current Facility-Administered Medications:     acetaminophen (TYLENOL) tablet 650 mg, Q4H PRN    atorvastatin (LIPITOR) tablet 40 mg, Daily With Dinner    azelastine (ASTELIN) 0.1 % nasal spray 2 spray, BID    brimonidine tartrate 0.2 % ophthalmic solution 1 drop, Q12H    calcium carbonate (TUMS) chewable tablet 500 mg, BID PRN    famotidine (PEPCID) tablet 20 mg, HS    fish oil capsule 1,000 mg, BID    fluticasone (FLONASE) 50 mcg/act nasal spray 2 spray, Daily    hydrOXYzine HCL (ATARAX) tablet 25 mg, Q8H PRN    insulin lispro (HumALOG/ADMELOG) 100 units/mL subcutaneous injection 2-12 Units, TID AC **AND** Fingerstick Glucose (POCT), TID AC    insulin lispro (HumALOG/ADMELOG) 100 units/mL subcutaneous injection 2-12 Units, HS    metoprolol succinate (TOPROL-XL) 24 hr tablet 12.5 mg, Daily    ondansetron (ZOFRAN) injection 4 mg, Q6H PRN    pantoprazole (PROTONIX) EC tablet 40 mg, BID AC    sodium chloride (OCEAN) 0.65 % nasal spray 1 spray, Q1H PRN    tamsulosin (FLOMAX) capsule 0.4 mg, Daily With Dinner    Administrative Statements   Today, Patient Was Seen By: Quirino Escamilla DO      **Please Note: This note may have been constructed using a voice recognition system.**

## 2025-02-18 NOTE — ASSESSMENT & PLAN NOTE
Lab Results   Component Value Date    HGBA1C 5.7 (H) 02/11/2025       Recent Labs     02/17/25  2136 02/18/25  0752 02/18/25  1106 02/18/25  1614   POCGLU 142* 104 92 126       Blood Sugar Average: Last 72 hrs:  (P) 106.4375

## 2025-02-18 NOTE — ASSESSMENT & PLAN NOTE
"Pt presented to [facility] ED 2/9/25 after having dark stools and feeling lightheaded  He was hospitalized at [facility] 2/4/25 for GI bleeding - EGD with \"Schatzki ring at GE junction, 3cm type I hiatal hernia, erythematous scarred mucosa in duodenal bulb, small angioectasia in second part of duodenum with stigmata oh recent hemorrhage\"  Recommended PPI BID for 3-6 months and GI follow up outpatient  He returned to [facility] with hgb drop from 8.1 to 6.6 for which he received 2 units of PRBCs  CT with no evidence of high volume GI bleeding; linear radiodensity in distal duodenum suggestive of endoscopy clip - no acute intraabdominal pathology  Given no GI rounding at [facility] for a few days, pt transferred to [facility]  Pt seen by GI at [place] - EGD and colonoscopy done on 2/10 with no evidence of upper GI bleeding but there was note of old blood throughout the colon and melena throughout the ileum.  GI recommended transfer to [facility] for further GI workup with capsule endoscopy and possible balloon enteroscopy  Repeat hgb 2/11/25 was 5.8 - pt transfused again with hgb up to 8.1 prior to transfer  consulted GI for continuation of care  Status post EGD with balloon enteroplasty on 2/13- 2 small angioectasias in the 3rd part of the duodenum; induced coagulation with argon plasma coagulation   Status post capsule endoscopy on 2/17  Patient still with melena  GI is following  Continue with PPI  "

## 2025-02-18 NOTE — ASSESSMENT & PLAN NOTE
Known history of this, due to squamous cell carcinoma of the tongue and having received chemo and radiation in the past  Continue to monitor

## 2025-02-18 NOTE — ASSESSMENT & PLAN NOTE
Malnutrition Findings:      BMI Findings:           Body mass index is 37.66 kg/m².   Continue with regular diet

## 2025-02-18 NOTE — PROGRESS NOTES
"Progress Note - Gastroenterology   Name: Ean Young 69 y.o. male I MRN: [id number]  [facility] Date of Admission: 2/12/2025   Date of Service: 2/18/2025 I Hospital Day: 6     Assessment & Plan  Upper GI bleed  Patient reports a 2 week history of dark stools w/o change in number, frequency (once weekly), consistency, or caliber,with no associated nausea or vomiting  Reports a similar episode \"in the 70s\" r/t an ulcer that required prolonged treatment  Denies any family Hx of similar or cancers of GI tract, alcohol use, or smoking  Hgb on presentation 6.6 w/ 2 u PRBCs given, dropping to 5.8 w/ 2u PRBC given  Lipase - negative    CT abdomen and pelvis showed no evidence of acute bleeding but diverticulosis without diverticulitis.  He is undergoing multiple EGD within the past 2 weeks below    2/5 - EGD - \"Schatzki ring in the GE junction , 3 cm sliding hiatal hernia, localized erythematous and scared mucosa in duodenal bulb, focal area of scarred mucosa and mild erythema in proximal duodenal bulb, single small TSAT in the second part of the duodenum, and with stigmata of recent hemorrhage; the lesion was ablated with argon plasma coagulation using circumferential loop and placement of 1 clip with achieved hemostasis.  Possible AVM versus Dieulafoy lesion in the second portion of duodenum\".  2/10 - EGD - \"as above, with no signs of bleeding stigmata or hemorrhage.\"  2/11 - Colonoscopy - \"internal hemorrhoids,multiple small and medium diverticulum in sigmoid and rectosigmoid colon, significant hematin in terminal ileum and old blood suggestive of proximal foci of bleeding\"  NSAID use in 12/24 for \"headaches\"      2/13 - EGD - normal esophagus, 1st and 2nd duodenum and proximal jejunum. 2 small telangiectasias in 3rd part of jejunum coagulation w/ argon plasma, mild erthematous hemorrhagic mucosa in lesser curve of stomach.    He is still having melena.       We reviewed the PillCam from yesterday but " it was limited due to study only being approximately 2 hours rather than the 8 hours.  There must been a technical failure.  We will plan for another PillCam study tomorrow.  Patient will get 2 L of prep tonight and n.p.o. after midnight.  I had an extensive discussion with the patient the patient is okay to do this for tomorrow.  Denies any acute distress at this time.    Denies any acute distress  Gastroesophageal reflux disease without esophagitis  Patient takes Pepcid 20mg Qhs and protonix 40mg Bid    Plan:  Continue  PTA medications  Head and neck cancer (HCC)  Receives radiation therapy for tongue cancer  Type 2 diabetes mellitus without complication, without long-term current use of insulin (HCC)  Lab Results   Component Value Date    HGBA1C 5.7 (H) 02/11/2025       Recent Labs     02/17/25  2136 02/18/25  0752 02/18/25  1106 02/18/25  1614   POCGLU 142* 104 92 126       Blood Sugar Average: Last 72 hrs:  (P) 106.4375    Acute blood loss anemia          Objective :  Temp:  [97.3 °F (36.3 °C)-99.2 °F (37.3 °C)] 97.3 °F (36.3 °C)  HR:  [80-87] 82  BP: (104-105)/(65-66) 105/65  Resp:  [16-18] 18  SpO2:  [95 %-97 %] 96 %  O2 Device: None (Room air)    Physical Exam    Lab Results: I have reviewed the following results:CBC/BMP:   .     02/18/25  0509   WBC 1.89*   HGB 7.5*   HCT 22.5*      SODIUM 139   K 3.5      CO2 27   BUN 11   CREATININE 0.57*   GLUC 92    , Creatinine Clearance: Estimated Creatinine Clearance: 164.9 mL/min (A) (by C-G formula based on SCr of 0.57 mg/dL (L))., LFTs: No new results in last 24 hours. , PTT/INR:No new results in last 24 hours.

## 2025-02-18 NOTE — PLAN OF CARE
Problem: GASTROINTESTINAL - ADULT  Goal: Minimal or absence of nausea and/or vomiting  Description: INTERVENTIONS:  - Administer IV fluids if ordered to ensure adequate hydration  - Maintain NPO status until nausea and vomiting are resolved  - Nasogastric tube if ordered  - Administer ordered antiemetic medications as needed  - Provide nonpharmacologic comfort measures as appropriate  - Advance diet as tolerated, if ordered  - Consider nutrition services referral to assist patient with adequate nutrition and appropriate food choices  Outcome: Progressing  Goal: Maintains or returns to baseline bowel function  Description: INTERVENTIONS:  - Assess bowel function  - Encourage oral fluids to ensure adequate hydration  - Administer IV fluids if ordered to ensure adequate hydration  - Administer ordered medications as needed  - Encourage mobilization and activity  - Consider nutritional services referral to assist patient with adequate nutrition and appropriate food choices  Outcome: Progressing  Goal: Maintains adequate nutritional intake  Description: INTERVENTIONS:  - Monitor percentage of each meal consumed  - Identify factors contributing to decreased intake, treat as appropriate  - Assist with meals as needed  - Monitor I&O, weight, and lab values if indicated  - Obtain nutrition services referral as needed  Outcome: Progressing     Problem: HEMATOLOGIC - ADULT  Goal: Maintains hematologic stability  Description: INTERVENTIONS  - Assess for signs and symptoms of bleeding or hemorrhage  - Monitor labs  - Administer supportive blood products/factors as ordered and appropriate  Outcome: Progressing     Problem: DISCHARGE PLANNING  Goal: Discharge to home or other facility with appropriate resources  Description: INTERVENTIONS:  - Identify barriers to discharge w/patient and caregiver  - Arrange for needed discharge resources and transportation as appropriate  - Identify discharge learning needs (meds, wound care,  etc.)  - Arrange for interpretive services to assist at discharge as needed  - Refer to Case Management Department for coordinating discharge planning if the patient needs post-hospital services based on physician/advanced practitioner order or complex needs related to functional status, cognitive ability, or social support system  Outcome: Progressing     Problem: Knowledge Deficit  Goal: Patient/family/caregiver demonstrates understanding of disease process, treatment plan, medications, and discharge instructions  Description: Complete learning assessment and assess knowledge base.  Interventions:  - Provide teaching at level of understanding  - Provide teaching via preferred learning methods  Outcome: Progressing     Problem: CARDIOVASCULAR - ADULT  Goal: Maintains optimal cardiac output and hemodynamic stability  Description: INTERVENTIONS:  - Monitor I/O, vital signs and rhythm  - Monitor for S/S and trends of decreased cardiac output  - Administer and titrate ordered vasoactive medications to optimize hemodynamic stability  - Assess quality of pulses, skin color and temperature  - Assess for signs of decreased coronary artery perfusion  - Instruct patient to report change in severity of symptoms  Outcome: Progressing  Goal: Absence of cardiac dysrhythmias or at baseline rhythm  Description: INTERVENTIONS:  - Continuous cardiac monitoring, vital signs, obtain 12 lead EKG if ordered  - Administer antiarrhythmic and heart rate control medications as ordered  - Monitor electrolytes and administer replacement therapy as ordered  Outcome: Progressing     Problem: RESPIRATORY - ADULT  Goal: Achieves optimal ventilation and oxygenation  Description: INTERVENTIONS:  - Assess for changes in respiratory status  - Assess for changes in mentation and behavior  - Position to facilitate oxygenation and minimize respiratory effort  - Oxygen administered by appropriate delivery if ordered  - Initiate smoking cessation  education as indicated  - Encourage broncho-pulmonary hygiene including cough, deep breathe, Incentive Spirometry  - Assess the need for suctioning and aspirate as needed  - Assess and instruct to report SOB or any respiratory difficulty  - Respiratory Therapy support as indicated  Outcome: Progressing     Problem: METABOLIC, FLUID AND ELECTROLYTES - ADULT  Goal: Electrolytes maintained within normal limits  Description: INTERVENTIONS:  - Monitor labs and assess patient for signs and symptoms of electrolyte imbalances  - Administer electrolyte replacement as ordered  - Monitor response to electrolyte replacements, including repeat lab results as appropriate  - Instruct patient on fluid and nutrition as appropriate  Outcome: Progressing  Goal: Fluid balance maintained  Description: INTERVENTIONS:  - Monitor labs   - Monitor I/O and WT  - Instruct patient on fluid and nutrition as appropriate  - Assess for signs & symptoms of volume excess or deficit  Outcome: Progressing     Problem: PAIN - ADULT  Goal: Verbalizes/displays adequate comfort level or baseline comfort level  Description: Interventions:  - Encourage patient to monitor pain and request assistance  - Assess pain using appropriate pain scale  - Administer analgesics based on type and severity of pain and evaluate response  - Implement non-pharmacological measures as appropriate and evaluate response  - Consider cultural and social influences on pain and pain management  - Notify physician/advanced practitioner if interventions unsuccessful or patient reports new pain  Outcome: Progressing     Problem: INFECTION - ADULT  Goal: Absence or prevention of progression during hospitalization  Description: INTERVENTIONS:  - Assess and monitor for signs and symptoms of infection  - Monitor lab/diagnostic results  - Monitor all insertion sites, i.e. indwelling lines, tubes, and drains  - Monitor endotracheal if appropriate and nasal secretions for changes in amount  and color  - [place] appropriate cooling/warming therapies per order  - Administer medications as ordered  - Instruct and encourage patient and family to use good hand hygiene technique  - Identify and instruct in appropriate isolation precautions for identified infection/condition  Outcome: Progressing     Problem: SAFETY ADULT  Goal: Patient will remain free of falls  Description: INTERVENTIONS:  - Educate patient/family on patient safety including physical limitations  - Instruct patient to call for assistance with activity   - Consult OT/PT to assist with strengthening/mobility   - Keep Call bell within reach  - Keep bed low and locked with side rails adjusted as appropriate  - Keep care items and personal belongings within reach  - Initiate and maintain comfort rounds  - Make Fall Risk Sign visible to staff  - Offer Toileting every 2 Hours, in advance of need  - Obtain necessary fall risk management equipment  - Apply yellow socks and bracelet for high fall risk patients  - Consider moving patient to room near nurses station  Outcome: Progressing     Problem: MUSCULOSKELETAL - ADULT  Goal: Maintain proper alignment of affected body part  Description: INTERVENTIONS:  - Support, maintain and protect limb and body alignment  - Provide patient/ family with appropriate education  Outcome: Progressing

## 2025-02-18 NOTE — ASSESSMENT & PLAN NOTE
"Patient reports a 2 week history of dark stools w/o change in number, frequency (once weekly), consistency, or caliber,with no associated nausea or vomiting  Reports a similar episode \"in the 70s\" r/t an ulcer that required prolonged treatment  Denies any family Hx of similar or cancers of GI tract, alcohol use, or smoking  Hgb on presentation 6.6 w/ 2 u PRBCs given, dropping to 5.8 w/ 2u PRBC given  Lipase - negative    CT abdomen and pelvis showed no evidence of acute bleeding but diverticulosis without diverticulitis.  He is undergoing multiple EGD within the past 2 weeks below    2/5 - EGD - \"Schatzki ring in the GE junction , 3 cm sliding hiatal hernia, localized erythematous and scared mucosa in duodenal bulb, focal area of scarred mucosa and mild erythema in proximal duodenal bulb, single small TSAT in the second part of the duodenum, and with stigmata of recent hemorrhage; the lesion was ablated with argon plasma coagulation using circumferential loop and placement of 1 clip with achieved hemostasis.  Possible AVM versus Dieulafoy lesion in the second portion of duodenum\".  2/10 - EGD - \"as above, with no signs of bleeding stigmata or hemorrhage.\"  2/11 - Colonoscopy - \"internal hemorrhoids,multiple small and medium diverticulum in sigmoid and rectosigmoid colon, significant hematin in terminal ileum and old blood suggestive of proximal foci of bleeding\"  NSAID use in 12/24 for \"headaches\"      2/13 - EGD - normal esophagus, 1st and 2nd duodenum and proximal jejunum. 2 small telangiectasias in 3rd part of jejunum coagulation w/ argon plasma, mild erthematous hemorrhagic mucosa in lesser curve of stomach.    He is still having melena.       We reviewed the PillCam from yesterday but it was limited due to study only being approximately 2 hours rather than the 8 hours.  There must been a technical failure.  We will plan for another PillCam study tomorrow.  Patient will get 2 L of prep tonight and n.p.o. after " midnight.  I had an extensive discussion with the patient the patient is okay to do this for tomorrow.  Denies any acute distress at this time.    Denies any acute distress

## 2025-02-18 NOTE — ASSESSMENT & PLAN NOTE
Lab Results   Component Value Date    HGBA1C 5.7 (H) 02/11/2025       Recent Labs     02/17/25  1646 02/17/25  2136 02/18/25  0752 02/18/25  1106   POCGLU 89 142* 104 92       Blood Sugar Average: Last 72 hrs:  (P) 105.9849658411239754  Target glucose 140-180 while inpatient  Sugars appear well controlled on sliding scale insulin with meals and at bedtime  Hypoglycemia protocol

## 2025-02-19 ENCOUNTER — APPOINTMENT (INPATIENT)
Dept: GASTROENTEROLOGY | Facility: HOSPITAL | Age: 70
End: 2025-02-19
Payer: COMMERCIAL

## 2025-02-19 LAB
ERYTHROCYTE [DISTWIDTH] IN BLOOD BY AUTOMATED COUNT: 19 % (ref 11.6–15.1)
FOLATE SERPL-MCNC: 7.9 NG/ML
GLUCOSE SERPL-MCNC: 105 MG/DL (ref 65–140)
GLUCOSE SERPL-MCNC: 132 MG/DL (ref 65–140)
GLUCOSE SERPL-MCNC: 92 MG/DL (ref 65–140)
GLUCOSE SERPL-MCNC: 98 MG/DL (ref 65–140)
HCT VFR BLD AUTO: 24.5 % (ref 36.5–49.3)
HGB BLD-MCNC: 7.9 G/DL (ref 12–17)
MCH RBC QN AUTO: 31.2 PG (ref 26.8–34.3)
MCHC RBC AUTO-ENTMCNC: 32.2 G/DL (ref 31.4–37.4)
MCV RBC AUTO: 97 FL (ref 82–98)
PLATELET # BLD AUTO: 174 THOUSANDS/UL (ref 149–390)
PMV BLD AUTO: 9.8 FL (ref 8.9–12.7)
RBC # BLD AUTO: 2.53 MILLION/UL (ref 3.88–5.62)
VIT B12 SERPL-MCNC: 709 PG/ML (ref 180–914)
WBC # BLD AUTO: 2 THOUSAND/UL (ref 4.31–10.16)

## 2025-02-19 PROCEDURE — 85027 COMPLETE CBC AUTOMATED: CPT | Performed by: INTERNAL MEDICINE

## 2025-02-19 PROCEDURE — 82948 REAGENT STRIP/BLOOD GLUCOSE: CPT

## 2025-02-19 PROCEDURE — 99232 SBSQ HOSP IP/OBS MODERATE 35: CPT | Performed by: INTERNAL MEDICINE

## 2025-02-19 PROCEDURE — 82746 ASSAY OF FOLIC ACID SERUM: CPT | Performed by: INTERNAL MEDICINE

## 2025-02-19 PROCEDURE — 91110 GI TRC IMG INTRAL ESOPH-ILE: CPT

## 2025-02-19 PROCEDURE — 82607 VITAMIN B-12: CPT | Performed by: INTERNAL MEDICINE

## 2025-02-19 RX ORDER — ATORVASTATIN CALCIUM 40 MG/1
40 TABLET, FILM COATED ORAL DAILY
Qty: 90 TABLET | Refills: 1 | Status: SHIPPED | OUTPATIENT
Start: 2025-02-19

## 2025-02-19 RX ORDER — FOLIC ACID 1 MG/1
1 TABLET ORAL DAILY
Status: DISCONTINUED | OUTPATIENT
Start: 2025-02-19 | End: 2025-02-21 | Stop reason: HOSPADM

## 2025-02-19 RX ADMIN — TAMSULOSIN HYDROCHLORIDE 0.4 MG: 0.4 CAPSULE ORAL at 17:17

## 2025-02-19 RX ADMIN — AZELASTINE HYDROCHLORIDE 2 SPRAY: 137 SPRAY, METERED NASAL at 21:20

## 2025-02-19 RX ADMIN — AZELASTINE HYDROCHLORIDE 2 SPRAY: 137 SPRAY, METERED NASAL at 09:16

## 2025-02-19 RX ADMIN — ATORVASTATIN CALCIUM 40 MG: 40 TABLET, FILM COATED ORAL at 17:17

## 2025-02-19 RX ADMIN — BRIMONIDINE TARTRATE 1 DROP: 2 SOLUTION/ DROPS OPHTHALMIC at 09:16

## 2025-02-19 RX ADMIN — OMEGA-3 FATTY ACIDS CAP 1000 MG 1000 MG: 1000 CAP at 21:20

## 2025-02-19 RX ADMIN — BRIMONIDINE TARTRATE 1 DROP: 2 SOLUTION/ DROPS OPHTHALMIC at 21:20

## 2025-02-19 RX ADMIN — PANTOPRAZOLE SODIUM 40 MG: 40 TABLET, DELAYED RELEASE ORAL at 17:17

## 2025-02-19 RX ADMIN — FAMOTIDINE 20 MG: 20 TABLET, FILM COATED ORAL at 21:20

## 2025-02-19 RX ADMIN — FLUTICASONE PROPIONATE 2 SPRAY: 50 SPRAY, METERED NASAL at 09:16

## 2025-02-19 RX ADMIN — FOLIC ACID 1 MG: 1 TABLET ORAL at 17:20

## 2025-02-19 NOTE — ASSESSMENT & PLAN NOTE
"Pt presented to [facility] ED 2/9/25 after having dark stools and feeling lightheaded  He was hospitalized at [facility] 2/4/25 for GI bleeding - EGD with \"Schatzki ring at GE junction, 3cm type I hiatal hernia, erythematous scarred mucosa in duodenal bulb, small angioectasia in second part of duodenum with stigmata oh recent hemorrhage\"  Recommended PPI BID for 3-6 months and GI follow up outpatient  He returned to [facility] with hgb drop from 8.1 to 6.6 for which he received 2 units of PRBCs  CT with no evidence of high volume GI bleeding; linear radiodensity in distal duodenum suggestive of endoscopy clip - no acute intraabdominal pathology  Given no GI rounding at [facility] for a few days, pt transferred to [facility]  Pt seen by GI at [place] - EGD and colonoscopy done on 2/10 with no evidence of upper GI bleeding but there was note of old blood throughout the colon and melena throughout the ileum.  GI recommended transfer to [facility] [place] for further GI workup with capsule endoscopy and possible balloon enteroscopy  Repeat hgb 2/11/25 was 5.8 - pt transfused again with hgb up to 8.1 prior to transfer  Status post EGD with balloon enteroplasty on 2/13- 2 small angioectasias in the 3rd part of the duodenum; induced coagulation with argon plasma coagulation   Status post capsule endoscopy on 2/17 however due to technical failure capsule endoscopy was repeated today 2/19  GI is following  Continue with PPI  "

## 2025-02-19 NOTE — PROGRESS NOTES
"Progress Note - Hospitalist   Name: Ean Young 69 y.o. male I MRN: [id number]  [facility] I Date of Admission: 2/12/2025   Date of Service: 2/19/2025 I Hospital Day: 7    Assessment & Plan  Upper GI bleed  Pt presented to [facility] ED 2/9/25 after having dark stools and feeling lightheaded  He was hospitalized at [facility] 2/4/25 for GI bleeding - EGD with \"Schatzki ring at GE junction, 3cm type I hiatal hernia, erythematous scarred mucosa in duodenal bulb, small angioectasia in second part of duodenum with stigmata oh recent hemorrhage\"  Recommended PPI BID for 3-6 months and GI follow up outpatient  He returned to [facility] with hgb drop from 8.1 to 6.6 for which he received 2 units of PRBCs  CT with no evidence of high volume GI bleeding; linear radiodensity in distal duodenum suggestive of endoscopy clip - no acute intraabdominal pathology  Given no GI rounding at [facility] for a few days, pt transferred to [facility]  Pt seen by GI at [place] - EGD and colonoscopy done on 2/10 with no evidence of upper GI bleeding but there was note of old blood throughout the colon and melena throughout the ileum.  GI recommended transfer to [facility] for further GI workup with capsule endoscopy and possible balloon enteroscopy  Repeat hgb 2/11/25 was 5.8 - pt transfused again with hgb up to 8.1 prior to transfer  Status post EGD with balloon enteroplasty on 2/13- 2 small angioectasias in the 3rd part of the duodenum; induced coagulation with argon plasma coagulation   Status post capsule endoscopy on 2/17 however due to technical failure capsule endoscopy was repeated today 2/19  GI is following  Continue with PPI  Acute blood loss anemia  Secondary to above  Required RBC transfusions  Anemia panel results reviewed   start folic acid  Monitor and transfuse for hemoglobin below 7  Pancytopenia (HCC)  Known history of this, due to squamous cell carcinoma of the tongue and having received chemo " and radiation in the past  Continue to monitor  Head and neck cancer (HCC)  H/o squamous cell carcinoma of the tongue with PET scan 10/30/24 with confirmation as such  Pt [facility] with [facility] oncology for chemo and radiation  Essential hypertension  BP WNL - continue home regimen of Toprol XL  Dyslipidemia  Continue Lipitor  Gastroesophageal reflux disease without esophagitis  Continue  protonix  Obstructive sleep apnea  Compliant with CPAP at bedtime, continue while inpatient  Type 2 diabetes mellitus without complication, without long-term current use of insulin ([facility])  Lab Results   Component Value Date    HGBA1C 5.7 (H) 02/11/2025       Recent Labs     02/18/25  1106 02/18/25  1614 02/18/25  2135 02/19/25  0708   POCGLU 92 126 119 98       Blood Sugar Average: Last 72 hrs:  (P) 105.8596054100523587  Target glucose 140-180 while inpatient  Sugars appear well controlled on sliding scale insulin with meals and at bedtime  Hypoglycemia protocol    Class 2 severe obesity due to excess calories with serious comorbidity and body mass index (BMI) of 37.0 to 37.9 in adult (HCC)  BMI 37  Educated on healthy lifestyle and diet modification  Benign prostatic hyperplasia  Continue flomax  Severe protein-calorie malnutrition (HCC)  Malnutrition Findings:      BMI Findings:           Body mass index is 37.66 kg/m².   Continue with regular diet    VTE Pharmacologic Prophylaxis: VTE Score: 7 High Risk (Score >/= 5) - Pharmacological DVT Prophylaxis Contraindicated. Sequential Compression Devices Ordered.    Mobility:   Basic Mobility Inpatient Raw Score: 23  JH-HLM Goal: 7: Walk 25 feet or more  JH-HLM Achieved: 7: Walk 25 feet or more  JH-HLM Goal achieved. Continue to encourage appropriate mobility.    Patient Centered Rounds: I performed bedside rounds with nursing staff today.   Discussions with Specialists or Other Care Team Provider: Nursing, CM    Education and Discussions with Family / Patient:  Patient.     Current Length  of Stay: 7 day(s)  Current Patient Status: Inpatient   Certification Statement: The patient will continue to require additional inpatient hospital stay due to management of GI bleed  Discharge Plan: Anticipate discharge in 24-48 hrs to home.    Code Status: Level 1 - Full Code    Subjective   Patient seen and examined  Comfortable sitting in chair  Technical failure with PillCam yesterday  Capsule endoscopy was repeated today after overnight bowel prep      Objective :  Temp:  [97.3 °F (36.3 °C)-98.5 °F (36.9 °C)] 98.5 °F (36.9 °C)  HR:  [70-82] 70  BP: ()/(43-66) 102/62  Resp:  [16-18] 16  SpO2:  [92 %-96 %] 92 %  O2 Device: None (Room air)    Body mass index is 37.66 kg/m².     Input and Output Summary (last 24 hours):     Intake/Output Summary (Last 24 hours) at 2/19/2025 0957  Last data filed at 2/19/2025 0800  Gross per 24 hour   Intake 120 ml   Output --   Net 120 ml       Physical Exam  Patient is awake alert oriented in no acute distress  Comfortable sitting in chair   Lung clear to auscultation bilateral  Heart positive S1-S2 no murmur  Abdomen soft nontender   Lower extremities no edema    Lines/Drains:        Lab Results: I have reviewed the following results:   Results from last 7 days   Lab Units 02/19/25  0448 02/18/25  0509   WBC Thousand/uL 2.00* 1.89*   HEMOGLOBIN g/dL 7.9* 7.5*   HEMATOCRIT % 24.5* 22.5*   PLATELETS Thousands/uL 174 158   SEGS PCT %  --  63   LYMPHO PCT %  --  25   MONO PCT %  --  10   EOS PCT %  --  1     Results from last 7 days   Lab Units 02/18/25  0509 02/17/25  0553   SODIUM mmol/L 139 140   POTASSIUM mmol/L 3.5 3.5   CHLORIDE mmol/L 105 106   CO2 mmol/L 27 29   BUN mg/dL 11 12   CREATININE mg/dL 0.57* 0.56*   ANION GAP mmol/L 7 5   CALCIUM mg/dL 7.9* 8.0*   ALBUMIN g/dL  --  2.8*   TOTAL BILIRUBIN mg/dL  --  0.65   ALK PHOS U/L  --  63   ALT U/L  --  18   AST U/L  --  16   GLUCOSE RANDOM mg/dL 92 97         Results from last 7 days   Lab Units 02/19/25  2406  02/18/25  2135 02/18/25  1614 02/18/25  1106 02/18/25  0752 02/17/25  2136 02/17/25  1646 02/17/25  1103 02/17/25  0730 02/17/25  0555 02/16/25  2101 02/16/25  1617   POC GLUCOSE mg/dl 98 119 126 92 104 142* 89 96 101 95 103 111               Recent Cultures (last 7 days):         Imaging Results Review: No pertinent imaging studies reviewed.  Other Study Results Review: No additional pertinent studies reviewed.    Last 24 Hours Medication List:     Current Facility-Administered Medications:     acetaminophen (TYLENOL) tablet 650 mg, Q4H PRN    atorvastatin (LIPITOR) tablet 40 mg, Daily With Dinner    azelastine (ASTELIN) 0.1 % nasal spray 2 spray, BID    brimonidine tartrate 0.2 % ophthalmic solution 1 drop, Q12H    calcium carbonate (TUMS) chewable tablet 500 mg, BID PRN    famotidine (PEPCID) tablet 20 mg, HS    fish oil capsule 1,000 mg, BID    fluticasone (FLONASE) 50 mcg/act nasal spray 2 spray, Daily    folic acid (FOLVITE) tablet 1 mg, Daily    hydrOXYzine HCL (ATARAX) tablet 25 mg, Q8H PRN    insulin lispro (HumALOG/ADMELOG) 100 units/mL subcutaneous injection 2-12 Units, TID AC **AND** Fingerstick Glucose (POCT), TID AC    insulin lispro (HumALOG/ADMELOG) 100 units/mL subcutaneous injection 2-12 Units, HS    metoprolol succinate (TOPROL-XL) 24 hr tablet 12.5 mg, Daily    ondansetron (ZOFRAN) injection 4 mg, Q6H PRN    pantoprazole (PROTONIX) EC tablet 40 mg, BID AC    sodium chloride (OCEAN) 0.65 % nasal spray 1 spray, Q1H PRN    tamsulosin (FLOMAX) capsule 0.4 mg, Daily With Dinner    Administrative Statements   Today, Patient Was Seen By: Quirino Escamilla DO      **Please Note: This note may have been constructed using a voice recognition system.**

## 2025-02-19 NOTE — ASSESSMENT & PLAN NOTE
Secondary to above  Required RBC transfusions  Anemia panel results reviewed   start folic acid  Monitor and transfuse for hemoglobin below 7

## 2025-02-19 NOTE — ASSESSMENT & PLAN NOTE
Lab Results   Component Value Date    HGBA1C 5.7 (H) 02/11/2025       Recent Labs     02/18/25  1106 02/18/25  1614 02/18/25  2135 02/19/25  0708   POCGLU 92 126 119 98       Blood Sugar Average: Last 72 hrs:  (P) 105.2312633815378446  Target glucose 140-180 while inpatient  Sugars appear well controlled on sliding scale insulin with meals and at bedtime  Hypoglycemia protocol

## 2025-02-19 NOTE — ASSESSMENT & PLAN NOTE
ENT H/o squamous cell carcinoma of the tongue with PET scan 10/30/24 with confirmation as such  Pt follows with [facility] oncology for chemo and radiation

## 2025-02-19 NOTE — PLAN OF CARE
Problem: SAFETY ADULT  Goal: Patient will remain free of falls  Description: INTERVENTIONS:  - Educate patient/family on patient safety including physical limitations  - Instruct patient to call for assistance with activity   - Consult OT/PT to assist with strengthening/mobility   - Keep Call bell within reach  - Keep bed low and locked with side rails adjusted as appropriate  - Keep care items and personal belongings within reach  - Initiate and maintain comfort rounds  - Make Fall Risk Sign visible to staff  - Offer Toileting every 2 Hours, in advance of need  - Obtain necessary fall risk management equipment  - Apply yellow socks and bracelet for high fall risk patients  - Consider moving patient to room near nurses station  Outcome: Progressing     Problem: PAIN - ADULT  Goal: Verbalizes/displays adequate comfort level or baseline comfort level  Description: Interventions:  - Encourage patient to monitor pain and request assistance  - Assess pain using appropriate pain scale  - Administer analgesics based on type and severity of pain and evaluate response  - Implement non-pharmacological measures as appropriate and evaluate response  - Consider cultural and social influences on pain and pain management  - Notify physician/advanced practitioner if interventions unsuccessful or patient reports new pain  Outcome: Progressing     Problem: INFECTION - ADULT  Goal: Absence or prevention of progression during hospitalization  Description: INTERVENTIONS:  - Assess and monitor for signs and symptoms of infection  - Monitor lab/diagnostic results  - Monitor all insertion sites, i.e. indwelling lines, tubes, and drains  - Monitor endotracheal if appropriate and nasal secretions for changes in amount and color  - [place] appropriate cooling/warming therapies per order  - Administer medications as ordered  - Instruct and encourage patient and family to use good hand hygiene technique  - Identify and instruct in  appropriate isolation precautions for identified infection/condition  Outcome: Progressing     Problem: HEMATOLOGIC - ADULT  Goal: Maintains hematologic stability  Description: INTERVENTIONS  - Assess for signs and symptoms of bleeding or hemorrhage  - Monitor labs  - Administer supportive blood products/factors as ordered and appropriate  Outcome: Progressing     Problem: MUSCULOSKELETAL - ADULT  Goal: Maintain proper alignment of affected body part  Description: INTERVENTIONS:  - Support, maintain and protect limb and body alignment  - Provide patient/ family with appropriate education  Outcome: Progressing     Problem: METABOLIC, FLUID AND ELECTROLYTES - ADULT  Goal: Fluid balance maintained  Description: INTERVENTIONS:  - Monitor labs   - Monitor I/O and WT  - Instruct patient on fluid and nutrition as appropriate  - Assess for signs & symptoms of volume excess or deficit  Outcome: Progressing     Problem: RESPIRATORY - ADULT  Goal: Achieves optimal ventilation and oxygenation  Description: INTERVENTIONS:  - Assess for changes in respiratory status  - Assess for changes in mentation and behavior  - Position to facilitate oxygenation and minimize respiratory effort  - Oxygen administered by appropriate delivery if ordered  - Initiate smoking cessation education as indicated  - Encourage broncho-pulmonary hygiene including cough, deep breathe, Incentive Spirometry  - Assess the need for suctioning and aspirate as needed  - Assess and instruct to report SOB or any respiratory difficulty  - Respiratory Therapy support as indicated  Outcome: Progressing     Problem: Knowledge Deficit  Goal: Patient/family/caregiver demonstrates understanding of disease process, treatment plan, medications, and discharge instructions  Description: Complete learning assessment and assess knowledge base.  Interventions:  - Provide teaching at level of understanding  - Provide teaching via preferred learning methods  Outcome:  Progressing     Problem: DISCHARGE PLANNING  Goal: Discharge to home or other facility with appropriate resources  Description: INTERVENTIONS:  - Identify barriers to discharge w/patient and caregiver  - Arrange for needed discharge resources and transportation as appropriate  - Identify discharge learning needs (meds, wound care, etc.)  - Arrange for interpretive services to assist at discharge as needed  - Refer to Case Management Department for coordinating discharge planning if the patient needs post-hospital services based on physician/advanced practitioner order or complex needs related to functional status, cognitive ability, or social support system  Outcome: Progressing

## 2025-02-20 ENCOUNTER — APPOINTMENT (INPATIENT)
Dept: RADIOLOGY | Facility: HOSPITAL | Age: 70
End: 2025-02-20
Payer: COMMERCIAL

## 2025-02-20 LAB
GLUCOSE SERPL-MCNC: 103 MG/DL (ref 65–140)
GLUCOSE SERPL-MCNC: 103 MG/DL (ref 65–140)
GLUCOSE SERPL-MCNC: 105 MG/DL (ref 65–140)
GLUCOSE SERPL-MCNC: 110 MG/DL (ref 65–140)
HCT VFR BLD AUTO: 24.4 % (ref 36.5–49.3)
HGB BLD-MCNC: 7.8 G/DL (ref 12–17)

## 2025-02-20 PROCEDURE — 85014 HEMATOCRIT: CPT | Performed by: INTERNAL MEDICINE

## 2025-02-20 PROCEDURE — 99232 SBSQ HOSP IP/OBS MODERATE 35: CPT | Performed by: INTERNAL MEDICINE

## 2025-02-20 PROCEDURE — 82948 REAGENT STRIP/BLOOD GLUCOSE: CPT

## 2025-02-20 PROCEDURE — 74018 RADEX ABDOMEN 1 VIEW: CPT

## 2025-02-20 PROCEDURE — 91111 GI TRC IMG INTRAL ESOPHAGUS: CPT | Performed by: INTERNAL MEDICINE

## 2025-02-20 PROCEDURE — 85018 HEMOGLOBIN: CPT | Performed by: INTERNAL MEDICINE

## 2025-02-20 RX ADMIN — FOLIC ACID 1 MG: 1 TABLET ORAL at 09:42

## 2025-02-20 RX ADMIN — TAMSULOSIN HYDROCHLORIDE 0.4 MG: 0.4 CAPSULE ORAL at 17:38

## 2025-02-20 RX ADMIN — AZELASTINE HYDROCHLORIDE 2 SPRAY: 137 SPRAY, METERED NASAL at 09:43

## 2025-02-20 RX ADMIN — BRIMONIDINE TARTRATE 1 DROP: 2 SOLUTION/ DROPS OPHTHALMIC at 22:08

## 2025-02-20 RX ADMIN — OMEGA-3 FATTY ACIDS CAP 1000 MG 1000 MG: 1000 CAP at 09:42

## 2025-02-20 RX ADMIN — OMEGA-3 FATTY ACIDS CAP 1000 MG 1000 MG: 1000 CAP at 22:07

## 2025-02-20 RX ADMIN — FLUTICASONE PROPIONATE 2 SPRAY: 50 SPRAY, METERED NASAL at 09:43

## 2025-02-20 RX ADMIN — FAMOTIDINE 20 MG: 20 TABLET, FILM COATED ORAL at 22:08

## 2025-02-20 RX ADMIN — BRIMONIDINE TARTRATE 1 DROP: 2 SOLUTION/ DROPS OPHTHALMIC at 09:43

## 2025-02-20 RX ADMIN — PANTOPRAZOLE SODIUM 40 MG: 40 TABLET, DELAYED RELEASE ORAL at 17:38

## 2025-02-20 RX ADMIN — PANTOPRAZOLE SODIUM 40 MG: 40 TABLET, DELAYED RELEASE ORAL at 09:42

## 2025-02-20 RX ADMIN — ATORVASTATIN CALCIUM 40 MG: 40 TABLET, FILM COATED ORAL at 17:38

## 2025-02-20 NOTE — PROGRESS NOTES
"Progress Note - Hospitalist   Name: Ean Young 69 y.o. male I MRN: [id number]  [facility] Date of Admission: 2/12/2025   Date of Service: 2/20/2025 I Hospital Day: 8    Assessment & Plan  Upper GI bleed  Pt presented to [facility] ED 2/9/25 after having dark stools and feeling lightheaded  He was hospitalized at [facility] 2/4/25 for GI bleeding - EGD with \"Schatzki ring at GE junction, 3cm type I hiatal hernia, erythematous scarred mucosa in duodenal bulb, small angioectasia in second part of duodenum with stigmata oh recent hemorrhage\"  Recommended PPI BID for 3-6 months and GI follow up outpatient  He returned to [facility] with hgb drop from 8.1 to 6.6 for which he received 2 units of PRBCs  CT with no evidence of high volume GI bleeding; linear radiodensity in distal duodenum suggestive of endoscopy clip - no acute intraabdominal pathology  Given no GI rounding at [facility] for a few days, pt transferred to [facility]  Pt seen by GI at [place] - EGD and colonoscopy done on 2/10 with no evidence of upper GI bleeding but there was note of old blood throughout the colon and melena throughout the ileum.  GI recommended transfer to [facility] for further GI workup with capsule endoscopy and possible balloon enteroscopy  Repeat hgb 2/11/25 was 5.8 - pt transfused again with hgb up to 8.1 prior to transfer  Status post EGD with balloon enteroplasty on 2/13- 2 small angioectasias in the 3rd part of the duodenum; induced coagulation with argon plasma coagulation   Status post capsule endoscopy on 2/17 however due to technical failure capsule endoscopy was repeated on 2/19  GI is following  Continue with PPI  Follow on endoscopy report and GI recommendation  Acute blood loss anemia  Secondary to above  Required RBC transfusions  Anemia panel results reviewed   Continue with folic acid  Monitor and transfuse for hemoglobin below 7  Pancytopenia (HCC)  Known history of this, due to squamous " cell carcinoma of the tongue and having received chemo and radiation in the past  Continue to monitor  Head and neck cancer (HCC)  H/o squamous cell carcinoma of the tongue with PET scan 10/30/24 with confirmation as such  [facility]  Essential hypertension  BP WNL - continue home regimen of Toprol XL  Dyslipidemia  Continue Lipitor  Gastroesophageal reflux disease without esophagitis  Continue  protonix  Obstructive sleep apnea  Compliant with CPAP at bedtime, continue while inpatient  Type 2 diabetes mellitus without complication, without long-term current use of insulin ([facility])  Lab Results   Component Value Date    HGBA1C 5.7 (H) 02/11/2025       Recent Labs     02/19/25  1652 02/19/25  2121 02/20/25  0653 02/20/25  1116   POCGLU 92 132 110 105       Blood Sugar Average: Last 72 hrs:  (P) 107.9287730116137303  Target glucose 140-180 while inpatient  Stable on insulin sliding scale  Hypoglycemia protocol    Class 2 severe obesity due to excess calories with serious comorbidity and body mass index (BMI) of 37.0 to 37.9 in adult (HCC)  BMI 37  Educated on healthy lifestyle and diet modification  Benign prostatic hyperplasia  Continue flomax  Severe protein-calorie malnutrition (HCC)  Malnutrition Findings:      BMI Findings:           Body mass index is 37.66 kg/m².   Continue with regular diet    VTE Pharmacologic Prophylaxis: VTE Score: 7 High Risk (Score >/= 5) - Pharmacological DVT Prophylaxis Contraindicated. Sequential Compression Devices Ordered.    Mobility:   Basic Mobility Inpatient Raw Score: 23  JH-HLM Goal: 7: Walk 25 feet or more  JH-HLM Achieved: 7: Walk 25 feet or more  JH-HLM Goal achieved. Continue to encourage appropriate mobility.    Patient Centered Rounds: I performed bedside rounds with nursing staff today.   Discussions with Specialists or Other Care Team Provider: Nursing    Education and Discussions with Family / Patient:  Patient.     Current  Length of Stay: 8 day(s)  Current Patient Status: Inpatient   Certification Statement: The patient will continue to require additional inpatient hospital stay due to management of GI bleed  Discharge Plan: Anticipate discharge in 24-48 hrs to home.    Code Status: Level 1 - Full Code    Subjective   Patient seen and examined  Comfortable in bed  Tolerating oral diet  No further melena    Objective :  Temp:  [98.1 °F (36.7 °C)-99.5 °F (37.5 °C)] 98.6 °F (37 °C)  HR:  [77-91] 77  BP: (107-109)/(69-77) 109/69  Resp:  [16-18] 18  SpO2:  [95 %-98 %] 95 %  O2 Device: None (Room air)    Body mass index is 37.66 kg/m².     Input and Output Summary (last 24 hours):     Intake/Output Summary (Last 24 hours) at 2/20/2025 1157  Last data filed at 2/20/2025 0725  Gross per 24 hour   Intake 120 ml   Output --   Net 120 ml       Physical Exam  Patient is awake alert oriented in no acute distress  Comfortable in bed  Lung clear to auscultation bilateral  Heart positive S1-S2 no murmur  Abdomen soft nontender   Lower extremities no edema    Lines/Drains:        Lab Results: I have reviewed the following results:   Results from last 7 days   Lab Units 02/20/25  0527 02/19/25  0448 02/18/25  0509   WBC Thousand/uL  --  2.00* 1.89*   HEMOGLOBIN g/dL 7.8* 7.9* 7.5*   HEMATOCRIT % 24.4* 24.5* 22.5*   PLATELETS Thousands/uL  --  174 158   SEGS PCT %  --   --  63   LYMPHO PCT %  --   --  25   MONO PCT %  --   --  10   EOS PCT %  --   --  1     Results from last 7 days   Lab Units 02/18/25  0509 02/17/25  0553   SODIUM mmol/L 139 140   POTASSIUM mmol/L 3.5 3.5   CHLORIDE mmol/L 105 106   CO2 mmol/L 27 29   BUN mg/dL 11 12   CREATININE mg/dL 0.57* 0.56*   ANION GAP mmol/L 7 5   CALCIUM mg/dL 7.9* 8.0*   ALBUMIN g/dL  --  2.8*   TOTAL BILIRUBIN mg/dL  --  0.65   ALK PHOS U/L  --  63   ALT U/L  --  18   AST U/L  --  16   GLUCOSE RANDOM mg/dL 92 97         Results from last 7 days   Lab Units 02/20/25  1116 02/20/25  0653 02/19/25  6110  02/19/25  1652 02/19/25  1053 02/19/25  0708 02/18/25  2135 02/18/25  1614 02/18/25  1106 02/18/25  0752 02/17/25  2136 02/17/25  1646   POC GLUCOSE mg/dl 105 110 132 92 105 98 119 126 92 104 142* 89               Recent Cultures (last 7 days):         Imaging Results Review: No pertinent imaging studies reviewed.  Other Study Results Review: No additional pertinent studies reviewed.    Last 24 Hours Medication List:     Current Facility-Administered Medications:     acetaminophen (TYLENOL) tablet 650 mg, Q4H PRN    atorvastatin (LIPITOR) tablet 40 mg, Daily With Dinner    azelastine (ASTELIN) 0.1 % nasal spray 2 spray, BID    brimonidine tartrate 0.2 % ophthalmic solution 1 drop, Q12H    calcium carbonate (TUMS) chewable tablet 500 mg, BID PRN    famotidine (PEPCID) tablet 20 mg, HS    fish oil capsule 1,000 mg, BID    fluticasone (FLONASE) 50 mcg/act nasal spray 2 spray, Daily    folic acid (FOLVITE) tablet 1 mg, Daily    hydrOXYzine HCL (ATARAX) tablet 25 mg, Q8H PRN    insulin lispro (HumALOG/ADMELOG) 100 units/mL subcutaneous injection 2-12 Units, TID AC **AND** Fingerstick Glucose (POCT), TID AC    insulin lispro (HumALOG/ADMELOG) 100 units/mL subcutaneous injection 2-12 Units, HS    metoprolol succinate (TOPROL-XL) 24 hr tablet 12.5 mg, Daily    ondansetron (ZOFRAN) injection 4 mg, Q6H PRN    pantoprazole (PROTONIX) EC tablet 40 mg, BID AC    sodium chloride (OCEAN) 0.65 % nasal spray 1 spray, Q1H PRN    tamsulosin (FLOMAX) capsule 0.4 mg, Daily With Dinner    Administrative Statements   Today, Patient Was Seen By: Quirino Escamilla DO      **Please Note: This note may have been constructed using a voice recognition system.**

## 2025-02-20 NOTE — PROGRESS NOTES
"Progress Note - Gastroenterology   Name: Ean Young 69 y.o. male I MRN: [id number]  [facility] I Date of Admission: 2/12/2025   Date of Service: 2/20/2025 I Hospital Day: 8    Assessment & Plan  Upper GI bleed  Patient reports a 2 week history of dark stools w/o change in number, frequency (once weekly), consistency, or caliber,with no associated nausea or vomiting  Reports a similar episode \"in the 70s\" r/t an ulcer that required prolonged treatment  Denies any family Hx of similar or cancers of GI tract, alcohol use, or smoking  Hgb on presentation 6.6 w/ 2 u PRBCs given, dropping to 5.8 w/ 2u PRBC given  Lipase - negative    CT abdomen and pelvis showed no evidence of acute bleeding but diverticulosis without diverticulitis.  He is undergoing multiple EGD within the past 2 weeks below    2/5 - EGD - \"Schatzki ring in the GE junction , 3 cm sliding hiatal hernia, localized erythematous and scared mucosa in duodenal bulb, focal area of scarred mucosa and mild erythema in proximal duodenal bulb, single small TSAT in the second part of the duodenum, and with stigmata of recent hemorrhage; the lesion was ablated with argon plasma coagulation using circumferential loop and placement of 1 clip with achieved hemostasis.  Possible AVM versus Dieulafoy lesion in the second portion of duodenum\".  2/10 - EGD - \"as above, with no signs of bleeding stigmata or hemorrhage.\"  2/11 - Colonoscopy - \"internal hemorrhoids,multiple small and medium diverticulum in sigmoid and rectosigmoid colon, significant hematin in terminal ileum and old blood suggestive of proximal foci of bleeding\"  NSAID use in 12/24 for \"headaches\"  2/13 - EGD - normal esophagus, 1st and 2nd duodenum and proximal jejunum. 2 small telangiectasias in 3rd part of jejunum coagulation w/ argon plasma, mild erthematous hemorrhagic mucosa in lesser curve of stomach    Patient had a PillCam capsule endoscopy completed 2/17 and again 2/19 both of the " studies were limited due to study only being approximately 2 - 3 hours rather than the 8 hours.  There must been a technical failure.  Fortunately, I do believe we were able to view most of the small bowel with the 3-hour study, however we cannot confirm passage into the cecum.  From the views that we could see, he had evidence of 2 small nonbleeding angioectasias throughout the small bowel, the first of which was at approximately 37 minutes.  I do not suspect these to be the source of patient's significant anemia and melena.  Fortunately patient's hemoglobin has remained stable at this time.  Recommend IV Venofer transfusion versus oral iron supplementation.  Obtain KUB to confirm passage of capsule.  Otherwise patient is stable for discharge from GI standpoint.    Gastroesophageal reflux disease without esophagitis  Patient takes Pepcid 20mg Qhs and protonix 40mg Bid    Plan:  Continue  PTA medications  Head and neck cancer (HCC)  Receives radiation therapy for tongue cancer  Type 2 diabetes mellitus without complication, without long-term current use of insulin ([facility])  Lab Results   Component Value Date    HGBA1C 5.7 (H) 02/11/2025       Recent Labs     02/19/25  2121 02/20/25  0653 02/20/25  1116 02/20/25  1553   POCGLU 132 110 105 103       Blood Sugar Average: Last 72 hrs:  (P) 106.8125    Acute blood loss anemia    Essential hypertension    Dyslipidemia    Obstructive sleep apnea    Class 2 severe obesity due to excess calories with serious comorbidity and body mass index (BMI) of 37.0 to 37.9 in adult (HCC)    Benign prostatic hyperplasia    Pancytopenia (HCC)    Severe protein-calorie malnutrition (HCC)  Malnutrition Findings:                                 BMI Findings:           Body mass index is 37.66 kg/m².         Subjective   Patient seen examined at bedside.  No acute events.  Patient reports he had a brown bowel movement this morning.  Denies any ongoing overt bleeding.  Denies any other acute GI  complaints including nausea or vomiting.  We reviewed the results of this PillCam which again was limited to only 3 hours.  I do not suspect that he has ongoing bleeding at this time and he is reassured by this as well.    Objective :  Temp:  [98.6 °F (37 °C)-99.5 °F (37.5 °C)] 98.6 °F (37 °C)  HR:  [77-91] 79  BP: (107-112)/(69-77) 112/71  Resp:  [16-18] 16  SpO2:  [95 %-99 %] 99 %  O2 Device: None (Room air)    Physical Exam  General: No apparent distress, resting comfortably   Head: Normocephalic, atraumatic  Eyes: Anicteric, no conjunctival erythema  ENT: External ear normal, no nasal discharge  Neck: Trachea midline, no visible lymphadenopathy   Respiratory:  Non-labored respirations, symmetric thorax expansion  Cardiovascular:  Extremities appear well-perfused  Abdomen: Non-distended   Extremities: Moves extremities spontaneously  Skin: No visible rashes or jaundice  Neuro: No gross focal deficits, no aphasia     Lasha Barrett D.O.  Gastroenterology Fellow, PGY-4  [facility]

## 2025-02-20 NOTE — ASSESSMENT & PLAN NOTE
"Pt presented to [facility] ED 2/9/25 after having dark stools and feeling lightheaded  He was hospitalized at [facility] 2/4/25 for GI bleeding - EGD with \"Schatzki ring at GE junction, 3cm type I hiatal hernia, erythematous scarred mucosa in duodenal bulb, small angioectasia in second part of duodenum with stigmata oh recent hemorrhage\"  Recommended PPI BID for 3-6 months and GI follow up outpatient  He returned to [facility] with hgb drop from 8.1 to 6.6 for which he received 2 units of PRBCs  CT with no evidence of high volume GI bleeding; linear radiodensity in distal duodenum suggestive of endoscopy clip - no acute intraabdominal pathology  Given no GI rounding at [facility] for a few days, pt transferred to [facility]  Pt seen by GI at [place] - EGD and colonoscopy done on 2/10 with no evidence of upper GI bleeding but there was note of old blood throughout the colon and melena throughout the ileum.  GI recommended transfer to [facility] for further GI workup with capsule endoscopy and possible balloon enteroscopy  Repeat hgb 2/11/25 was 5.8 - pt transfused again with hgb up to 8.1 prior to transfer  Status post EGD with balloon enteroplasty on 2/13- 2 small angioectasias in the 3rd part of the duodenum; induced coagulation with argon plasma coagulation   Status post capsule endoscopy on 2/17 however due to technical failure capsule endoscopy was repeated on 2/19  GI is following  Continue with PPI  Follow on endoscopy report and GI recommendation  "

## 2025-02-20 NOTE — ASSESSMENT & PLAN NOTE
Secondary to above  Required RBC transfusions  Anemia panel results reviewed   Continue with folic acid  Monitor and transfuse for hemoglobin below 7

## 2025-02-20 NOTE — ASSESSMENT & PLAN NOTE
"Patient reports a 2 week history of dark stools w/o change in number, frequency (once weekly), consistency, or caliber,with no associated nausea or vomiting  Reports a similar episode \"in the 70s\" r/t an ulcer that required prolonged treatment  Denies any family Hx of similar or cancers of GI tract, alcohol use, or smoking  Hgb on presentation 6.6 w/ 2 u PRBCs given, dropping to 5.8 w/ 2u PRBC given  Lipase - negative    CT abdomen and pelvis showed no evidence of acute bleeding but diverticulosis without diverticulitis.  He is undergoing multiple EGD within the past 2 weeks below    2/5 - EGD - \"Schatzki ring in the GE junction , 3 cm sliding hiatal hernia, localized erythematous and scared mucosa in duodenal bulb, focal area of scarred mucosa and mild erythema in proximal duodenal bulb, single small TSAT in the second part of the duodenum, and with stigmata of recent hemorrhage; the lesion was ablated with argon plasma coagulation using circumferential loop and placement of 1 clip with achieved hemostasis.  Possible AVM versus Dieulafoy lesion in the second portion of duodenum\".  2/10 - EGD - \"as above, with no signs of bleeding stigmata or hemorrhage.\"  2/11 - Colonoscopy - \"internal hemorrhoids,multiple small and medium diverticulum in sigmoid and rectosigmoid colon, significant hematin in terminal ileum and old blood suggestive of proximal foci of bleeding\"  NSAID use in 12/24 for \"headaches\"  2/13 - EGD - normal esophagus, 1st and 2nd duodenum and proximal jejunum. 2 small telangiectasias in 3rd part of jejunum coagulation w/ argon plasma, mild erthematous hemorrhagic mucosa in lesser curve of stomach    Patient had a PillCam capsule endoscopy completed 2/17 and again 2/19 both of the studies were limited due to study only being approximately 2 - 3 hours rather than the 8 hours.  There must been a technical failure.  Fortunately, I do believe we were able to view most of the small bowel with the 3-hour study, " however we cannot confirm passage into the cecum.  From the views that we could see, he had evidence of 2 small nonbleeding angioectasias throughout the small bowel, the first of which was at approximately 37 minutes.  I do not suspect these to be the source of patient's significant anemia and melena.  Fortunately patient's hemoglobin has remained stable at this time.  Recommend IV Venofer transfusion versus oral iron supplementation.  Obtain KUB to confirm passage of capsule.  Otherwise patient is stable for discharge from GI standpoint.

## 2025-02-20 NOTE — PLAN OF CARE
Problem: GASTROINTESTINAL - ADULT  Goal: Minimal or absence of nausea and/or vomiting  Description: INTERVENTIONS:  - Administer IV fluids if ordered to ensure adequate hydration  - Maintain NPO status until nausea and vomiting are resolved  - Nasogastric tube if ordered  - Administer ordered antiemetic medications as needed  - Provide nonpharmacologic comfort measures as appropriate  - Advance diet as tolerated, if ordered  - Consider nutrition services referral to assist patient with adequate nutrition and appropriate food choices  Outcome: Progressing  Goal: Maintains or returns to baseline bowel function  Description: INTERVENTIONS:  - Assess bowel function  - Encourage oral fluids to ensure adequate hydration  - Administer IV fluids if ordered to ensure adequate hydration  - Administer ordered medications as needed  - Encourage mobilization and activity  - Consider nutritional services referral to assist patient with adequate nutrition and appropriate food choices  Outcome: Progressing  Goal: Maintains adequate nutritional intake  Description: INTERVENTIONS:  - Monitor percentage of each meal consumed  - Identify factors contributing to decreased intake, treat as appropriate  - Assist with meals as needed  - Monitor I&O, weight, and lab values if indicated  - Obtain nutrition services referral as needed  Outcome: Progressing     Problem: HEMATOLOGIC - ADULT  Goal: Maintains hematologic stability  Description: INTERVENTIONS  - Assess for signs and symptoms of bleeding or hemorrhage  - Monitor labs  - Administer supportive blood products/factors as ordered and appropriate  Outcome: Progressing     Problem: CARDIOVASCULAR - ADULT  Goal: Maintains optimal cardiac output and hemodynamic stability  Description: INTERVENTIONS:  - Monitor I/O, vital signs and rhythm  - Monitor for S/S and trends of decreased cardiac output  - Administer and titrate ordered vasoactive medications to optimize hemodynamic stability  -  Assess quality of pulses, skin color and temperature  - Assess for signs of decreased coronary artery perfusion  - Instruct patient to report change in severity of symptoms  Outcome: Progressing  Goal: Absence of cardiac dysrhythmias or at baseline rhythm  Description: INTERVENTIONS:  - Continuous cardiac monitoring, vital signs, obtain 12 lead EKG if ordered  - Administer antiarrhythmic and heart rate control medications as ordered  - Monitor electrolytes and administer replacement therapy as ordered  Outcome: Progressing     Problem: RESPIRATORY - ADULT  Goal: Achieves optimal ventilation and oxygenation  Description: INTERVENTIONS:  - Assess for changes in respiratory status  - Assess for changes in mentation and behavior  - Position to facilitate oxygenation and minimize respiratory effort  - Oxygen administered by appropriate delivery if ordered  - Initiate smoking cessation education as indicated  - Encourage broncho-pulmonary hygiene including cough, deep breathe, Incentive Spirometry  - Assess the need for suctioning and aspirate as needed  - Assess and instruct to report SOB or any respiratory difficulty  - Respiratory Therapy support as indicated  Outcome: Progressing     Problem: METABOLIC, FLUID AND ELECTROLYTES - ADULT  Goal: Electrolytes maintained within normal limits  Description: INTERVENTIONS:  - Monitor labs and assess patient for signs and symptoms of electrolyte imbalances  - Administer electrolyte replacement as ordered  - Monitor response to electrolyte replacements, including repeat lab results as appropriate  - Instruct patient on fluid and nutrition as appropriate  Outcome: Progressing  Goal: Fluid balance maintained  Description: INTERVENTIONS:  - Monitor labs   - Monitor I/O and WT  - Instruct patient on fluid and nutrition as appropriate  - Assess for signs & symptoms of volume excess or deficit  Outcome: Progressing     Problem: MUSCULOSKELETAL - ADULT  Goal: Maintain proper alignment  of affected body part  Description: INTERVENTIONS:  - Support, maintain and protect limb and body alignment  - Provide patient/ family with appropriate education  Outcome: Progressing     Problem: DISCHARGE PLANNING  Goal: Discharge to home or other facility with appropriate resources  Description: INTERVENTIONS:  - Identify barriers to discharge w/patient and caregiver  - Arrange for needed discharge resources and transportation as appropriate  - Identify discharge learning needs (meds, wound care, etc.)  - Arrange for interpretive services to assist at discharge as needed  - Refer to Case Management Department for coordinating discharge planning if the patient needs post-hospital services based on physician/advanced practitioner order or complex needs related to functional status, cognitive ability, or social support system  Outcome: Progressing     Problem: Knowledge Deficit  Goal: Patient/family/caregiver demonstrates understanding of disease process, treatment plan, medications, and discharge instructions  Description: Complete learning assessment and assess knowledge base.  Interventions:  - Provide teaching at level of understanding  - Provide teaching via preferred learning methods  Outcome: Progressing     Problem: PAIN - ADULT  Goal: Verbalizes/displays adequate comfort level or baseline comfort level  Description: Interventions:  - Encourage patient to monitor pain and request assistance  - Assess pain using appropriate pain scale  - Administer analgesics based on type and severity of pain and evaluate response  - Implement non-pharmacological measures as appropriate and evaluate response  - Consider cultural and social influences on pain and pain management  - Notify physician/advanced practitioner if interventions unsuccessful or patient reports new pain  Outcome: Progressing     Problem: INFECTION - ADULT  Goal: Absence or prevention of progression during hospitalization  Description: INTERVENTIONS:  -  Assess and monitor for signs and symptoms of infection  - Monitor lab/diagnostic results  - Monitor all insertion sites, i.e. indwelling lines, tubes, and drains  - Monitor endotracheal if appropriate and nasal secretions for changes in amount and color  - [place] appropriate cooling/warming therapies per order  - Administer medications as ordered  - Instruct and encourage patient and family to use good hand hygiene technique  - Identify and instruct in appropriate isolation precautions for identified infection/condition  Outcome: Progressing     Problem: SAFETY ADULT  Goal: Patient will remain free of falls  Description: INTERVENTIONS:  - Educate patient/family on patient safety including physical limitations  - Instruct patient to call for assistance with activity   - Consult OT/PT to assist with strengthening/mobility   - Keep Call bell within reach  - Keep bed low and locked with side rails adjusted as appropriate  - Keep care items and personal belongings within reach  - Initiate and maintain comfort rounds  - Make Fall Risk Sign visible to staff  - Offer Toileting every 2 Hours, in advance of need  - Obtain necessary fall risk management equipment  - Apply yellow socks and bracelet for high fall risk patients  - Consider moving patient to room near nurses station  Outcome: Progressing

## 2025-02-20 NOTE — ASSESSMENT & PLAN NOTE
H/o squamous cell carcinoma of the tongue with PET scan 10/30/24 with confirmation as such  Pt follows with [facility] oncology for chemo and radiation

## 2025-02-20 NOTE — ASSESSMENT & PLAN NOTE
Malnutrition Findings:                                 BMI Findings:           Body mass index is 37.66 kg/m².

## 2025-02-20 NOTE — ASSESSMENT & PLAN NOTE
Lab Results   Component Value Date    HGBA1C 5.7 (H) 02/11/2025       Recent Labs     02/19/25  1652 02/19/25  2121 02/20/25  0653 02/20/25  1116   POCGLU 92 132 110 105       Blood Sugar Average: Last 72 hrs:  (P) 107.9668840523579384  Target glucose 140-180 while inpatient  Stable on insulin sliding scale  Hypoglycemia protocol

## 2025-02-20 NOTE — ASSESSMENT & PLAN NOTE
Lab Results   Component Value Date    HGBA1C 5.7 (H) 02/11/2025       Recent Labs     02/19/25  2121 02/20/25  0653 02/20/25  1116 02/20/25  1553   POCGLU 132 110 105 103       Blood Sugar Average: Last 72 hrs:  (P) 106.8125

## 2025-02-20 NOTE — CASE MANAGEMENT
Case Management Discharge Planning Note    Patient name Ean Young  Location [facility] 808/[facility] 808-01 MRN [id number]  : 1955 Date 2025       Current Admission Date: 2025  Current Admission Diagnosis:Upper GI bleed   Patient Active Problem List    Diagnosis Date Noted Date Diagnosed    Acute blood loss anemia 2025     Severe protein-calorie malnutrition (HCC) 2025     Pancytopenia (HCC) 2025     Gastric ulcer 2025     Upper GI bleed 2025     JM (obstructive sleep apnea) 2025     Intractable episodic headache 2024     Anxiety about treatment 2024     Other insomnia 2024     Head and neck cancer (HCC) 2024     Benign prostatic hyperplasia 2024     Chronic obstructive pulmonary disease ([facility]) 2024     Class 2 severe obesity due to excess calories with serious comorbidity and body mass index (BMI) of 37.0 to 37.9 in adult ([facility]) 2024     Laryngopharyngeal reflux (LPR) 2024     Cervical spondylosis 10/06/2023     Bilateral carpal tunnel syndrome 10/06/2023     Cervical radiculopathy 2023     Moderate aortic stenosis 2022     Decreased hearing of both ears 2022     Urinary frequency 2022     Type 2 diabetes mellitus without complication, without long-term current use of insulin ([facility]) 2022     Essential hypertension 2022     Dyslipidemia 2022     Gastroesophageal reflux disease without esophagitis 2022     Obstructive sleep apnea 2022       LOS (days): 8  Geometric Mean LOS (GMLOS) (days): 4.5  Days to GMLOS:-3.1     OBJECTIVE:  Risk of Unplanned Readmission Score: 19.43         Current admission status: Inpatient   Preferred Pharmacy:   [id number] [place] - [address]  [address]  [place] [address]  Phone: [phone] Fax: [phone]    Primary Care Provider: Timmy Chapman DO    Primary Insurance: [facility] REP  Secondary  Insurance: [place] [employer] [facility]    DISCHARGE DETAILS:                                          Other Referral/Resources/Interventions Provided:  Referral Comments: S/w [facility] for care coordinaiton. Not medically cleared yet. DCP home. CM to follow.

## 2025-02-21 VITALS
TEMPERATURE: 98.7 F | BODY MASS INDEX: 37.8 KG/M2 | OXYGEN SATURATION: 96 % | HEART RATE: 81 BPM | WEIGHT: 270 LBS | HEIGHT: 71 IN | DIASTOLIC BLOOD PRESSURE: 62 MMHG | SYSTOLIC BLOOD PRESSURE: 107 MMHG | RESPIRATION RATE: 20 BRPM

## 2025-02-21 LAB
ERYTHROCYTE [DISTWIDTH] IN BLOOD BY AUTOMATED COUNT: 19.4 % (ref 11.6–15.1)
GLUCOSE SERPL-MCNC: 111 MG/DL (ref 65–140)
GLUCOSE SERPL-MCNC: 113 MG/DL (ref 65–140)
HCT VFR BLD AUTO: 22.8 % (ref 36.5–49.3)
HGB BLD-MCNC: 7.4 G/DL (ref 12–17)
MCH RBC QN AUTO: 31.2 PG (ref 26.8–34.3)
MCHC RBC AUTO-ENTMCNC: 32.5 G/DL (ref 31.4–37.4)
MCV RBC AUTO: 96 FL (ref 82–98)
PLATELET # BLD AUTO: 189 THOUSANDS/UL (ref 149–390)
PMV BLD AUTO: 9.4 FL (ref 8.9–12.7)
RBC # BLD AUTO: 2.37 MILLION/UL (ref 3.88–5.62)
WBC # BLD AUTO: 2.18 THOUSAND/UL (ref 4.31–10.16)

## 2025-02-21 PROCEDURE — 82948 REAGENT STRIP/BLOOD GLUCOSE: CPT

## 2025-02-21 PROCEDURE — 99239 HOSP IP/OBS DSCHRG MGMT >30: CPT | Performed by: INTERNAL MEDICINE

## 2025-02-21 PROCEDURE — 85027 COMPLETE CBC AUTOMATED: CPT | Performed by: INTERNAL MEDICINE

## 2025-02-21 RX ORDER — FERROUS SULFATE 325(65) MG
325 TABLET ORAL
Qty: 30 TABLET | Refills: 0 | Status: SHIPPED | OUTPATIENT
Start: 2025-02-21

## 2025-02-21 RX ORDER — FERROUS SULFATE 325(65) MG
325 TABLET ORAL
Status: DISCONTINUED | OUTPATIENT
Start: 2025-02-21 | End: 2025-02-21 | Stop reason: HOSPADM

## 2025-02-21 RX ORDER — FOLIC ACID 1 MG/1
1 TABLET ORAL DAILY
Qty: 30 TABLET | Refills: 0 | Status: SHIPPED | OUTPATIENT
Start: 2025-02-21

## 2025-02-21 RX ADMIN — OMEGA-3 FATTY ACIDS CAP 1000 MG 1000 MG: 1000 CAP at 09:52

## 2025-02-21 RX ADMIN — PANTOPRAZOLE SODIUM 40 MG: 40 TABLET, DELAYED RELEASE ORAL at 06:12

## 2025-02-21 RX ADMIN — FLUTICASONE PROPIONATE 2 SPRAY: 50 SPRAY, METERED NASAL at 09:53

## 2025-02-21 RX ADMIN — FOLIC ACID 1 MG: 1 TABLET ORAL at 09:52

## 2025-02-21 RX ADMIN — BRIMONIDINE TARTRATE 1 DROP: 2 SOLUTION/ DROPS OPHTHALMIC at 09:53

## 2025-02-21 RX ADMIN — FERROUS SULFATE TAB 325 MG (65 MG ELEMENTAL FE) 325 MG: 325 (65 FE) TAB at 09:54

## 2025-02-21 RX ADMIN — AZELASTINE HYDROCHLORIDE 2 SPRAY: 137 SPRAY, METERED NASAL at 09:53

## 2025-02-21 NOTE — PLAN OF CARE
Problem: GASTROINTESTINAL - ADULT  Goal: Minimal or absence of nausea and/or vomiting  Description: INTERVENTIONS:  - Administer IV fluids if ordered to ensure adequate hydration  - Maintain NPO status until nausea and vomiting are resolved  - Nasogastric tube if ordered  - Administer ordered antiemetic medications as needed  - Provide nonpharmacologic comfort measures as appropriate  - Advance diet as tolerated, if ordered  - Consider nutrition services referral to assist patient with adequate nutrition and appropriate food choices  Outcome: Progressing  Goal: Maintains or returns to baseline bowel function  Description: INTERVENTIONS:  - Assess bowel function  - Encourage oral fluids to ensure adequate hydration  - Administer IV fluids if ordered to ensure adequate hydration  - Administer ordered medications as needed  - Encourage mobilization and activity  - Consider nutritional services referral to assist patient with adequate nutrition and appropriate food choices  Outcome: Progressing  Goal: Maintains adequate nutritional intake  Description: INTERVENTIONS:  - Monitor percentage of each meal consumed  - Identify factors contributing to decreased intake, treat as appropriate  - Assist with meals as needed  - Monitor I&O, weight, and lab values if indicated  - Obtain nutrition services referral as needed  Outcome: Progressing     Problem: HEMATOLOGIC - ADULT  Goal: Maintains hematologic stability  Description: INTERVENTIONS  - Assess for signs and symptoms of bleeding or hemorrhage  - Monitor labs  - Administer supportive blood products/factors as ordered and appropriate  Outcome: Progressing     Problem: CARDIOVASCULAR - ADULT  Goal: Maintains optimal cardiac output and hemodynamic stability  Description: INTERVENTIONS:  - Monitor I/O, vital signs and rhythm  - Monitor for S/S and trends of decreased cardiac output  - Administer and titrate ordered vasoactive medications to optimize hemodynamic stability  -  Assess quality of pulses, skin color and temperature  - Assess for signs of decreased coronary artery perfusion  - Instruct patient to report change in severity of symptoms  Outcome: Progressing  Goal: Absence of cardiac dysrhythmias or at baseline rhythm  Description: INTERVENTIONS:  - Continuous cardiac monitoring, vital signs, obtain 12 lead EKG if ordered  - Administer antiarrhythmic and heart rate control medications as ordered  - Monitor electrolytes and administer replacement therapy as ordered  Outcome: Progressing     Problem: RESPIRATORY - ADULT  Goal: Achieves optimal ventilation and oxygenation  Description: INTERVENTIONS:  - Assess for changes in respiratory status  - Assess for changes in mentation and behavior  - Position to facilitate oxygenation and minimize respiratory effort  - Oxygen administered by appropriate delivery if ordered  - Initiate smoking cessation education as indicated  - Encourage broncho-pulmonary hygiene including cough, deep breathe, Incentive Spirometry  - Assess the need for suctioning and aspirate as needed  - Assess and instruct to report SOB or any respiratory difficulty  - Respiratory Therapy support as indicated  Outcome: Progressing     Problem: METABOLIC, FLUID AND ELECTROLYTES - ADULT  Goal: Electrolytes maintained within normal limits  Description: INTERVENTIONS:  - Monitor labs and assess patient for signs and symptoms of electrolyte imbalances  - Administer electrolyte replacement as ordered  - Monitor response to electrolyte replacements, including repeat lab results as appropriate  - Instruct patient on fluid and nutrition as appropriate  Outcome: Progressing  Goal: Fluid balance maintained  Description: INTERVENTIONS:  - Monitor labs   - Monitor I/O and WT  - Instruct patient on fluid and nutrition as appropriate  - Assess for signs & symptoms of volume excess or deficit  Outcome: Progressing     Problem: MUSCULOSKELETAL - ADULT  Goal: Maintain proper alignment  of affected body part  Description: INTERVENTIONS:  - Support, maintain and protect limb and body alignment  - Provide patient/ family with appropriate education  Outcome: Progressing     Problem: DISCHARGE PLANNING  Goal: Discharge to home or other facility with appropriate resources  Description: INTERVENTIONS:  - Identify barriers to discharge w/patient and caregiver  - Arrange for needed discharge resources and transportation as appropriate  - Identify discharge learning needs (meds, wound care, etc.)  - Arrange for interpretive services to assist at discharge as needed  - Refer to Case Management Department for coordinating discharge planning if the patient needs post-hospital services based on physician/advanced practitioner order or complex needs related to functional status, cognitive ability, or social support system  Outcome: Progressing     Problem: Knowledge Deficit  Goal: Patient/family/caregiver demonstrates understanding of disease process, treatment plan, medications, and discharge instructions  Description: Complete learning assessment and assess knowledge base.  Interventions:  - Provide teaching at level of understanding  - Provide teaching via preferred learning methods  Outcome: Progressing     Problem: PAIN - ADULT  Goal: Verbalizes/displays adequate comfort level or baseline comfort level  Description: Interventions:  - Encourage patient to monitor pain and request assistance  - Assess pain using appropriate pain scale  - Administer analgesics based on type and severity of pain and evaluate response  - Implement non-pharmacological measures as appropriate and evaluate response  - Consider cultural and social influences on pain and pain management  - Notify physician/advanced practitioner if interventions unsuccessful or patient reports new pain  Outcome: Progressing     Problem: INFECTION - ADULT  Goal: Absence or prevention of progression during hospitalization  Description: INTERVENTIONS:  -  Assess and monitor for signs and symptoms of infection  - Monitor lab/diagnostic results  - Monitor all insertion sites, i.e. indwelling lines, tubes, and drains  - Monitor endotracheal if appropriate and nasal secretions for changes in amount and color  - [place] appropriate cooling/warming therapies per order  - Administer medications as ordered  - Instruct and encourage patient and family to use good hand hygiene technique  - Identify and instruct in appropriate isolation precautions for identified infection/condition  Outcome: Progressing     Problem: SAFETY ADULT  Goal: Patient will remain free of falls  Description: INTERVENTIONS:  - Educate patient/family on patient safety including physical limitations  - Instruct patient to call for assistance with activity   - Consult OT/PT to assist with strengthening/mobility   - Keep Call bell within reach  - Keep bed low and locked with side rails adjusted as appropriate  - Keep care items and personal belongings within reach  - Initiate and maintain comfort rounds  - Make Fall Risk Sign visible to staff  - Offer Toileting every 2 Hours, in advance of need  - Obtain necessary fall risk management equipment  - Apply yellow socks and bracelet for high fall risk patients  - Consider moving patient to room near nurses station  Outcome: Progressing     Problem: Nutrition/Hydration-ADULT  Goal: Nutrient/Hydration intake appropriate for improving, restoring or maintaining nutritional needs  Description: Monitor and assess patient's nutrition/hydration status for malnutrition. Collaborate with interdisciplinary team and initiate plan and interventions as ordered.  Monitor patient's weight and dietary intake as ordered or per policy. Utilize nutrition screening tool and intervene as necessary. Determine patient's food preferences and provide high-protein, high-caloric foods as appropriate.     INTERVENTIONS:  - Monitor oral intake, urinary output, labs, and treatment plans  -  Assess nutrition and hydration status and recommend course of action  - Evaluate amount of meals eaten  - Assist patient with eating if necessary   - Allow adequate time for meals  - Recommend/ encourage appropriate diets, oral nutritional supplements, and vitamin/mineral supplements  - Order, calculate, and assess calorie counts as needed  - Recommend, monitor, and adjust tube feedings and TPN/PPN based on assessed needs  - Assess need for intravenous fluids  - Provide specific nutrition/hydration education as appropriate  - Include patient/family/caregiver in decisions related to nutrition  Outcome: Progressing

## 2025-02-21 NOTE — DISCHARGE SUMMARY
"Discharge Summary - Hospitalist   Name: Ean Young 69 y.o. male I MRN: [id number]  [facility] I Date of Admission: 2/12/2025   Date of Service: 2/21/2025 I Hospital Day: 9     Assessment & Plan  Upper GI bleed  Pt presented to [facility] ED 2/9/25 after having dark stools and feeling lightheaded  He was hospitalized at [facility] 2/4/25 for GI bleeding - EGD with \"Schatzki ring at GE junction, 3cm type I hiatal hernia, erythematous scarred mucosa in duodenal bulb, small angioectasia in second part of duodenum with stigmata oh recent hemorrhage\"  Recommended PPI BID for 3-6 months and GI follow up outpatient  He returned to [facility] with hgb drop from 8.1 to 6.6 for which he received 2 units of PRBCs  CT with no evidence of high volume GI bleeding; linear radiodensity in distal duodenum suggestive of endoscopy clip - no acute intraabdominal pathology  Given no GI rounding at [facility] for a few days, pt transferred to [facility]  Pt seen by GI at [place] - EGD and colonoscopy done on 2/10 with no evidence of upper GI bleeding but there was note of old blood throughout the colon and melena throughout the ileum.  GI recommended transfer to [facility] for further GI workup with capsule endoscopy and possible balloon enteroscopy  Repeat hgb 2/11/25 was 5.8 - pt transfused again with hgb up to 8.1 prior to transfer  Status post EGD with balloon enteroplasty on 2/13- 2 small angioectasias in the 3rd part of the duodenum; induced coagulation with argon plasma coagulation   Status post capsule endoscopy on 2/17 however due to technical failure capsule endoscopy was repeated on 2/19  Continue with PPI  Stable H&H, no further melena  No acute bleeding seen on PillCam capsule endoscopy  Per GI, patient is stable for discharge home on oral iron with plan for outpatient GI follow-up  Acute blood loss anemia  Secondary to above  Required RBC transfusions  Anemia panel results reviewed   Continue " with folic acid, start iron supplement  Monitor and transfuse for hemoglobin below 7  Pancytopenia (HCC)  Known history of this, due to squamous cell carcinoma of the tongue and having received chemo and radiation in the past  Continue to monitor  Head and neck cancer (HCC)  H/o squamous cell carcinoma of the tongue with PET scan 10/30/24 with confirmation as such  [facility] and radiation  Essential hypertension  BP WNL - continue home regimen of Toprol XL  Dyslipidemia  Continue Lipitor  Gastroesophageal reflux disease without esophagitis  Continue  protonix  Obstructive sleep apnea  Compliant with CPAP at bedtime, continue while inpatient  Type 2 diabetes mellitus without complication, without long-term current use of insulin (HCC)  Lab Results   Component Value Date    HGBA1C 5.7 (H) 02/11/2025       Recent Labs     02/20/25  1553 02/20/25  2105 02/21/25  0724 02/21/25  1104   POCGLU 103 103 113 111       Blood Sugar Average: Last 72 hrs:  (P) 108.7511071895343940  Target glucose 140-180 while inpatient  Stable on insulin sliding scale  Hypoglycemia protocol    Class 2 severe obesity due to excess calories with serious comorbidity and body mass index (BMI) of 37.0 to 37.9 in adult (HCC)  BMI 37  Educated on healthy lifestyle and diet modification  Benign prostatic hyperplasia  Continue flomax  Severe protein-calorie malnutrition (HCC)  Malnutrition Findings:      BMI Findings:           Body mass index is 37.66 kg/m².   Continue with regular diet     Medical Problems       Resolved Problems  Date Reviewed: 2/21/2025   None       Discharging Physician / Practitioner: Quirino Escamilla DO  PCP: Timmy Chapman DO  Admission Date:   Admission Orders (From admission, onward)       Ordered        02/12/25 2119  INPATIENT ADMISSION  Once                          Discharge Date: 02/21/25    Consultations During Hospital Stay:  GI    Procedures Performed:   PillCam capsule  "endoscopy    Significant Findings / Test Results:   As above    Incidental Findings:   none    Test Results Pending at Discharge (will require follow up):   Abdominal KUB     Outpatient Tests Requested:  GI follow-up    Complications:  none    Reason for Admission:   Upper GI bleed    Hospital Course:   Ean Young is a 69 y.o. male patient who originally presented to the hospital on 2/12/2025 due to GI bleed  Pt initially presented to [facility] ED 2/9/25 after having dark stools and feeling lightheaded  He was hospitalized at [facility] 2/4/25 for GI bleeding - EGD with \"Schatzki ring at GE junction, 3cm type I hiatal hernia, erythematous scarred mucosa in duodenal bulb, small angioectasia in second part of duodenum with stigmata oh recent hemorrhage\"  Recommended PPI BID for 3-6 months and GI follow up outpatient  He returned to [facility] with hgb drop from 8.1 to 6.6 for which he received 2 units of PRBCs  CT with no evidence of high volume GI bleeding; linear radiodensity in distal duodenum suggestive of endoscopy clip - correlate with history; no acute intraabdominal pathology  Given no GI rounding at [facility] for a few days, pt transferred to [facility]  Pt seen by GI at [place] - EGD and colonoscopy done with no evidence of upper GI bleeding but there was note of old blood throughout the colon and melena throughout the ileum.  GI recommended transfer to [facility] for further GI workup with capsule endoscopy and possible balloon enteroscopy    Patient underwent EGD with balloon enteroplasty on 2/13- 2 small angioectasias in the 3rd part of the duodenum; induced coagulation with argon plasma coagulation   Status post capsule endoscopy on 2/17 however due to technical failure capsule endoscopy was repeated on 2/19    Currently with a stable hemoglobin, no active GI bleeding seen on PillCam capsule endoscopy  GI recommending initiating oral iron and discharge patient home with plan for " "outpatient follow-up  Please see above list of diagnoses and related plan for additional information.     Condition at Discharge: stable    Discharge Day Visit / Exam:   Subjective:    Patient seen and examined  Comfortable sitting in chair  No event overnight  Denied further melena  Tolerating oral diet  Cleared by GI for discharge home today  Vitals: Blood Pressure: 107/62 (02/21/25 0729)  Pulse: 81 (02/21/25 0729)  Temperature: 98.7 °F (37.1 °C) (02/21/25 0729)  Temp Source: Tympanic (02/13/25 1528)  Respirations: 20 (02/21/25 0729)  Height: 5' 11\" (180.3 cm) (02/20/25 1317)  Weight - Scale: 122 kg (270 lb) (02/12/25 2210)  SpO2: 96 % (02/21/25 0729)  Physical Exam   Patient is awake alert oriented in no acute distress  Comfortable sitting in chair  Lung clear to auscultation bilateral  Heart positive S1-S2 no murmur  Abdomen soft nontender   Lower extremities no edema      Discharge instructions/Information to patient and family:   See after visit summary for information provided to patient and family.      Provisions for Follow-Up Care:  See after visit summary for information related to follow-up care and any pertinent home health orders.      Mobility at time of Discharge:   Basic Mobility Inpatient Raw Score: 23  JH-HLM Goal: 7: Walk 25 feet or more  JH-HLM Achieved: 7: Walk 25 feet or more  HLM Goal achieved. Continue to encourage appropriate mobility.     Disposition:   Home    Planned Readmission: no    Discharge Medications:  See after visit summary for reconciled discharge medications provided to patient and/or family.      Administrative Statements   Discharge Statement:  I have spent a total time of 35 minutes in caring for this patient on the day of the visit/encounter. >30 minutes of time was spent on: Counseling / Coordination of care, Documenting in the medical record, Reviewing / ordering tests, medicine, procedures  , and Communicating with other healthcare professionals .    **Please Note: This " note may have been constructed using a voice recognition system**

## 2025-02-21 NOTE — ASSESSMENT & PLAN NOTE
H/o squamous cell carcinoma of the tongue with PET scan 10/30/24 with confirmation as such  Pt follows with [facility] oncology for chemo and radiation

## 2025-02-21 NOTE — CASE MANAGEMENT
Case Management Discharge Planning Note    Patient name Ean Young  [facility] MRN [id number]  : 1955 Date 2025       Current Admission Date: 2025  Current Admission Diagnosis:Upper GI bleed   Patient Active Problem List    Diagnosis Date Noted Date Diagnosed    Acute blood loss anemia 2025     Severe protein-calorie malnutrition (HCC) 2025     Pancytopenia (HCC) 2025     Gastric ulcer 2025     Upper GI bleed 2025     JM (obstructive sleep apnea) 2025     Intractable episodic headache 2024     Anxiety about treatment 2024     Other insomnia 2024     Head and neck cancer (HCC) 2024     Benign prostatic hyperplasia 2024     Chronic obstructive pulmonary disease ([facility]) 2024     Class 2 severe obesity due to excess calories with serious comorbidity and body mass index (BMI) of 37.0 to 37.9 in adult ([facility]) 2024     Laryngopharyngeal reflux (LPR) 2024     Cervical spondylosis 10/06/2023     Bilateral carpal tunnel syndrome 10/06/2023     Cervical radiculopathy 2023     Moderate aortic stenosis 2022     Decreased hearing of both ears 2022     Urinary frequency 2022     Type 2 diabetes mellitus without complication, without long-term current use of insulin ([facility]) 2022     Essential hypertension 2022     Dyslipidemia 2022     Gastroesophageal reflux disease without esophagitis 2022     Obstructive sleep apnea 2022       LOS (days): 9  Geometric Mean LOS (GMLOS) (days): 4.5  Days to GMLOS:-4.1     OBJECTIVE:  Risk of Unplanned Readmission Score: 17.75         Current admission status: Inpatient   Preferred Pharmacy:   [id number] [place] - [address]  [address] [address]  Phone: [phone] Fax: [phone]    Primary Care Provider: Timmy Chapman DO    [facility]  Secondary  Insurance: [place] [employer] [facility]    DISCHARGE DETAILS:                                          Other Referral/Resources/Interventions Provided:  Referral Comments: S/w SLIM & informed pt has no cm needs & will be dc'd today. Will need lyft home. S/w pt & confirmed he has no ride home. Can get into his home & care independently. SIgned lyft waiver & placed in medical records bin. [employer]dt[employer] request sent & confirmed pt got text. Julienne took pt to sarah [facility]. Confirmed address with pt.

## 2025-02-21 NOTE — ASSESSMENT & PLAN NOTE
Secondary to above  Required RBC transfusions  Anemia panel results reviewed   Continue with folic acid, start iron supplement  Monitor and transfuse for hemoglobin below 7

## 2025-02-21 NOTE — ASSESSMENT & PLAN NOTE
"Pt presented to [facility] ED 2/9/25 after having dark stools and feeling lightheaded  He was hospitalized at [facility] 2/4/25 for GI bleeding - EGD with \"Schatzki ring at GE junction, 3cm type I hiatal hernia, erythematous scarred mucosa in duodenal bulb, small angioectasia in second part of duodenum with stigmata oh recent hemorrhage\"  Recommended PPI BID for 3-6 months and GI follow up outpatient  He returned to [facility] with hgb drop from 8.1 to 6.6 for which he received 2 units of PRBCs  CT with no evidence of high volume GI bleeding; linear radiodensity in distal duodenum suggestive of endoscopy clip - no acute intraabdominal pathology  Given no GI rounding at [facility] for a few days, pt transferred to [facility]  Pt seen by GI at [place] - EGD and colonoscopy done on 2/10 with no evidence of upper GI bleeding but there was note of old blood throughout the colon and melena throughout the ileum.  GI recommended transfer to [facility] for further GI workup with capsule endoscopy and possible balloon enteroscopy  Repeat hgb 2/11/25 was 5.8 - pt transfused again with hgb up to 8.1 prior to transfer  Status post EGD with balloon enteroplasty on 2/13- 2 small angioectasias in the 3rd part of the duodenum; induced coagulation with argon plasma coagulation   Status post capsule endoscopy on 2/17 however due to technical failure capsule endoscopy was repeated on 2/19  Continue with PPI  Stable H&H, no further melena  No acute bleeding seen on PillCam capsule endoscopy  Per GI, patient is stable for discharge home on oral iron with plan for outpatient GI follow-up  "

## 2025-02-21 NOTE — ASSESSMENT & PLAN NOTE
Lab Results   Component Value Date    HGBA1C 5.7 (H) 02/11/2025       Recent Labs     02/20/25  1553 02/20/25  2105 02/21/25  0724 02/21/25  1104   POCGLU 103 103 113 111       Blood Sugar Average: Last 72 hrs:  (P) 108.8761541605196770  Target glucose 140-180 while inpatient  Stable on insulin sliding scale  Hypoglycemia protocol

## 2025-02-24 ENCOUNTER — TELEPHONE (OUTPATIENT)
Dept: NON INVASIVE DIAGNOSTICS | Facility: HOSPITAL | Age: 70
End: 2025-02-24

## 2025-02-24 ENCOUNTER — TRANSITIONAL CARE MANAGEMENT (OUTPATIENT)
Dept: FAMILY MEDICINE CLINIC | Facility: CLINIC | Age: 70
End: 2025-02-24

## 2025-02-24 ENCOUNTER — PATIENT OUTREACH (OUTPATIENT)
Dept: CASE MANAGEMENT | Facility: OTHER | Age: 70
End: 2025-02-24

## 2025-02-24 NOTE — UTILIZATION REVIEW
NOTIFICATION OF ADMISSION DISCHARGE   This is a Notification of Discharge from [facility]. Please be advised that this patient has been discharge from our facility. Below you will find the admission and discharge date and time including the patient’s disposition.   UTILIZATION REVIEW CONTACT:  Aria Perez  Utilization   Network Utilization Review Department  Phone: [phone] x carefully listen to the prompts. All voicemails are confidential.  Email: [email or web address]     ADMISSION INFORMATION  PRESENTATION DATE: 2/12/2025  9:11 PM  OBERVATION ADMISSION DATE: N/A  INPATIENT ADMISSION DATE: 2/12/25  9:11 PM   DISCHARGE DATE: 2/21/2025 12:27 PM   DISPOSITION:Home/Self Care    Network Utilization Review Department  ATTENTION: Please call with any questions or concerns to [phone] and carefully listen to the prompts so that you are directed to the right person. All voicemails are confidential.   For Discharge needs, contact Care Management DC Support Team at [phone] opt. 2  Send all requests for admission clinical reviews, approved or denied determinations and any other requests to dedicated fax number below belonging to the campus where the patient is receiving treatment. List of dedicated fax numbers for the Facilities:  FACILITY NAME UR FAX NUMBER   ADMISSION DENIALS (Administrative/Medical Necessity) [phone]   DISCHARGE SUPPORT TEAM ([facility]) [phone]   PARENT CHILD HEALTH (Maternity/NICU/Pediatrics) [phone]   [facility] [phone]   General acute hospital [phone]   [facility] [phone]   [facility] [phone]   [facility] [phone]   [facility] [phone]   [facility] [phone]   [facility] [phone]    [facility] [phone]   [facility] [phone]   [facility] [phone]   [facility] [phone]

## 2025-02-24 NOTE — TELEPHONE ENCOUNTER
-I was able to call and speak with patient.  Patient notes that many of his antihypertensive medications were discontinued due to hypotension since having significant weight reduction.  He notes overall his blood pressure seem well-controlled on the metoprolol succinate 12.5 mg daily.  He is not having symptomatic low readings as he was previously.  I informed him then we will continue to monitor off his previous regimen with current therapies.  Counseled him on dietary and lifestyle modifications and we will see patient for follow-up or sooner if necessary.  Patient overall notes feeling much better since hospital discharge.

## 2025-02-25 ENCOUNTER — PATIENT OUTREACH (OUTPATIENT)
Dept: CASE MANAGEMENT | Facility: HOSPITAL | Age: 70
End: 2025-02-25

## 2025-02-25 NOTE — PROGRESS NOTES
HRR referral received. Pt admitted to [facility] 2/12-2/21/25 for Upper GI Bleed after transfer from [facility] 2/9-2/12. Chart review performed.    PMH:HTN, JM, GERD, DM2, COPD, HH      Attempted outreach to pt, left voicemail with contact name and number to return call.                F/U Apts:  PCP 2/28/25  GI 3/12/25  Cards 3/17/25

## 2025-02-28 ENCOUNTER — OFFICE VISIT (OUTPATIENT)
Dept: FAMILY MEDICINE CLINIC | Facility: CLINIC | Age: 70
End: 2025-02-28
Payer: COMMERCIAL

## 2025-02-28 VITALS
HEART RATE: 86 BPM | HEIGHT: 71 IN | SYSTOLIC BLOOD PRESSURE: 118 MMHG | TEMPERATURE: 97.8 F | BODY MASS INDEX: 37.15 KG/M2 | DIASTOLIC BLOOD PRESSURE: 72 MMHG | OXYGEN SATURATION: 97 % | WEIGHT: 265.4 LBS

## 2025-02-28 DIAGNOSIS — G47.33 OBSTRUCTIVE SLEEP APNEA: ICD-10-CM

## 2025-02-28 DIAGNOSIS — K21.9 GASTROESOPHAGEAL REFLUX DISEASE WITHOUT ESOPHAGITIS: ICD-10-CM

## 2025-02-28 DIAGNOSIS — C76.0 HEAD AND NECK CANCER (HCC): ICD-10-CM

## 2025-02-28 DIAGNOSIS — E66.01 CLASS 2 SEVERE OBESITY DUE TO EXCESS CALORIES WITH SERIOUS COMORBIDITY AND BODY MASS INDEX (BMI) OF 37.0 TO 37.9 IN ADULT (HCC): Chronic | ICD-10-CM

## 2025-02-28 DIAGNOSIS — E11.65 TYPE 2 DIABETES MELLITUS WITH HYPERGLYCEMIA, WITHOUT LONG-TERM CURRENT USE OF INSULIN (HCC): ICD-10-CM

## 2025-02-28 DIAGNOSIS — D61.818 PANCYTOPENIA (HCC): ICD-10-CM

## 2025-02-28 DIAGNOSIS — K92.2 UPPER GI BLEED: ICD-10-CM

## 2025-02-28 DIAGNOSIS — E66.812 CLASS 2 SEVERE OBESITY DUE TO EXCESS CALORIES WITH SERIOUS COMORBIDITY AND BODY MASS INDEX (BMI) OF 37.0 TO 37.9 IN ADULT (HCC): Chronic | ICD-10-CM

## 2025-02-28 DIAGNOSIS — I10 ESSENTIAL HYPERTENSION: ICD-10-CM

## 2025-02-28 DIAGNOSIS — N40.0 BENIGN PROSTATIC HYPERPLASIA WITHOUT LOWER URINARY TRACT SYMPTOMS: ICD-10-CM

## 2025-02-28 DIAGNOSIS — E78.5 DYSLIPIDEMIA: ICD-10-CM

## 2025-02-28 DIAGNOSIS — Z78.9 TRANSITION OF CARE: Primary | ICD-10-CM

## 2025-02-28 PROCEDURE — 99495 TRANSJ CARE MGMT MOD F2F 14D: CPT | Performed by: FAMILY MEDICINE

## 2025-02-28 PROCEDURE — 91111 GI TRC IMG INTRAL ESOPHAGUS: CPT | Performed by: INTERNAL MEDICINE

## 2025-02-28 RX ORDER — FERROUS SULFATE 325(65) MG
325 TABLET ORAL
Qty: 90 TABLET | Refills: 1 | Status: SHIPPED | OUTPATIENT
Start: 2025-02-28

## 2025-02-28 NOTE — ASSESSMENT & PLAN NOTE
Continue proton pump inhibitor twice a day as advised by gastroenterology.  Patient has an outpatient visit with gastroenterology in the future.

## 2025-02-28 NOTE — ASSESSMENT & PLAN NOTE
History of squamous cell carcinoma of the tongue with PET scan 10/30/2024 with confirmation as such.  Patient follows with the [facility] oncology for chemotherapy

## 2025-02-28 NOTE — ASSESSMENT & PLAN NOTE
Patient has a known history of this due to squamous cell carcinoma of the tongue and having received chemotherapy in the past.

## 2025-02-28 NOTE — PROGRESS NOTES
Name: Ean Young      : 1955      MRN: [id number]  Encounter Provider: Timmy Chapman DO  Encounter Date: 2025   Encounter department: [facility]  :  Assessment & Plan  Transition of care  Patient is a pleasant 69-year-old male who presents for transition of care.  Initially, the patient presented to [facility].  He was hospitalized at [facility] to 2025 for upper GI bleeding.  Patient underwent EGD which Schatzki ring at gastroesophageal junction, 3 cm type hiatal hernia, erythematous scarred mucosa in duodenal bulb, small angioectasia in second part of duodenum with stigmata of recent hemorrhage.  The patient was recommended to be on a proton pump inhibitor twice a day for 3 to 6 months and follow-up with GI as an outpatient.    He returned to [facility] with a hemoglobin drop from 8.1-6.6 for which she received 2 units of packed red blood cells.  CT with no evidence of high-volume GI bleeding linear radiodensity in distal duodenum suggestive of clip-no acute intra-abdominal pathology.  Given no GI rounding at [facility] for a few days the patient was transferred to [facility].  The patient was seen by GI at [place].  EGD and colonoscopy done on February 10 with no evidence of upper GI bleeding but there was note of old blood throughout the colon and melena throughout the ileum.  GI recommended transfer to [facility] for further GI workup and capsule endoscopy with possible balloon enteroscopy.  Repeat hemoglobin on 2025 was 5.8.  Patient was transfused again which brought up his hemoglobin to 8.1 prior to transfer.  Status post EGD with balloon angioplasty on -2 small angioectasias in the third part of the duodenum induced coagulation with argon plasma coagulation patient was status post capsule endoscopy on 2025 however due to technical failure the capsule endoscopy was repeated on 2025.  There was no acute bleeding seen on  PillCam capsule endoscopy.       Upper GI bleed  Please see above.  Last CBC was 2/21/2025.  Patient with a white blood cell count of 2.18 (history of pancytopenia secondary to chemotherapy), hemoglobin 7.4, hematocrit 22.8, platelets 189       Pancytopenia (HCC)  Patient has a known history of this due to squamous cell carcinoma of the tongue and having received chemotherapy in the past.       Head and neck cancer (HCC)  History of squamous cell carcinoma of the tongue with PET scan 10/30/2024 with confirmation as such.  Patient follows with the [facility] oncology for chemotherapy       Essential hypertension  Currently controlled.  Will continue current medication regimen..  Patient is on Toprol-XL.       Dyslipidemia  Tolerating statin.  Stable.  Continue current treatment.       Gastroesophageal reflux disease without esophagitis  Continue proton pump inhibitor twice a day as advised by gastroenterology.  Patient has an outpatient visit with gastroenterology in the future.       Obstructive sleep apnea  Compliant with CPAP at home.  Continue current treatment.       Type 2 diabetes mellitus with hyperglycemia, without long-term current use of insulin ([facility])    Lab Results   Component Value Date    HGBA1C 5.7 (H) 02/11/2025   Currently controlled.  Continue current treatment plan.         Benign prostatic hyperplasia without lower urinary tract symptoms         Class 2 severe obesity due to excess calories with serious comorbidity and body mass index (BMI) of 37.0 to 37.9 in adult ([facility])                  History of Present Illness   The patient is a pleasant 69-year-old male who presents today for transition of care.  I have reviewed the following notes from the records from the emergency room.  They are as follows:    Ean Young is a 69 y.o. male patient who originally presented to the hospital on 2/12/2025 due to GI bleed  · Pt initially presented to [facility] ED 2/9/25 after having  "dark stools and feeling lightheaded  · He was hospitalized at [facility] 2/4/25 for GI bleeding - EGD with \"Schatzki ring at GE junction, 3cm type I hiatal hernia, erythematous scarred mucosa in duodenal bulb, small angioectasia in second part of duodenum with stigmata oh recent hemorrhage\"  · Recommended PPI BID for 3-6 months and GI follow up outpatient  · He returned to [facility] normotensive with hgb drop from 8.1 to 6.6 for which he received 2 units of PRBCs  · CT with no evidence of high volume GI bleeding; linear radiodensity in distal duodenum suggestive of endoscopy clip - correlate with history; no acute intraabdominal pathology  · Given no GI rounding at [facility] for a few days, pt transferred to [facility]  · Pt seen by GI at [place] - EGD and colonoscopy done with no evidence of upper GI bleeding but there was note of old blood throughout the colon and melena throughout the ileum.  · GI recommended transfer to [facility] for further GI workup with capsule endoscopy and possible balloon enteroscopy     · Patient underwent EGD with balloon enteroplasty on 2/13- 2 small angioectasias in the 3rd part of the duodenum; induced coagulation with argon plasma coagulation   · Status post capsule endoscopy on 2/17 however due to technical failure capsule endoscopy was repeated on 2/19     Currently with a stable hemoglobin, no active GI bleeding seen on PillCam capsule endoscopy  GI recommending initiating oral iron and discharge patient home with plan for outpatient follow-up  Please see above list of diagnoses and related plan for additional information.       Review of Systems   Constitutional:  Positive for fatigue. Negative for chills and fever.   HENT:  Negative for ear pain and sore throat.    Eyes:  Negative for pain and visual disturbance.   Respiratory:  Negative for cough and shortness of breath.    Cardiovascular:  Negative for chest pain and palpitations.   Gastrointestinal:  Negative for abdominal " pain and vomiting.   Genitourinary:  Negative for dysuria and hematuria.   Musculoskeletal:  Negative for arthralgias and back pain.   Skin:  Negative for color change and rash.   Neurological:  Negative for seizures and syncope.   Psychiatric/Behavioral: Negative.     All other systems reviewed and are negative.      Objective   There were no vitals taken for this visit.     Physical Exam  Vitals and nursing note reviewed.   Constitutional:       General: He is not in acute distress.     Appearance: Normal appearance. He is well-developed.   HENT:      Head: Normocephalic and atraumatic.      Right Ear: Tympanic membrane normal.      Left Ear: Tympanic membrane normal.      Mouth/Throat:      Mouth: Mucous membranes are moist.      Pharynx: Oropharynx is clear.   Eyes:      Extraocular Movements: Extraocular movements intact.      Conjunctiva/sclera: Conjunctivae normal.      Pupils: Pupils are equal, round, and reactive to light.   Cardiovascular:      Rate and Rhythm: Normal rate and regular rhythm.      Heart sounds: Normal heart sounds. No murmur heard.     No friction rub.   Pulmonary:      Effort: Pulmonary effort is normal. No respiratory distress.      Breath sounds: Normal breath sounds.   Abdominal:      General: Bowel sounds are normal.      Palpations: Abdomen is soft.      Tenderness: There is no abdominal tenderness. There is no guarding or rebound.      Hernia: No hernia is present.   Musculoskeletal:         General: No swelling.      Cervical back: Neck supple.   Skin:     General: Skin is warm and dry.      Capillary Refill: Capillary refill takes less than 2 seconds.   Neurological:      General: No focal deficit present.      Mental Status: He is alert and oriented to person, place, and time.      Gait: Gait normal.   Psychiatric:         Mood and Affect: Mood normal.         Behavior: Behavior normal.         Thought Content: Thought content normal.

## 2025-02-28 NOTE — ASSESSMENT & PLAN NOTE
Lab Results   Component Value Date    HGBA1C 5.7 (H) 02/11/2025   Currently controlled.  Continue current treatment plan.

## 2025-03-04 ENCOUNTER — PATIENT OUTREACH (OUTPATIENT)
Dept: CASE MANAGEMENT | Facility: OTHER | Age: 70
End: 2025-03-04

## 2025-03-04 NOTE — LETTER
Date: 03/04/25    Dear Ean Young,   My name is Tracy; I am a registered nurse care manager working with [facility].    I have not been able to reach you and would like to set a time that I can talk with you over the phone.  My work is to help patients that have complex medical conditions get the care they need. This includes patients who may have been in the hospital or emergency room.     Please call me  at [phone] with any questions you may have. I look forward to hearing from  you.  Sincerely,  Tracy Chowdhury RN  Outpatient Care Manager

## 2025-03-04 NOTE — PROGRESS NOTES
Second attempt to contact patient for f/u post hospitalization.  Message left on vm.  Unable to reach letter sent.  Will close referral at this time.

## 2025-03-08 ENCOUNTER — RESULTS FOLLOW-UP (OUTPATIENT)
Age: 70
End: 2025-03-08

## 2025-03-11 ENCOUNTER — TELEPHONE (OUTPATIENT)
Age: 70
End: 2025-03-11

## 2025-03-11 NOTE — TELEPHONE ENCOUNTER
Patients GI provider:  Dr. Barrett    Number to return call: [phone]    Reason for call: Pt calling to schedule his capsule endoscopy for [place]. Pt is stating he is still bleeding with his bowel movement. Pt also is stating he is not sure if he can get a . Pt is asking for a call back on Thursday to get scheduled    Scheduled procedure/appointment date if applicable: Apt 8/20/25

## 2025-03-13 NOTE — TELEPHONE ENCOUNTER
I called & lvm for the patient to call the GI Office back to schedule a capsule endoscopy in the [facility].

## 2025-03-13 NOTE — TELEPHONE ENCOUNTER
Pt returning call to set up for capsule endo. Pt states he will be going for final treatment all next week, but would like a call at the end of next week to see if he can be scheduled the first two weeks of April. Pt will need [employer] transportation.

## 2025-03-14 NOTE — TELEPHONE ENCOUNTER
Pt returned call to schedule capsule endoscopy. Pt stated first 2 weeks of April he is available. Pt requesting a call after 2:30pm due to radiation treatments.

## 2025-03-14 NOTE — TELEPHONE ENCOUNTER
I called & lvm for the patient to call the [facility] Office back to schedule a capsule endoscopy in the [facility].

## 2025-03-14 NOTE — TELEPHONE ENCOUNTER
Patient returning call to schedule a capsule endoscopy. Patient is requesting a call back after 2:30pm, thank you.

## 2025-03-17 ENCOUNTER — OFFICE VISIT (OUTPATIENT)
Dept: CARDIOLOGY CLINIC | Facility: CLINIC | Age: 70
End: 2025-03-17
Payer: COMMERCIAL

## 2025-03-17 VITALS
OXYGEN SATURATION: 97 % | HEART RATE: 84 BPM | HEIGHT: 69 IN | BODY MASS INDEX: 36.29 KG/M2 | DIASTOLIC BLOOD PRESSURE: 58 MMHG | WEIGHT: 245 LBS | SYSTOLIC BLOOD PRESSURE: 112 MMHG | RESPIRATION RATE: 18 BRPM

## 2025-03-17 DIAGNOSIS — I10 ESSENTIAL HYPERTENSION: Primary | Chronic | ICD-10-CM

## 2025-03-17 DIAGNOSIS — I35.0 MODERATE AORTIC STENOSIS: ICD-10-CM

## 2025-03-17 DIAGNOSIS — K92.2 GI BLEED: ICD-10-CM

## 2025-03-17 DIAGNOSIS — K25.9 GASTRIC ULCER: ICD-10-CM

## 2025-03-17 DIAGNOSIS — G47.33 OBSTRUCTIVE SLEEP APNEA: Chronic | ICD-10-CM

## 2025-03-17 DIAGNOSIS — E78.5 DYSLIPIDEMIA: Chronic | ICD-10-CM

## 2025-03-17 DIAGNOSIS — E66.812 CLASS 2 SEVERE OBESITY DUE TO EXCESS CALORIES WITH SERIOUS COMORBIDITY AND BODY MASS INDEX (BMI) OF 37.0 TO 37.9 IN ADULT (HCC): Chronic | ICD-10-CM

## 2025-03-17 DIAGNOSIS — E66.01 CLASS 2 SEVERE OBESITY DUE TO EXCESS CALORIES WITH SERIOUS COMORBIDITY AND BODY MASS INDEX (BMI) OF 37.0 TO 37.9 IN ADULT (HCC): Chronic | ICD-10-CM

## 2025-03-17 PROCEDURE — 99214 OFFICE O/P EST MOD 30 MIN: CPT | Performed by: INTERNAL MEDICINE

## 2025-03-17 RX ORDER — HYDROCHLOROTHIAZIDE 12.5 MG/1
12.5 TABLET ORAL DAILY
Qty: 90 TABLET | Refills: 2 | Status: ON HOLD | OUTPATIENT
Start: 2025-03-17

## 2025-03-17 NOTE — ASSESSMENT & PLAN NOTE
-Counseled patient on dietary and lifestyle modifications  -Patient will continue to monitor on reduced medical therapy due to significant weight reduction  -Continue metoprolol succinate 12.5 mg daily and hydrochlorothiazide 12.5 mg daily.  -Will check BMP to monitor renal function and electrolytes on this.

## 2025-03-17 NOTE — ASSESSMENT & PLAN NOTE
-Counseled patient on dietary and lifestyle modifications along with weight reduction  -Patient will need to follow-up with sleep medicine team to determine feasibility of CPAP therapy given current clinical scenario with cancer and treatment options.

## 2025-03-17 NOTE — PROGRESS NOTES
Patient ID: Ean Young is a 69 y.o. male.        Plan:      Assessment & Plan  Essential hypertension  -Counseled patient on dietary and lifestyle modifications  -Patient will continue to monitor on reduced medical therapy due to significant weight reduction  -Continue metoprolol succinate 12.5 mg daily and hydrochlorothiazide 12.5 mg daily.  -Will check BMP to monitor renal function and electrolytes on this.  Moderate aortic stenosis  -Seen on echocardiographic imaging May 2024  -Follow-up repeat echocardiogram which is ordered and pending  -Continue metoprolol succinate 12.5 mg daily.  Obstructive sleep apnea  -Counseled patient on dietary and lifestyle modifications along with weight reduction  -Patient will need to follow-up with sleep medicine team to determine feasibility of CPAP therapy given current clinical scenario with cancer and treatment options.  Class 2 severe obesity due to excess calories with serious comorbidity and body mass index (BMI) of 37.0 to 37.9 in adult (HCC)  -Counseled on dietary and lifestyle modifications  -Continue to monitor  Dyslipidemia  -Counseled on dietary and lifestyle modifications  -Continue to monitor  -Continue atorvastatin 40 mg daily.      Follow up Plan/Other summary comments:  -Lipid panel 1/3/2025 showing total cholesterol 174, triglyceride 231, HDL 34, LDL 94  -Counseled patient on dietary and lifestyle modifications including following a low-salt, low-fat, heart healthy diet with sodium restriction to less than 1800 mg of sodium daily, DASH diet, NSAID avoidance  -Patient also counseled on need for weight reduction goal, less than 29  -Patient will continue atorvastatin 40 mg daily, metoprolol succinate 12.5 mg daily  -Patient notes his hydrochlorothiazide was recently restarted 12.5 mg daily and has been doing well with this  -Patient will monitor home blood pressure readings let our office know if significantly elevated greater than 130/80's MHG for up  titration of medical therapy  -Will follow-up previously ordered transthoracic echocardiogram to monitor aortic stenosis.  -Patient will be seen in 6 months or sooner if necessary  -Patient counseled if he were to have any warning or alarm type symptoms he is to seek emergency medical care immediately.    HPI:   -Patient is a 69-year-old male with hypertension, hyperlipidemia, obesity, obstructive sleep apnea compliant with CPAP therapy who initially presented to the office in December 2022 after referral from primary care physician for newly discovered murmur.  Patient had been seen by cardiology several years prior to that in [place] and has been recommended to undergo stress testing at that time but was unable to do so due to insurance.  Unfortunately patient was also eventually diagnosed with cancer and had been undergoing treatment and evaluation.  During hospitalization with upper GI bleed in February 2025 his antihypertensive regimen was reduced and he presents to office today for follow-up.  -Currently in the office today patient denies any chest pain, palpitations, lightness or dizziness, loss conscious, shortness of breath, lower extremity edema, orthopnea or bendopnea.  He is still dealing with his throat issues and is undergoing radiation therapy for this and is following up with GI team as well.    Most recent or relevant cardiac/vascular testing:    -Transthoracic echocardiogram 5/20/2024 showing left ventricular systolic function normal cemented LVEF 60% with grade 1 diastolic dysfunction, normal right ventricular systolic function, mildly dilated left atrium, mildly dilated right atrium, moderate aortic stenosis    -Nuclear stress test 12/30/2022 showing no diagnostic evidence of ischemia on perfusion imaging.    -ECG 2/9/2025 showing sinus rhythm heart rate 91 bpm.      Past Surgical History:   Procedure Laterality Date    EGD      EYE SURGERY      laser surgery    SHOULDER ARTHROSCOPY Right   "   TONGUE SURGERY  07/2024    TONSILLECTOMY           Review of Systems   Review of Systems   Constitutional:  Negative for chills, diaphoresis, fatigue and fever.   HENT:  Positive for trouble swallowing. Negative for sneezing and tinnitus.    Eyes:  Negative for pain and redness.   Respiratory:  Negative for shortness of breath and wheezing.    Cardiovascular:  Negative for chest pain, palpitations and leg swelling.   Gastrointestinal:  Negative for abdominal pain, blood in stool, constipation, diarrhea, nausea and vomiting.   Genitourinary:  Negative for dysuria.   Musculoskeletal:  Positive for arthralgias. Negative for neck pain and neck stiffness.   Skin:  Negative for rash.   Neurological:  Negative for dizziness, syncope, light-headedness and headaches.   Psychiatric/Behavioral:  Negative for agitation and confusion.    All other systems reviewed and are negative.         Objective:     /58 (BP Location: Left arm, Patient Position: Sitting, Cuff Size: Large)   Pulse 84   Resp 18   Ht 5' 9\" (1.753 m)   Wt 111 kg (245 lb)   SpO2 97%   BMI 36.18 kg/m²     PHYSICAL EXAM:  Physical Exam  Vitals reviewed.   Constitutional:       General: He is not in acute distress.     Appearance: He is obese. He is not diaphoretic.   HENT:      Head: Normocephalic and atraumatic.   Eyes:      General:         Right eye: No discharge.         Left eye: No discharge.   Neck:      Comments: Trachea midline, neck obese, difficult to assess JVD  Cardiovascular:      Rate and Rhythm: Normal rate and regular rhythm.      Heart sounds: Murmur (KAREN) heard.      No friction rub.   Pulmonary:      Effort: Pulmonary effort is normal. No respiratory distress.      Breath sounds: No wheezing.   Chest:      Chest wall: No tenderness.   Abdominal:      General: Bowel sounds are normal.      Palpations: Abdomen is soft.      Tenderness: There is no abdominal tenderness. There is no rebound.   Musculoskeletal:      Right lower leg: No " edema.      Left lower leg: No edema.   Skin:     General: Skin is warm and dry.   Neurological:      Mental Status: He is alert.      Comments: Awake, alert, able to answer questions appropriately.   Psychiatric:         Mood and Affect: Mood normal.         Behavior: Behavior normal.            Meds reviewed.  Current Outpatient Medications on File Prior to Visit   Medication Sig Dispense Refill    atorvastatin (LIPITOR) 40 mg tablet take 1 tablet by mouth once daily 90 tablet 1    azelastine (ASTELIN) 0.1 % nasal spray 2 sprays into each nostril 2 (two) times a day 1 mL 1    famotidine (PEPCID) 20 mg tablet Take 1 tablet (20 mg total) by mouth daily at bedtime 30 tablet 1    ferrous sulfate 325 (65 Fe) mg tablet Take 1 tablet (325 mg total) by mouth daily with breakfast 90 tablet 1    folic acid (FOLVITE) 1 mg tablet Take 1 tablet (1 mg total) by mouth daily 30 tablet 0    hydrOXYzine pamoate (VISTARIL) 25 mg capsule Take 1 capsule (25 mg total) by mouth 3 (three) times a day as needed for anxiety (And headache) 30 capsule 1    metoprolol succinate (TOPROL-XL) 25 mg 24 hr tablet Take 0.5 tablets (12.5 mg total) by mouth daily 15 tablet 0    Omega-3 Fatty Acids (Fish Oil) 1200 MG CAPS Take by mouth 2 (two) times a day      pantoprazole (PROTONIX) 40 mg tablet Take 1 tablet (40 mg total) by mouth 2 (two) times a day 60 tablet 1    tamsulosin (FLOMAX) 0.4 mg Take 1 capsule (0.4 mg total) by mouth daily with dinner 90 capsule 3    brimonidine tartrate 0.2 % ophthalmic solution INSTILL 1 DROP INTO EACH EYE TWICE DAILY      fluticasone (FLONASE) 50 mcg/act nasal spray 2 sprays into each nostril daily (Patient not taking: Reported on 3/17/2025) 16 g 5     No current facility-administered medications on file prior to visit.      Past Medical History:   Diagnosis Date    Cancer (HCC)     Dyslipidemia     GERD (gastroesophageal reflux disease)     HL (hearing loss)     Hypertension     Essential    Moderate aortic stenosis      Obstructive sleep apnea        Social History     Tobacco Use   Smoking Status Never    Passive exposure: Never   Smokeless Tobacco Never     Family History   Problem Relation Age of Onset    Heart disease Mother     Heart attack Mother     Prostate cancer Father     Parkinsonism Maternal Grandmother

## 2025-03-17 NOTE — ASSESSMENT & PLAN NOTE
-Counseled on dietary and lifestyle modifications  -Continue to monitor  -Continue atorvastatin 40 mg daily.

## 2025-03-17 NOTE — ASSESSMENT & PLAN NOTE
-Seen on echocardiographic imaging May 2024  -Follow-up repeat echocardiogram which is ordered and pending  -Continue metoprolol succinate 12.5 mg daily.

## 2025-03-18 RX ORDER — PANTOPRAZOLE SODIUM 40 MG/1
40 TABLET, DELAYED RELEASE ORAL 2 TIMES DAILY
Qty: 200 TABLET | Refills: 1 | Status: ON HOLD | OUTPATIENT
Start: 2025-03-18

## 2025-03-18 NOTE — TELEPHONE ENCOUNTER
"Spoke with pt 03/17/2025 and scheduled procedure for 04/07/2025.  Pt will need transportation.  Spoke with [facility], pt is oncology pt w/ [facility].  Alfonso at [facility] stated he believes [employer] should be able to handle.      Mailed ride share waiver with return envelope to patient's home address.  Attempted to call the patient, his number is \"not currently in service\".  Called his niece on as alternate and emergency number.  She will pass the message along that he is to complete and return the form asap.        "

## 2025-03-24 DIAGNOSIS — K92.2 UPPER GI BLEED: ICD-10-CM

## 2025-03-24 RX ORDER — FOLIC ACID 1 MG/1
1 TABLET ORAL DAILY
Qty: 30 TABLET | Refills: 0 | Status: ON HOLD | OUTPATIENT
Start: 2025-03-24

## 2025-03-24 NOTE — TELEPHONE ENCOUNTER
Patient requests PCP take over prescribing medication.      Medication: folic acid (FOLVITE) 1 mg tablet     Dose/Frequency: Take 1 tablet (1 mg total) by mouth daily     Quantity: 30 tablet     Pharmacy: [id number] - [place] [address]     Office:   [x] PCP/Provider -   [] Speciality/Provider -     Does the patient have enough for 3 days?   [x] Yes   [] No - Send as HP to POD     yes

## 2025-03-28 ENCOUNTER — HOSPITAL ENCOUNTER (INPATIENT)
Facility: HOSPITAL | Age: 70
LOS: 5 days | Discharge: HOME HEALTH | End: 2025-04-03
Attending: EMERGENCY MEDICINE | Admitting: HOSPITALIST
Payer: COMMERCIAL

## 2025-03-28 DIAGNOSIS — E87.6 HYPOKALEMIA: ICD-10-CM

## 2025-03-28 DIAGNOSIS — Z87.19 HISTORY OF GI BLEED: ICD-10-CM

## 2025-03-28 DIAGNOSIS — R26.2 AMBULATORY DYSFUNCTION: ICD-10-CM

## 2025-03-28 DIAGNOSIS — D64.9 SYMPTOMATIC ANEMIA: ICD-10-CM

## 2025-03-28 DIAGNOSIS — K92.1 MELENA: ICD-10-CM

## 2025-03-28 DIAGNOSIS — D50.0 CHRONIC BLOOD LOSS ANEMIA: ICD-10-CM

## 2025-03-28 DIAGNOSIS — I35.0 AORTIC STENOSIS: ICD-10-CM

## 2025-03-28 DIAGNOSIS — R42 LIGHTHEADEDNESS: Primary | ICD-10-CM

## 2025-03-28 DIAGNOSIS — E83.42 HYPOMAGNESEMIA: ICD-10-CM

## 2025-03-28 DIAGNOSIS — D64.9 ANEMIA: ICD-10-CM

## 2025-03-28 LAB
2HR DELTA HS TROPONIN: 1 NG/L
4HR DELTA HS TROPONIN: 4 NG/L
ALBUMIN SERPL BCG-MCNC: 3.9 G/DL (ref 3.5–5)
ALP SERPL-CCNC: 78 U/L (ref 34–104)
ALT SERPL W P-5'-P-CCNC: 7 U/L (ref 7–52)
ANION GAP SERPL CALCULATED.3IONS-SCNC: 14 MMOL/L (ref 4–13)
AST SERPL W P-5'-P-CCNC: 13 U/L (ref 13–39)
BASOPHILS # BLD AUTO: 0 THOUSANDS/ÂΜL (ref 0–0.1)
BASOPHILS NFR BLD AUTO: 0 % (ref 0–1)
BILIRUB SERPL-MCNC: 0.93 MG/DL (ref 0.2–1)
BILIRUB UR QL STRIP: NEGATIVE
BUN SERPL-MCNC: 12 MG/DL (ref 5–25)
CALCIUM SERPL-MCNC: 8.8 MG/DL (ref 8.4–10.2)
CARDIAC TROPONIN I PNL SERPL HS: 28 NG/L (ref ?–50)
CARDIAC TROPONIN I PNL SERPL HS: 29 NG/L (ref ?–50)
CARDIAC TROPONIN I PNL SERPL HS: 32 NG/L (ref ?–50)
CHLORIDE SERPL-SCNC: 92 MMOL/L (ref 96–108)
CLARITY UR: CLEAR
CO2 SERPL-SCNC: 31 MMOL/L (ref 21–32)
COLOR UR: COLORLESS
CREAT SERPL-MCNC: 0.71 MG/DL (ref 0.6–1.3)
EOSINOPHIL # BLD AUTO: 0 THOUSAND/ÂΜL (ref 0–0.61)
EOSINOPHIL NFR BLD AUTO: 0 % (ref 0–6)
ERYTHROCYTE [DISTWIDTH] IN BLOOD BY AUTOMATED COUNT: 18.5 % (ref 11.6–15.1)
GFR SERPL CREATININE-BSD FRML MDRD: 96 ML/MIN/1.73SQ M
GLUCOSE SERPL-MCNC: 103 MG/DL (ref 65–140)
GLUCOSE UR STRIP-MCNC: NEGATIVE MG/DL
HCT VFR BLD AUTO: 19.7 % (ref 36.5–49.3)
HCT VFR BLD AUTO: 21.6 % (ref 36.5–49.3)
HGB BLD-MCNC: 6.4 G/DL (ref 12–17)
HGB BLD-MCNC: 7.1 G/DL (ref 12–17)
HGB UR QL STRIP.AUTO: NEGATIVE
IMM GRANULOCYTES # BLD AUTO: 0.01 THOUSAND/UL (ref 0–0.2)
IMM GRANULOCYTES NFR BLD AUTO: 0 % (ref 0–2)
KETONES UR STRIP-MCNC: ABNORMAL MG/DL
LEUKOCYTE ESTERASE UR QL STRIP: NEGATIVE
LYMPHOCYTES # BLD AUTO: 0.35 THOUSANDS/ÂΜL (ref 0.6–4.47)
LYMPHOCYTES NFR BLD AUTO: 12 % (ref 14–44)
MAGNESIUM SERPL-MCNC: 1.2 MG/DL (ref 1.9–2.7)
MCH RBC QN AUTO: 32.3 PG (ref 26.8–34.3)
MCHC RBC AUTO-ENTMCNC: 32.9 G/DL (ref 31.4–37.4)
MCV RBC AUTO: 98 FL (ref 82–98)
MONOCYTES # BLD AUTO: 0.29 THOUSAND/ÂΜL (ref 0.17–1.22)
MONOCYTES NFR BLD AUTO: 10 % (ref 4–12)
NEUTROPHILS # BLD AUTO: 2.35 THOUSANDS/ÂΜL (ref 1.85–7.62)
NEUTS SEG NFR BLD AUTO: 78 % (ref 43–75)
NITRITE UR QL STRIP: NEGATIVE
NRBC BLD AUTO-RTO: 0 /100 WBCS
PH UR STRIP.AUTO: 6 [PH]
PHOSPHATE SERPL-MCNC: 3.3 MG/DL (ref 2.3–4.1)
PLATELET # BLD AUTO: 145 THOUSANDS/UL (ref 149–390)
PMV BLD AUTO: 9.8 FL (ref 8.9–12.7)
POTASSIUM SERPL-SCNC: 2.8 MMOL/L (ref 3.5–5.3)
PROT SERPL-MCNC: 5.8 G/DL (ref 6.4–8.4)
PROT UR STRIP-MCNC: NEGATIVE MG/DL
RBC # BLD AUTO: 2.2 MILLION/UL (ref 3.88–5.62)
SODIUM SERPL-SCNC: 137 MMOL/L (ref 135–147)
SP GR UR STRIP.AUTO: <1.005 (ref 1–1.03)
UROBILINOGEN UR STRIP-ACNC: <2 MG/DL
WBC # BLD AUTO: 3 THOUSAND/UL (ref 4.31–10.16)

## 2025-03-28 PROCEDURE — 84484 ASSAY OF TROPONIN QUANT: CPT | Performed by: EMERGENCY MEDICINE

## 2025-03-28 PROCEDURE — 94760 N-INVAS EAR/PLS OXIMETRY 1: CPT

## 2025-03-28 PROCEDURE — 84484 ASSAY OF TROPONIN QUANT: CPT | Performed by: PHYSICIAN ASSISTANT

## 2025-03-28 PROCEDURE — 96365 THER/PROPH/DIAG IV INF INIT: CPT

## 2025-03-28 PROCEDURE — 36415 COLL VENOUS BLD VENIPUNCTURE: CPT | Performed by: EMERGENCY MEDICINE

## 2025-03-28 PROCEDURE — 96367 TX/PROPH/DG ADDL SEQ IV INF: CPT

## 2025-03-28 PROCEDURE — 85014 HEMATOCRIT: CPT | Performed by: PHYSICIAN ASSISTANT

## 2025-03-28 PROCEDURE — 80053 COMPREHEN METABOLIC PANEL: CPT | Performed by: EMERGENCY MEDICINE

## 2025-03-28 PROCEDURE — 93005 ELECTROCARDIOGRAM TRACING: CPT

## 2025-03-28 PROCEDURE — 81003 URINALYSIS AUTO W/O SCOPE: CPT | Performed by: PHYSICIAN ASSISTANT

## 2025-03-28 PROCEDURE — 85025 COMPLETE CBC W/AUTO DIFF WBC: CPT | Performed by: EMERGENCY MEDICINE

## 2025-03-28 PROCEDURE — 99285 EMERGENCY DEPT VISIT HI MDM: CPT

## 2025-03-28 PROCEDURE — 99291 CRITICAL CARE FIRST HOUR: CPT | Performed by: EMERGENCY MEDICINE

## 2025-03-28 PROCEDURE — 85018 HEMOGLOBIN: CPT | Performed by: PHYSICIAN ASSISTANT

## 2025-03-28 PROCEDURE — 83735 ASSAY OF MAGNESIUM: CPT | Performed by: EMERGENCY MEDICINE

## 2025-03-28 PROCEDURE — 99222 1ST HOSP IP/OBS MODERATE 55: CPT | Performed by: PHYSICIAN ASSISTANT

## 2025-03-28 PROCEDURE — 84100 ASSAY OF PHOSPHORUS: CPT | Performed by: EMERGENCY MEDICINE

## 2025-03-28 PROCEDURE — 94660 CPAP INITIATION&MGMT: CPT

## 2025-03-28 RX ORDER — FOLIC ACID 1 MG/1
1 TABLET ORAL DAILY
Status: DISCONTINUED | OUTPATIENT
Start: 2025-03-29 | End: 2025-04-03 | Stop reason: HOSPADM

## 2025-03-28 RX ORDER — METOPROLOL SUCCINATE 25 MG/1
12.5 TABLET, EXTENDED RELEASE ORAL DAILY
Status: DISCONTINUED | OUTPATIENT
Start: 2025-03-29 | End: 2025-04-03 | Stop reason: HOSPADM

## 2025-03-28 RX ORDER — AZELASTINE 1 MG/ML
2 SPRAY, METERED NASAL 2 TIMES DAILY
Status: DISCONTINUED | OUTPATIENT
Start: 2025-03-28 | End: 2025-04-03 | Stop reason: HOSPADM

## 2025-03-28 RX ORDER — POTASSIUM CHLORIDE 20MEQ/15ML
40 LIQUID (ML) ORAL ONCE
Status: COMPLETED | OUTPATIENT
Start: 2025-03-28 | End: 2025-03-28

## 2025-03-28 RX ORDER — HYDROXYZINE HYDROCHLORIDE 25 MG/1
25 TABLET, FILM COATED ORAL 3 TIMES DAILY
Status: DISCONTINUED | OUTPATIENT
Start: 2025-03-28 | End: 2025-04-03 | Stop reason: HOSPADM

## 2025-03-28 RX ORDER — PANTOPRAZOLE SODIUM 40 MG/1
40 TABLET, DELAYED RELEASE ORAL EVERY 12 HOURS SCHEDULED
Status: DISCONTINUED | OUTPATIENT
Start: 2025-03-28 | End: 2025-03-29

## 2025-03-28 RX ORDER — ACETAMINOPHEN 325 MG/1
650 TABLET ORAL EVERY 6 HOURS PRN
Status: DISCONTINUED | OUTPATIENT
Start: 2025-03-28 | End: 2025-04-03 | Stop reason: HOSPADM

## 2025-03-28 RX ORDER — ONDANSETRON 2 MG/ML
4 INJECTION INTRAMUSCULAR; INTRAVENOUS EVERY 6 HOURS PRN
Status: DISCONTINUED | OUTPATIENT
Start: 2025-03-28 | End: 2025-04-03 | Stop reason: HOSPADM

## 2025-03-28 RX ORDER — FERROUS SULFATE 325(65) MG
325 TABLET ORAL
Status: DISCONTINUED | OUTPATIENT
Start: 2025-03-29 | End: 2025-04-03 | Stop reason: HOSPADM

## 2025-03-28 RX ORDER — BRIMONIDINE TARTRATE 2 MG/ML
1 SOLUTION/ DROPS OPHTHALMIC EVERY 12 HOURS SCHEDULED
Status: DISCONTINUED | OUTPATIENT
Start: 2025-03-28 | End: 2025-04-03 | Stop reason: HOSPADM

## 2025-03-28 RX ORDER — ATORVASTATIN CALCIUM 40 MG/1
40 TABLET, FILM COATED ORAL DAILY
Status: DISCONTINUED | OUTPATIENT
Start: 2025-03-29 | End: 2025-04-03 | Stop reason: HOSPADM

## 2025-03-28 RX ORDER — MAGNESIUM SULFATE HEPTAHYDRATE 40 MG/ML
4 INJECTION, SOLUTION INTRAVENOUS ONCE
Status: COMPLETED | OUTPATIENT
Start: 2025-03-28 | End: 2025-03-28

## 2025-03-28 RX ORDER — HYDROCHLOROTHIAZIDE 12.5 MG/1
12.5 TABLET ORAL DAILY
Status: DISCONTINUED | OUTPATIENT
Start: 2025-03-29 | End: 2025-04-03 | Stop reason: HOSPADM

## 2025-03-28 RX ORDER — CHLORAL HYDRATE 500 MG
1000 CAPSULE ORAL 2 TIMES DAILY
Status: DISCONTINUED | OUTPATIENT
Start: 2025-03-28 | End: 2025-04-03 | Stop reason: HOSPADM

## 2025-03-28 RX ORDER — POTASSIUM CHLORIDE 14.9 MG/ML
20 INJECTION INTRAVENOUS
Status: COMPLETED | OUTPATIENT
Start: 2025-03-28 | End: 2025-03-29

## 2025-03-28 RX ORDER — FAMOTIDINE 20 MG/1
20 TABLET, FILM COATED ORAL
Status: DISCONTINUED | OUTPATIENT
Start: 2025-03-28 | End: 2025-04-03 | Stop reason: HOSPADM

## 2025-03-28 RX ORDER — TAMSULOSIN HYDROCHLORIDE 0.4 MG/1
0.4 CAPSULE ORAL
Status: DISCONTINUED | OUTPATIENT
Start: 2025-03-29 | End: 2025-04-03 | Stop reason: HOSPADM

## 2025-03-28 RX ADMIN — POTASSIUM CHLORIDE 20 MEQ: 14.9 INJECTION, SOLUTION INTRAVENOUS at 20:53

## 2025-03-28 RX ADMIN — POTASSIUM CHLORIDE 20 MEQ: 14.9 INJECTION, SOLUTION INTRAVENOUS at 22:38

## 2025-03-28 RX ADMIN — SODIUM CHLORIDE, SODIUM LACTATE, POTASSIUM CHLORIDE, AND CALCIUM CHLORIDE 500 ML: .6; .31; .03; .02 INJECTION, SOLUTION INTRAVENOUS at 17:03

## 2025-03-28 RX ADMIN — FAMOTIDINE 20 MG: 20 TABLET, FILM COATED ORAL at 22:17

## 2025-03-28 RX ADMIN — MAGNESIUM SULFATE HEPTAHYDRATE 4 G: 40 INJECTION, SOLUTION INTRAVENOUS at 18:22

## 2025-03-28 RX ADMIN — POTASSIUM CHLORIDE 40 MEQ: 20 SOLUTION ORAL at 18:18

## 2025-03-28 RX ADMIN — HYDROXYZINE HYDROCHLORIDE 25 MG: 25 TABLET, FILM COATED ORAL at 22:16

## 2025-03-28 RX ADMIN — OMEGA-3 FATTY ACIDS CAP 1000 MG 1000 MG: 1000 CAP at 22:17

## 2025-03-28 RX ADMIN — ACETAMINOPHEN 650 MG: 325 TABLET, FILM COATED ORAL at 22:16

## 2025-03-28 NOTE — H&P
H&P - Hospitalist   Name: Ean Young 69 y.o. male I MRN: [id number]  Unit/Bed#: 403-01 I Date of Admission: 3/28/2025   Date of Service: 3/28/2025 I Hospital Day: 0     Assessment & Plan  Lightheadedness  Patient presented to the ER for evaluation of lightheadedness  Likely secondary to dehydration in the setting of chemotherapy and radiation treatments  Patient also reports decreased p.o. intake  Patient reports symptoms has been ongoing for least 5 days with with changing position (sitting to standing)  Patient denies syncope, chest pain, palpitation  Patient does have history of moderate aortic stenosis  Patient also with notable electrolyte abnormalities  --Admit on observation  --EKG monitoring due to electrolyte imbalances  --Orthostatic vital sign  --Replace electrolytes  --OT/PT eval  --Monitor hemodynamic status  --Am labs  --Supportive care   Hypokalemia  Potassium level at 2.8  --Potassium replaced in the ER  --Telemetry monitoring  --Monitor potassium levels  Pancytopenia (HCC)  History of pancytopenia  Likely related to patient's cancer diagnosis with chemotherapy and radiation  Patient denies episodes of fever  --Monitor hemoglobin level, WBC, RBC and platelets  --Monitor for new onset fever  --Check AM CBC  Hypomagnesemia  Magnesium levels at 1.2  --Magnesium replaced in the ER  --Monitor magnesium levels  Chronic blood loss anemia  Patient with history of GI bleed.  Follows with GI  Hemoglobin level at 7.1  No evidence of acute bleeding  --Continue iron supplement  --Check iron panel  --Check H&H  --Monitor hemoglobin level  --Transfusion with hemoglobin < 7.0  --Monitor hemodynamic status  Essential hypertension  Blood pressure is stable  --Continue PTA blood pressure medications  --Monitor blood pressure while admitted  Dyslipidemia  Stable  --Continue statin  Moderate aortic stenosis  History of aortic stenosis  --Continue current medication  --Will consider repeat ECHO   --Continue  outpatient cardiology consult  JM (obstructive sleep apnea)  History of JM  --Respiratory protocol  --Continue CPAP at bedtime      VTE Pharmacologic Prophylaxis:   Moderate Risk (Score 3-4) - Pharmacological DVT Prophylaxis Contraindicated. Sequential Compression Devices Ordered.  Code Status: Level 1 - Full Code   Discussion with family: Patient declined call to .     Anticipated Length of Stay: Patient will be admitted on an observation basis with an anticipated length of stay of less than 2 midnights secondary to lightheadedness, hypomagnesemia, hypokalemia, pancytopenia.    History of Present Illness   Chief Complaint: Lightheadedness    Ean Young is a 69 y.o. male with a PMH of hypertension, hyperlipidemia, GERD aortic stenosis, JM, head and neck cancer who presents with to the emergency room for evaluation of complaint of lightheadedness over the past few days getting progressively worse.  Patient reports that his symptoms is worse with transitioning from a sitting to standing position.  Patient denies having any syncope, nausea, vomiting, diarrhea, abdominal pain.  Patient also denies having any chest pain, shortness of breath, fever, chills, palpitations.  Patient does have a history of GI bleed but no evidence of current bleeding.  He states that he has been having dark-colored stools since started taking iron supplements.    Workup in the emergency room included labs significant for potassium of 2.8, chloride of 92, anion gap of 14, magnesium of 1.2, 0-hour troponin of 28, 12-hour troponin of 29, WBC of 3.00, RBC of 2.20, hemoglobin of 7.1, platelets of 145, UA negative for acute UTI.  EG shows a sinus rhythm rate of 80 bpm with possible left atrial enlargement.  While in the emergency room patient received potassium chloride 20 mEq IV, lactated Ringer's 500 mL, magnesium sulfate 4 g IV, potassium chloride 40 mEq p.o.    Patient is being admitted on an inpatient status [facility]  level care for further management of acute lightheadedness, hypomagnesemia, hypokalemia, pancytopenia.    Review of Systems   Constitutional:  Positive for activity change. Negative for appetite change, chills, fatigue and fever.   Eyes:  Negative for photophobia and visual disturbance.   Respiratory:  Negative for cough, chest tightness, shortness of breath, wheezing and stridor.    Cardiovascular:  Negative for chest pain, palpitations and leg swelling.   Gastrointestinal:  Negative for abdominal pain, blood in stool, diarrhea, nausea and vomiting.   Genitourinary:  Negative for decreased urine volume, difficulty urinating, dysuria, flank pain, hematuria and urgency.   Musculoskeletal:  Negative for arthralgias, back pain, gait problem, neck pain and neck stiffness.   Skin:  Negative for color change, rash and wound.   Neurological:  Positive for dizziness and light-headedness. Negative for tremors, syncope, weakness and headaches.   Psychiatric/Behavioral:  Negative for agitation and confusion. The patient is not nervous/anxious.        Historical Information   Past Medical History:   Diagnosis Date    Cancer (HCC)     Dyslipidemia     GERD (gastroesophageal reflux disease)     HL (hearing loss)     Hypertension     Essential    Moderate aortic stenosis     Obstructive sleep apnea      Past Surgical History:   Procedure Laterality Date    EGD      EYE SURGERY      laser surgery    SHOULDER ARTHROSCOPY Right     TONGUE SURGERY  07/2024    TONSILLECTOMY       Social History     Tobacco Use    Smoking status: Never     Passive exposure: Never    Smokeless tobacco: Never   Vaping Use    Vaping status: Never Used   Substance and Sexual Activity    Alcohol use: Yes     Comment: rare    Drug use: Never    Sexual activity: Not Currently     E-Cigarette/Vaping    E-Cigarette Use Never User      E-Cigarette/Vaping Substances    Nicotine No     THC No     CBD No     Flavoring No      Family History   Problem Relation Age of  Onset    Heart disease Mother     Heart attack Mother     Prostate cancer Father     Parkinsonism Maternal Grandmother      Social History:  Marital Status: Single   Occupation: Retired  Patient Pre-hospital Living Situation: Home  Patient Pre-hospital Level of Mobility: walks  Patient Pre-hospital Diet Restrictions: None reported    Meds/Allergies   I have reviewed home medications using recent Epic encounter.  Prior to Admission medications    Medication Sig Start Date End Date Taking? Authorizing Provider   atorvastatin (LIPITOR) 40 mg tablet take 1 tablet by mouth once daily 2/19/25   Benjamin Tsang DO   azelastine (ASTELIN) 0.1 % nasal spray 2 sprays into each nostril 2 (two) times a day 8/9/24   Timmy Chapman DO   brimonidine tartrate 0.2 % ophthalmic solution INSTILL 1 DROP INTO EACH EYE TWICE DAILY 2/19/23   Historical Provider, MD   famotidine (PEPCID) 20 mg tablet Take 1 tablet (20 mg total) by mouth daily at bedtime 2/7/25   ESTELA Hwang   ferrous sulfate 325 (65 Fe) mg tablet Take 1 tablet (325 mg total) by mouth daily with breakfast 2/28/25   Timmy Chapman DO   fluticasone (FLONASE) 50 mcg/act nasal spray 2 sprays into each nostril daily  Patient not taking: Reported on 3/17/2025 11/11/24   Timmy Chapman DO   folic acid (FOLVITE) 1 mg tablet Take 1 tablet (1 mg total) by mouth daily 3/24/25   Timmy Chapman DO   hydroCHLOROthiazide 12.5 mg tablet Take 1 tablet (12.5 mg total) by mouth daily 3/17/25   Benjamin Tsang DO   hydrOXYzine pamoate (VISTARIL) 25 mg capsule Take 1 capsule (25 mg total) by mouth 3 (three) times a day as needed for anxiety (And headache) 1/2/25   Timmy Chapman DO   metoprolol succinate (TOPROL-XL) 25 mg 24 hr tablet Take 0.5 tablets (12.5 mg total) by mouth daily 2/6/25   My [facility],    Omega-3 Fatty Acids (Fish Oil) 1200 MG CAPS Take by mouth 2 (two) times a day    Historical Provider, MD   pantoprazole (PROTONIX) 40 mg tablet take 1 tablet by mouth  twice a day 3/18/25   ESTELA Hwang   tamsulosin (FLOMAX) 0.4 mg Take 1 capsule (0.4 mg total) by mouth daily with dinner 10/22/24   ESTELA Chan     Allergies   Allergen Reactions    Pollen Extract Other (See Comments)       Objective :  Temp:  [97.5 °F (36.4 °C)-97.8 °F (36.6 °C)] 97.7 °F (36.5 °C)  HR:  [] 76  BP: ()/(56-66) 89/56  Resp:  [18] 18  SpO2:  [88 %-99 %] 99 %  O2 Device: None (Room air)    Physical Exam  Constitutional:       General: He is not in acute distress.     Appearance: He is not ill-appearing.   HENT:      Head: Normocephalic and atraumatic.      Nose: Nose normal.      Mouth/Throat:      Mouth: Mucous membranes are moist.      Pharynx: Oropharynx is clear.   Eyes:      General:         Right eye: No discharge.         Left eye: No discharge.      Pupils: Pupils are equal, round, and reactive to light.   Cardiovascular:      Rate and Rhythm: Normal rate and regular rhythm.      Pulses: Normal pulses.      Heart sounds: Murmur heard.   Pulmonary:      Effort: No respiratory distress.      Breath sounds: No stridor. No wheezing, rhonchi or rales.   Abdominal:      General: There is no distension.      Palpations: Abdomen is soft. There is no mass.      Tenderness: There is no abdominal tenderness.   Musculoskeletal:         General: No swelling or tenderness.      Cervical back: Normal range of motion and neck supple.      Right lower leg: No edema.      Left lower leg: No edema.   Skin:     General: Skin is warm and dry.      Capillary Refill: Capillary refill takes less than 2 seconds.      Coloration: Skin is not jaundiced or pale.      Findings: No erythema or lesion.   Neurological:      Mental Status: He is alert and oriented to person, place, and time.   Psychiatric:         Mood and Affect: Mood normal.          Lines/Drains:            Lab Results: I have reviewed the following results:  Results from last 7 days   Lab Units 03/28/25  1702   WBC Thousand/uL 3.00*    HEMOGLOBIN g/dL 7.1*   HEMATOCRIT % 21.6*   PLATELETS Thousands/uL 145*   SEGS PCT % 78*   LYMPHO PCT % 12*   MONO PCT % 10   EOS PCT % 0     Results from last 7 days   Lab Units 03/28/25  1702   SODIUM mmol/L 137   POTASSIUM mmol/L 2.8*   CHLORIDE mmol/L 92*   CO2 mmol/L 31   BUN mg/dL 12   CREATININE mg/dL 0.71   ANION GAP mmol/L 14*   CALCIUM mg/dL 8.8   ALBUMIN g/dL 3.9   TOTAL BILIRUBIN mg/dL 0.93   ALK PHOS U/L 78   ALT U/L 7   AST U/L 13   GLUCOSE RANDOM mg/dL 103             Lab Results   Component Value Date    HGBA1C 5.7 (H) 02/11/2025    HGBA1C 6.5 11/11/2024    HGBA1C 7.0 (A) 08/09/2024           Imaging Results Review: No pertinent imaging studies reviewed.  Other Study Results Review: EKG was reviewed.     Administrative Statements   I have spent a total time of 40 minutes in caring for this patient on the day of the visit/encounter including Documenting in the medical record, Reviewing/placing orders in the medical record (including tests, medications, and/or procedures), Obtaining or reviewing history  , and Communicating with other healthcare professionals .    ** Please Note: This note has been constructed using a voice recognition system. **

## 2025-03-28 NOTE — ASSESSMENT & PLAN NOTE
Patient with history of GI bleed.  Follows with GI  Hemoglobin level at 7.1  No evidence of acute bleeding  --Continue iron supplement  --Check iron panel  --Check H&H  --Monitor hemoglobin level  --Transfusion with hemoglobin < 7.0  --Monitor hemodynamic status

## 2025-03-28 NOTE — ASSESSMENT & PLAN NOTE
History of aortic stenosis  --Continue current medication  --Will consider repeat ECHO   --Continue outpatient cardiology consult

## 2025-03-28 NOTE — ED PROVIDER NOTES
Time reflects when diagnosis was documented in both MDM as applicable and the Disposition within this note       Time User Action Codes Description Comment    3/28/2025  7:06 PM Dilip Fitzpatrick [R42] Lightheadedness     3/28/2025  7:06 PM Dilip Fitzpatrick [E87.6] Hypokalemia     3/28/2025  7:06 PM Dilip Fitzpatrick [E83.42] Hypomagnesemia     3/28/2025  7:06 PM Dilip Fitzpatrick [I35.0] Aortic stenosis     3/28/2025  7:06 PM Dilip Fitzpatrick [D64.9] Anemia     3/28/2025  7:07 PM Dilip Fitzpatrick [Z87.19] History of GI bleed           ED Disposition       ED Disposition   Admit    Condition   Stable    Date/Time   Fri Mar 28, 2025  7:05 PM    Comment   Case was discussed with ALLYSSA and the patient's admission status was agreed to be Admission Status: observation status to the service of Dr. Montano .               Assessment & Plan       Medical Decision Making  69-year-old male presents for evaluation of lightheadedness. 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 Patient states over the past few days he has been feeling lightheaded when transitioning from a seated to a standing position.  Patient has been tolerating p.o. intake but decreased due to a lack of taste and fatigue. He does however feel slightly dehydrated.  History of GI bleed but no active bleeding at this time.  Patient does take iron supplementation and states does have dark-colored stools. Patient denies any other associated symptoms.  No chest pain, shortness of breath, nausea or vomiting, dizziness or diaphoresis.  No acute focal neurological deficits.  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  No recent  illness.  No fever or chills.  No palpitations.  No diarrhea.  No abdominal pain.  No urinary symptoms or flank pain.  On exam, the patient is overall very well-appearing and not in acute distress.      Check basic labs, symptom management, IV fluids as indicated, EKG, and disposition as appropriate.    Amount and/or Complexity of Data Reviewed  Labs: ordered. Decision-making details documented in ED Course.  ECG/medicine tests: ordered and independent interpretation performed. Decision-making details documented in ED Course.    Risk  Prescription drug management.    Critical Care Time Statement: Upon my evaluation, this patient had a high probability of imminent or life-threatening deterioration due to hypokalemia, hypomagnesemia, anemia, dehydration, HX aortic stenosis, which required my direct attention, intervention, and personal management.  I spent a total of 33 minutes directly providing critical care services, including interpretation of complex medical databases, evaluating for the presence of life-threatening injuries or illnesses, management of organ system failure(s) , complex medical decision making (to support/prevent further life-threatening deterioration)., interpretation of hemodynamic data, titration of continuous IV medications (drips), managing enteral or parenteral nutritional support, and electrolyte replacement . This time is exclusive of procedures, teaching, treating other patients, family meetings, and any prior time recorded by providers other than myself.            Medications   magnesium sulfate 4 g/100 mL IVPB (premix) 4 g (4 g Intravenous New Bag 3/28/25 1822)   potassium chloride 20 mEq IVPB (premix) (has no administration in time range)   lactated ringers bolus 500 mL (0 mL Intravenous Stopped 3/28/25 1819)   potassium chloride oral solution 40 mEq (40 mEq Oral Given 3/28/25 1818)       ED Risk Strat Scores                            SBIRT 22yo+      Flowsheet Row Most Recent Value    Initial Alcohol Screen: US AUDIT-C     1. How often do you have a drink containing alcohol? 0 Filed at: 03/28/2025 1647   2. How many drinks containing alcohol do you have on a typical day you are drinking?  0 Filed at: 03/28/2025 1647   3a. Male UNDER 65: How often do you have five or more drinks on one occasion? 0 Filed at: 03/28/2025 1647   3b. FEMALE Any Age, or MALE 65+: How often do you have 4 or more drinks on one occassion? 0 Filed at: 03/28/2025 1647   Audit-C Score 0 Filed at: 03/28/2025 1647   SANTA: How many times in the past year have you...    Used an illegal drug or used a prescription medication for non-medical reasons? Never Filed at: 03/28/2025 1647                            History of Present Illness       Chief Complaint   Patient presents with    Dizziness     Pt states that he started with dizziness about a week ago that got worse today. Denies any chest pain. Pt is a cancer pt with his last chemo treatment being last week.        Past Medical History:   Diagnosis Date    Cancer (HCC)     Dyslipidemia     GERD (gastroesophageal reflux disease)     HL (hearing loss)     Hypertension     Essential    Moderate aortic stenosis     Obstructive sleep apnea       Past Surgical History:   Procedure Laterality Date    EGD      EYE SURGERY      laser surgery    SHOULDER ARTHROSCOPY Right     TONGUE SURGERY  07/2024    TONSILLECTOMY        Family History   Problem Relation Age of Onset    Heart disease Mother     Heart attack Mother     Prostate cancer Father     Parkinsonism Maternal Grandmother       Social History     Tobacco Use    Smoking status: Never     Passive exposure: Never    Smokeless tobacco: Never   Vaping Use    Vaping status: Never Used   Substance Use Topics    Alcohol use: Yes     Comment: rare    Drug use: Never      E-Cigarette/Vaping    E-Cigarette Use Never User       E-Cigarette/Vaping Substances    Nicotine No     THC No     CBD No     Flavoring No       I have reviewed and  agree with the history as documented.     69-year-old male presents for evaluation of lightheadedness. 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 Patient states over the past few days he has been feeling lightheaded when transitioning from a seated to a standing position.  Patient has been tolerating p.o. intake but decreased due to a lack of taste and fatigue. He does however feel slightly dehydrated.  History of GI bleed but no active bleeding at this time.  Patient does take iron supplementation and states does have dark-colored stools. Patient denies any other associated symptoms.  No chest pain, shortness of breath, nausea or vomiting, dizziness or diaphoresis.  No acute focal neurological deficits.  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  No recent illness.  No fever or chills.  No palpitations.  No diarrhea.  No abdominal pain.  No urinary symptoms or flank pain.  On exam, the patient is overall very well-appearing and not in acute distress.      Dizziness  Associated symptoms: no blood in stool, no chest pain, no diarrhea, no headaches, no nausea, no palpitations, no shortness of breath, no vomiting and no weakness        Review of Systems   Constitutional:  Negative for activity change, appetite change, chills, diaphoresis, fatigue and fever.   HENT:  Negative for dental problem, ear pain, sore throat, trouble swallowing and voice change.    Eyes:  Negative for pain and visual disturbance.   Respiratory:  Negative for cough, chest tightness, shortness of breath and wheezing.    Cardiovascular:  Negative for chest pain, palpitations and leg swelling.   Gastrointestinal:  Negative for  abdominal pain, anal bleeding, blood in stool, diarrhea, nausea, rectal pain and vomiting.   Endocrine: Negative for polydipsia, polyphagia and polyuria.   Genitourinary:  Negative for difficulty urinating, dysuria, flank pain, frequency, hematuria and urgency.   Musculoskeletal:  Negative for back pain, joint swelling, myalgias, neck pain and neck stiffness.   Skin:  Negative for pallor, rash and wound.   Neurological:  Positive for light-headedness. Negative for dizziness, facial asymmetry, speech difficulty, weakness, numbness and headaches.   Hematological:  Negative for adenopathy.   Psychiatric/Behavioral:  Negative for agitation. The patient is not nervous/anxious.    All other systems reviewed and are negative.          Objective       ED Triage Vitals [03/28/25 1645]   Temperature Pulse Blood Pressure Respirations SpO2 Patient Position - Orthostatic VS   97.5 °F (36.4 °C) 100 102/63 18 97 % Sitting      Temp Source Heart Rate Source BP Location FiO2 (%) Pain Score    Temporal Monitor Left arm -- No Pain      Vitals      Date and Time Temp Pulse SpO2 Resp BP Pain Score FACES Pain Rating User   03/28/25 1900 -- 79 92 % 18 106/57 -- -- AB   03/28/25 1800 97.8 °F (36.6 °C) 80 91 % 18 99/57 No Pain -- KTR   03/28/25 1711 -- -- -- -- -- No Pain -- KTR   03/28/25 1645 97.5 °F (36.4 °C) 100 97 % 18 102/63 No Pain -- AB            Physical Exam  Vitals and nursing note reviewed.   Constitutional:       General: He is not in acute distress.     Appearance: He is well-developed. He is not ill-appearing, toxic-appearing or diaphoretic.   HENT:      Head: Normocephalic and atraumatic.      Right Ear: External ear normal.      Left Ear: External ear normal.      Nose: Nose normal. No congestion or rhinorrhea.      Mouth/Throat:      Mouth: Mucous membranes are dry.      Pharynx: No oropharyngeal exudate or posterior oropharyngeal erythema.   Eyes:      General: No visual field deficit or scleral icterus.        Right  eye: No discharge.         Left eye: No discharge.      Extraocular Movements: Extraocular movements intact.      Conjunctiva/sclera: Conjunctivae normal.      Pupils: Pupils are equal, round, and reactive to light.   Neck:      Thyroid: No thyromegaly.      Vascular: No carotid bruit or JVD.      Trachea: Trachea and phonation normal. No tracheal deviation.      Meningeal: Brudzinski's sign and Kernig's sign absent.   Cardiovascular:      Rate and Rhythm: Normal rate and regular rhythm.      Pulses: Normal pulses.      Heart sounds: S1 normal and S2 normal. Murmur heard.      Crescendo decrescendo systolic murmur is present with a grade of 4/6.      No friction rub. No gallop.   Pulmonary:      Effort: Pulmonary effort is normal. No accessory muscle usage, respiratory distress or retractions.      Breath sounds: Normal breath sounds and air entry. No stridor or decreased air movement. No decreased breath sounds, wheezing, rhonchi or rales.   Chest:      Chest wall: No tenderness.   Abdominal:      General: There is no distension.      Palpations: Abdomen is soft. There is no mass.      Tenderness: There is no abdominal tenderness. There is no guarding or rebound.      Hernia: No hernia is present.   Musculoskeletal:         General: No swelling, tenderness or deformity. Normal range of motion.      Cervical back: Full passive range of motion without pain, normal range of motion and neck supple. No rigidity or tenderness.      Right lower leg: No edema.      Left lower leg: No edema.   Lymphadenopathy:      Cervical: No cervical adenopathy.   Skin:     General: Skin is warm and dry.      Capillary Refill: Capillary refill takes less than 2 seconds.      Coloration: Skin is not pale.      Findings: No erythema or rash.   Neurological:      General: No focal deficit present.      Mental Status: He is alert and oriented to person, place, and time.      GCS: GCS eye subscore is 4. GCS verbal subscore is 5. GCS motor  subscore is 6.      Cranial Nerves: Facial asymmetry present. No cranial nerve deficit or dysarthria.      Sensory: Sensation is intact. No sensory deficit.      Motor: Motor function is intact. No weakness, tremor, atrophy, abnormal muscle tone, seizure activity or pronator drift.      Coordination: Coordination is intact. Coordination normal. Finger-Nose-Finger Test and Heel to Shin Test normal.      Gait: Gait is intact.      Deep Tendon Reflexes: Reflexes are normal and symmetric. Reflexes normal.      Comments: Right facial droop- chronic, Hx of mumps as a child   Psychiatric:         Behavior: Behavior normal. Behavior is cooperative.         Results Reviewed       Procedure Component Value Units Date/Time    HS Troponin I 2hr [[id number]] Collected: 03/28/25 1913    Lab Status: No result Specimen: Blood from Arm, Right     HS Troponin I 4hr [[id number]]     Lab Status: No result Specimen: Blood     HS Troponin 0hr (reflex protocol) [[id number]]  (Normal) Collected: 03/28/25 1702    Lab Status: Final result Specimen: Blood from Arm, Right Updated: 03/28/25 1739     hs TnI 0hr 28 ng/L     Comprehensive metabolic panel [[id number]]  (Abnormal) Collected: 03/28/25 1702    Lab Status: Final result Specimen: Blood from Arm, Right Updated: 03/28/25 1733     Sodium 137 mmol/L      Potassium 2.8 mmol/L      Chloride 92 mmol/L      CO2 31 mmol/L      ANION GAP 14 mmol/L      BUN 12 mg/dL      Creatinine 0.71 mg/dL      Glucose 103 mg/dL      Calcium 8.8 mg/dL      AST 13 U/L      ALT 7 U/L      Alkaline Phosphatase 78 U/L      Total Protein 5.8 g/dL      Albumin 3.9 g/dL      Total Bilirubin 0.93 mg/dL      eGFR 96 ml/min/1.73sq m     Narrative:      [employer] guidelines for Chronic Kidney Disease (CKD):     Stage 1 with normal or high GFR (GFR > 90 mL/min/1.73 square meters)    Stage 2 Mild CKD (GFR = 60-89 mL/min/1.73 square meters)    Stage 3A Moderate CKD (GFR = 45-59 mL/min/1.73 square  meters)    Stage 3B Moderate CKD (GFR = 30-44 mL/min/1.73 square meters)    Stage 4 Severe CKD (GFR = 15-29 mL/min/1.73 square meters)    Stage 5 End Stage CKD (GFR <15 mL/min/1.73 square meters)  Note: GFR calculation is accurate only with a steady state creatinine    Phosphorus [[id number]]  (Normal) Collected: 03/28/25 1702    Lab Status: Final result Specimen: Blood from Arm, Right Updated: 03/28/25 1733     Phosphorus 3.3 mg/dL     Magnesium [[id number]]  (Abnormal) Collected: 03/28/25 1702    Lab Status: Final result Specimen: Blood from Arm, Right Updated: 03/28/25 1733     Magnesium 1.2 mg/dL     CBC and differential [[id number]]  (Abnormal) Collected: 03/28/25 1702    Lab Status: Final result Specimen: Blood from Arm, Right Updated: 03/28/25 1716     WBC 3.00 Thousand/uL      RBC 2.20 Million/uL      Hemoglobin 7.1 g/dL      Hematocrit 21.6 %      MCV 98 fL      MCH 32.3 pg      MCHC 32.9 g/dL      RDW 18.5 %      MPV 9.8 fL      Platelets 145 Thousands/uL      nRBC 0 /100 WBCs      Segmented % 78 %      Immature Grans % 0 %      Lymphocytes % 12 %      Monocytes % 10 %      Eosinophils Relative 0 %      Basophils Relative 0 %      Absolute Neutrophils 2.35 Thousands/µL      Absolute Immature Grans 0.01 Thousand/uL      Absolute Lymphocytes 0.35 Thousands/µL      Absolute Monocytes 0.29 Thousand/µL      Eosinophils Absolute 0.00 Thousand/µL      Basophils Absolute 0.00 Thousands/µL     UA w Reflex to Microscopic w Reflex to Culture [[id number]]     Lab Status: No result Specimen: Urine             No orders to display       ECG 12 Lead Documentation Only    Date/Time: 3/28/2025 4:49 PM    Performed by: Dilip Fitzpatrick DO  Authorized by: Dilip Fitzpatrick DO    Indications / Diagnosis:  Lightheaded  ECG reviewed by me, the ED Provider: yes    Patient location:  ED  Previous ECG:     Previous ECG:  Compared to current    Comparison ECG info:  2/9/25    Similarity:  No change    Comparison to cardiac  monitor: Yes    Interpretation:     Interpretation: normal    Rate:     ECG rate:  80    ECG rate assessment: normal    Rhythm:     Rhythm: sinus rhythm    Ectopy:     Ectopy: none    QRS:     QRS axis:  Normal    QRS intervals:  Normal  Conduction:     Conduction: normal    ST segments:     ST segments:  Normal  T waves:     T waves: normal    Other findings:     Other findings: prolonged qTc interval        ED Medication and Procedure Management   Prior to Admission Medications   Prescriptions Last Dose Informant Patient Reported? Taking?   Omega-3 Fatty Acids (Fish Oil) 1200 MG CAPS  Self Yes No   Sig: Take by mouth 2 (two) times a day   atorvastatin (LIPITOR) 40 mg tablet   No No   Sig: take 1 tablet by mouth once daily   azelastine (ASTELIN) 0.1 % nasal spray  Self No No   Si sprays into each nostril 2 (two) times a day   brimonidine tartrate 0.2 % ophthalmic solution  Self Yes No   Sig: INSTILL 1 DROP INTO EACH EYE TWICE DAILY   famotidine (PEPCID) 20 mg tablet   No No   Sig: Take 1 tablet (20 mg total) by mouth daily at bedtime   ferrous sulfate 325 (65 Fe) mg tablet   No No   Sig: Take 1 tablet (325 mg total) by mouth daily with breakfast   fluticasone (FLONASE) 50 mcg/act nasal spray   No No   Si sprays into each nostril daily   Patient not taking: Reported on 3/17/2025   folic acid (FOLVITE) 1 mg tablet   No No   Sig: Take 1 tablet (1 mg total) by mouth daily   hydrOXYzine pamoate (VISTARIL) 25 mg capsule   No No   Sig: Take 1 capsule (25 mg total) by mouth 3 (three) times a day as needed for anxiety (And headache)   hydroCHLOROthiazide 12.5 mg tablet   No No   Sig: Take 1 tablet (12.5 mg total) by mouth daily   metoprolol succinate (TOPROL-XL) 25 mg 24 hr tablet   No No   Sig: Take 0.5 tablets (12.5 mg total) by mouth daily   pantoprazole (PROTONIX) 40 mg tablet   No No   Sig: take 1 tablet by mouth twice a day   tamsulosin (FLOMAX) 0.4 mg   No No   Sig: Take 1 capsule (0.4 mg total) by mouth  daily with dinner      Facility-Administered Medications: None     Patient's Medications   Discharge Prescriptions    No medications on file     No discharge procedures on file.  ED SEPSIS DOCUMENTATION   Time reflects when diagnosis was documented in both MDM as applicable and the Disposition within this note       Time User Action Codes Description Comment    3/28/2025  7:06 PM Dilip Fitzpatrick [R42] Lightheadedness     3/28/2025  7:06 PM Dilip Fitzpatrick [E87.6] Hypokalemia     3/28/2025  7:06 PM Dilip Fitzpatrick [E83.42] Hypomagnesemia     3/28/2025  7:06 PM Dilip Fitzpatrick [I35.0] Aortic stenosis     3/28/2025  7:06 PM Dilip Fitzpatrick [D64.9] Anemia     3/28/2025  7:07 PM Dilip Fitzpatrick [Z87.19] History of GI bleed                  Dilip Fitzpatrick,   03/28/25 1915

## 2025-03-28 NOTE — ASSESSMENT & PLAN NOTE
History of pancytopenia  Likely related to patient's cancer diagnosis with chemotherapy and radiation  Patient denies episodes of fever  --Monitor hemoglobin level, WBC, RBC and platelets  --Monitor for new onset fever  --Check AM CBC

## 2025-03-28 NOTE — ASSESSMENT & PLAN NOTE
Potassium level at 2.8  --Potassium replaced in the ER  --Telemetry monitoring  --Monitor potassium levels

## 2025-03-28 NOTE — ASSESSMENT & PLAN NOTE
Patient presented to the ER for evaluation of lightheadedness  Likely secondary to dehydration in the setting of chemotherapy and radiation treatments  Patient also reports decreased p.o. intake  Patient reports symptoms has been ongoing for least 5 days with with changing position (sitting to standing)  Patient denies syncope, chest pain, palpitation  Patient does have history of moderate aortic stenosis  Patient also with notable electrolyte abnormalities  --Admit on observation  --EKG monitoring due to electrolyte imbalances  --Orthostatic vital sign  --Replace electrolytes  --OT/PT eval  --Monitor hemodynamic status  --Am labs  --Supportive care

## 2025-03-28 NOTE — ASSESSMENT & PLAN NOTE
Blood pressure is stable  --Continue PTA blood pressure medications  --Monitor blood pressure while admitted

## 2025-03-29 ENCOUNTER — LAB REQUISITION (OUTPATIENT)
Dept: LAB | Facility: HOSPITAL | Age: 70
End: 2025-03-29
Payer: COMMERCIAL

## 2025-03-29 DIAGNOSIS — D50.0 IRON DEFICIENCY ANEMIA SECONDARY TO BLOOD LOSS (CHRONIC): ICD-10-CM

## 2025-03-29 PROBLEM — D64.9 SYMPTOMATIC ANEMIA: Status: ACTIVE | Noted: 2025-03-29

## 2025-03-29 LAB
ABO GROUP BLD: NORMAL
ABO GROUP BLD: NORMAL
ANION GAP SERPL CALCULATED.3IONS-SCNC: 9 MMOL/L (ref 4–13)
BASOPHILS # BLD AUTO: 0.01 THOUSANDS/ÂΜL (ref 0–0.1)
BASOPHILS NFR BLD AUTO: 1 % (ref 0–1)
BLD GP AB SCN SERPL QL: NEGATIVE
BLD GP AB SCN SERPL QL: NEGATIVE
BUN SERPL-MCNC: 9 MG/DL (ref 5–25)
CALCIUM SERPL-MCNC: 8.1 MG/DL (ref 8.4–10.2)
CHLORIDE SERPL-SCNC: 98 MMOL/L (ref 96–108)
CO2 SERPL-SCNC: 32 MMOL/L (ref 21–32)
CREAT SERPL-MCNC: 0.67 MG/DL (ref 0.6–1.3)
EOSINOPHIL # BLD AUTO: 0.01 THOUSAND/ÂΜL (ref 0–0.61)
EOSINOPHIL NFR BLD AUTO: 1 % (ref 0–6)
ERYTHROCYTE [DISTWIDTH] IN BLOOD BY AUTOMATED COUNT: 18.8 % (ref 11.6–15.1)
FERRITIN SERPL-MCNC: 82 NG/ML (ref 24–336)
GFR SERPL CREATININE-BSD FRML MDRD: 98 ML/MIN/1.73SQ M
GLUCOSE SERPL-MCNC: 89 MG/DL (ref 65–140)
HCT VFR BLD AUTO: 20 % (ref 36.5–49.3)
HCT VFR BLD AUTO: 24.7 % (ref 36.5–49.3)
HGB BLD-MCNC: 6.5 G/DL (ref 12–17)
HGB BLD-MCNC: 8 G/DL (ref 12–17)
IMM GRANULOCYTES # BLD AUTO: 0.01 THOUSAND/UL (ref 0–0.2)
IMM GRANULOCYTES NFR BLD AUTO: 1 % (ref 0–2)
IRON SATN MFR SERPL: 17 % (ref 15–50)
IRON SERPL-MCNC: 43 UG/DL (ref 50–212)
LYMPHOCYTES # BLD AUTO: 0.32 THOUSANDS/ÂΜL (ref 0.6–4.47)
LYMPHOCYTES NFR BLD AUTO: 17 % (ref 14–44)
MAGNESIUM SERPL-MCNC: 2.1 MG/DL (ref 1.9–2.7)
MCH RBC QN AUTO: 32.7 PG (ref 26.8–34.3)
MCHC RBC AUTO-ENTMCNC: 32.5 G/DL (ref 31.4–37.4)
MCV RBC AUTO: 101 FL (ref 82–98)
MONOCYTES # BLD AUTO: 0.24 THOUSAND/ÂΜL (ref 0.17–1.22)
MONOCYTES NFR BLD AUTO: 12 % (ref 4–12)
NEUTROPHILS # BLD AUTO: 1.35 THOUSANDS/ÂΜL (ref 1.85–7.62)
NEUTS SEG NFR BLD AUTO: 68 % (ref 43–75)
NRBC BLD AUTO-RTO: 0 /100 WBCS
PLATELET # BLD AUTO: 123 THOUSANDS/UL (ref 149–390)
PMV BLD AUTO: 9.8 FL (ref 8.9–12.7)
POTASSIUM SERPL-SCNC: 3.1 MMOL/L (ref 3.5–5.3)
RBC # BLD AUTO: 1.99 MILLION/UL (ref 3.88–5.62)
RH BLD: POSITIVE
RH BLD: POSITIVE
SODIUM SERPL-SCNC: 139 MMOL/L (ref 135–147)
SPECIMEN EXPIRATION DATE: NORMAL
SPECIMEN EXPIRATION DATE: NORMAL
TIBC SERPL-MCNC: 250.6 UG/DL (ref 250–450)
TRANSFERRIN SERPL-MCNC: 179 MG/DL (ref 203–362)
UIBC SERPL-MCNC: 208 UG/DL (ref 155–355)
WBC # BLD AUTO: 1.94 THOUSAND/UL (ref 4.31–10.16)

## 2025-03-29 PROCEDURE — 99232 SBSQ HOSP IP/OBS MODERATE 35: CPT

## 2025-03-29 PROCEDURE — 86850 RBC ANTIBODY SCREEN: CPT | Performed by: PHYSICIAN ASSISTANT

## 2025-03-29 PROCEDURE — 85014 HEMATOCRIT: CPT

## 2025-03-29 PROCEDURE — 86923 COMPATIBILITY TEST ELECTRIC: CPT

## 2025-03-29 PROCEDURE — 85025 COMPLETE CBC W/AUTO DIFF WBC: CPT | Performed by: PHYSICIAN ASSISTANT

## 2025-03-29 PROCEDURE — P9016 RBC LEUKOCYTES REDUCED: HCPCS

## 2025-03-29 PROCEDURE — 85018 HEMOGLOBIN: CPT

## 2025-03-29 PROCEDURE — 86850 RBC ANTIBODY SCREEN: CPT | Performed by: HOSPITALIST

## 2025-03-29 PROCEDURE — 94660 CPAP INITIATION&MGMT: CPT

## 2025-03-29 PROCEDURE — 83540 ASSAY OF IRON: CPT | Performed by: HOSPITALIST

## 2025-03-29 PROCEDURE — 94760 N-INVAS EAR/PLS OXIMETRY 1: CPT

## 2025-03-29 PROCEDURE — 82728 ASSAY OF FERRITIN: CPT | Performed by: HOSPITALIST

## 2025-03-29 PROCEDURE — 86901 BLOOD TYPING SEROLOGIC RH(D): CPT | Performed by: PHYSICIAN ASSISTANT

## 2025-03-29 PROCEDURE — 83735 ASSAY OF MAGNESIUM: CPT | Performed by: PHYSICIAN ASSISTANT

## 2025-03-29 PROCEDURE — 86900 BLOOD TYPING SEROLOGIC ABO: CPT | Performed by: HOSPITALIST

## 2025-03-29 PROCEDURE — 86901 BLOOD TYPING SEROLOGIC RH(D): CPT | Performed by: HOSPITALIST

## 2025-03-29 PROCEDURE — 86900 BLOOD TYPING SEROLOGIC ABO: CPT | Performed by: PHYSICIAN ASSISTANT

## 2025-03-29 PROCEDURE — 83550 IRON BINDING TEST: CPT | Performed by: HOSPITALIST

## 2025-03-29 PROCEDURE — 80048 BASIC METABOLIC PNL TOTAL CA: CPT | Performed by: PHYSICIAN ASSISTANT

## 2025-03-29 RX ORDER — PANTOPRAZOLE SODIUM 40 MG/10ML
40 INJECTION, POWDER, LYOPHILIZED, FOR SOLUTION INTRAVENOUS EVERY 12 HOURS SCHEDULED
Status: DISCONTINUED | OUTPATIENT
Start: 2025-03-29 | End: 2025-04-03 | Stop reason: HOSPADM

## 2025-03-29 RX ADMIN — AZELASTINE HYDROCHLORIDE 2 SPRAY: 137 SPRAY, METERED NASAL at 22:49

## 2025-03-29 RX ADMIN — METOPROLOL SUCCINATE 12.5 MG: 25 TABLET, EXTENDED RELEASE ORAL at 11:06

## 2025-03-29 RX ADMIN — FAMOTIDINE 20 MG: 20 TABLET, FILM COATED ORAL at 22:48

## 2025-03-29 RX ADMIN — AZELASTINE HYDROCHLORIDE 2 SPRAY: 137 SPRAY, METERED NASAL at 11:05

## 2025-03-29 RX ADMIN — PANTOPRAZOLE SODIUM 40 MG: 40 TABLET, DELAYED RELEASE ORAL at 06:24

## 2025-03-29 RX ADMIN — FOLIC ACID 1 MG: 1 TABLET ORAL at 11:06

## 2025-03-29 RX ADMIN — HYDROXYZINE HYDROCHLORIDE 25 MG: 25 TABLET, FILM COATED ORAL at 11:06

## 2025-03-29 RX ADMIN — ATORVASTATIN CALCIUM 40 MG: 40 TABLET, FILM COATED ORAL at 11:06

## 2025-03-29 RX ADMIN — HYDROXYZINE HYDROCHLORIDE 25 MG: 25 TABLET, FILM COATED ORAL at 22:48

## 2025-03-29 RX ADMIN — FERROUS SULFATE TAB 325 MG (65 MG ELEMENTAL FE) 325 MG: 325 (65 FE) TAB at 11:06

## 2025-03-29 RX ADMIN — BRIMONIDINE TARTRATE 1 DROP: 2 SOLUTION/ DROPS OPHTHALMIC at 22:50

## 2025-03-29 RX ADMIN — OMEGA-3 FATTY ACIDS CAP 1000 MG 1000 MG: 1000 CAP at 22:48

## 2025-03-29 RX ADMIN — HYDROXYZINE HYDROCHLORIDE 25 MG: 25 TABLET, FILM COATED ORAL at 16:20

## 2025-03-29 RX ADMIN — PANTOPRAZOLE SODIUM 40 MG: 40 INJECTION, POWDER, FOR SOLUTION INTRAVENOUS at 14:07

## 2025-03-29 RX ADMIN — BRIMONIDINE TARTRATE 1 DROP: 2 SOLUTION/ DROPS OPHTHALMIC at 11:13

## 2025-03-29 RX ADMIN — OMEGA-3 FATTY ACIDS CAP 1000 MG 1000 MG: 1000 CAP at 11:06

## 2025-03-29 RX ADMIN — TAMSULOSIN HYDROCHLORIDE 0.4 MG: 0.4 CAPSULE ORAL at 16:20

## 2025-03-29 RX ADMIN — PANTOPRAZOLE SODIUM 40 MG: 40 INJECTION, POWDER, FOR SOLUTION INTRAVENOUS at 22:51

## 2025-03-29 NOTE — ASSESSMENT & PLAN NOTE
Suspected chemotherapy induced with leukopenia, thrombocytopenia & decreased ANC   At least contributing to acute anemia if not full cause   Transfuse as indicated   No indication for Granix at this time   Remains afebrile   Trend

## 2025-03-29 NOTE — PLAN OF CARE
Problem: PAIN - ADULT  Goal: Verbalizes/displays adequate comfort level or baseline comfort level  Description: Interventions:- Encourage patient to monitor pain and request assistance- Assess pain using appropriate pain scale- Administer analgesics based on type and severity of pain and evaluate response- Implement non-pharmacological measures as appropriate and evaluate response- Consider cultural and social influences on pain and pain management- Notify physician/advanced practitioner if interventions unsuccessful or patient reports new pain  Outcome: Progressing     Problem: SAFETY ADULT  Goal: Patient will remain free of falls  Description: INTERVENTIONS:- Educate patient/family on patient safety including physical limitations- Instruct patient to call for assistance with activity - Consult OT/PT to assist with strengthening/mobility - Keep Call bell within reach- Keep bed low and locked with side rails adjusted as appropriate- Keep care items and personal belongings within reach- Initiate and maintain comfort rounds- Make Fall Risk Sign visible to staff- Offer Toileting every 2 Hours, in advance of need- Initiate/Maintain bed/chair alarm- Obtain necessary fall risk management equipment: non skid socks- Apply yellow socks and bracelet for high fall risk patients- Consider moving patient to room near nurses station  Outcome: Progressing  Goal: Maintain or return to baseline ADL function  Description: INTERVENTIONS:-  Assess patient's ability to carry out ADLs; assess patient's baseline for ADL function and identify physical deficits which impact ability to perform ADLs (bathing, care of mouth/teeth, toileting, grooming, dressing, etc.)- Assess/evaluate cause of self-care deficits - Assess range of motion- Assess patient's mobility; develop plan if impaired- Assess patient's need for assistive devices and provide as appropriate- Encourage maximum independence but intervene and supervise when necessary- Involve  family in performance of ADLs- Assess for home care needs following discharge - Consider OT consult to assist with ADL evaluation and planning for discharge- Provide patient education as appropriate  Outcome: Progressing  Goal: Maintains/Returns to pre admission functional level  Description: INTERVENTIONS:- Perform AM-PAC 6 Click Basic Mobility/ Daily Activity assessment daily.- Set and communicate daily mobility goal to care team and patient/family/caregiver. - Collaborate with rehabilitation services on mobility goals if consulted- Perform Range of Motion 3 times a day.- Reposition patient every 3 hours.- Dangle patient 3 times a day- Stand patient 3 times a day- Ambulate patient 3 times a day- Out of bed to chair 2 times a day - Out of bed for meals 3 times a day- Out of bed for toileting- Record patient progress and toleration of activity level   Outcome: Progressing     Problem: DISCHARGE PLANNING  Goal: Discharge to home or other facility with appropriate resources  Description: INTERVENTIONS:- Identify barriers to discharge w/patient and caregiver- Arrange for needed discharge resources and transportation as appropriate- Identify discharge learning needs (meds, wound care, etc.)- Arrange for interpretive services to assist at discharge as needed- Refer to Case Management Department for coordinating discharge planning if the patient needs post-hospital services based on physician/advanced practitioner order or complex needs related to functional status, cognitive ability, or social support system  Outcome: Progressing     Problem: NEUROSENSORY - ADULT  Goal: Achieves stable or improved neurological status  Description: INTERVENTIONS- Monitor and report changes in neurological status- Monitor vital signs such as temperature, blood pressure, glucose, and any other labs ordered - Initiate measures to prevent increased intracranial pressure- Monitor for lightheadedness.    Outcome: Progressing     Problem:  GASTROINTESTINAL - ADULT  Goal: Maintains or returns to baseline bowel function  Description: INTERVENTIONS:- Assess bowel function- Encourage oral fluids to ensure adequate hydration- Administer IV fluids if ordered to ensure adequate hydration- Administer ordered medications as needed- Encourage mobilization and activity- Consider nutritional services referral to assist patient with adequate nutrition and appropriate food choices  Outcome: Progressing  Goal: Maintains adequate nutritional intake  Description: INTERVENTIONS:- Monitor percentage of each meal consumed- Identify factors contributing to decreased intake, treat as appropriate- Assist with meals as needed- Monitor I&O, weight, and lab values if indicated- Obtain nutrition services referral as needed  Outcome: Progressing     Problem: METABOLIC, FLUID AND ELECTROLYTES - ADULT  Goal: Electrolytes maintained within normal limits  Description: INTERVENTIONS:- Monitor labs and assess patient for signs and symptoms of electrolyte imbalances- Administer electrolyte replacement as ordered- Monitor response to electrolyte replacements, including repeat lab results as appropriate- Instruct patient on fluid and nutrition as appropriate  Outcome: Progressing  Goal: Fluid balance maintained  Description: INTERVENTIONS:- Monitor labs - Monitor I/O and WT- Instruct patient on fluid and nutrition as appropriate- Assess for signs & symptoms of volume excess or deficit  Outcome: Progressing

## 2025-03-29 NOTE — ASSESSMENT & PLAN NOTE
Patient presented to the ER for evaluation of lightheadedness  Likely secondary to dehydration in the setting of chemotherapy and radiation treatments  Patient also reports decreased p.o. intake  Patient reports symptoms has been ongoing for least 5 days with with changing position (sitting to standing)  Patient denies syncope, chest pain, palpitation  Patient does have history of moderate aortic stenosis  Patient also with notable electrolyte abnormalities  --Admit on observation  --EKG monitoring due to electrolyte imbalances  --Orthostatic vital sign  --Replace electrolytes  --OT/PT eval  --Monitor hemodynamic status  --Am labs  --Supportive care    done Good

## 2025-03-29 NOTE — CASE MANAGEMENT
Case Management Progress Note    Patient name Ean Young  Location /403-01 MRN [id number]  : 1955 Date 3/29/2025       LOS (days): 0  Geometric Mean LOS (GMLOS) (days):   Days to GMLOS:        OBJECTIVE:        Current admission status: Inpatient  Preferred Pharmacy:   [id number] - [place] - [address] [place] [id number]  Phone: [phone] Fax: [phone]    Primary Care Provider: Timmy Chapman DO    Primary Insurance: [employer]  Secondary Insurance: [place] [employer] AND [employer] [facility]    PROGRESS NOTE:      Chart review completed.  No CM needs at this time.  CM to follow for any discharge needs.

## 2025-03-29 NOTE — PROGRESS NOTES
Progress Note - Hospitalist   Name: Ean Young 69 y.o. male I MRN: [id number]  Unit/Bed#: 403-01 I Date of Admission: 3/28/2025   Date of Service: 3/29/2025 I Hospital Day: 0    Assessment & Plan  Symptomatic anemia  70 yo male PMHx HTN, mod AS, prior UGI this past February presents with positional dizziness and lightheadedness. Associated decreased oral intake. Noted with positive orthostats.    With lightheadedness with positive orthostats suggestive of volume depletion. Also suspecting symptomatic anemia 2/2  chemotherapy induced vs rule out GI bleed  VS with low-normal BP not tachycardiac. Normal Cr and BUN. No overt GI bleeding. Some darker stools at home but is on iron supplementation. No prior melena description like last GI bleed    Did have multiple EGD in February noted small angiectasia in 2nd part of duodenum with stigmata of recent hemorrhage s/p hemostasis. C scope with internal hemorrhoid, small and medium sigmoid & rectosigmoid diverticulum & old blood suggestive of proximal foci of bleeding. Did have [facility] capsule endoscopy on 2/17 & 2/19 both limited to 2-3 hours rather than the 8 but GI believes most of small bowel seen with evidence of non-bleeding angiectasias throughout small bowel  Hgb in 7s since February  Hgb 7.1 > 6.4 > 6.5   Transfuse 2 units pRBCs   IV PPI bid   Trend H&H q8   Stool record  Hold pharm DVT ppx  Pancytopenia (HCC)  Suspected chemotherapy induced with leukopenia, thrombocytopenia & decreased ANC   At least contributing to acute anemia if not full cause   Transfuse as indicated   No indication for Granix at this time   Remains afebrile   Trend   Hypokalemia  Monitor & replete as indicated  Hypomagnesemia  Monitor & replete as indicated   Chronic blood loss anemia  As above, with multiple AVMs  Essential hypertension  Hold HCTZ  Continue BB with holding parameters  Dyslipidemia  Continue statin  Moderate aortic stenosis  Monitor volume status  JM (obstructive sleep  apnea)  Continue CPAP hs     VTE Pharmacologic Prophylaxis:   SCDs    Mobility:   Basic Mobility Inpatient Raw Score: 20  JH-HLM Goal: 6: Walk 10 steps or more  JH-HLM Achieved: 6: Walk 10 steps or more  JH-HLM Goal achieved. Continue to encourage appropriate mobility.    Patient Centered Rounds: I performed bedside rounds with nursing staff today.   Discussions with Specialists or Other Care Team Provider: case management    Education and Discussions with Family / Patient: Attempted to update  (niece) via phone. Left voicemail.     Current Length of Stay: 0 day(s)  Current Patient Status: Inpatient   Certification Statement: The patient, admitted on an observation basis, will now require > 2 midnight hospital stay due to acute anemia  Discharge Plan: Anticipate discharge in 48-72 hrs to home.    Code Status: Level 1 - Full Code    Subjective   Patient denies dizziness or lightheadedness currently. Was happening with positional changes, noted with positive orthostats.  He reports he has been having darker stools since starting iron supplement.  However denies any melena like the last time he had a GI bleed when it was very loose and watery stool.  Notes that the stools are formed.  Has bowel movements every couple days.  No abdominal pain.  No hematic emesis, no hematochezia.  No chest pain or shortness of breath.    Objective :  Temp:  [97.3 °F (36.3 °C)-98 °F (36.7 °C)] 98 °F (36.7 °C)  HR:  [] 72  BP: ()/(53-73) 123/73  Resp:  [15-20] 16  SpO2:  [88 %-100 %] 91 %  O2 Device: None (Room air)    Body mass index is 32.99 kg/m².     Input and Output Summary (last 24 hours):     Intake/Output Summary (Last 24 hours) at 3/29/2025 1605  Last data filed at 3/29/2025 1353  Gross per 24 hour   Intake 1140 ml   Output 300 ml   Net 840 ml       Physical Exam  HENT:      Head: Normocephalic and atraumatic.   Cardiovascular:      Rate and Rhythm: Normal rate and regular rhythm.      Heart sounds:  Murmur heard.   Pulmonary:      Effort: Pulmonary effort is normal. No respiratory distress.      Breath sounds: Normal breath sounds.   Abdominal:      General: Abdomen is flat. Bowel sounds are normal. There is no distension.      Palpations: Abdomen is soft.   Musculoskeletal:      Right lower leg: No edema.      Left lower leg: No edema.   Skin:     General: Skin is warm and dry.      Coloration: Skin is not jaundiced.   Neurological:      General: No focal deficit present.      Mental Status: He is alert and oriented to person, place, and time.      GCS: GCS eye subscore is 4. GCS verbal subscore is 5. GCS motor subscore is 6.      Cranial Nerves: Facial asymmetry present. No cranial nerve deficit.      Sensory: Sensation is intact.      Motor: No weakness.      Comments: Noted have mild right sided facial drop at right nasolabial fold, patient reports this is chronic           Lines/Drains:        Telemetry:  Telemetry Orders (From admission, onward)               24 Hour Telemetry Monitoring  Continuous x 24 Hours (Telem)        Expiring   Question:  Reason for 24 Hour Telemetry  Answer:  Metabolic/electrolyte disturbance with high probability of dysrhythmia. K level <3 or >6 OR KCL infusion >10mEq/hr                     Telemetry Reviewed: Normal Sinus Rhythm  Indication for Continued Telemetry Use: No indication for continued use. Will discontinue.                Lab Results: I have reviewed the following results:   Results from last 7 days   Lab Units 03/29/25  0824   WBC Thousand/uL 1.94*   HEMOGLOBIN g/dL 6.5*   HEMATOCRIT % 20.0*   PLATELETS Thousands/uL 123*   SEGS PCT % 68   LYMPHO PCT % 17   MONO PCT % 12   EOS PCT % 1     Results from last 7 days   Lab Units 03/29/25  0049 03/28/25  1702   SODIUM mmol/L 139 137   POTASSIUM mmol/L 3.1* 2.8*   CHLORIDE mmol/L 98 92*   CO2 mmol/L 32 31   BUN mg/dL 9 12   CREATININE mg/dL 0.67 0.71   ANION GAP mmol/L 9 14*   CALCIUM mg/dL 8.1* 8.8   ALBUMIN g/dL  --   3.9   TOTAL BILIRUBIN mg/dL  --  0.93   ALK PHOS U/L  --  78   ALT U/L  --  7   AST U/L  --  13   GLUCOSE RANDOM mg/dL 89 103                       Recent Cultures (last 7 days):         Imaging Results Review: I reviewed radiology reports from this admission including: procedure reports.  Other Study Results Review: No additional pertinent studies reviewed.    Last 24 Hours Medication List:     Current Facility-Administered Medications:     acetaminophen (TYLENOL) tablet 650 mg, Q6H PRN    atorvastatin (LIPITOR) tablet 40 mg, Daily    azelastine (ASTELIN) 0.1 % nasal spray 2 spray, BID    brimonidine tartrate 0.2 % ophthalmic solution 1 drop, Q12H HAYLEE    famotidine (PEPCID) tablet 20 mg, HS    ferrous sulfate tablet 325 mg, Daily With Breakfast    fish oil capsule 1,000 mg, BID    folic acid (FOLVITE) tablet 1 mg, Daily    [Held by provider] hydroCHLOROthiazide tablet 12.5 mg, Daily    hydrOXYzine HCL (ATARAX) tablet 25 mg, TID    metoprolol succinate (TOPROL-XL) 24 hr tablet 12.5 mg, Daily    ondansetron (ZOFRAN) injection 4 mg, Q6H PRN    pantoprazole (PROTONIX) injection 40 mg, Q12H HAYLEE    tamsulosin (FLOMAX) capsule 0.4 mg, Daily With Dinner    Administrative Statements   Today, Patient Was Seen By: Cristal Ellis PA-C      **Please Note: This note may have been constructed using a voice recognition system.**

## 2025-03-29 NOTE — RESPIRATORY THERAPY NOTE
RT Protocol Note  Ean Young 69 y.o. male MRN: [id number]  Unit/Bed#: 403-01 Encounter: [id number]    Assessment    Principal Problem:    Lightheadedness  Active Problems:    Essential hypertension    Dyslipidemia    Moderate aortic stenosis    JM (obstructive sleep apnea)    Hypokalemia    Pancytopenia (HCC)    Hypomagnesemia    Chronic blood loss anemia      Home Pulmonary Medications:  none  Home Devices/Therapy: BiPAP/CPAP    Past Medical History:   Diagnosis Date    Cancer (HCC)     Dyslipidemia     GERD (gastroesophageal reflux disease)     HL (hearing loss)     Hypertension     Essential    Moderate aortic stenosis     Obstructive sleep apnea      Social History     Socioeconomic History    Marital status: Single     Spouse name: None    Number of children: None    Years of education: None    Highest education level: None   Occupational History    None   Tobacco Use    Smoking status: Never     Passive exposure: Never    Smokeless tobacco: Never   Vaping Use    Vaping status: Never Used   Substance and Sexual Activity    Alcohol use: Yes     Comment: rare    Drug use: Never    Sexual activity: Not Currently   Other Topics Concern    None   Social History Narrative    None     Social Drivers of Health     Financial Resource Strain: Low Risk  (11/29/2023)    Overall Financial Resource Strain (CARDIA)     Difficulty of Paying Living Expenses: Not hard at all   Food Insecurity: Food Insecurity Present (2/12/2025)    Nursing - Inadequate Food Risk Classification     Worried About Running Out of Food in the Last Year: Not on file     Ran Out of Food in the Last Year: Not on file     Ran Out of Food in the Last Year: Sometimes true   Transportation Needs: Unmet Transportation Needs (2/12/2025)    Nursing - Transportation Risk Classification     Lack of Transportation: Not on file     Lack of Transportation: Yes   Physical Activity: Not on file   Stress: Not on file   Social Connections: Unknown (6/18/2024)     "Received from [facility]     How often do you feel lonely or isolated from those around you? (Adult - for ages 18 years and over): Not on file   Intimate Partner Violence: Unknown (2025)    Nursing IPS     Feels Physically and Emotionally Safe: Not on file     Physically Hurt by Someone: Not on file     Humiliated or Emotionally Abused by Someone: Not on file     Physically Hurt by Someone: No     Hurt or Threatened by Someone: No   Housing Stability: Unknown (2025)    Nursing: Inadequate Housing Risk Classification     Has Housing: Not on file     Worried About Losing Housing: Not on file     Unable to Get Utilities: Not on file     Unable to Pay for Housing in the Last Year: No     Has Housin       Subjective         Objective    Physical Exam:   Assessment Type: Assess only  General Appearance: Alert, Awake  Respiratory Pattern: Symmetrical, Spontaneous, Normal  Chest Assessment: Chest expansion symmetrical  Bilateral Breath Sounds: Clear  Cough: None  O2 Device: room air    Vitals:  Blood pressure (!) 89/56, pulse 76, temperature 97.7 °F (36.5 °C), resp. rate 18, height 5' 11\" (1.803 m), weight 107 kg (236 lb 8.9 oz), SpO2 94%.          O2 Device: room air     Plan    Respiratory Plan: No distress/Pulmonary history, Vent/NIV/HFNC, Discontinue Protocol        Resp Comments: Pt has no pulmonary hx other than JM to which he wears a noctural CPAP. He is unaware of his settings and it was difficult to find the results of his sleep study in his chart however during his last visit he wore a CPAP of +12. He does not use any respiratory medication nor home O2. He denies any SOB or dyspnea and is not admitted for any respiratory complications at this time. Will set pt up with a CPAP for HS use.   "

## 2025-03-29 NOTE — UTILIZATION REVIEW
OBSERVATION 3-28-25 CHANGED TO INPATIENT 3-29-25 FOR TREATMENT OF SYMPTOMATIC ANEMIA, HB 6.4> 6.5 DESPITE 1U PRBC.      Initial Clinical Review    Admission: Date/Time/Statement:   Admission Orders (From admission, onward)       Ordered        03/29/25 0950  INPATIENT ADMISSION  Once            03/28/25 1907  Place in Observation  Once                            ED Arrival Information       Expected   -    Arrival   3/28/2025 16:43    Acuity   Urgent              Means of arrival   Ambulance    Escorted by   [facility]    Service   Hospitalist    Admission type   Emergency              Arrival complaint   Dizziness             Chief Complaint   Patient presents with    Dizziness     Pt states that he started with dizziness about a week ago that got worse today. Denies any chest pain. Pt is a cancer pt with his last chemo treatment being last week.        Initial Presentation: 69 y.o. male presents to ed on 3-28-25 from home via ems for evaluation and treatment of dizziness worsening over 1 week since his last chemo. He reports decreased po intake.   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  Essential HTN.    Clinical assessment signicant for  orthostatic BP 89/ 56 standing, dry mucous membranes.     K 2.8, WBC 3.0, HB 7.1> 6.4, .   Initially treated with iv  ml bolus, iv Mag x 1, po Kcl 40 meq x1.  Admit to observation for lightheadedness, anemia, hypokalemia.   Plan includes : telemetry, serial HB, replete electrolytes, check iron panel, orthostatic blood pressures, transfuse 1U prbc, iv protonix q12.      Anticipated Length of Stay/Certification Statement: Patient will be admitted on an observation basis with an anticipated  length of stay of less than 2 midnights secondary to lightheadedness, hypomagnesemia, hypokalemia, pancytopenia.     Date: 3- 29-25    Day 2: observation changed to inpatient  Certification Statement: The patient, admitted on an observation basis, will now require > 2 midnight hospital stay due to acute anemia  Discharge Plan: Anticipate discharge in 48-72 hrs to home.    WBC 1.94, PLATELETS 123, HB 6.5.  Transfuse second U PRBC due to symptomatic anemia.  Continue iv protonix q12 and iv fluids.        Date: 3-30-25 inpatient med surg  Day 3: Has surpassed a 2nd midnight with active treatments and services for persistent lightheadedness and anemia with iv protonix, iv fluids, monitor for GI bleed.   Certification Statement: The patient will continue to require additional inpatient hospital stay due to monitoring vitals and blood counts  Discharge Plan: Anticipate discharge tomorrow to home.    Wbc improved 1.94> 2.32.   Platelet 1.94> 2.32.  HB improved to 8.0> 9.2> 8.4  remaining stable.  Remains on iv protonix bid.    Continue to trend H& H.   Recommend stool chart. Orthostatics and lightheadedness improving. Patient reports lightheaded still present  when stands up.  Start iv .9% ns 75/hr.  Monitor on telemetry nsr.   Obtain  PT/OT evaluations.       ED Treatment-Medication Administration from 03/28/2025 1643 to 03/28/2025 1928         Date/Time Order Dose Route Action     03/28/2025 1703 lactated ringers bolus 500 mL 500 mL Intravenous New Bag     03/28/2025 1822 magnesium sulfate 4 g/100 mL IVPB (premix) 4 g 4 g Intravenous New Bag     03/28/2025 1818 potassium chloride oral solution 40 mEq 40 mEq Oral Given            Scheduled Medications:    atorvastatin, 40 mg, Oral, Daily  azelastine, 2 spray, Each Nare, BID  brimonidine tartrate, 1 drop, Both Eyes, Q12H HAYLEE  famotidine, 20 mg, Oral, HS  ferrous sulfate, 325 mg, Oral, Daily With Breakfast  fish oil, 1,000 mg, Oral, BID  folic acid, 1 mg, Oral, Daily  [Held  by provider] hydroCHLOROthiazide, 12.5 mg, Oral, Daily  hydrOXYzine HCL, 25 mg, Oral, TID  metoprolol succinate, 12.5 mg, Oral, Daily  pantoprazole, 40 mg, Intravenous, Q12H HAYLEE  tamsulosin, 0.4 mg, Oral, Daily With Dinner      Continuous IV Infusions:    sodium chloride 0.9 % infusion  Rate: 75 mL/hr Dose: 75 mL/hr  Freq: Continuous Route: IV  Last Dose: 75 mL/hr (03/30/25 1011)  Start: 03/30/25 1000 End: 03/30/25 2010        PRN Meds:  acetaminophen, 650 mg, Oral, Q6H PRN  ondansetron, 4 mg, Intravenous, Q6H PRN      ED Triage Vitals [03/28/25 1645]   Temperature Pulse Respirations Blood Pressure SpO2 Pain Score   97.5 °F (36.4 °C) 100 18 102/63 97 % No Pain     Weight (last 2 days)       Date/Time Weight    03/28/25 19:50:34 107 (236.55)    03/28/25 1645 111 (245.15)            Vital Signs (last 3 days)       Date/Time Temp Pulse Resp BP MAP (mmHg) SpO2 O2 Device Warren Coma Scale Score Pain    03/29/25 11:39:23 97.8 °F (36.6 °C) 72 18 116/67 83 92 % -- -- --    03/29/25 11:07:38 97.6 °F (36.4 °C) 77 18 113/67 82 96 % None (Room air) -- No Pain    03/29/25 1106 -- 78 -- 113/67 -- -- -- -- --    03/29/25 07:22:29 97.9 °F (36.6 °C) 71 18 108/65 79 100 % -- -- --    03/29/25 03:45:02 97.3 °F (36.3 °C) 61 20 92/54 67 98 % -- -- --    03/29/25 0332 -- -- -- -- -- 99 % -- -- --    03/29/25 01:07:05 -- 59 17 90/53 65 99 % -- -- --    03/28/25 2235 -- -- -- -- -- 99 % -- -- --    03/28/25 2216 -- -- -- -- -- -- None (Room air) 15 7    03/28/25 2039 -- -- -- -- -- 99 % None (Room air) -- --    03/28/25 2035 -- 76 18 -- -- 94 % -- -- --    03/28/25 2016 -- -- -- -- -- -- -- -- No Pain    03/28/25 1900 -- 79 18 106/57 75 92 % None (Room air) -- --    03/28/25 1800 97.8 °F (36.6 °C) 80 18 99/57 72 91 % None (Room air) -- No Pain    03/28/25 1711 -- -- -- -- -- -- -- 15 No Pain    03/28/25 1645 97.5 °F (36.4 °C) 100 18 102/63 77 97 % None (Room air) -- No Pain       ORTHOSTATIC BLOOD PRESSURE     03/28/25 19:50:34 89/56  SPO2  94 %  Standing - Orthostatic VS   03/28/25 19:48:16 111/66 88 % Sitting - Orthostatic VS   03/28/25 19:45:48 113/65 93 % Lying - Orthostatic VS       Pertinent Labs/Diagnostic Test Results:             Results from last 7 days   Lab Units 03/29/25  0824 03/28/25 2151 03/28/25  1702   WBC Thousand/uL 1.94*  --  3.00*   HEMOGLOBIN g/dL 6.5* 6.4* 7.1*   HEMATOCRIT % 20.0* 19.7* 21.6*   PLATELETS Thousands/uL 123*  --  145*   TOTAL NEUT ABS Thousands/µL 1.35*  --  2.35         Results from last 7 days   Lab Units 03/29/25  0049 03/28/25  1702   SODIUM mmol/L 139 137   POTASSIUM mmol/L 3.1* 2.8*   CHLORIDE mmol/L 98 92*   CO2 mmol/L 32 31   ANION GAP mmol/L 9 14*   BUN mg/dL 9 12   CREATININE mg/dL 0.67 0.71   EGFR ml/min/1.73sq m 98 96   CALCIUM mg/dL 8.1* 8.8   MAGNESIUM mg/dL 2.1 1.2*   PHOSPHORUS mg/dL  --  3.3     Results from last 7 days   Lab Units 03/28/25  1702   AST U/L 13   ALT U/L 7   ALK PHOS U/L 78   TOTAL PROTEIN g/dL 5.8*   ALBUMIN g/dL 3.9   TOTAL BILIRUBIN mg/dL 0.93         Results from last 7 days   Lab Units 03/29/25  0049 03/28/25  1702   GLUCOSE RANDOM mg/dL 89 103       Results from last 7 days   Lab Units 03/28/25 2151 03/28/25  1913 03/28/25  1702   HS TNI 0HR ng/L  --   --  28   HS TNI 2HR ng/L  --  29  --    HSTNI D2 ng/L  --  1  --    HS TNI 4HR ng/L 32  --   --    HSTNI D4 ng/L 4  --   --          Results from last 7 days   Lab Units 03/28/25  1937   CLARITY UA  Clear   COLOR UA  Colorless   SPEC GRAV UA  <1.005*   PH UA  6.0   GLUCOSE UA mg/dl Negative   KETONES UA mg/dl 40 (2+)*   BLOOD UA  Negative   PROTEIN UA mg/dl Negative   NITRITE UA  Negative   BILIRUBIN UA  Negative   UROBILINOGEN UA (BE) mg/dl <2.0   LEUKOCYTES UA  Negative       Past Medical History:   Diagnosis Date    Cancer (HCC)     Dyslipidemia     GERD (gastroesophageal reflux disease)     HL (hearing loss)     Hypertension     Essential    Moderate aortic stenosis     Obstructive sleep apnea      Present on  Admission:   Essential hypertension   Dyslipidemia   Pancytopenia (HCC)   JM (obstructive sleep apnea)   Hypokalemia   Hypomagnesemia   Lightheadedness   Moderate aortic stenosis   Chronic blood loss anemia      Admitting Diagnosis:     Hypokalemia [E87.6]  Hypomagnesemia [E83.42]  Dizziness [R42]  Lightheadedness [R42]  Aortic stenosis [I35.0]  Anemia [D64.9]  History of GI bleed [Z87.19]    Age/Sex: 69 y.o. male    Network Utilization Review Department  ATTENTION: Please call with any questions or concerns to [phone] and carefully listen to the prompts so that you are directed to the right person. All voicemails are confidential.   For Discharge needs, contact Care Management DC Support Team at [phone] opt. 2  Send all requests for admission clinical reviews, approved or denied determinations and any other requests to dedicated fax number below belonging to the campus where the patient is receiving treatment. List of dedicated fax numbers for the Facilities:  FACILITY NAME UR FAX NUMBER   ADMISSION DENIALS (Administrative/Medical Necessity) [phone]   DISCHARGE SUPPORT TEAM (NETWORK) [phone]   PARENT CHILD HEALTH (Maternity/NICU/Pediatrics) [phone]   [facility] [phone]   [facility] [phone]   [facility] [phone]   [facility] [phone]   [facility] [phone]   [facility] [phone]   [facility] [phone]   [facility] [phone]   [facility] [phone]   [facility] [phone]   [facility] [phone]   [facility] [phone]

## 2025-03-29 NOTE — ASSESSMENT & PLAN NOTE
70 yo male PMHx HTN, mod AS, prior UGI this past February presents with positional dizziness and lightheadedness. Associated decreased oral intake. Noted with positive orthostats.    With lightheadedness with positive orthostats suggestive of volume depletion. Also suspecting symptomatic anemia 2/2  chemotherapy induced vs rule out GI bleed  VS with low-normal BP not tachycardiac. Normal Cr and BUN. No overt GI bleeding. Some darker stools at home but is on iron supplementation. No prior melena description like last GI bleed    Did have multiple EGD in February noted small angiectasia in 2nd part of duodenum with stigmata of recent hemorrhage s/p hemostasis. C scope with internal hemorrhoid, small and medium sigmoid & rectosigmoid diverticulum & old blood suggestive of proximal foci of bleeding. Did have PillCam capsule endoscopy on 2/17 & 2/19 both limited to 2-3 hours rather than the 8 but GI believes most of small bowel seen with evidence of non-bleeding angiectasias throughout small bowel  Hgb in 7s since February  Hgb 7.1 > 6.4 > 6.5   Transfuse 2 units pRBCs   IV PPI bid   Trend H&H q8   Stool record  Hold pharm DVT ppx

## 2025-03-29 NOTE — RESPIRATORY THERAPY NOTE
03/28/25 9356   Respiratory Assessment   Resp Comments Pt placed on CPAP for HS   O2 Device CPAP   Non-Invasive Information   O2 Interface Device Full face mask   Non-Invasive Ventilation Mode CPAP   $ Intermittent NIV Yes   SpO2 99 %   $ Pulse Oximetry Spot Check Charge Completed   Non-Invasive Settings   FiO2 (%) 24   PEEP/CPAP (cm H2O) 12   Rise Time 3   Non-Invasive Readings   Total Rate 17   Spontaneous MV (mL) 9.5   Spontaneous Vt (mL) 568   Leak (lpm) 0   Skin Intervention Skin intact   Non-Invasive Alarms   Insp Pressure High (cm H20) 18   Insp Pressure Low (cm H20) 6   Low Insp Pressure Time (sec) 20 sec   MV Low (L/min) 3   Vt High (mL) 1000   Vt Low (mL) 200   High Resp Rate (BPM) 40 BPM   Low Resp Rate (BPM) 8 BPM   Apnea Interval (sec) 20   Apnea Rate 8

## 2025-03-29 NOTE — UTILIZATION REVIEW
NOTIFICATION OF OBSERVATION ADMISSION   AUTHORIZATION REQUEST   SERVICING FACILITY:   [place] [address]  Tax ID:  [id number]  NPI: [id number] ATTENDING PROVIDER:  Attending Name and NPI#: Black MontanoDo [[id number]]  Address: [address] [place] [address]  Phone: [phone]     ADMISSION INFORMATION:  Place of Service: On [facility]  Place of Service Code: 22 CPT Code:   Admitting Diagnosis Code/Description:  Hypokalemia [E87.6]  Hypomagnesemia [E83.42]  Dizziness [R42]  Lightheadedness [R42]  Aortic stenosis [I35.0]  Anemia [D64.9]  History of GI bleed [Z87.19]  Observation Admission Date/Time: 03/28/2025 1907  Discharge Date/Time: No discharge date for patient encounter.     UTILIZATION REVIEW CONTACT:  Jayden Mejia Utilization   Network Utilization Review Department  Phone: [phone]  Fax [phone]  Email: [email or web address]  Contact for approvals/pending authorizations, clinical reviews, and discharge.     PHYSICIAN ADVISORY SERVICES:  Medical Necessity Denial & Wgjb-en-Qtmz Review  Phone: [phone]  Fax: [phone]  Email: [email or web address]     DISCHARGE SUPPORT TEAM:  For Patients Discharge Needs & Updates  Phone: [phone] opt. 2 Fax: [phone]  Email: [email or web address]

## 2025-03-30 LAB
ABO GROUP BLD BPU: NORMAL
ABO GROUP BLD BPU: NORMAL
ALBUMIN SERPL BCG-MCNC: 3.4 G/DL (ref 3.5–5)
ALP SERPL-CCNC: 68 U/L (ref 34–104)
ALT SERPL W P-5'-P-CCNC: 7 U/L (ref 7–52)
ANION GAP SERPL CALCULATED.3IONS-SCNC: 7 MMOL/L (ref 4–13)
AST SERPL W P-5'-P-CCNC: 11 U/L (ref 13–39)
ATRIAL RATE: 80 BPM
BASOPHILS # BLD AUTO: 0.01 THOUSANDS/ÂΜL (ref 0–0.1)
BASOPHILS NFR BLD AUTO: 0 % (ref 0–1)
BILIRUB SERPL-MCNC: 0.82 MG/DL (ref 0.2–1)
BPU ID: NORMAL
BPU ID: NORMAL
BUN SERPL-MCNC: 9 MG/DL (ref 5–25)
CALCIUM ALBUM COR SERPL-MCNC: 9 MG/DL (ref 8.3–10.1)
CALCIUM SERPL-MCNC: 8.5 MG/DL (ref 8.4–10.2)
CHLORIDE SERPL-SCNC: 101 MMOL/L (ref 96–108)
CO2 SERPL-SCNC: 33 MMOL/L (ref 21–32)
CREAT SERPL-MCNC: 0.64 MG/DL (ref 0.6–1.3)
CROSSMATCH: NORMAL
CROSSMATCH: NORMAL
EOSINOPHIL # BLD AUTO: 0.02 THOUSAND/ÂΜL (ref 0–0.61)
EOSINOPHIL NFR BLD AUTO: 1 % (ref 0–6)
ERYTHROCYTE [DISTWIDTH] IN BLOOD BY AUTOMATED COUNT: 18.3 % (ref 11.6–15.1)
GFR SERPL CREATININE-BSD FRML MDRD: 100 ML/MIN/1.73SQ M
GLUCOSE SERPL-MCNC: 97 MG/DL (ref 65–140)
HCT VFR BLD AUTO: 25.5 % (ref 36.5–49.3)
HCT VFR BLD AUTO: 25.5 % (ref 36.5–49.3)
HCT VFR BLD AUTO: 28.5 % (ref 36.5–49.3)
HEMOCCULT STL QL IA: POSITIVE
HGB BLD-MCNC: 8.4 G/DL (ref 12–17)
HGB BLD-MCNC: 8.4 G/DL (ref 12–17)
HGB BLD-MCNC: 9.2 G/DL (ref 12–17)
IMM GRANULOCYTES # BLD AUTO: 0.01 THOUSAND/UL (ref 0–0.2)
IMM GRANULOCYTES NFR BLD AUTO: 0 % (ref 0–2)
LYMPHOCYTES # BLD AUTO: 0.37 THOUSANDS/ÂΜL (ref 0.6–4.47)
LYMPHOCYTES NFR BLD AUTO: 16 % (ref 14–44)
MCH RBC QN AUTO: 31.6 PG (ref 26.8–34.3)
MCHC RBC AUTO-ENTMCNC: 32.9 G/DL (ref 31.4–37.4)
MCV RBC AUTO: 96 FL (ref 82–98)
MONOCYTES # BLD AUTO: 0.32 THOUSAND/ÂΜL (ref 0.17–1.22)
MONOCYTES NFR BLD AUTO: 14 % (ref 4–12)
NEUTROPHILS # BLD AUTO: 1.59 THOUSANDS/ÂΜL (ref 1.85–7.62)
NEUTS SEG NFR BLD AUTO: 69 % (ref 43–75)
NRBC BLD AUTO-RTO: 0 /100 WBCS
P AXIS: 52 DEGREES
PLATELET # BLD AUTO: 118 THOUSANDS/UL (ref 149–390)
PMV BLD AUTO: 9.6 FL (ref 8.9–12.7)
POTASSIUM SERPL-SCNC: 3.5 MMOL/L (ref 3.5–5.3)
PR INTERVAL: 158 MS
PROT SERPL-MCNC: 5.5 G/DL (ref 6.4–8.4)
QRS AXIS: 29 DEGREES
QRSD INTERVAL: 102 MS
QT INTERVAL: 400 MS
QTC INTERVAL: 461 MS
RBC # BLD AUTO: 2.66 MILLION/UL (ref 3.88–5.62)
SODIUM SERPL-SCNC: 141 MMOL/L (ref 135–147)
T WAVE AXIS: 40 DEGREES
UNIT DISPENSE STATUS: NORMAL
UNIT DISPENSE STATUS: NORMAL
UNIT PRODUCT CODE: NORMAL
UNIT PRODUCT CODE: NORMAL
UNIT PRODUCT VOLUME: 350 ML
UNIT PRODUCT VOLUME: 350 ML
UNIT RH: NORMAL
UNIT RH: NORMAL
VENTRICULAR RATE: 80 BPM
WBC # BLD AUTO: 2.32 THOUSAND/UL (ref 4.31–10.16)

## 2025-03-30 PROCEDURE — 93010 ELECTROCARDIOGRAM REPORT: CPT | Performed by: INTERNAL MEDICINE

## 2025-03-30 PROCEDURE — 85014 HEMATOCRIT: CPT | Performed by: PHYSICIAN ASSISTANT

## 2025-03-30 PROCEDURE — 85018 HEMOGLOBIN: CPT | Performed by: PHYSICIAN ASSISTANT

## 2025-03-30 PROCEDURE — 94760 N-INVAS EAR/PLS OXIMETRY 1: CPT

## 2025-03-30 PROCEDURE — 99232 SBSQ HOSP IP/OBS MODERATE 35: CPT | Performed by: PHYSICIAN ASSISTANT

## 2025-03-30 PROCEDURE — 85025 COMPLETE CBC W/AUTO DIFF WBC: CPT

## 2025-03-30 PROCEDURE — G0328 FECAL BLOOD SCRN IMMUNOASSAY: HCPCS | Performed by: PHYSICIAN ASSISTANT

## 2025-03-30 PROCEDURE — 94660 CPAP INITIATION&MGMT: CPT

## 2025-03-30 PROCEDURE — 80053 COMPREHEN METABOLIC PANEL: CPT

## 2025-03-30 RX ORDER — BISACODYL 5 MG/1
10 TABLET, DELAYED RELEASE ORAL ONCE
Status: COMPLETED | OUTPATIENT
Start: 2025-03-30 | End: 2025-03-30

## 2025-03-30 RX ORDER — SODIUM CHLORIDE 9 MG/ML
75 INJECTION, SOLUTION INTRAVENOUS CONTINUOUS
Status: DISPENSED | OUTPATIENT
Start: 2025-03-30 | End: 2025-03-30

## 2025-03-30 RX ADMIN — METOPROLOL SUCCINATE 12.5 MG: 25 TABLET, EXTENDED RELEASE ORAL at 09:15

## 2025-03-30 RX ADMIN — SODIUM CHLORIDE 75 ML/HR: 0.9 INJECTION, SOLUTION INTRAVENOUS at 10:11

## 2025-03-30 RX ADMIN — BRIMONIDINE TARTRATE 1 DROP: 2 SOLUTION/ DROPS OPHTHALMIC at 09:15

## 2025-03-30 RX ADMIN — HYDROXYZINE HYDROCHLORIDE 25 MG: 25 TABLET, FILM COATED ORAL at 09:15

## 2025-03-30 RX ADMIN — AZELASTINE HYDROCHLORIDE 2 SPRAY: 137 SPRAY, METERED NASAL at 09:15

## 2025-03-30 RX ADMIN — AZELASTINE HYDROCHLORIDE 2 SPRAY: 137 SPRAY, METERED NASAL at 21:50

## 2025-03-30 RX ADMIN — OMEGA-3 FATTY ACIDS CAP 1000 MG 1000 MG: 1000 CAP at 21:46

## 2025-03-30 RX ADMIN — FERROUS SULFATE TAB 325 MG (65 MG ELEMENTAL FE) 325 MG: 325 (65 FE) TAB at 09:15

## 2025-03-30 RX ADMIN — TAMSULOSIN HYDROCHLORIDE 0.4 MG: 0.4 CAPSULE ORAL at 16:03

## 2025-03-30 RX ADMIN — FOLIC ACID 1 MG: 1 TABLET ORAL at 09:15

## 2025-03-30 RX ADMIN — BRIMONIDINE TARTRATE 1 DROP: 2 SOLUTION/ DROPS OPHTHALMIC at 21:50

## 2025-03-30 RX ADMIN — HYDROXYZINE HYDROCHLORIDE 25 MG: 25 TABLET, FILM COATED ORAL at 16:03

## 2025-03-30 RX ADMIN — BISACODYL 10 MG: 5 TABLET, COATED ORAL at 16:03

## 2025-03-30 RX ADMIN — OMEGA-3 FATTY ACIDS CAP 1000 MG 1000 MG: 1000 CAP at 09:15

## 2025-03-30 RX ADMIN — PANTOPRAZOLE SODIUM 40 MG: 40 INJECTION, POWDER, FOR SOLUTION INTRAVENOUS at 09:13

## 2025-03-30 RX ADMIN — PANTOPRAZOLE SODIUM 40 MG: 40 INJECTION, POWDER, FOR SOLUTION INTRAVENOUS at 21:46

## 2025-03-30 RX ADMIN — HYDROXYZINE HYDROCHLORIDE 25 MG: 25 TABLET, FILM COATED ORAL at 21:46

## 2025-03-30 RX ADMIN — ATORVASTATIN CALCIUM 40 MG: 40 TABLET, FILM COATED ORAL at 09:15

## 2025-03-30 RX ADMIN — FAMOTIDINE 20 MG: 20 TABLET, FILM COATED ORAL at 21:46

## 2025-03-30 NOTE — ASSESSMENT & PLAN NOTE
68 yo male PMHx HTN, mod AS, prior UGI this past February presents with positional dizziness and lightheadedness. Associated decreased oral intake. Noted with positive orthostats.    With lightheadedness with positive orthostats suggestive of volume depletion. Also suspecting symptomatic anemia 2/2  chemotherapy induced vs rule out GI bleed  VS with low-normal BP not tachycardiac. Normal Cr and BUN. No overt GI bleeding. Some darker stools at home but is on iron supplementation. No prior melena description like last GI bleed    Did have multiple EGD in February noted small angiectasia in 2nd part of duodenum with stigmata of recent hemorrhage s/p hemostasis. C scope with internal hemorrhoid, small and medium sigmoid & rectosigmoid diverticulum & old blood suggestive of proximal foci of bleeding. Did have PillCam capsule endoscopy on 2/17 & 2/19 both limited to 2-3 hours rather than the 8 but GI believes most of small bowel seen with evidence of non-bleeding angiectasias throughout small bowel  Hgb in 7s since February  Hgb 7.1 > 6.4 > 6.5   Transfuse 2 units pRBCs   Now improved to the 8 range and remaining relatively stable, will continue to trend  IV PPI bid   Trend H&H   Stool record  Hold pharm DVT ppx

## 2025-03-30 NOTE — ASSESSMENT & PLAN NOTE
Patient presented to the ER for evaluation of lightheadedness  Likely secondary to dehydration in the setting of chemotherapy and radiation treatments  Patient also reports decreased p.o. intake  Orthostatics positive on admission  Now with improved hgb, improved PO intake at this time  He reports lightheadedness with positional changes is improved today but still present  Provide with 10 hours of gentle IVF, monitoring closely with his moderate aortic stenosis  PT/OT

## 2025-03-30 NOTE — ASSESSMENT & PLAN NOTE
Suspected chemotherapy induced with leukopenia, thrombocytopenia & decreased ANC   At least contributing to acute anemia if not full cause   Transfuse as indicated   No indication for Granix at this time - WBC are improved from admission  Remains afebrile   Trend

## 2025-03-30 NOTE — PROGRESS NOTES
Progress Note - Hospitalist   Name: Ean Young 69 y.o. male I MRN: [id number]  Unit/Bed#: 403-01 I Date of Admission: 3/28/2025   Date of Service: 3/30/2025 I Hospital Day: 1    Assessment & Plan  Symptomatic anemia  70 yo male PMHx HTN, mod AS, prior UGI this past February presents with positional dizziness and lightheadedness. Associated decreased oral intake. Noted with positive orthostats.    With lightheadedness with positive orthostats suggestive of volume depletion. Also suspecting symptomatic anemia 2/2  chemotherapy induced vs rule out GI bleed  VS with low-normal BP not tachycardiac. Normal Cr and BUN. No overt GI bleeding. Some darker stools at home but is on iron supplementation. No prior melena description like last GI bleed    Did have multiple EGD in February noted small angiectasia in 2nd part of duodenum with stigmata of recent hemorrhage s/p hemostasis. C scope with internal hemorrhoid, small and medium sigmoid & rectosigmoid diverticulum & old blood suggestive of proximal foci of bleeding. Did have PillCam capsule endoscopy on 2/17 & 2/19 both limited to 2-3 hours rather than the 8 but GI believes most of small bowel seen with evidence of non-bleeding angiectasias throughout small bowel  Hgb in 7s since February  Hgb 7.1 > 6.4 > 6.5   Transfuse 2 units pRBCs   Now improved to the 8 range and remaining relatively stable, will continue to trend  IV PPI bid   Trend H&H   Stool record  Hold pharm DVT ppx  Lightheadedness  Patient presented to the ER for evaluation of lightheadedness  Likely secondary to dehydration in the setting of chemotherapy and radiation treatments  Patient also reports decreased p.o. intake  Orthostatics positive on admission  Now with improved hgb, improved PO intake at this time  He reports lightheadedness with positional changes is improved today but still present  Provide with 10 hours of gentle IVF, monitoring closely with his moderate aortic  stenosis  PT/OT  Pancytopenia (HCC)  Suspected chemotherapy induced with leukopenia, thrombocytopenia & decreased ANC   At least contributing to acute anemia if not full cause   Transfuse as indicated   No indication for Granix at this time - WBC are improved from admission  Remains afebrile   Trend   Hypokalemia  Monitor & replete as indicated  Hypomagnesemia  Monitor & replete as indicated   Chronic blood loss anemia  As above, with multiple AVMs  Essential hypertension  Hold HCTZ  Continue BB with holding parameters  Still with some soft pressures  Dyslipidemia  Continue statin  Moderate aortic stenosis  Monitor volume status while receiving IVF  Avoid hypotension if possible, holding HCTZ  JM (obstructive sleep apnea)  Continue CPAP hs     VTE Pharmacologic Prophylaxis:   High Risk (Score >/= 5) - Pharmacological DVT Prophylaxis Contraindicated. Sequential Compression Devices Ordered.    Mobility:   Basic Mobility Inpatient Raw Score: 20  JH-HLM Goal: 6: Walk 10 steps or more  JH-HLM Achieved: 1: Laying in bed  JH-HLM Goal NOT achieved. Continue with multidisciplinary rounding and encourage appropriate mobility to improve upon JH-HLM goals.    Patient Centered Rounds: I performed bedside rounds with nursing staff today.   Discussions with Specialists or Other Care Team Provider: none    Education and Discussions with Family / Patient: Patient declined call to .     Current Length of Stay: 1 day(s)  Current Patient Status: Inpatient   Certification Statement: The patient will continue to require additional inpatient hospital stay due to monitoring vitals and blood counts  Discharge Plan: Anticipate discharge tomorrow to home.    Code Status: Level 1 - Full Code    Subjective   Patient reports feeling improved today, has an appetite now but does still have some lightheadedness upon standing    Objective :  Temp:  [97.3 °F (36.3 °C)-98.5 °F (36.9 °C)] 97.9 °F (36.6 °C)  HR:  [65-85] 78  BP:  ()/(49-73) 116/63  Resp:  [15-18] 18  SpO2:  [91 %-99 %] 93 %  O2 Device: None (Room air)    Body mass index is 32.99 kg/m².     Input and Output Summary (last 24 hours):     Intake/Output Summary (Last 24 hours) at 3/30/2025 1013  Last data filed at 3/30/2025 0553  Gross per 24 hour   Intake 2080 ml   Output --   Net 2080 ml       Physical Exam  Vitals and nursing note reviewed.   Cardiovascular:      Rate and Rhythm: Normal rate and regular rhythm.      Pulses: Normal pulses.      Heart sounds: Normal heart sounds.   Pulmonary:      Effort: Pulmonary effort is normal.      Breath sounds: Normal breath sounds.   Abdominal:      General: Bowel sounds are normal.      Palpations: Abdomen is soft.      Tenderness: There is no abdominal tenderness. There is no guarding.   Musculoskeletal:      Right lower leg: No edema.      Left lower leg: No edema.   Skin:     Coloration: Skin is not pale.   Neurological:      Mental Status: He is alert and oriented to person, place, and time. Mental status is at baseline.   Psychiatric:         Mood and Affect: Mood normal.         Behavior: Behavior normal.           Lines/Drains:        Telemetry:  Telemetry Orders (From admission, onward)               24 Hour Telemetry Monitoring  Continuous x 24 Hours (Telem)        Expiring   Question:  Reason for 24 Hour Telemetry  Answer:  Metabolic/electrolyte disturbance with high probability of dysrhythmia. K level <3 or >6 OR KCL infusion >10mEq/hr                     Telemetry Reviewed: Normal Sinus Rhythm  Indication for Continued Telemetry Use: No indication for continued use. Will discontinue.                Lab Results: I have reviewed the following results:   Results from last 7 days   Lab Units 03/30/25  0638   WBC Thousand/uL 2.32*   HEMOGLOBIN g/dL 8.4*   HEMATOCRIT % 25.5*   PLATELETS Thousands/uL 118*   SEGS PCT % 69   LYMPHO PCT % 16   MONO PCT % 14*   EOS PCT % 1     Results from last 7 days   Lab Units 03/30/25  0638    SODIUM mmol/L 141   POTASSIUM mmol/L 3.5   CHLORIDE mmol/L 101   CO2 mmol/L 33*   BUN mg/dL 9   CREATININE mg/dL 0.64   ANION GAP mmol/L 7   CALCIUM mg/dL 8.5   ALBUMIN g/dL 3.4*   TOTAL BILIRUBIN mg/dL 0.82   ALK PHOS U/L 68   ALT U/L 7   AST U/L 11*   GLUCOSE RANDOM mg/dL 97                       Recent Cultures (last 7 days):         Imaging Results Review: No pertinent imaging studies reviewed.  Other Study Results Review: No additional pertinent studies reviewed.    Last 24 Hours Medication List:     Current Facility-Administered Medications:     acetaminophen (TYLENOL) tablet 650 mg, Q6H PRN    atorvastatin (LIPITOR) tablet 40 mg, Daily    azelastine (ASTELIN) 0.1 % nasal spray 2 spray, BID    brimonidine tartrate 0.2 % ophthalmic solution 1 drop, Q12H HAYLEE    famotidine (PEPCID) tablet 20 mg, HS    ferrous sulfate tablet 325 mg, Daily With Breakfast    fish oil capsule 1,000 mg, BID    folic acid (FOLVITE) tablet 1 mg, Daily    [Held by provider] hydroCHLOROthiazide tablet 12.5 mg, Daily    hydrOXYzine HCL (ATARAX) tablet 25 mg, TID    metoprolol succinate (TOPROL-XL) 24 hr tablet 12.5 mg, Daily    ondansetron (ZOFRAN) injection 4 mg, Q6H PRN    pantoprazole (PROTONIX) injection 40 mg, Q12H HAYLEE    sodium chloride 0.9 % infusion, Continuous, Last Rate: 75 mL/hr (03/30/25 1011)    tamsulosin (FLOMAX) capsule 0.4 mg, Daily With Dinner    Administrative Statements   Today, Patient Was Seen By: Chastity Ladd PA-C  I have spent a total time of 46 minutes in caring for this patient on the day of the visit/encounter including Risks and benefits of tx options, Instructions for management, Patient and family education, Importance of tx compliance, Counseling / Coordination of care, Documenting in the medical record, Reviewing/placing orders in the medical record (including tests, medications, and/or procedures), Obtaining or reviewing history  , and Communicating with other healthcare professionals .    **Please Note:  This note may have been constructed using a voice recognition system.**

## 2025-03-31 LAB
ALBUMIN SERPL BCG-MCNC: 3.5 G/DL (ref 3.5–5)
ALP SERPL-CCNC: 74 U/L (ref 34–104)
ALT SERPL W P-5'-P-CCNC: 8 U/L (ref 7–52)
ANION GAP SERPL CALCULATED.3IONS-SCNC: 8 MMOL/L (ref 4–13)
AST SERPL W P-5'-P-CCNC: 13 U/L (ref 13–39)
BASOPHILS # BLD AUTO: 0.01 THOUSANDS/ÂΜL (ref 0–0.1)
BASOPHILS NFR BLD AUTO: 0 % (ref 0–1)
BILIRUB SERPL-MCNC: 0.74 MG/DL (ref 0.2–1)
BUN SERPL-MCNC: 9 MG/DL (ref 5–25)
CALCIUM SERPL-MCNC: 8.6 MG/DL (ref 8.4–10.2)
CHLORIDE SERPL-SCNC: 104 MMOL/L (ref 96–108)
CO2 SERPL-SCNC: 30 MMOL/L (ref 21–32)
CREAT SERPL-MCNC: 0.56 MG/DL (ref 0.6–1.3)
EOSINOPHIL # BLD AUTO: 0.01 THOUSAND/ÂΜL (ref 0–0.61)
EOSINOPHIL NFR BLD AUTO: 0 % (ref 0–6)
ERYTHROCYTE [DISTWIDTH] IN BLOOD BY AUTOMATED COUNT: 17.9 % (ref 11.6–15.1)
GFR SERPL CREATININE-BSD FRML MDRD: 106 ML/MIN/1.73SQ M
GLUCOSE SERPL-MCNC: 112 MG/DL (ref 65–140)
HCT VFR BLD AUTO: 26.3 % (ref 36.5–49.3)
HCT VFR BLD AUTO: 26.3 % (ref 36.5–49.3)
HGB BLD-MCNC: 8.6 G/DL (ref 12–17)
HGB BLD-MCNC: 8.8 G/DL (ref 12–17)
IMM GRANULOCYTES # BLD AUTO: 0 THOUSAND/UL (ref 0–0.2)
IMM GRANULOCYTES NFR BLD AUTO: 0 % (ref 0–2)
LYMPHOCYTES # BLD AUTO: 0.37 THOUSANDS/ÂΜL (ref 0.6–4.47)
LYMPHOCYTES NFR BLD AUTO: 14 % (ref 14–44)
MAGNESIUM SERPL-MCNC: 1.6 MG/DL (ref 1.9–2.7)
MCH RBC QN AUTO: 32.6 PG (ref 26.8–34.3)
MCHC RBC AUTO-ENTMCNC: 33.5 G/DL (ref 31.4–37.4)
MCV RBC AUTO: 97 FL (ref 82–98)
MONOCYTES # BLD AUTO: 0.23 THOUSAND/ÂΜL (ref 0.17–1.22)
MONOCYTES NFR BLD AUTO: 9 % (ref 4–12)
NEUTROPHILS # BLD AUTO: 2.07 THOUSANDS/ÂΜL (ref 1.85–7.62)
NEUTS SEG NFR BLD AUTO: 77 % (ref 43–75)
NRBC BLD AUTO-RTO: 0 /100 WBCS
PHOSPHATE SERPL-MCNC: 3.6 MG/DL (ref 2.3–4.1)
PLATELET # BLD AUTO: 129 THOUSANDS/UL (ref 149–390)
PMV BLD AUTO: 9.9 FL (ref 8.9–12.7)
POTASSIUM SERPL-SCNC: 3.2 MMOL/L (ref 3.5–5.3)
PROT SERPL-MCNC: 5.9 G/DL (ref 6.4–8.4)
RBC # BLD AUTO: 2.7 MILLION/UL (ref 3.88–5.62)
SODIUM SERPL-SCNC: 142 MMOL/L (ref 135–147)
WBC # BLD AUTO: 2.69 THOUSAND/UL (ref 4.31–10.16)

## 2025-03-31 PROCEDURE — 97162 PT EVAL MOD COMPLEX 30 MIN: CPT

## 2025-03-31 PROCEDURE — 80053 COMPREHEN METABOLIC PANEL: CPT | Performed by: HOSPITALIST

## 2025-03-31 PROCEDURE — 85018 HEMOGLOBIN: CPT

## 2025-03-31 PROCEDURE — 99232 SBSQ HOSP IP/OBS MODERATE 35: CPT

## 2025-03-31 PROCEDURE — 83735 ASSAY OF MAGNESIUM: CPT | Performed by: HOSPITALIST

## 2025-03-31 PROCEDURE — 94760 N-INVAS EAR/PLS OXIMETRY 1: CPT

## 2025-03-31 PROCEDURE — 97166 OT EVAL MOD COMPLEX 45 MIN: CPT

## 2025-03-31 PROCEDURE — 85014 HEMATOCRIT: CPT

## 2025-03-31 PROCEDURE — 84100 ASSAY OF PHOSPHORUS: CPT | Performed by: HOSPITALIST

## 2025-03-31 PROCEDURE — 85025 COMPLETE CBC W/AUTO DIFF WBC: CPT | Performed by: PHYSICIAN ASSISTANT

## 2025-03-31 PROCEDURE — 94660 CPAP INITIATION&MGMT: CPT

## 2025-03-31 RX ORDER — POTASSIUM CHLORIDE 1500 MG/1
40 TABLET, EXTENDED RELEASE ORAL ONCE
Status: COMPLETED | OUTPATIENT
Start: 2025-03-31 | End: 2025-03-31

## 2025-03-31 RX ORDER — MAGNESIUM SULFATE HEPTAHYDRATE 40 MG/ML
2 INJECTION, SOLUTION INTRAVENOUS ONCE
Status: COMPLETED | OUTPATIENT
Start: 2025-03-31 | End: 2025-03-31

## 2025-03-31 RX ADMIN — PANTOPRAZOLE SODIUM 40 MG: 40 INJECTION, POWDER, FOR SOLUTION INTRAVENOUS at 09:26

## 2025-03-31 RX ADMIN — HYDROXYZINE HYDROCHLORIDE 25 MG: 25 TABLET, FILM COATED ORAL at 21:03

## 2025-03-31 RX ADMIN — ATORVASTATIN CALCIUM 40 MG: 40 TABLET, FILM COATED ORAL at 09:26

## 2025-03-31 RX ADMIN — FOLIC ACID 1 MG: 1 TABLET ORAL at 09:27

## 2025-03-31 RX ADMIN — PANTOPRAZOLE SODIUM 40 MG: 40 INJECTION, POWDER, FOR SOLUTION INTRAVENOUS at 21:03

## 2025-03-31 RX ADMIN — BRIMONIDINE TARTRATE 1 DROP: 2 SOLUTION/ DROPS OPHTHALMIC at 09:29

## 2025-03-31 RX ADMIN — FERROUS SULFATE TAB 325 MG (65 MG ELEMENTAL FE) 325 MG: 325 (65 FE) TAB at 09:26

## 2025-03-31 RX ADMIN — HYDROXYZINE HYDROCHLORIDE 25 MG: 25 TABLET, FILM COATED ORAL at 16:38

## 2025-03-31 RX ADMIN — METOPROLOL SUCCINATE 12.5 MG: 25 TABLET, EXTENDED RELEASE ORAL at 09:27

## 2025-03-31 RX ADMIN — OMEGA-3 FATTY ACIDS CAP 1000 MG 1000 MG: 1000 CAP at 21:03

## 2025-03-31 RX ADMIN — HYDROXYZINE HYDROCHLORIDE 25 MG: 25 TABLET, FILM COATED ORAL at 09:27

## 2025-03-31 RX ADMIN — TAMSULOSIN HYDROCHLORIDE 0.4 MG: 0.4 CAPSULE ORAL at 16:38

## 2025-03-31 RX ADMIN — POTASSIUM CHLORIDE 40 MEQ: 1500 TABLET, EXTENDED RELEASE ORAL at 12:06

## 2025-03-31 RX ADMIN — MAGNESIUM SULFATE HEPTAHYDRATE 2 G: 40 INJECTION, SOLUTION INTRAVENOUS at 12:06

## 2025-03-31 RX ADMIN — AZELASTINE HYDROCHLORIDE 2 SPRAY: 137 SPRAY, METERED NASAL at 09:29

## 2025-03-31 RX ADMIN — OMEGA-3 FATTY ACIDS CAP 1000 MG 1000 MG: 1000 CAP at 09:26

## 2025-03-31 RX ADMIN — BRIMONIDINE TARTRATE 1 DROP: 2 SOLUTION/ DROPS OPHTHALMIC at 21:04

## 2025-03-31 RX ADMIN — FAMOTIDINE 20 MG: 20 TABLET, FILM COATED ORAL at 21:03

## 2025-03-31 RX ADMIN — AZELASTINE HYDROCHLORIDE 2 SPRAY: 137 SPRAY, METERED NASAL at 21:04

## 2025-03-31 NOTE — ASSESSMENT & PLAN NOTE
68 yo male PMHx HTN, mod AS, prior UGI this past February presents with positional dizziness and lightheadedness. Associated decreased oral intake. Noted with positive orthostats.    With lightheadedness with positive orthostats suggestive of volume depletion. Also suspecting symptomatic anemia 2/2  chemotherapy induced vs rule out GI bleed  VS with low-normal BP not tachycardiac. Normal Cr and BUN.   Did have recurrence of melena on 3/30 consistent with his prior episodes of melena in the past    Did have multiple EGD in February noted small angiectasia in 2nd part of duodenum with stigmata of recent hemorrhage s/p hemostasis. C scope with internal hemorrhoid, small and medium sigmoid & rectosigmoid diverticulum & old blood suggestive of proximal foci of bleeding. Did have [facility] capsule endoscopy on 2/17 & 2/19 both limited to 2-3 hours rather than the 8 but GI believes most of small bowel seen with evidence of non-bleeding angiectasias throughout small bowel  Hgb in 7s since February  Now s/p 2 units pRBC with Hgb stabilization   IV PPI bid   Trend H&H   Stool record  Hold pharm DVT ppx  GI consult, d/w GI today, NPO at [facility] for possible EGD with push enteroscopy

## 2025-03-31 NOTE — PLAN OF CARE
Problem: PHYSICAL THERAPY ADULT  Goal: Performs mobility at highest level of function for planned discharge setting.  See evaluation for individualized goals.  Description: Treatment/Interventions: Functional transfer training, LE strengthening/ROM, Therapeutic exercise, Endurance training, Bed mobility, Gait training          See flowsheet documentation for full assessment, interventions and recommendations.  Note: Prognosis: Good  Problem List: Decreased strength, Decreased endurance, Impaired balance, Decreased mobility, Obesity, Impaired hearing      Assessment: Pt is a 69 y.o. male seen for PT evaluation s/p admission to [facility] on 3/28/2025 with Symptomatic anemia.  Order placed for PT services.  Upon evaluation: Pt is presenting with impaired functional mobility due to impaired balance, fall risk, and significant lightheadedness requiring SUP assistance for transfers and causing the inability to ambulate. Pt's clinical presentation is currently evolving given the functional mobility deficits above, especially decreased endurance, impaired balance, and decreased activity tolerance, and combined with medical complications of abnormal H&H, abnormal WBCs, abnormal CBC, abnormal potassium values, and need for input for mobility technique/safety.  Pt's PMHx and comorbidities that may affect physical performance and progress include: moderate aortic stenosis, symptomatic anemia, DM, COPD, head and neck cancer. Personal factors affecting pt at time of IE include: anxiety and inability to ambulate household distances. Pt will benefit from continued skilled PT services to address deficits as defined above and to maximize level of functional mobility to facilitate return toward PLOF and improved QOL. From PT/mobility standpoint, recommendation at time of d/c would be Level III (Minimum Resource Intensity) in order to reduce fall risk and maximize pt's functional independence and consistency with  mobility.     Rehab Resource Intensity Level, PT: III (Minimum Resource Intensity)    See flowsheet documentation for full assessment.

## 2025-03-31 NOTE — PLAN OF CARE
Problem: PAIN - ADULT  Goal: Verbalizes/displays adequate comfort level or baseline comfort level  Description: Interventions:- Encourage patient to monitor pain and request assistance- Assess pain using appropriate pain scale- Administer analgesics based on type and severity of pain and evaluate response- Implement non-pharmacological measures as appropriate and evaluate response- Consider cultural and social influences on pain and pain management- Notify physician/advanced practitioner if interventions unsuccessful or patient reports new pain  Outcome: Progressing     Problem: SAFETY ADULT  Goal: Patient will remain free of falls  Description: INTERVENTIONS:- Educate patient/family on patient safety including physical limitations- Instruct patient to call for assistance with activity - Consult OT/PT to assist with strengthening/mobility - Keep Call bell within reach- Keep bed low and locked with side rails adjusted as appropriate- Keep care items and personal belongings within reach- Initiate and maintain comfort rounds- Make Fall Risk Sign visible to staff- Offer Toileting every 2 Hours, in advance of need- Initiate/Maintain bed/chair alarm- Obtain necessary fall risk management equipment: non skid socks- Apply yellow socks and bracelet for high fall risk patients- Consider moving patient to room near nurses station  Outcome: Progressing  Goal: Maintain or return to baseline ADL function  Description: INTERVENTIONS:-  Assess patient's ability to carry out ADLs; assess patient's baseline for ADL function and identify physical deficits which impact ability to perform ADLs (bathing, care of mouth/teeth, toileting, grooming, dressing, etc.)- Assess/evaluate cause of self-care deficits - Assess range of motion- Assess patient's mobility; develop plan if impaired- Assess patient's need for assistive devices and provide as appropriate- Encourage maximum independence but intervene and supervise when necessary- Involve  family in performance of ADLs- Assess for home care needs following discharge - Consider OT consult to assist with ADL evaluation and planning for discharge- Provide patient education as appropriate  Outcome: Progressing  Goal: Maintains/Returns to pre admission functional level  Description: INTERVENTIONS:- Perform AM-PAC 6 Click Basic Mobility/ Daily Activity assessment daily.- Set and communicate daily mobility goal to care team and patient/family/caregiver. - Collaborate with rehabilitation services on mobility goals if consulted- Perform Range of Motion 3 times a day.- Reposition patient every 3 hours.- Dangle patient 3 times a day- Stand patient 3 times a day- Ambulate patient 3 times a day- Out of bed to chair 2 times a day - Out of bed for meals 3 times a day- Out of bed for toileting- Record patient progress and toleration of activity level   Outcome: Progressing     Problem: DISCHARGE PLANNING  Goal: Discharge to home or other facility with appropriate resources  Description: INTERVENTIONS:- Identify barriers to discharge w/patient and caregiver- Arrange for needed discharge resources and transportation as appropriate- Identify discharge learning needs (meds, wound care, etc.)- Arrange for interpretive services to assist at discharge as needed- Refer to Case Management Department for coordinating discharge planning if the patient needs post-hospital services based on physician/advanced practitioner order or complex needs related to functional status, cognitive ability, or social support system  Outcome: Progressing     Problem: NEUROSENSORY - ADULT  Goal: Achieves stable or improved neurological status  Description: INTERVENTIONS- Monitor and report changes in neurological status- Monitor vital signs such as temperature, blood pressure, glucose, and any other labs ordered - Initiate measures to prevent increased intracranial pressure- Monitor for lightheadedness.    Outcome: Progressing     Problem:  GASTROINTESTINAL - ADULT  Goal: Maintains or returns to baseline bowel function  Description: INTERVENTIONS:- Assess bowel function- Encourage oral fluids to ensure adequate hydration- Administer IV fluids if ordered to ensure adequate hydration- Administer ordered medications as needed- Encourage mobilization and activity- Consider nutritional services referral to assist patient with adequate nutrition and appropriate food choices  Outcome: Progressing  Goal: Maintains adequate nutritional intake  Description: INTERVENTIONS:- Monitor percentage of each meal consumed- Identify factors contributing to decreased intake, treat as appropriate- Assist with meals as needed- Monitor I&O, weight, and lab values if indicated- Obtain nutrition services referral as needed  Outcome: Progressing     Problem: METABOLIC, FLUID AND ELECTROLYTES - ADULT  Goal: Electrolytes maintained within normal limits  Description: INTERVENTIONS:- Monitor labs and assess patient for signs and symptoms of electrolyte imbalances- Administer electrolyte replacement as ordered- Monitor response to electrolyte replacements, including repeat lab results as appropriate- Instruct patient on fluid and nutrition as appropriate  Outcome: Progressing  Goal: Fluid balance maintained  Description: INTERVENTIONS:- Monitor labs - Monitor I/O and WT- Instruct patient on fluid and nutrition as appropriate- Assess for signs & symptoms of volume excess or deficit  Outcome: Progressing     Problem: HEMATOLOGIC - ADULT  Goal: Maintains hematologic stability  Description: INTERVENTIONS- Assess for signs and symptoms of bleeding or hemorrhage- Monitor labs- Administer supportive blood products/factors as ordered and appropriate  Outcome: Progressing

## 2025-03-31 NOTE — PLAN OF CARE
Problem: OCCUPATIONAL THERAPY ADULT  Goal: Performs self-care activities at highest level of function for planned discharge setting.  See evaluation for individualized goals.  Description: Treatment Interventions: ADL retraining, Functional transfer training, UE strengthening/ROM, Endurance training, Equipment evaluation/education, Patient/family training, Activityengagement, Energy conservation          See flowsheet documentation for full assessment, interventions and recommendations.   Note: Limitation: Decreased ADL status, Decreased UE strength, Decreased Safe judgement during ADL, Decreased endurance, Decreased self-care trans, Decreased high-level ADLs     Assessment: Pt is a 69 y.o. male seen for OT evaluation s/p admit to [facility] on 3/28/2025 w/ Symptomatic anemia. Pt with PMHx impacting their performance during ADL tasks, including: chronic dyslipidemia, chronic HTN, moderate aortic stneosis, pancytopenia, lightheadedness, chronic blood loss anemia. Prior to admission to the hospital Pt was performing ADLs without physical assistance. IADLs without physical assistance. Functional transfers/ambulation without physical assistance.  OT order placed to assess Pt's ADLs, cognitive status, and performance during functional tasks in order to maximize safety and independence while making most appropriate d/c recommendations. Pt's clinical presentation is currently evolving given new onset deficits that affect Pt's occupational performance and ability to safely return to OF including decrease activity tolerance, decrease standing balance, decrease performance during ADL tasks, decrease safety awareness , increase impulsiveness, decrease UB MS, decrease generalized strength, decrease activity engagement, decrease performance during functional transfers, limited family support, high fall risk, and limited insight to deficits combined with medical complications of abnormal renal lab values, abnormal H&H, abnormal CBC,  abnormal potassium values, impulsivity during admission, and need for input for mobility technique/safety. Personal factors affecting Pt at time of initial evaluation include: limited home support, inability to perform IADLs, inability to perform ADLs, new need for AD, inability to navigate community distances, limited insight into impairments, decreased initiation and engagement, and recent fall(s)/fall history. The patient's raw score on the -PAC Daily Activity Inpatient Short Form is 21. A raw score of greater than or equal to 19 suggests the patient may benefit from discharge to home. Please refer to the recommendation of the Occupational Therapist for safe discharge planning.. Pt will benefit from continued skilled OT services to address deficits as defined above and to maximize level independence/participation during ADLs and functional tasks to facilitate return toward PLOF and improved quality of life. From an occupational therapy standpoint, Level III (Minimum Resource Intensity) and with walker is recommended upon d/c.     Rehab Resource Intensity Level, OT: III (Minimum Resource Intensity)

## 2025-03-31 NOTE — ASSESSMENT & PLAN NOTE
Patient presented to the ER for evaluation of lightheadedness  Likely secondary to dehydration in the setting of chemotherapy and radiation treatments  Patient also reports decreased p.o. intake  Orthostatics positive on admission  Now with improved hgb, improved PO intake at this time  He reports lightheadedness with positional changes is improved today but still present  Provide with 10 hours of gentle IVF, monitoring closely with his moderate aortic stenosis  Was unable to receive this given limited IV access yesterday, trial this today  PT/OT

## 2025-03-31 NOTE — PHYSICAL THERAPY NOTE
"PHYSICAL THERAPY EVALUATION  NAME:  Ean Young  DATE: 03/31/25    AGE:   69 y.o.  Mrn:   [id number]  ADMIT DX:  Hypokalemia [E87.6]  Hypomagnesemia [E83.42]  Dizziness [R42]  Lightheadedness [R42]  Aortic stenosis [I35.0]  Anemia [D64.9]  History of GI bleed [Z87.19]    Past Medical History:   Diagnosis Date    Cancer (HCC)     Dyslipidemia     GERD (gastroesophageal reflux disease)     HL (hearing loss)     Hypertension     Essential    Moderate aortic stenosis     Obstructive sleep apnea      Length Of Stay: 2  Performed at least 2 patient identifiers during session: Name and ID bracelet  PHYSICAL THERAPY EVALUATION :    03/31/25 0848   PT Last Visit   PT Visit Date 03/31/25   Note Type   Note type Evaluation   Pain Assessment   Pain Assessment Tool 0-10   Pain Score No Pain   Restrictions/Precautions   Weight Bearing Precautions Per Order No   Other Precautions Fall Risk   Home Living   Type of Home Apartment   Home Layout One level;Ramped entrance;Elevator   Bathroom Shower/Tub Tub/shower unit   Bathroom Toilet Standard   Bathroom Equipment Grab bars in shower;Grab bars around toilet   Bathroom Accessibility Accessible   Additional Comments pt denies use of DME at baseline   Prior Function   Level of [place] Independent with ADLs;Independent with functional mobility;Independent with IADLS   Lives With Alone   Receives Help From Family   IADLs Family/Friend/Other provides transportation   Falls in the last 6 months 0   Vocational Retired   General   Family/Caregiver Present No   Cognition   Overall Cognitive Status WFL   Arousal/Participation Alert   Orientation Level Oriented X4   Following Commands Follows all commands and directions without difficulty   Subjective   Subjective \"I worked in a restaurant for 40 years\"   RLE Assessment   RLE Assessment WFL   LLE Assessment   LLE Assessment WFL   Bed Mobility   Additional Comments pt OOB at start/end of session   Transfers   Sit to Stand 5  Supervision "   Additional items Armrests;Increased time required;Verbal cues   Stand to Sit 5  Supervision   Additional items Armrests;Increased time required;Verbal cues   Additional Comments VCs for safety awareness   Ambulation/Elevation   Ambulation/Elevation Additional Comments pt unable to ambulate during session 2* to extreme lightheadedness; did not feel comfortable ambulating away from recliner chair. /63   Balance   Static Sitting Good   Dynamic Sitting Fair +   Static Standing Fair   Dynamic Standing Fair   Endurance Deficit   Endurance Deficit Yes   Endurance Deficit Description pt unable to ambulate 2* to symptoms outlined above; HR and SpO2 remained WFL on RA   Activity Tolerance   Activity Tolerance Patient limited by fatigue   Assessment   Prognosis Good   Problem List Decreased strength;Decreased endurance;Impaired balance;Decreased mobility;Obesity;Impaired hearing   Goals   Patient Goals to feel better   LTG Expiration Date 04/14/25   Plan   Treatment/Interventions Functional transfer training;LE strengthening/ROM;Therapeutic exercise;Endurance training;Bed mobility;Gait training   PT Frequency 3-5x/wk   Discharge Recommendation   Rehab Resource Intensity Level, PT III (Minimum Resource Intensity)   AM-PAC Basic Mobility Inpatient   Turning in Flat Bed Without Bedrails 4   Lying on Back to Sitting on Edge of Flat Bed Without Bedrails 3   Moving Bed to Chair 3   Standing Up From Chair Using Arms 3   Walk in Room 2   Climb 3-5 Stairs With Railing 2   Basic Mobility Inpatient Raw Score 17   Basic Mobility Standardized Score 39.67   [facility] Highest Level Of Mobility   -HLM Goal 5: Stand one or more mins   -HLM Achieved 5: Stand (1 or more minutes)   End of Consult   Patient Position at End of Consult Bedside chair;All needs within reach         (Please find full objective findings from PT assessment regarding body systems outlined above).     Assessment: Pt is a 69 y.o. male seen for PT evaluation  s/p admission to [facility] on 3/28/2025 with Symptomatic anemia.  Order placed for PT services.  Upon evaluation: Pt is presenting with impaired functional mobility due to impaired balance, fall risk, and significant lightheadedness  requiring  SUP assistance for transfers and causing the inability to ambulate. Pt's clinical presentation is currently evolving given the functional mobility deficits above, especially decreased endurance, impaired balance, and decreased activity tolerance, and combined with medical complications of abnormal H&H, abnormal WBCs, abnormal CBC, abnormal potassium values, and need for input for mobility technique/safety.  Pt's PMHx and comorbidities that may affect physical performance and progress include:  moderate aortic stenosis, symptomatic anemia, DM, COPD, head and neck cancer . Personal factors affecting pt at time of IE include: anxiety and inability to ambulate household distances. Pt will benefit from continued skilled PT services to address deficits as defined above and to maximize level of functional mobility to facilitate return toward PLOF and improved QOL. From PT/mobility standpoint, recommendation at time of d/c would be Level III (Minimum Resource Intensity) in order to reduce fall risk and maximize pt's functional independence and consistency with mobility.      The patient's AM-PAC Basic Mobility Inpatient Short Form Raw Score is 17. A Raw score of greater than 16 suggests the patient may benefit from discharge to home. Please also refer to the recommendation of the Physical Therapist for safe discharge planning.       Goals: Pt will participate in B LE strengthening exercises to facilitate improved functional activity tolerance. Pt will perform all functional transfers and bed mobility mod(I) with good safety awareness. Pt will ambulate 250' mod(I) with LRAD while maintaining good functional dynamic balance. Pt will ascend/descend a FFOS with HR and  SUP to reflect the ability to safely navigate the home.     Lesia White, PT,DPT

## 2025-03-31 NOTE — OCCUPATIONAL THERAPY NOTE
"    Occupational Therapy Evaluation     Patient Name: Ean Young  Today's Date: 3/31/2025  Problem List  Principal Problem:    Symptomatic anemia  Active Problems:    Essential hypertension    Dyslipidemia    Moderate aortic stenosis    JM (obstructive sleep apnea)    Hypokalemia    Pancytopenia (HCC)    Hypomagnesemia    Lightheadedness    Chronic blood loss anemia    Past Medical History  Past Medical History:   Diagnosis Date    Cancer (HCC)     Dyslipidemia     GERD (gastroesophageal reflux disease)     HL (hearing loss)     Hypertension     Essential    Moderate aortic stenosis     Obstructive sleep apnea      Past Surgical History  Past Surgical History:   Procedure Laterality Date    EGD      EYE SURGERY      laser surgery    SHOULDER ARTHROSCOPY Right     TONGUE SURGERY  07/2024    TONSILLECTOMY               03/31/25 1345   OT Last Visit   OT Visit Date 03/31/25   Note Type   Note type Evaluation   Pain Assessment   Pain Assessment Tool 0-10   Pain Score No Pain   Restrictions/Precautions   Weight Bearing Precautions Per Order No   Other Precautions Fall Risk;Multiple lines  (neutropenic precautions)   Home Living   Type of Home Apartment   Home Layout One level;Performs ADLs on one level;Able to live on main level with bedroom/bathroom;Elevator;Ramped entrance   Bathroom Shower/Tub Tub/shower unit   Bathroom Toilet Standard   Bathroom Equipment Grab bars in shower;Grab bars around toilet   Bathroom Accessibility Accessible   Home Equipment Grab bars   Additional Comments no AD baseline   Prior Function   Level of [place] Independent with ADLs;Independent with functional mobility;Independent with IADLS   Lives With Alone   Receives Help From Family   IADLs Family/Friend/Other provides transportation   Falls in the last 6 months 0   Vocational Retired   Subjective   Subjective \" she's a tough cookie, let me tell ya\"   ADL   Where Assessed Chair   LB Dressing Assistance 5  Supervision/Setup   LB " Dressing Deficit Steadying;Increased time to complete   Bed Mobility   Additional Comments coming out of bathroom at start of session, seated in chair at end of session; pt on RA; SPo2>90%; no chair alarm on chair upon arrival-RN notified   Transfers   Sit to Stand 5  Supervision   Additional items Increased time required;Armrests   Stand to Sit 5  Supervision   Additional items Armrests;Increased time required   Stand pivot 5  Supervision   Additional items Verbal cues;Increased time required   Additional Comments completing functional transfers w no AD; mild instability   Functional Mobility   Functional Mobility 5  Supervision   Additional Comments pt refusing to utilize RW for functional mobilty, utilizing IV pole for stability; ~100ft functional mobility with mild instability- pt would benefit from use of AD, occasional grabbing of railing in hallway   Balance   Static Sitting Good   Dynamic Sitting Fair +   Static Standing Fair   Dynamic Standing Fair   Ambulatory Fair -   Activity Tolerance   Activity Tolerance Patient limited by fatigue   RUE Assessment   RUE Assessment WFL   LUE Assessment   LUE Assessment WFL   Hand Function   Gross Motor Coordination Functional   Fine Motor Coordination Functional   Sensation   Light Touch No apparent deficits   Sharp/Dull No apparent deficits   Psychosocial   Psychosocial (WDL) WDL   Cognition   Overall Cognitive Status WFL   Arousal/Participation Alert   Attention Within functional limits   Orientation Level Oriented X4   Memory Within functional limits   Following Commands Follows all commands and directions without difficulty   Assessment   Limitation Decreased ADL status;Decreased UE strength;Decreased Safe judgement during ADL;Decreased endurance;Decreased self-care trans;Decreased high-level ADLs   Assessment Pt is a 69 y.o. male seen for OT evaluation s/p admit to [facility] on 3/28/2025 w/ Symptomatic anemia. Pt with PMHx impacting their performance during ADL tasks,  including: chronic dyslipidemia, chronic HTN, moderate aortic stneosis, pancytopenia, lightheadedness, chronic blood loss anemia. Prior to admission to the hospital Pt was performing ADLs without physical assistance. IADLs without physical assistance. Functional transfers/ambulation without physical assistance.  OT order placed to assess Pt's ADLs, cognitive status, and performance during functional tasks in order to maximize safety and independence while making most appropriate d/c recommendations. Pt's clinical presentation is currently evolving given new onset deficits that affect Pt's occupational performance and ability to safely return to OF including decrease activity tolerance, decrease standing balance, decrease performance during ADL tasks, decrease safety awareness , increase impulsiveness, decrease UB MS, decrease generalized strength, decrease activity engagement, decrease performance during functional transfers, limited family support, high fall risk, and limited insight to deficits combined with medical complications of abnormal renal lab values, abnormal H&H, abnormal CBC, abnormal potassium values, impulsivity during admission, and need for input for mobility technique/safety. Personal factors affecting Pt at time of initial evaluation include: limited home support, inability to perform IADLs, inability to perform ADLs, new need for AD, inability to navigate community distances, limited insight into impairments, decreased initiation and engagement, and recent fall(s)/fall history. The patient's raw score on the -PAC Daily Activity Inpatient Short Form is 21. A raw score of greater than or equal to 19 suggests the patient may benefit from discharge to home. Please refer to the recommendation of the Occupational Therapist for safe discharge planning.. Pt will benefit from continued skilled OT services to address deficits as defined above and to maximize level independence/participation during ADLs  and functional tasks to facilitate return toward PLOF and improved quality of life. From an occupational therapy standpoint, Level III (Minimum Resource Intensity) and with walker is recommended upon d/c.   Goals   Patient Goals to feel better   Short Term Goal  pt will complete UE strengthening exercises   Long Term Goal #1 pt will complete functional mobility with good safety awareness at mod (I) with least restrictive device   Long Term Goal #2 pt will complete LB dressing/bathing (I)   Long Term Goal pt will complete toilet transfers and hygiene (I)   Plan   Treatment Interventions ADL retraining;Functional transfer training;UE strengthening/ROM;Endurance training;Equipment evaluation/education;Patient/family training;Activityengagement;Energy conservation   Goal Expiration Date 04/14/25   OT Frequency 3-5x/wk   Discharge Recommendation   Rehab Resource Intensity Level, OT III (Minimum Resource Intensity)   AM-PAC Daily Activity Inpatient   Lower Body Dressing 3   Bathing 3   Toileting 3   Upper Body Dressing 4   Grooming 4   Eating 4   Daily Activity Raw Score 21   Daily Activity Standardized Score (Calc for Raw Score >=11) 44.27   AM-PAC Applied Cognition Inpatient   Following a Speech/Presentation 4   Understanding Ordinary Conversation 4   Taking Medications 4   Remembering Where Things Are Placed or Put Away 4   Remembering List of 4-5 Errands 4   Taking Care of Complicated Tasks 3   Applied Cognition Raw Score 23   Applied Cognition Standardized Score 53.08

## 2025-03-31 NOTE — RESPIRATORY THERAPY NOTE
03/30/25 2233   Respiratory Assessment   Resp Comments Pt refusing CPAP tonight, stated he just had a bad day and didn't want to go on tonight. On 2L NC.   Non-Invasive Information   Non-Invasive Ventilation Mode CPAP   SpO2 99 %

## 2025-03-31 NOTE — PROGRESS NOTES
Progress Note - Hospitalist   Name: Ean Young 69 y.o. male I MRN: [id number]  Unit/Bed#: 403-01 I Date of Admission: 3/28/2025   Date of Service: 3/31/2025 I Hospital Day: 2    Assessment & Plan  Symptomatic anemia  68 yo male PMHx HTN, mod AS, prior UGI this past February presents with positional dizziness and lightheadedness. Associated decreased oral intake. Noted with positive orthostats.    With lightheadedness with positive orthostats suggestive of volume depletion. Also suspecting symptomatic anemia 2/2  chemotherapy induced vs rule out GI bleed  VS with low-normal BP not tachycardiac. Normal Cr and BUN.   Did have recurrence of melena on 3/30 consistent with his prior episodes of melena in the past    Did have multiple EGD in February noted small angiectasia in 2nd part of duodenum with stigmata of recent hemorrhage s/p hemostasis. C scope with internal hemorrhoid, small and medium sigmoid & rectosigmoid diverticulum & old blood suggestive of proximal foci of bleeding. Did have [facility] capsule endoscopy on 2/17 & 2/19 both limited to 2-3 hours rather than the 8 but GI believes most of small bowel seen with evidence of non-bleeding angiectasias throughout small bowel  Hgb in 7s since February  Now s/p 2 units pRBC with Hgb stabilization   IV PPI bid   Trend H&H   Stool record  Hold pharm DVT ppx  GI consult, d/w GI today, NPO at [facility] for possible EGD with push enteroscopy   Lightheadedness  Patient presented to the ER for evaluation of lightheadedness  Likely secondary to dehydration in the setting of chemotherapy and radiation treatments  Patient also reports decreased p.o. intake  Orthostatics positive on admission  Now with improved hgb, improved PO intake at this time  He reports lightheadedness with positional changes is improved today but still present  Provide with 10 hours of gentle IVF, monitoring closely with his moderate aortic stenosis  Was unable to receive this given limited IV access  yesterday, trial this today  PT/OT  Pancytopenia (HCC)  Suspected chemotherapy induced with leukopenia, thrombocytopenia & decreased ANC   At least contributing to acute anemia if not full cause   Transfuse as indicated   No indication for Granix at this time - WBC are improved from admission  Remains afebrile   Trend   Hypokalemia  Monitor & replete as indicated  Hypomagnesemia  Monitor & replete as indicated   Chronic blood loss anemia  As above, with multiple AVMs  Essential hypertension  Hold HCTZ  Continue BB with holding parameters  Still with some soft pressures  Dyslipidemia  Continue statin  Moderate aortic stenosis  Monitor volume status while receiving IVF  Avoid hypotension if possible, holding HCTZ  JM (obstructive sleep apnea)  Continue CPAP hs     VTE Pharmacologic Prophylaxis:   SCDs    Mobility:   Basic Mobility Inpatient Raw Score: 20  JH-HLM Goal: 6: Walk 10 steps or more  JH-HLM Achieved: 4: Move to chair/commode  JH-HLM Goal NOT achieved. Continue with multidisciplinary rounding and encourage appropriate mobility to improve upon JH-HLM goals.    Patient Centered Rounds: I performed bedside rounds with nursing staff today.   Discussions with Specialists or Other Care Team Provider: case management, GI    Education and Discussions with Family / Patient: to update family     Current Length of Stay: 2 day(s)  Current Patient Status: Inpatient   Certification Statement: The patient will continue to require additional inpatient hospital stay due to acute anemia, possible GI bleed, GI consult, possible EGD  Discharge Plan: Anticipate discharge in 48-72 hrs to home.    Code Status: Level 1 - Full Code    Subjective   Patient reports persistent positional lightheadedness.  Did report he had a bowel movement yesterday that was consistent with his prior melena.  Denies any abdominal pain.  No hematemesis.  Reports that there is difficulty with his IV access yesterday and he was really unable to receive  most of the IV fluids as ordered.  He denies any chest pain or shortness of breath.    Objective :  Temp:  [97.4 °F (36.3 °C)-98.1 °F (36.7 °C)] 97.4 °F (36.3 °C)  HR:  [70-77] 77  BP: (120-127)/(62-63) 121/62  Resp:  [17] 17  SpO2:  [92 %-99 %] 92 %  O2 Device: High flow nasal cannula    Body mass index is 32.99 kg/m².     Input and Output Summary (last 24 hours):     Intake/Output Summary (Last 24 hours) at 3/31/2025 0918  Last data filed at 3/31/2025 0843  Gross per 24 hour   Intake 420 ml   Output --   Net 420 ml       Physical Exam  HENT:      Head: Normocephalic and atraumatic.   Cardiovascular:      Rate and Rhythm: Normal rate and regular rhythm.      Heart sounds: Murmur heard.   Pulmonary:      Effort: Pulmonary effort is normal.      Breath sounds: Normal breath sounds.   Abdominal:      General: Abdomen is flat. Bowel sounds are normal. There is no distension.      Palpations: Abdomen is soft.   Musculoskeletal:      Right lower leg: No edema.      Left lower leg: No edema.   Skin:     General: Skin is warm and dry.   Neurological:      Mental Status: He is alert.      Comments: At baseline with right-sided facial drooping           Lines/Drains:              Lab Results: I have reviewed the following results:   Results from last 7 days   Lab Units 03/30/25  1310 03/30/25  0638   WBC Thousand/uL  --  2.32*   HEMOGLOBIN g/dL 8.4* 8.4*   HEMATOCRIT % 25.5* 25.5*   PLATELETS Thousands/uL  --  118*   SEGS PCT %  --  69   LYMPHO PCT %  --  16   MONO PCT %  --  14*   EOS PCT %  --  1     Results from last 7 days   Lab Units 03/30/25  0638   SODIUM mmol/L 141   POTASSIUM mmol/L 3.5   CHLORIDE mmol/L 101   CO2 mmol/L 33*   BUN mg/dL 9   CREATININE mg/dL 0.64   ANION GAP mmol/L 7   CALCIUM mg/dL 8.5   ALBUMIN g/dL 3.4*   TOTAL BILIRUBIN mg/dL 0.82   ALK PHOS U/L 68   ALT U/L 7   AST U/L 11*   GLUCOSE RANDOM mg/dL 97                       Recent Cultures (last 7 days):         Imaging Results Review: No pertinent  imaging studies reviewed.  Other Study Results Review: No additional pertinent studies reviewed.    Last 24 Hours Medication List:     Current Facility-Administered Medications:     acetaminophen (TYLENOL) tablet 650 mg, Q6H PRN    atorvastatin (LIPITOR) tablet 40 mg, Daily    azelastine (ASTELIN) 0.1 % nasal spray 2 spray, BID    brimonidine tartrate 0.2 % ophthalmic solution 1 drop, Q12H HAYLEE    famotidine (PEPCID) tablet 20 mg, HS    ferrous sulfate tablet 325 mg, Daily With Breakfast    fish oil capsule 1,000 mg, BID    folic acid (FOLVITE) tablet 1 mg, Daily    [Held by provider] hydroCHLOROthiazide tablet 12.5 mg, Daily    hydrOXYzine HCL (ATARAX) tablet 25 mg, TID    metoprolol succinate (TOPROL-XL) 24 hr tablet 12.5 mg, Daily    ondansetron (ZOFRAN) injection 4 mg, Q6H PRN    pantoprazole (PROTONIX) injection 40 mg, Q12H HAYLEE    tamsulosin (FLOMAX) capsule 0.4 mg, Daily With Dinner    Administrative Statements   Today, Patient Was Seen By: Cristal Ellis PA-C      **Please Note: This note may have been constructed using a voice recognition system.**

## 2025-04-01 ENCOUNTER — ANESTHESIA (INPATIENT)
Dept: PERIOP | Facility: HOSPITAL | Age: 70
End: 2025-04-01
Payer: COMMERCIAL

## 2025-04-01 ENCOUNTER — APPOINTMENT (INPATIENT)
Dept: PERIOP | Facility: HOSPITAL | Age: 70
End: 2025-04-01
Payer: COMMERCIAL

## 2025-04-01 ENCOUNTER — ANESTHESIA EVENT (INPATIENT)
Dept: PERIOP | Facility: HOSPITAL | Age: 70
End: 2025-04-01
Payer: COMMERCIAL

## 2025-04-01 LAB
ALBUMIN SERPL BCG-MCNC: 3.3 G/DL (ref 3.5–5)
ALP SERPL-CCNC: 69 U/L (ref 34–104)
ALT SERPL W P-5'-P-CCNC: 8 U/L (ref 7–52)
ANION GAP SERPL CALCULATED.3IONS-SCNC: 7 MMOL/L (ref 4–13)
AST SERPL W P-5'-P-CCNC: 13 U/L (ref 13–39)
BASOPHILS # BLD AUTO: 0.01 THOUSANDS/ÂΜL (ref 0–0.1)
BASOPHILS NFR BLD AUTO: 1 % (ref 0–1)
BILIRUB SERPL-MCNC: 0.69 MG/DL (ref 0.2–1)
BUN SERPL-MCNC: 12 MG/DL (ref 5–25)
CALCIUM ALBUM COR SERPL-MCNC: 9.1 MG/DL (ref 8.3–10.1)
CALCIUM SERPL-MCNC: 8.5 MG/DL (ref 8.4–10.2)
CHLORIDE SERPL-SCNC: 105 MMOL/L (ref 96–108)
CO2 SERPL-SCNC: 29 MMOL/L (ref 21–32)
CREAT SERPL-MCNC: 0.54 MG/DL (ref 0.6–1.3)
EOSINOPHIL # BLD AUTO: 0.01 THOUSAND/ÂΜL (ref 0–0.61)
EOSINOPHIL NFR BLD AUTO: 1 % (ref 0–6)
ERYTHROCYTE [DISTWIDTH] IN BLOOD BY AUTOMATED COUNT: 17.7 % (ref 11.6–15.1)
GFR SERPL CREATININE-BSD FRML MDRD: 107 ML/MIN/1.73SQ M
GLUCOSE SERPL-MCNC: 95 MG/DL (ref 65–140)
HCT VFR BLD AUTO: 22.8 % (ref 36.5–49.3)
HCT VFR BLD AUTO: 26.2 % (ref 36.5–49.3)
HGB BLD-MCNC: 7.5 G/DL (ref 12–17)
HGB BLD-MCNC: 8.5 G/DL (ref 12–17)
IMM GRANULOCYTES # BLD AUTO: 0 THOUSAND/UL (ref 0–0.2)
IMM GRANULOCYTES NFR BLD AUTO: 0 % (ref 0–2)
LYMPHOCYTES # BLD AUTO: 0.42 THOUSANDS/ÂΜL (ref 0.6–4.47)
LYMPHOCYTES NFR BLD AUTO: 20 % (ref 14–44)
MCH RBC QN AUTO: 31.9 PG (ref 26.8–34.3)
MCHC RBC AUTO-ENTMCNC: 32.9 G/DL (ref 31.4–37.4)
MCV RBC AUTO: 97 FL (ref 82–98)
MONOCYTES # BLD AUTO: 0.26 THOUSAND/ÂΜL (ref 0.17–1.22)
MONOCYTES NFR BLD AUTO: 12 % (ref 4–12)
NEUTROPHILS # BLD AUTO: 1.45 THOUSANDS/ÂΜL (ref 1.85–7.62)
NEUTS SEG NFR BLD AUTO: 66 % (ref 43–75)
NRBC BLD AUTO-RTO: 0 /100 WBCS
PLATELET # BLD AUTO: 113 THOUSANDS/UL (ref 149–390)
PMV BLD AUTO: 10.2 FL (ref 8.9–12.7)
POTASSIUM SERPL-SCNC: 3.5 MMOL/L (ref 3.5–5.3)
PROT SERPL-MCNC: 5.3 G/DL (ref 6.4–8.4)
RBC # BLD AUTO: 2.35 MILLION/UL (ref 3.88–5.62)
SODIUM SERPL-SCNC: 141 MMOL/L (ref 135–147)
WBC # BLD AUTO: 2.15 THOUSAND/UL (ref 4.31–10.16)

## 2025-04-01 PROCEDURE — 99232 SBSQ HOSP IP/OBS MODERATE 35: CPT

## 2025-04-01 PROCEDURE — 85025 COMPLETE CBC W/AUTO DIFF WBC: CPT

## 2025-04-01 PROCEDURE — 97110 THERAPEUTIC EXERCISES: CPT

## 2025-04-01 PROCEDURE — 85014 HEMATOCRIT: CPT

## 2025-04-01 PROCEDURE — 44369 SMALL BOWEL ENDOSCOPY: CPT | Performed by: STUDENT IN AN ORGANIZED HEALTH CARE EDUCATION/TRAINING PROGRAM

## 2025-04-01 PROCEDURE — 0W3P8ZZ CONTROL BLEEDING IN GASTROINTESTINAL TRACT, VIA NATURAL OR ARTIFICIAL OPENING ENDOSCOPIC: ICD-10-PCS | Performed by: STUDENT IN AN ORGANIZED HEALTH CARE EDUCATION/TRAINING PROGRAM

## 2025-04-01 PROCEDURE — 80053 COMPREHEN METABOLIC PANEL: CPT

## 2025-04-01 PROCEDURE — 94660 CPAP INITIATION&MGMT: CPT

## 2025-04-01 PROCEDURE — 99222 1ST HOSP IP/OBS MODERATE 55: CPT | Performed by: STUDENT IN AN ORGANIZED HEALTH CARE EDUCATION/TRAINING PROGRAM

## 2025-04-01 PROCEDURE — 85018 HEMOGLOBIN: CPT

## 2025-04-01 PROCEDURE — 97116 GAIT TRAINING THERAPY: CPT

## 2025-04-01 PROCEDURE — 94760 N-INVAS EAR/PLS OXIMETRY 1: CPT

## 2025-04-01 PROCEDURE — 0DJ08ZZ INSPECTION OF UPPER INTESTINAL TRACT, VIA NATURAL OR ARTIFICIAL OPENING ENDOSCOPIC: ICD-10-PCS | Performed by: STUDENT IN AN ORGANIZED HEALTH CARE EDUCATION/TRAINING PROGRAM

## 2025-04-01 RX ORDER — PROPOFOL 10 MG/ML
INJECTION, EMULSION INTRAVENOUS AS NEEDED
Status: DISCONTINUED | OUTPATIENT
Start: 2025-04-01 | End: 2025-04-01

## 2025-04-01 RX ORDER — SODIUM CHLORIDE, SODIUM LACTATE, POTASSIUM CHLORIDE, CALCIUM CHLORIDE 600; 310; 30; 20 MG/100ML; MG/100ML; MG/100ML; MG/100ML
INJECTION, SOLUTION INTRAVENOUS CONTINUOUS PRN
Status: DISCONTINUED | OUTPATIENT
Start: 2025-04-01 | End: 2025-04-01

## 2025-04-01 RX ORDER — LIDOCAINE HYDROCHLORIDE 20 MG/ML
INJECTION, SOLUTION EPIDURAL; INFILTRATION; INTRACAUDAL; PERINEURAL AS NEEDED
Status: DISCONTINUED | OUTPATIENT
Start: 2025-04-01 | End: 2025-04-01

## 2025-04-01 RX ORDER — PHENYLEPHRINE HCL IN 0.9% NACL 1 MG/10 ML
SYRINGE (ML) INTRAVENOUS AS NEEDED
Status: DISCONTINUED | OUTPATIENT
Start: 2025-04-01 | End: 2025-04-01

## 2025-04-01 RX ORDER — OCTREOTIDE ACETATE 100 UG/ML
100 INJECTION, SOLUTION INTRAVENOUS; SUBCUTANEOUS 2 TIMES DAILY
Status: DISCONTINUED | OUTPATIENT
Start: 2025-04-01 | End: 2025-04-03 | Stop reason: HOSPADM

## 2025-04-01 RX ORDER — EPHEDRINE SULFATE 50 MG/ML
INJECTION INTRAVENOUS AS NEEDED
Status: DISCONTINUED | OUTPATIENT
Start: 2025-04-01 | End: 2025-04-01

## 2025-04-01 RX ADMIN — HYDROXYZINE HYDROCHLORIDE 25 MG: 25 TABLET, FILM COATED ORAL at 09:42

## 2025-04-01 RX ADMIN — AZELASTINE HYDROCHLORIDE 2 SPRAY: 137 SPRAY, METERED NASAL at 20:23

## 2025-04-01 RX ADMIN — PROPOFOL 50 MG: 10 INJECTION, EMULSION INTRAVENOUS at 11:19

## 2025-04-01 RX ADMIN — EPHEDRINE SULFATE 10 MG: 50 INJECTION, SOLUTION INTRAVENOUS at 11:37

## 2025-04-01 RX ADMIN — PANTOPRAZOLE SODIUM 40 MG: 40 INJECTION, POWDER, FOR SOLUTION INTRAVENOUS at 20:23

## 2025-04-01 RX ADMIN — TAMSULOSIN HYDROCHLORIDE 0.4 MG: 0.4 CAPSULE ORAL at 16:14

## 2025-04-01 RX ADMIN — FOLIC ACID 1 MG: 1 TABLET ORAL at 09:42

## 2025-04-01 RX ADMIN — OCTREOTIDE ACETATE 100 MCG: 100 INJECTION, SOLUTION INTRAVENOUS; SUBCUTANEOUS at 20:22

## 2025-04-01 RX ADMIN — PROPOFOL 50 MG: 10 INJECTION, EMULSION INTRAVENOUS at 11:17

## 2025-04-01 RX ADMIN — OMEGA-3 FATTY ACIDS CAP 1000 MG 1000 MG: 1000 CAP at 09:42

## 2025-04-01 RX ADMIN — BRIMONIDINE TARTRATE 1 DROP: 2 SOLUTION/ DROPS OPHTHALMIC at 09:42

## 2025-04-01 RX ADMIN — SODIUM CHLORIDE, SODIUM LACTATE, POTASSIUM CHLORIDE, AND CALCIUM CHLORIDE: .6; .31; .03; .02 INJECTION, SOLUTION INTRAVENOUS at 11:06

## 2025-04-01 RX ADMIN — Medication 100 MCG: at 11:19

## 2025-04-01 RX ADMIN — PROPOFOL 20 MG: 10 INJECTION, EMULSION INTRAVENOUS at 11:25

## 2025-04-01 RX ADMIN — OMEGA-3 FATTY ACIDS CAP 1000 MG 1000 MG: 1000 CAP at 20:22

## 2025-04-01 RX ADMIN — PROPOFOL 50 MG: 10 INJECTION, EMULSION INTRAVENOUS at 11:15

## 2025-04-01 RX ADMIN — ATORVASTATIN CALCIUM 40 MG: 40 TABLET, FILM COATED ORAL at 09:42

## 2025-04-01 RX ADMIN — PANTOPRAZOLE SODIUM 40 MG: 40 INJECTION, POWDER, FOR SOLUTION INTRAVENOUS at 09:41

## 2025-04-01 RX ADMIN — HYDROXYZINE HYDROCHLORIDE 25 MG: 25 TABLET, FILM COATED ORAL at 20:22

## 2025-04-01 RX ADMIN — PROPOFOL 80 MG: 10 INJECTION, EMULSION INTRAVENOUS at 11:13

## 2025-04-01 RX ADMIN — Medication 100 MCG: at 11:13

## 2025-04-01 RX ADMIN — OCTREOTIDE ACETATE 100 MCG: 100 INJECTION, SOLUTION INTRAVENOUS; SUBCUTANEOUS at 13:05

## 2025-04-01 RX ADMIN — HYDROXYZINE HYDROCHLORIDE 25 MG: 25 TABLET, FILM COATED ORAL at 16:14

## 2025-04-01 RX ADMIN — METOPROLOL SUCCINATE 12.5 MG: 25 TABLET, EXTENDED RELEASE ORAL at 09:42

## 2025-04-01 RX ADMIN — LIDOCAINE HYDROCHLORIDE 100 MG: 20 INJECTION, SOLUTION EPIDURAL; INFILTRATION; INTRACAUDAL; PERINEURAL at 11:13

## 2025-04-01 RX ADMIN — PROPOFOL 50 MG: 10 INJECTION, EMULSION INTRAVENOUS at 11:23

## 2025-04-01 RX ADMIN — PROPOFOL 20 MG: 10 INJECTION, EMULSION INTRAVENOUS at 11:14

## 2025-04-01 RX ADMIN — BRIMONIDINE TARTRATE 1 DROP: 2 SOLUTION/ DROPS OPHTHALMIC at 20:22

## 2025-04-01 RX ADMIN — AZELASTINE HYDROCHLORIDE 2 SPRAY: 137 SPRAY, METERED NASAL at 09:42

## 2025-04-01 RX ADMIN — FAMOTIDINE 20 MG: 20 TABLET, FILM COATED ORAL at 20:22

## 2025-04-01 NOTE — ASSESSMENT & PLAN NOTE
68 yo male PMHx HTN, mod AS, prior UGI this past February presents with positional dizziness and lightheadedness. Associated decreased oral intake. Noted with positive orthostats.    With lightheadedness with positive orthostats suggestive of volume depletion. Also suspecting symptomatic anemia 2/2  chemotherapy induced vs rule out GI bleed  VS with low-normal BP not tachycardiac. Normal Cr and BUN.   Did have recurrence of melena on 3/30 consistent with his prior episodes of melena in the past    Did have multiple EGD in February noted small angiectasia in 2nd part of duodenum with stigmata of recent hemorrhage s/p hemostasis. C scope with internal hemorrhoid, small and medium sigmoid & rectosigmoid diverticulum & old blood suggestive of proximal foci of bleeding. Did have PillCam capsule endoscopy on 2/17 & 2/19 both limited to 2-3 hours rather than the 8 but GI believes most of small bowel seen with evidence of non-bleeding angiectasias throughout small bowel  Hgb in 7s since February  Now s/p 2 units pRBC with Hgbs in 8  Hgb down to 7.5 today, repeat in the evening   IV PPI bid   Trend H&H   Stool record  Hold pharm DVT ppx  Appreciate GI consult, for EGD with push enteroscopy today   Note, does have outpatient capsule endoscopy scheduled for 4/7

## 2025-04-01 NOTE — ASSESSMENT & PLAN NOTE
Proceed with repeat outpatient capsule endoscopy as scheduled on 4/7/25.  Will consider starting patient on Octreotide pending EGD/PUSH results.

## 2025-04-01 NOTE — CONSULTS
Consultation - Gastroenterology   Name: Ean Young 69 y.o. male I MRN: [id number]  Unit/Bed#: 403-01 I Date of Admission: 3/28/2025   Date of Service: 4/1/2025 I Hospital Day: 3   Inpatient consult to gastroenterology  Consult performed by: ESTELA Hwang  Consult ordered by: Cristal Ellis PA-C        Physician Requesting Evaluation: Iftikhar Kenney DO   Reason for Evaluation / Principal Problem: Symptomatic anemia and chronic blood loss anemia.    Assessment & Plan  Symptomatic anemia  At this point in time since the patient's hemoglobin dropped again and is now 7.5, and after a discussion with the attending, Dr. Cary, we decided to proceed with an EGD with push enteroscopy to evaluate for any identified AVMs on the previous capsule endoscopies and source of anemia/bleeding.  The patient was agreeable and verbalized an understanding.    I obtained informed verbal consent from the patient. The risks/benefits/alternatives of the procedure were discussed with the patient. Risks included, but not limited to,bleeding, infection, sore throat, and perforation and incomplete procedure were discussed. The patient gave verbal understanding and is agreeable to proceed. Patient's questions were answered to the best of my ability and until they verbalized an understanding.      Continue NPO as ordered.   Continue to maintain a large bore IV.  Continue to monitor hemoglobin and transfuse as per protocol.   Continue with serial abdominal exams.   Continue to monitor stool output for any overt signs of GI bleeding.   Continue PPI as ordered.   Continue PRN antiemetics as ordered.   Chronic blood loss anemia  Proceed with repeat outpatient capsule endoscopy as scheduled on 4/7/25.  Will consider starting patient on Octreotide pending EGD/PUSH results.   Lightheadedness  Improved/Stable  Essential hypertension  As per primary team.  Hypokalemia  As per primary team.  Pancytopenia (HCC)  As per primary  team.  Hypomagnesemia  As per primary team.    Continue rest of medications as per primary team.   Continue supportive care.    I have discussed the above management plan in detail with the primary service.   Gastroenterology service will follow.    History of Present Illness   HPI:  Ean Young is a 69 y.o. male  with a PMH of hypertension, hyperlipidemia, GERD aortic stenosis, JM, head and neck cancer who presented to the ED on 3/28/2025 secondary to progressively worsening lightheadedness.  He continued to report dark-colored stools since he started taking iron supplements.  Upon admission his hemoglobin was 7.1.  He currently denies any nausea, vomiting, GERD symptoms, or abdominal pain.  His last bowel movement was on 3/30/2025 and was without any evidence of bright red blood, but possible evidence of melena.  The patient is known to our practice and has been worked up for the anemia as he recently has had 2 capsule endoscopies, unfortunately neither 1 which were able to confirm completion to the cecum and is scheduled for repeat capsule endoscopy on 4/7/2025.    The patient is currently n.p.o. for possible EGD with push enteroscopy today.    Tobacco/Vaping: Denied  ETOH: Rarely Hx of ETOH: Denied  Marijuana: Denied  Illicit Drug Use: Denied Hx of Illicit Drug Use: Denied    GI Meds: Famotidine 20 mg at bedtime, Zofran as needed, and Protonix 40 mg twice daily.    Imaging: (None)    Endoscopy History: EGD: (2/13/25):  The duodenal bulb, 1st part of the duodenum, 2nd part of the duodenum and proximal jejunum appeared normal.  2 small angioectasias in the 3rd part of the duodenum; induced coagulation with argon plasma coagulation  Prior clip in D2 seen. The base had some erythematous mucosa vs clot. APC was applied to this area  Mild erythematous, hemorrhagic mucosa in the lesser curve of the stomach; induced coagulation with argon plasma coagulation  2 cm type I hiatal hernia  The upper third of the  esophagus, middle third of the esophagus and lower third of the esophagus appeared normal.    COLONOSCOPY: (2/11/25): Significant amount of hematin in the terminal ileum and throughout the colon which obscured visualization, diverticulosis in the sigmoid colon rectosigmoid colon and small internal hemorrhoids noted with inadequate bowel prep and recommendation for repeat colonoscopy in 6 months.    DUE: 8/11/25     Review of Systems  Medical History Review: I have reviewed the patient's PMH, PSH, Social History, Family History, Meds, and Allergies     Objective :  Temp:  [98 °F (36.7 °C)-98.1 °F (36.7 °C)] 98.1 °F (36.7 °C)  HR:  [] 74  BP: ()/(55-72) 105/62  SpO2:  [90 %-97 %] 96 %  O2 Device: None (Room air)    Physical Exam  Exam:  Oral mucosa normal upon visual inspection, without any sores, lesions, or ulcerations. Sclera without icterus and benign. Lung sounds CTA b/l. Normal S1 & S2 upon exam. Abdomen is soft and distended, nontender, with very faint bowel sounds x 4.  No edema noted of the b/l lower extremities upon exam today. Skin is non-icteric.       Lab Results: I have reviewed the following results:CBC/BMP:   .     03/31/25  0940 03/31/25  1848 04/01/25  0502   WBC 2.69*  --  2.15*   HGB 8.8*   < > 7.5*   HCT 26.3*   < > 22.8*   *  --  113*   SODIUM 142  --  141   K 3.2*  --  3.5     --  105   CO2 30  --  29   BUN 9  --  12   CREATININE 0.56*  --  0.54*   GLUC 112  --  95   MG 1.6*  --   --    PHOS 3.6  --   --     < > = values in this interval not displayed.    , LFTs:   .     04/01/25  0502   AST 13   ALT 8   ALB 3.3*   TBILI 0.69   ALKPHOS 69

## 2025-04-01 NOTE — PHYSICAL THERAPY NOTE
PHYSICAL THERAPY NOTE          Patient Name: Ean Young  Today's Date: 4/1/2025 04/01/25 0831   PT Last Visit   PT Visit Date 04/01/25   Note Type   Note Type Treatment   Pain Assessment   Pain Assessment Tool 0-10   Pain Score No Pain   Restrictions/Precautions   Weight Bearing Precautions Per Order No   Other Precautions Fall Risk  (neutropenic precautions)   General   Chart Reviewed Yes   Family/Caregiver Present No   Cognition   Overall Cognitive Status WFL   Arousal/Participation Alert;Cooperative   Attention Within functional limits   Orientation Level Oriented X4   Memory Within functional limits   Following Commands Follows all commands and directions without difficulty   Subjective   Subjective Reports he is very cold. Agreeable to therapy.   Bed Mobility   Additional Comments OOB in chair start/end PT session   Transfers   Sit to Stand 5  Supervision   Additional items Armrests;Increased time required   Stand to Sit 5  Supervision   Additional items Armrests;Increased time required   Stand pivot 5  Supervision   Additional items Increased time required   Additional Comments no device   Ambulation/Elevation   Gait pattern Short stride;Improper Weight shift;Wide ALFIE  (decreased gait speed)   Gait Assistance 5  Supervision   Additional items Verbal cues   Assistive Device None  (rail in hallway due to slight dizziness)   Distance 125'   Balance   Static Sitting Good   Dynamic Sitting Fair +   Static Standing Fair   Dynamic Standing Fair   Ambulatory Fair -   Endurance Deficit   Endurance Deficit Yes   Endurance Deficit Description BP:129/76   Activity Tolerance   Activity Tolerance Patient limited by fatigue   Exercises   Hip Flexion Sitting;20 reps;AROM;Bilateral   Hip Abduction Sitting;20 reps;AROM;Bilateral   Hip Adduction Sitting;20 reps;AROM;Bilateral   Knee AROM Long Arc Quad Sitting;20 reps;AROM;Bilateral   Ankle  Pumps Sitting;20 reps;AROM;Bilateral   Assessment   Prognosis Good   Problem List Decreased strength;Decreased endurance;Impaired balance;Decreased mobility;Obesity;Impaired hearing   Assessment Pt. seen for PT treatment session this date with interventions consisting of  therapeutic exercises, transfers and  gait training w/ emphasis on improving pt's functional activity tolerance.  In comparison to previous session, Pt. With improvement  in activity tolerance. C/o slight dizziness with ambulation. No episodes LOB.  Pt is in need of continued activity in PT to improve strength balance endurance mobility transfers and ambulation with return to maximize LOF. From PT/mobility standpoint, recommendation at time of d/c would be Level III: minimal resource intensity in order to promote return to PLOF and independence.   The patient's [facility] Basic Mobility Inpatient Short Form Raw Score is 20. A Raw score of greater than 16 suggests the patient may benefit from discharge to home. Pt continues to be functioning below baseline level. PT will continue to see pt during current hospitalization in order to address the deficits listed above and provide interventions consistent w/ POC in effort to achieve goals.  Please also refer to physical therapist recommendation for safe DC planning.   Goals   Patient Goals to feel better   LTG Expiration Date 04/14/25   PT Treatment Day 1   Plan   Treatment/Interventions Functional transfer training;LE strengthening/ROM;Therapeutic exercise;Endurance training;Bed mobility;Gait training   Progress Progressing toward goals   PT Frequency 3-5x/wk   Discharge Recommendation   Rehab Resource Intensity Level, PT III (Minimum Resource Intensity)   AM-PAC Basic Mobility Inpatient   Turning in Flat Bed Without Bedrails 4   Lying on Back to Sitting on Edge of Flat Bed Without Bedrails 3   Moving Bed to Chair 3   Standing Up From Chair Using Arms 4   Walk in Room 3   Climb 3-5 Stairs With Railing 3    Basic Mobility Inpatient Raw Score 20   Basic Mobility Standardized Score 43.99   [facility] Highest Level Of Mobility   -HLM Goal 6: Walk 10 steps or more   -HLM Achieved 7: Walk 25 feet or more   Education   Education Provided Mobility training   Patient Demonstrates verbal understanding   End of Consult   Patient Position at End of Consult Bedside chair;All needs within reach

## 2025-04-01 NOTE — ANESTHESIA PREPROCEDURE EVALUATION
Procedure:  EGD WITH PUSH ENTEROSCOPY    Mod/Severe AS    Relevant Problems   CARDIO   (+) Essential hypertension   (+) Moderate aortic stenosis      ENDO   (+) Type 2 diabetes mellitus with hyperglycemia, without long-term current use of insulin (HCC)   (+) Type 2 diabetes mellitus without complication, without long-term current use of insulin (HCC)      GI/HEPATIC   (+) Gastric ulcer   (+) Gastroesophageal reflux disease without esophagitis   (+) Upper GI bleed      /RENAL   (+) Benign prostatic hyperplasia      HEMATOLOGY   (+) Acute blood loss anemia   (+) Chronic blood loss anemia   (+) Pancytopenia (HCC)   (+) Symptomatic anemia      NEURO/PSYCH   (+) Anxiety about treatment   (+) Intractable episodic headache      PULMONARY   (+) Chronic obstructive pulmonary disease (HCC)   (+) JM (obstructive sleep apnea)   (+) Obstructive sleep apnea        Physical Exam    Airway    Mallampati score: III  TM Distance: >3 FB  Neck ROM: full     Dental       Cardiovascular  Rhythm: regular, Rate: normal, Murmur    Pulmonary  Pulmonary exam normal Breath sounds clear to auscultation    Other Findings        Anesthesia Plan  ASA Score- 3 Emergent    Anesthesia Type- IV sedation with anesthesia with ASA Monitors.         Additional Monitors:     Airway Plan:     Comment: Discussed risks/benefits, including medication reactions, awareness, aspiration, and serious/life threatening complications. Plan to maintain native airway with IVGA, monitored with EtCO2.       Plan Factors-Exercise tolerance (METS): >4 METS.    Chart reviewed.    Patient summary reviewed.      Patient instructed to abstain from smoking on day of procedure. Patient did not smoke on day of surgery.            Induction- intravenous.    Postoperative Plan-     Perioperative Resuscitation Plan - Level 1 - Full Code.       Informed Consent- Anesthetic plan and risks discussed with patient.  I personally reviewed this patient with the CRNA. Discussed and agreed  on the Anesthesia Plan with the CRNA..      NPO Status:  Vitals Value Taken Time   Date of last liquid 03/31/25 04/01/25 1038   Time of last liquid 2000 04/01/25 1038   Date of last solid 03/31/25 04/01/25 1038   Time of last solid 1700 04/01/25 1038

## 2025-04-01 NOTE — ANESTHESIA POSTPROCEDURE EVALUATION
Post-Op Assessment Note    CV Status:  Stable  Pain Score: 0    Pain management: adequate       Mental Status:  Awake and sleepy   Hydration Status:  Stable   PONV Controlled:  None   Airway Patency:  Patent     Post Op Vitals Reviewed: Yes    No anethesia notable event occurred.    Staff: Anesthesiologist           Last Filed PACU Vitals:  Vitals Value Taken Time   Temp 97.2    Pulse 71 04/01/25 1133   BP 89/54    Resp 12    SpO2 99 % 04/01/25 1133   Vitals shown include unfiled device data.

## 2025-04-01 NOTE — PLAN OF CARE
Problem: PHYSICAL THERAPY ADULT  Goal: Performs mobility at highest level of function for planned discharge setting.  See evaluation for individualized goals.  Description: Treatment/Interventions: Functional transfer training, LE strengthening/ROM, Therapeutic exercise, Endurance training, Bed mobility, Gait training          See flowsheet documentation for full assessment, interventions and recommendations.  Outcome: Progressing  Note: Prognosis: Good  Problem List: Decreased strength, Decreased endurance, Impaired balance, Decreased mobility, Obesity, Impaired hearing  Assessment: Pt. seen for PT treatment session this date with interventions consisting of  therapeutic exercises, transfers and  gait training w/ emphasis on improving pt's functional activity tolerance.  In comparison to previous session, Pt. With improvement  in activity tolerance. C/o slight dizziness with ambulation. No episodes LOB.  Pt is in need of continued activity in PT to improve strength balance endurance mobility transfers and ambulation with return to maximize LOF. From PT/mobility standpoint, recommendation at time of d/c would be Level III: minimal resource intensity in order to promote return to PLOF and independence.   The patient's AM-PAC Basic Mobility Inpatient Short Form Raw Score is 20. A Raw score of greater than 16 suggests the patient may benefit from discharge to home. Pt continues to be functioning below baseline level. PT will continue to see pt during current hospitalization in order to address the deficits listed above and provide interventions consistent w/ POC in effort to achieve goals.  Please also refer to physical therapist recommendation for safe DC planning.        Rehab Resource Intensity Level, PT: III (Minimum Resource Intensity)    See flowsheet documentation for full assessment.

## 2025-04-01 NOTE — PROGRESS NOTES
Progress Note - Hospitalist   Name: Ean Young 69 y.o. male I MRN: [id number]  Unit/Bed#: 403-01 I Date of Admission: 3/28/2025   Date of Service: 4/1/2025 I Hospital Day: 3    Assessment & Plan  Symptomatic anemia  70 yo male PMHx HTN, mod AS, prior UGI this past February presents with positional dizziness and lightheadedness. Associated decreased oral intake. Noted with positive orthostats.    With lightheadedness with positive orthostats suggestive of volume depletion. Also suspecting symptomatic anemia 2/2  chemotherapy induced vs rule out GI bleed  VS with low-normal BP not tachycardiac. Normal Cr and BUN.   Did have recurrence of melena on 3/30 consistent with his prior episodes of melena in the past    Did have multiple EGD in February noted small angiectasia in 2nd part of duodenum with stigmata of recent hemorrhage s/p hemostasis. C scope with internal hemorrhoid, small and medium sigmoid & rectosigmoid diverticulum & old blood suggestive of proximal foci of bleeding. Did have [facility] capsule endoscopy on 2/17 & 2/19 both limited to 2-3 hours rather than the 8 but GI believes most of small bowel seen with evidence of non-bleeding angiectasias throughout small bowel  Hgb in 7s since February  Now s/p 2 units pRBC with Hgbs in 8  Hgb down to 7.5 today, repeat in the evening   IV PPI bid   Trend H&H   Stool record  Hold pharm DVT ppx  Appreciate GI consult, for EGD with push enteroscopy today   Note, does have outpatient capsule endoscopy scheduled for 4/7  Lightheadedness  Patient presented to the ER for evaluation of lightheadedness  Likely secondary to dehydration & acute anemia  Orthostatics positive on admission, monitor daily   S/p IV fluids and 2pRBCs as above. Monitor off further fluids & transfuse as indicated  HCTZ remains on hold   PT/OT  Pancytopenia (HCC)  Suspected chemotherapy induced with leukopenia, thrombocytopenia & decreased ANC   At least contributing to acute anemia if not full cause    Transfuse as indicated   No indication for Granix at this time   Remains afebrile   Trend   Hypokalemia  Monitor & replete as indicated  Hypomagnesemia  Monitor & replete as indicated   Chronic blood loss anemia  As above, with multiple AVMs  Essential hypertension  Hold HCTZ  Continue BB with holding parameters  Still with some soft pressures  Dyslipidemia  Continue statin  Moderate aortic stenosis  Monitor volume status while receiving IVF  Avoid hypotension if possible, holding HCTZ  JM (obstructive sleep apnea)  Continue CPAP hs     VTE Pharmacologic Prophylaxis:   SCDs    Mobility:   Basic Mobility Inpatient Raw Score: 20  JH-HLM Goal: 6: Walk 10 steps or more  JH-HLM Achieved: 7: Walk 25 feet or more  JH-HLM Goal achieved. Continue to encourage appropriate mobility.    Patient Centered Rounds: I performed bedside rounds with nursing staff today.   Discussions with Specialists or Other Care Team Provider: case management, GI    Education and Discussions with Family / Patient: to update family    Current Length of Stay: 3 day(s)  Current Patient Status: Inpatient   Certification Statement: The patient will continue to require additional inpatient hospital stay due to GIB, upper endoscopy, monitoring Hgb   Discharge Plan: Anticipate discharge in 48-72 hrs to home.    Code Status: Level 1 - Full Code    Subjective   Patient reports he feels somewhat better today. No abdominal pain. No further melena. No hematochezia or hematemesis. No chest pain or SOB.     Objective :  Temp:  [98 °F (36.7 °C)-98.1 °F (36.7 °C)] 98.1 °F (36.7 °C)  HR:  [] 83  BP: ()/(55-76) 129/76  SpO2:  [90 %-97 %] 97 %  O2 Device: None (Room air)    Body mass index is 32.99 kg/m².     Input and Output Summary (last 24 hours):     Intake/Output Summary (Last 24 hours) at 4/1/2025 0911  Last data filed at 4/1/2025 0817  Gross per 24 hour   Intake 240 ml   Output --   Net 240 ml       Physical Exam  Constitutional:       General: He  is not in acute distress.  HENT:      Head: Normocephalic and atraumatic.   Cardiovascular:      Rate and Rhythm: Normal rate and regular rhythm.   Pulmonary:      Effort: Pulmonary effort is normal. No respiratory distress.      Breath sounds: Normal breath sounds.   Abdominal:      General: Abdomen is flat. Bowel sounds are normal.      Palpations: Abdomen is soft.      Tenderness: There is no abdominal tenderness. There is no guarding.   Musculoskeletal:      Right lower leg: No edema.      Left lower leg: No edema.   Skin:     General: Skin is warm and dry.   Neurological:      General: No focal deficit present.      Mental Status: He is alert.      Comments: Baseline right sided facial droop            Lines/Drains:              Lab Results: I have reviewed the following results:   Results from last 7 days   Lab Units 04/01/25  0502   WBC Thousand/uL 2.15*   HEMOGLOBIN g/dL 7.5*   HEMATOCRIT % 22.8*   PLATELETS Thousands/uL 113*   SEGS PCT % 66   LYMPHO PCT % 20   MONO PCT % 12   EOS PCT % 1     Results from last 7 days   Lab Units 04/01/25  0502   SODIUM mmol/L 141   POTASSIUM mmol/L 3.5   CHLORIDE mmol/L 105   CO2 mmol/L 29   BUN mg/dL 12   CREATININE mg/dL 0.54*   ANION GAP mmol/L 7   CALCIUM mg/dL 8.5   ALBUMIN g/dL 3.3*   TOTAL BILIRUBIN mg/dL 0.69   ALK PHOS U/L 69   ALT U/L 8   AST U/L 13   GLUCOSE RANDOM mg/dL 95                       Recent Cultures (last 7 days):         Imaging Results Review: No pertinent imaging studies reviewed.  Other Study Results Review: No additional pertinent studies reviewed.    Last 24 Hours Medication List:     Current Facility-Administered Medications:     acetaminophen (TYLENOL) tablet 650 mg, Q6H PRN    atorvastatin (LIPITOR) tablet 40 mg, Daily    azelastine (ASTELIN) 0.1 % nasal spray 2 spray, BID    brimonidine tartrate 0.2 % ophthalmic solution 1 drop, Q12H HAYLEE    famotidine (PEPCID) tablet 20 mg, HS    ferrous sulfate tablet 325 mg, Daily With Breakfast    fish oil  capsule 1,000 mg, BID    folic acid (FOLVITE) tablet 1 mg, Daily    [Held by provider] hydroCHLOROthiazide tablet 12.5 mg, Daily    hydrOXYzine HCL (ATARAX) tablet 25 mg, TID    metoprolol succinate (TOPROL-XL) 24 hr tablet 12.5 mg, Daily    ondansetron (ZOFRAN) injection 4 mg, Q6H PRN    pantoprazole (PROTONIX) injection 40 mg, Q12H HAYLEE    tamsulosin (FLOMAX) capsule 0.4 mg, Daily With Dinner    Administrative Statements   Today, Patient Was Seen By: Cristal Ellis PA-C      **Please Note: This note may have been constructed using a voice recognition system.**

## 2025-04-01 NOTE — CASE MANAGEMENT
Case Management Discharge Planning Note    Patient name Ean Young  Location /403-01 MRN [id number]  : 1955 Date 2025       Current Admission Date: 3/28/2025  Current Admission Diagnosis:Symptomatic anemia   Patient Active Problem List    Diagnosis Date Noted Date Diagnosed    Symptomatic anemia 2025     Hypomagnesemia 2025     Lightheadedness 2025     Chronic blood loss anemia 2025     Type 2 diabetes mellitus with hyperglycemia, without long-term current use of insulin ([facility]) 2025     Acute blood loss anemia 2025     Severe protein-calorie malnutrition ([facility]) 2025     Pancytopenia ([facility]) 2025     Gastric ulcer 2025     Upper GI bleed 2025     JM (obstructive sleep apnea) 2025     Hypokalemia 2025     Intractable episodic headache 2024     Anxiety about treatment 2024     Other insomnia 2024     Head and neck cancer ([facility]) 2024     Benign prostatic hyperplasia 2024     Chronic obstructive pulmonary disease ([facility]) 2024     Class 2 severe obesity due to excess calories with serious comorbidity and body mass index (BMI) of 37.0 to 37.9 in adult ([facility]) 2024     Laryngopharyngeal reflux (LPR) 2024     Cervical spondylosis 10/06/2023     Bilateral carpal tunnel syndrome 10/06/2023     Cervical radiculopathy 2023     Moderate aortic stenosis 2022     Decreased hearing of both ears 2022     Urinary frequency 2022     Type 2 diabetes mellitus without complication, without long-term current use of insulin ([facility]) 2022     Essential hypertension 2022     Dyslipidemia 2022     Gastroesophageal reflux disease without esophagitis 2022     Obstructive sleep apnea 2022       LOS (days): 3  Geometric Mean LOS (GMLOS) (days): 3.4  Days to GMLOS:0.2     OBJECTIVE:  Risk of Unplanned Readmission Score: 24.72         Current admission status:  Inpatient   Preferred Pharmacy:   [id number] - [place] - [address]  [address]  [place] [address]  Phone: [phone] Fax: [phone]    Primary Care Provider: Timmy Chapman DO    Primary Insurance: ANGELLA ALVARADO  Secondary Insurance: [facility] AND [facility]    DISCHARGE DETAILS:  Possible need for Octreotide on dc. Likely to be started in the hospital and CM will need to check if covered with pt's insurance once sent to pt's pharmacy.

## 2025-04-01 NOTE — ASSESSMENT & PLAN NOTE
At this point in time since the patient's hemoglobin dropped again and is now 7.5, and after a discussion with the attending, Dr. Cary, we decided to proceed with an EGD with push enteroscopy to evaluate for any identified AVMs on the previous capsule endoscopies and source of anemia/bleeding.  The patient was agreeable and verbalized an understanding.    I obtained informed verbal consent from the patient. The risks/benefits/alternatives of the procedure were discussed with the patient. Risks included, but not limited to,bleeding, infection, sore throat, and perforation and incomplete procedure were discussed. The patient gave verbal understanding and is agreeable to proceed. Patient's questions were answered to the best of my ability and until they verbalized an understanding.      Continue NPO as ordered.   Continue to maintain a large bore IV.  Continue to monitor hemoglobin and transfuse as per protocol.   Continue with serial abdominal exams.   Continue to monitor stool output for any overt signs of GI bleeding.   Continue PPI as ordered.   Continue PRN antiemetics as ordered.

## 2025-04-01 NOTE — ASSESSMENT & PLAN NOTE
Patient presented to the ER for evaluation of lightheadedness  Likely secondary to dehydration & acute anemia  Orthostatics positive on admission, monitor daily   S/p IV fluids and 2pRBCs as above. Monitor off further fluids & transfuse as indicated  HCTZ remains on hold   PT/OT

## 2025-04-02 ENCOUNTER — TELEPHONE (OUTPATIENT)
Age: 70
End: 2025-04-02

## 2025-04-02 DIAGNOSIS — K92.2 UPPER GI BLEED: ICD-10-CM

## 2025-04-02 DIAGNOSIS — D50.0 CHRONIC BLOOD LOSS ANEMIA: Primary | ICD-10-CM

## 2025-04-02 LAB
ALBUMIN SERPL BCG-MCNC: 3.2 G/DL (ref 3.5–5)
ALP SERPL-CCNC: 71 U/L (ref 34–104)
ALT SERPL W P-5'-P-CCNC: 9 U/L (ref 7–52)
ANION GAP SERPL CALCULATED.3IONS-SCNC: 5 MMOL/L (ref 4–13)
AST SERPL W P-5'-P-CCNC: 13 U/L (ref 13–39)
BASOPHILS # BLD AUTO: 0.01 THOUSANDS/ÂΜL (ref 0–0.1)
BASOPHILS NFR BLD AUTO: 0 % (ref 0–1)
BILIRUB SERPL-MCNC: 0.65 MG/DL (ref 0.2–1)
BUN SERPL-MCNC: 12 MG/DL (ref 5–25)
CALCIUM ALBUM COR SERPL-MCNC: 8.9 MG/DL (ref 8.3–10.1)
CALCIUM SERPL-MCNC: 8.3 MG/DL (ref 8.4–10.2)
CHLORIDE SERPL-SCNC: 105 MMOL/L (ref 96–108)
CO2 SERPL-SCNC: 30 MMOL/L (ref 21–32)
CREAT SERPL-MCNC: 0.63 MG/DL (ref 0.6–1.3)
EOSINOPHIL # BLD AUTO: 0.02 THOUSAND/ÂΜL (ref 0–0.61)
EOSINOPHIL NFR BLD AUTO: 1 % (ref 0–6)
ERYTHROCYTE [DISTWIDTH] IN BLOOD BY AUTOMATED COUNT: 17.5 % (ref 11.6–15.1)
GFR SERPL CREATININE-BSD FRML MDRD: 101 ML/MIN/1.73SQ M
GLUCOSE SERPL-MCNC: 115 MG/DL (ref 65–140)
HCT VFR BLD AUTO: 22.9 % (ref 36.5–49.3)
HCT VFR BLD AUTO: 26.3 % (ref 36.5–49.3)
HGB BLD-MCNC: 7.3 G/DL (ref 12–17)
HGB BLD-MCNC: 8.5 G/DL (ref 12–17)
IMM GRANULOCYTES # BLD AUTO: 0 THOUSAND/UL (ref 0–0.2)
IMM GRANULOCYTES NFR BLD AUTO: 0 % (ref 0–2)
LYMPHOCYTES # BLD AUTO: 0.38 THOUSANDS/ÂΜL (ref 0.6–4.47)
LYMPHOCYTES NFR BLD AUTO: 15 % (ref 14–44)
MCH RBC QN AUTO: 31.7 PG (ref 26.8–34.3)
MCHC RBC AUTO-ENTMCNC: 31.9 G/DL (ref 31.4–37.4)
MCV RBC AUTO: 100 FL (ref 82–98)
MONOCYTES # BLD AUTO: 0.23 THOUSAND/ÂΜL (ref 0.17–1.22)
MONOCYTES NFR BLD AUTO: 9 % (ref 4–12)
NEUTROPHILS # BLD AUTO: 1.85 THOUSANDS/ÂΜL (ref 1.85–7.62)
NEUTS SEG NFR BLD AUTO: 75 % (ref 43–75)
NRBC BLD AUTO-RTO: 0 /100 WBCS
PLATELET # BLD AUTO: 121 THOUSANDS/UL (ref 149–390)
PMV BLD AUTO: 10.2 FL (ref 8.9–12.7)
POTASSIUM SERPL-SCNC: 3.8 MMOL/L (ref 3.5–5.3)
PROT SERPL-MCNC: 5.2 G/DL (ref 6.4–8.4)
RBC # BLD AUTO: 2.3 MILLION/UL (ref 3.88–5.62)
SODIUM SERPL-SCNC: 140 MMOL/L (ref 135–147)
WBC # BLD AUTO: 2.49 THOUSAND/UL (ref 4.31–10.16)

## 2025-04-02 PROCEDURE — 94660 CPAP INITIATION&MGMT: CPT

## 2025-04-02 PROCEDURE — 97116 GAIT TRAINING THERAPY: CPT

## 2025-04-02 PROCEDURE — 99232 SBSQ HOSP IP/OBS MODERATE 35: CPT

## 2025-04-02 PROCEDURE — 85025 COMPLETE CBC W/AUTO DIFF WBC: CPT

## 2025-04-02 PROCEDURE — 99232 SBSQ HOSP IP/OBS MODERATE 35: CPT | Performed by: STUDENT IN AN ORGANIZED HEALTH CARE EDUCATION/TRAINING PROGRAM

## 2025-04-02 PROCEDURE — 80053 COMPREHEN METABOLIC PANEL: CPT

## 2025-04-02 PROCEDURE — 85014 HEMATOCRIT: CPT

## 2025-04-02 PROCEDURE — 94760 N-INVAS EAR/PLS OXIMETRY 1: CPT

## 2025-04-02 PROCEDURE — 85018 HEMOGLOBIN: CPT

## 2025-04-02 PROCEDURE — 97110 THERAPEUTIC EXERCISES: CPT

## 2025-04-02 RX ADMIN — FAMOTIDINE 20 MG: 20 TABLET, FILM COATED ORAL at 21:27

## 2025-04-02 RX ADMIN — OMEGA-3 FATTY ACIDS CAP 1000 MG 1000 MG: 1000 CAP at 21:27

## 2025-04-02 RX ADMIN — PANTOPRAZOLE SODIUM 40 MG: 40 INJECTION, POWDER, FOR SOLUTION INTRAVENOUS at 23:02

## 2025-04-02 RX ADMIN — HYDROXYZINE HYDROCHLORIDE 25 MG: 25 TABLET, FILM COATED ORAL at 15:48

## 2025-04-02 RX ADMIN — PANTOPRAZOLE SODIUM 40 MG: 40 INJECTION, POWDER, FOR SOLUTION INTRAVENOUS at 09:46

## 2025-04-02 RX ADMIN — HYDROXYZINE HYDROCHLORIDE 25 MG: 25 TABLET, FILM COATED ORAL at 08:38

## 2025-04-02 RX ADMIN — OCTREOTIDE ACETATE 100 MCG: 100 INJECTION, SOLUTION INTRAVENOUS; SUBCUTANEOUS at 10:54

## 2025-04-02 RX ADMIN — FOLIC ACID 1 MG: 1 TABLET ORAL at 08:38

## 2025-04-02 RX ADMIN — AZELASTINE HYDROCHLORIDE 2 SPRAY: 137 SPRAY, METERED NASAL at 21:28

## 2025-04-02 RX ADMIN — AZELASTINE HYDROCHLORIDE 2 SPRAY: 137 SPRAY, METERED NASAL at 08:38

## 2025-04-02 RX ADMIN — ATORVASTATIN CALCIUM 40 MG: 40 TABLET, FILM COATED ORAL at 08:38

## 2025-04-02 RX ADMIN — BRIMONIDINE TARTRATE 1 DROP: 2 SOLUTION/ DROPS OPHTHALMIC at 08:38

## 2025-04-02 RX ADMIN — METOPROLOL SUCCINATE 12.5 MG: 25 TABLET, EXTENDED RELEASE ORAL at 08:38

## 2025-04-02 RX ADMIN — HYDROXYZINE HYDROCHLORIDE 25 MG: 25 TABLET, FILM COATED ORAL at 21:27

## 2025-04-02 RX ADMIN — BRIMONIDINE TARTRATE 1 DROP: 2 SOLUTION/ DROPS OPHTHALMIC at 21:28

## 2025-04-02 RX ADMIN — FERROUS SULFATE TAB 325 MG (65 MG ELEMENTAL FE) 325 MG: 325 (65 FE) TAB at 08:38

## 2025-04-02 RX ADMIN — TAMSULOSIN HYDROCHLORIDE 0.4 MG: 0.4 CAPSULE ORAL at 15:48

## 2025-04-02 RX ADMIN — OMEGA-3 FATTY ACIDS CAP 1000 MG 1000 MG: 1000 CAP at 08:38

## 2025-04-02 NOTE — PROGRESS NOTES
Progress Note - Hospitalist   Name: Ean Young 69 y.o. male I MRN: [id number]  Unit/Bed#: 403-01 I Date of Admission: 3/28/2025   Date of Service: 4/2/2025 I Hospital Day: 4    Assessment & Plan  Symptomatic anemia  68 yo male PMHx HTN, mod AS, prior UGI this past February presents with positional dizziness and lightheadedness. Associated decreased oral intake. Noted with positive orthostats.    With lightheadedness with positive orthostats suggestive of volume depletion. Also suspecting symptomatic anemia 2/2  chemotherapy induced vs rule out GI bleed  VS with low-normal BP not tachycardiac. Normal Cr and BUN.   Did have recurrence of melena on 3/30 consistent with his prior episodes of melena in the past    Did have multiple EGD in February noted small angiectasia in 2nd part of duodenum with stigmata of recent hemorrhage s/p hemostasis. C scope with internal hemorrhoid, small and medium sigmoid & rectosigmoid diverticulum & old blood suggestive of proximal foci of bleeding. Did have [facility] capsule endoscopy on 2/17 & 2/19 both limited to 2-3 hours rather than the 8 but GI believes most of small bowel seen with evidence of non-bleeding angiectasias throughout small bowel  S/p 2 units pRBCs  Continue to trend H&H  q12   Remains without further signs of overt GIB   IV PPI bid   Stool record  Hold pharm DVT ppx  Appreciate GI consult, s/p EGD with push enteroscopy revealing AVMs. Recommending octreotide 100mg bid which should be continued at discharge. GI team working on prior auth   Note, does have outpatient capsule endoscopy scheduled for 4/7  Lightheadedness  Patient presented to the ER for evaluation of lightheadedness  Likely secondary to dehydration & acute anemia  Orthostatics positive on admission, monitor daily   S/p IV fluids and 2pRBCs as above. Monitor off further fluids & transfuse as indicated  HCTZ remains on hold   PT/OT  Pancytopenia (HCC)  Suspected chemotherapy induced with leukopenia,  thrombocytopenia & decreased ANC   At least contributing to acute anemia if not full cause   Transfuse as indicated   No indication for Granix at this time   Remains afebrile   Trend   Hypokalemia  Monitor & replete as indicated  Hypomagnesemia  Monitor & replete as indicated   Chronic blood loss anemia  As above, with multiple AVMs  Essential hypertension  Hold HCTZ  Continue BB with holding parameters  Still with some soft pressures  Dyslipidemia  Continue statin  Moderate aortic stenosis  Monitor volume status while receiving IVF  Avoid hypotension if possible, holding HCTZ  JM (obstructive sleep apnea)  Continue CPAP hs     VTE Pharmacologic Prophylaxis:   SCDs    Mobility:   Basic Mobility Inpatient Raw Score: 20  JH-HLM Goal: 6: Walk 10 steps or more  JH-HLM Achieved: 6: Walk 10 steps or more  JH-HLM Goal achieved. Continue to encourage appropriate mobility.    Patient Centered Rounds: I performed bedside rounds with nursing staff today.   Discussions with Specialists or Other Care Team Provider: case management, GI    Education and Discussions with Family / Patient: Updated  (niece) via phone.    Current Length of Stay: 4 day(s)  Current Patient Status: Inpatient   Certification Statement: The patient will continue to require additional inpatient hospital stay due to monitoring Hgb, IV meds  Discharge Plan: Anticipate discharge in 24-48 hrs to home.    Code Status: Level 1 - Full Code    Subjective   Reports improvement in dizziness today. No further episodes of melena noted. Tolerated diet without issue. Denies abominal pain, N/V/D or chest pain.    Objective :  Temp:  [98.1 °F (36.7 °C)-98.2 °F (36.8 °C)] 98.2 °F (36.8 °C)  HR:  [63-67] 63  BP: (125-128)/(60-63) 128/63  Resp:  [18-20] 18  SpO2:  [90 %-98 %] 98 %  O2 Device: None (Room air)    Body mass index is 32.99 kg/m².     Input and Output Summary (last 24 hours):     Intake/Output Summary (Last 24 hours) at 4/2/2025 1200  Last data  filed at 4/2/2025 0827  Gross per 24 hour   Intake 120 ml   Output --   Net 120 ml       Physical Exam  Constitutional:       General: He is not in acute distress.  HENT:      Head: Normocephalic and atraumatic.   Cardiovascular:      Rate and Rhythm: Normal rate and regular rhythm.   Pulmonary:      Effort: Pulmonary effort is normal.      Breath sounds: Normal breath sounds.   Abdominal:      General: Abdomen is flat. Bowel sounds are normal.      Palpations: Abdomen is soft.      Tenderness: There is no abdominal tenderness.   Musculoskeletal:      Right lower leg: No edema.      Left lower leg: No edema.   Skin:     General: Skin is warm and dry.   Neurological:      General: No focal deficit present.      Mental Status: He is alert.           Lines/Drains:              Lab Results: I have reviewed the following results:   Results from last 7 days   Lab Units 04/02/25  0450   WBC Thousand/uL 2.49*   HEMOGLOBIN g/dL 7.3*   HEMATOCRIT % 22.9*   PLATELETS Thousands/uL 121*   SEGS PCT % 75   LYMPHO PCT % 15   MONO PCT % 9   EOS PCT % 1     Results from last 7 days   Lab Units 04/02/25  0450   SODIUM mmol/L 140   POTASSIUM mmol/L 3.8   CHLORIDE mmol/L 105   CO2 mmol/L 30   BUN mg/dL 12   CREATININE mg/dL 0.63   ANION GAP mmol/L 5   CALCIUM mg/dL 8.3*   ALBUMIN g/dL 3.2*   TOTAL BILIRUBIN mg/dL 0.65   ALK PHOS U/L 71   ALT U/L 9   AST U/L 13   GLUCOSE RANDOM mg/dL 115                       Recent Cultures (last 7 days):         Imaging Results Review: I reviewed radiology reports from this admission including: xray(s).      Last 24 Hours Medication List:     Current Facility-Administered Medications:     acetaminophen (TYLENOL) tablet 650 mg, Q6H PRN    atorvastatin (LIPITOR) tablet 40 mg, Daily    azelastine (ASTELIN) 0.1 % nasal spray 2 spray, BID    brimonidine tartrate 0.2 % ophthalmic solution 1 drop, Q12H HAYLEE    famotidine (PEPCID) tablet 20 mg, HS    ferrous sulfate tablet 325 mg, Daily With Breakfast    fish  oil capsule 1,000 mg, BID    folic acid (FOLVITE) tablet 1 mg, Daily    [Held by provider] hydroCHLOROthiazide tablet 12.5 mg, Daily    hydrOXYzine HCL (ATARAX) tablet 25 mg, TID    metoprolol succinate (TOPROL-XL) 24 hr tablet 12.5 mg, Daily    octreotide (SandoSTATIN) injection 100 mcg, BID    ondansetron (ZOFRAN) injection 4 mg, Q6H PRN    pantoprazole (PROTONIX) injection 40 mg, Q12H HAYLEE    tamsulosin (FLOMAX) capsule 0.4 mg, Daily With Dinner    Administrative Statements   Today, Patient Was Seen By: Cristal Ellis PA-C      **Please Note: This note may have been constructed using a voice recognition system.**

## 2025-04-02 NOTE — PLAN OF CARE
Problem: PHYSICAL THERAPY ADULT  Goal: Performs mobility at highest level of function for planned discharge setting.  See evaluation for individualized goals.  Description: Treatment/Interventions: Functional transfer training, LE strengthening/ROM, Therapeutic exercise, Endurance training, Bed mobility, Gait training          See flowsheet documentation for full assessment, interventions and recommendations.  Outcome: Progressing  Note: Prognosis: Good  Problem List: Decreased strength, Decreased endurance, Impaired balance, Decreased mobility, Obesity, Impaired hearing  Assessment: Pt. seen for PT treatment session this date with interventions consisting of  therapeutic exercises,  transfers and  gait training w/ emphasis on improving pt's functional activity tolerance. Pt. Requiring occasional cues for sequence and safety. In comparison to previous session, Pt. With improvement  in activity tolerance. No c/o dizziness with ambulation.  Pt is in need of continued activity in PT to improve strength balance endurance mobility transfers and ambulation with return to maximize LOF. From PT/mobility standpoint, recommendation at time of d/c would be Level III: minimal resource intensity in order to promote return to PLOF and independence.   The patient's AM-PAC Basic Mobility Inpatient Short Form Raw Score is 21. A Raw score of greater than 16 suggests the patient may benefit from discharge to home. Pt continues to be functioning below baseline level. PT will continue to see pt during current hospitalization in order to address the deficits listed above and provide interventions consistent w/ POC in effort to achieve goals.  Please also refer to physical therapist recommendation for safe DC planning.        Rehab Resource Intensity Level, PT: III (Minimum Resource Intensity)    See flowsheet documentation for full assessment.

## 2025-04-02 NOTE — TELEPHONE ENCOUNTER
From: ESTELA Hwang  Sent: 4/2/2025  12:58 PM EDT  To: Mandi Cary asked me to see if we can work on prior auth for outpatient Octreotide 100 mcg sub Q BID for this patient?

## 2025-04-02 NOTE — PLAN OF CARE
Problem: PAIN - ADULT  Goal: Verbalizes/displays adequate comfort level or baseline comfort level  Description: Interventions:- Encourage patient to monitor pain and request assistance- Assess pain using appropriate pain scale- Administer analgesics based on type and severity of pain and evaluate response- Implement non-pharmacological measures as appropriate and evaluate response- Consider cultural and social influences on pain and pain management- Notify physician/advanced practitioner if interventions unsuccessful or patient reports new pain  Outcome: Progressing     Problem: SAFETY ADULT  Goal: Patient will remain free of falls  Description: INTERVENTIONS:- Educate patient/family on patient safety including physical limitations- Instruct patient to call for assistance with activity - Consult OT/PT to assist with strengthening/mobility - Keep Call bell within reach- Keep bed low and locked with side rails adjusted as appropriate- Keep care items and personal belongings within reach- Initiate and maintain comfort rounds- Make Fall Risk Sign visible to staff- Offer Toileting every 2 Hours, in advance of need- Initiate/Maintain bed/chair alarm- Obtain necessary fall risk management equipment: non skid socks- Apply yellow socks and bracelet for high fall risk patients- Consider moving patient to room near nurses station  Outcome: Progressing  Goal: Maintain or return to baseline ADL function  Description: INTERVENTIONS:-  Assess patient's ability to carry out ADLs; assess patient's baseline for ADL function and identify physical deficits which impact ability to perform ADLs (bathing, care of mouth/teeth, toileting, grooming, dressing, etc.)- Assess/evaluate cause of self-care deficits - Assess range of motion- Assess patient's mobility; develop plan if impaired- Assess patient's need for assistive devices and provide as appropriate- Encourage maximum independence but intervene and supervise when necessary- Involve  family in performance of ADLs- Assess for home care needs following discharge - Consider OT consult to assist with ADL evaluation and planning for discharge- Provide patient education as appropriate  Outcome: Progressing  Goal: Maintains/Returns to pre admission functional level  Description: INTERVENTIONS:- Perform AM-PAC 6 Click Basic Mobility/ Daily Activity assessment daily.- Set and communicate daily mobility goal to care team and patient/family/caregiver. - Collaborate with rehabilitation services on mobility goals if consulted- Perform Range of Motion 3 times a day.- Reposition patient every 3 hours.- Dangle patient 3 times a day- Stand patient 3 times a day- Ambulate patient 3 times a day- Out of bed to chair 2 times a day - Out of bed for meals 3 times a day- Out of bed for toileting- Record patient progress and toleration of activity level   Outcome: Progressing     Problem: DISCHARGE PLANNING  Goal: Discharge to home or other facility with appropriate resources  Description: INTERVENTIONS:- Identify barriers to discharge w/patient and caregiver- Arrange for needed discharge resources and transportation as appropriate- Identify discharge learning needs (meds, wound care, etc.)- Arrange for interpretive services to assist at discharge as needed- Refer to Case Management Department for coordinating discharge planning if the patient needs post-hospital services based on physician/advanced practitioner order or complex needs related to functional status, cognitive ability, or social support system  Outcome: Progressing     Problem: NEUROSENSORY - ADULT  Goal: Achieves stable or improved neurological status  Description: INTERVENTIONS- Monitor and report changes in neurological status- Monitor vital signs such as temperature, blood pressure, glucose, and any other labs ordered - Initiate measures to prevent increased intracranial pressure- Monitor for lightheadedness.    Outcome: Progressing     Problem:  GASTROINTESTINAL - ADULT  Goal: Maintains or returns to baseline bowel function  Description: INTERVENTIONS:- Assess bowel function- Encourage oral fluids to ensure adequate hydration- Administer IV fluids if ordered to ensure adequate hydration- Administer ordered medications as needed- Encourage mobilization and activity- Consider nutritional services referral to assist patient with adequate nutrition and appropriate food choices  Outcome: Progressing  Goal: Maintains adequate nutritional intake  Description: INTERVENTIONS:- Monitor percentage of each meal consumed- Identify factors contributing to decreased intake, treat as appropriate- Assist with meals as needed- Monitor I&O, weight, and lab values if indicated- Obtain nutrition services referral as needed  Outcome: Progressing     Problem: METABOLIC, FLUID AND ELECTROLYTES - ADULT  Goal: Electrolytes maintained within normal limits  Description: INTERVENTIONS:- Monitor labs and assess patient for signs and symptoms of electrolyte imbalances- Administer electrolyte replacement as ordered- Monitor response to electrolyte replacements, including repeat lab results as appropriate- Instruct patient on fluid and nutrition as appropriate  Outcome: Progressing  Goal: Fluid balance maintained  Description: INTERVENTIONS:- Monitor labs - Monitor I/O and WT- Instruct patient on fluid and nutrition as appropriate- Assess for signs & symptoms of volume excess or deficit  Outcome: Progressing     Problem: HEMATOLOGIC - ADULT  Goal: Maintains hematologic stability  Description: INTERVENTIONS- Assess for signs and symptoms of bleeding or hemorrhage- Monitor labs- Administer supportive blood products/factors as ordered and appropriate  Outcome: Progressing     Problem: PAIN - ADULT  Goal: Verbalizes/displays adequate comfort level or baseline comfort level  Description: Interventions:- Encourage patient to monitor pain and request assistance- Assess pain using appropriate pain  scale- Administer analgesics based on type and severity of pain and evaluate response- Implement non-pharmacological measures as appropriate and evaluate response- Consider cultural and social influences on pain and pain management- Notify physician/advanced practitioner if interventions unsuccessful or patient reports new pain  Outcome: Progressing     Problem: SAFETY ADULT  Goal: Patient will remain free of falls  Description: INTERVENTIONS:- Educate patient/family on patient safety including physical limitations- Instruct patient to call for assistance with activity - Consult OT/PT to assist with strengthening/mobility - Keep Call bell within reach- Keep bed low and locked with side rails adjusted as appropriate- Keep care items and personal belongings within reach- Initiate and maintain comfort rounds- Make Fall Risk Sign visible to staff- Offer Toileting every 2 Hours, in advance of need- Initiate/Maintain bed/chair alarm- Obtain necessary fall risk management equipment: non skid socks- Apply yellow socks and bracelet for high fall risk patients- Consider moving patient to room near nurses station  Outcome: Progressing  Goal: Maintain or return to baseline ADL function  Description: INTERVENTIONS:-  Assess patient's ability to carry out ADLs; assess patient's baseline for ADL function and identify physical deficits which impact ability to perform ADLs (bathing, care of mouth/teeth, toileting, grooming, dressing, etc.)- Assess/evaluate cause of self-care deficits - Assess range of motion- Assess patient's mobility; develop plan if impaired- Assess patient's need for assistive devices and provide as appropriate- Encourage maximum independence but intervene and supervise when necessary- Involve family in performance of ADLs- Assess for home care needs following discharge - Consider OT consult to assist with ADL evaluation and planning for discharge- Provide patient education as appropriate  Outcome:  Progressing  Goal: Maintains/Returns to pre admission functional level  Description: INTERVENTIONS:- Perform AM-PAC 6 Click Basic Mobility/ Daily Activity assessment daily.- Set and communicate daily mobility goal to care team and patient/family/caregiver. - Collaborate with rehabilitation services on mobility goals if consulted- Perform Range of Motion 3 times a day.- Reposition patient every 3 hours.- Dangle patient 3 times a day- Stand patient 3 times a day- Ambulate patient 3 times a day- Out of bed to chair 2 times a day - Out of bed for meals 3 times a day- Out of bed for toileting- Record patient progress and toleration of activity level   Outcome: Progressing     Problem: DISCHARGE PLANNING  Goal: Discharge to home or other facility with appropriate resources  Description: INTERVENTIONS:- Identify barriers to discharge w/patient and caregiver- Arrange for needed discharge resources and transportation as appropriate- Identify discharge learning needs (meds, wound care, etc.)- Arrange for interpretive services to assist at discharge as needed- Refer to Case Management Department for coordinating discharge planning if the patient needs post-hospital services based on physician/advanced practitioner order or complex needs related to functional status, cognitive ability, or social support system  Outcome: Progressing     Problem: NEUROSENSORY - ADULT  Goal: Achieves stable or improved neurological status  Description: INTERVENTIONS- Monitor and report changes in neurological status- Monitor vital signs such as temperature, blood pressure, glucose, and any other labs ordered - Initiate measures to prevent increased intracranial pressure- Monitor for lightheadedness.    Outcome: Progressing     Problem: GASTROINTESTINAL - ADULT  Goal: Maintains or returns to baseline bowel function  Description: INTERVENTIONS:- Assess bowel function- Encourage oral fluids to ensure adequate hydration- Administer IV fluids if  ordered to ensure adequate hydration- Administer ordered medications as needed- Encourage mobilization and activity- Consider nutritional services referral to assist patient with adequate nutrition and appropriate food choices  Outcome: Progressing  Goal: Maintains adequate nutritional intake  Description: INTERVENTIONS:- Monitor percentage of each meal consumed- Identify factors contributing to decreased intake, treat as appropriate- Assist with meals as needed- Monitor I&O, weight, and lab values if indicated- Obtain nutrition services referral as needed  Outcome: Progressing     Problem: METABOLIC, FLUID AND ELECTROLYTES - ADULT  Goal: Electrolytes maintained within normal limits  Description: INTERVENTIONS:- Monitor labs and assess patient for signs and symptoms of electrolyte imbalances- Administer electrolyte replacement as ordered- Monitor response to electrolyte replacements, including repeat lab results as appropriate- Instruct patient on fluid and nutrition as appropriate  Outcome: Progressing  Goal: Fluid balance maintained  Description: INTERVENTIONS:- Monitor labs - Monitor I/O and WT- Instruct patient on fluid and nutrition as appropriate- Assess for signs & symptoms of volume excess or deficit  Outcome: Progressing     Problem: HEMATOLOGIC - ADULT  Goal: Maintains hematologic stability  Description: INTERVENTIONS- Assess for signs and symptoms of bleeding or hemorrhage- Monitor labs- Administer supportive blood products/factors as ordered and appropriate  Outcome: Progressing

## 2025-04-02 NOTE — PHYSICAL THERAPY NOTE
PHYSICAL THERAPY NOTE          Patient Name: Ean Young  Today's Date: 4/2/2025 04/02/25 0836   PT Last Visit   PT Visit Date 04/02/25   Note Type   Note Type Treatment   Pain Assessment   Pain Assessment Tool 0-10   Pain Score No Pain   Restrictions/Precautions   Weight Bearing Precautions Per Order No   Other Precautions Fall Risk   General   Chart Reviewed Yes   Response to Previous Treatment Patient with no complaints from previous session.   Family/Caregiver Present No   Cognition   Overall Cognitive Status WFL   Arousal/Participation Alert;Cooperative   Attention Within functional limits   Orientation Level Oriented X4   Memory Within functional limits   Following Commands Follows all commands and directions without difficulty   Subjective   Subjective Agreeable to therapy. No c/o dizziness with ambulation   Bed Mobility   Additional Comments OOB in chair start/end PT session   Transfers   Sit to Stand 5  Supervision   Additional items Armrests;Increased time required   Stand to Sit 5  Supervision   Additional items Armrests;Increased time required;Verbal cues   Stand pivot 5  Supervision   Additional items Increased time required   Additional Comments no device   Ambulation/Elevation   Gait pattern Short stride;Improper Weight shift;Wide ALFIE  (decreased gait speed)   Gait Assistance 5  Supervision   Additional items Verbal cues   Assistive Device None   Distance 200'   Balance   Static Sitting Good   Dynamic Sitting Fair +   Static Standing Fair   Dynamic Standing Fair   Ambulatory Fair   Endurance Deficit   Endurance Deficit Yes   Activity Tolerance   Activity Tolerance Patient limited by fatigue   Exercises   Hip Flexion Sitting;20 reps;AROM;Bilateral   Hip Abduction Sitting;20 reps;AROM;Bilateral   Hip Adduction Sitting;20 reps;AROM;Bilateral   Knee AROM Long Arc Quad Sitting;20 reps;AROM;Bilateral   Ankle Pumps  Sitting;20 reps;AROM;Bilateral   Assessment   Prognosis Good   Problem List Decreased strength;Decreased endurance;Impaired balance;Decreased mobility;Obesity;Impaired hearing   Assessment Pt. seen for PT treatment session this date with interventions consisting of  therapeutic exercises,  transfers and  gait training w/ emphasis on improving pt's functional activity tolerance. Pt. Requiring occasional cues for sequence and safety. In comparison to previous session, Pt. With improvement  in activity tolerance. No c/o dizziness with ambulation.  Pt is in need of continued activity in PT to improve strength balance endurance mobility transfers and ambulation with return to maximize LOF. From PT/mobility standpoint, recommendation at time of d/c would be Level III: minimal resource intensity in order to promote return to PLOF and independence.   The patient's [facility] Basic Mobility Inpatient Short Form Raw Score is 21. A Raw score of greater than 16 suggests the patient may benefit from discharge to home. Pt continues to be functioning below baseline level. PT will continue to see pt during current hospitalization in order to address the deficits listed above and provide interventions consistent w/ POC in effort to achieve goals.  Please also refer to physical therapist recommendation for safe DC planning.   Goals   Patient Goals to feel better   LTG Expiration Date 04/14/25   PT Treatment Day 2   Plan   Treatment/Interventions Functional transfer training;LE strengthening/ROM;Therapeutic exercise;Endurance training;Bed mobility;Gait training   Progress Progressing toward goals   PT Frequency 3-5x/wk   Discharge Recommendation   Rehab Resource Intensity Level, PT III (Minimum Resource Intensity)   AM-PAC Basic Mobility Inpatient   Turning in Flat Bed Without Bedrails 4   Lying on Back to Sitting on Edge of Flat Bed Without Bedrails 4   Moving Bed to Chair 3   Standing Up From Chair Using Arms 4   Walk in Room 3   Climb  3-5 Stairs With Railing 3   Basic Mobility Inpatient Raw Score 21   Basic Mobility Standardized Score 45.55   [facility] Highest Level Of Mobility   -HLM Goal 6: Walk 10 steps or more   -HLM Achieved 7: Walk 25 feet or more   Education   Education Provided Mobility training   Patient Demonstrates verbal understanding   End of Consult   Patient Position at End of Consult Bedside chair;Bed/Chair alarm activated;All needs within reach   End of Consult Comments discussed POC with PT

## 2025-04-02 NOTE — ASSESSMENT & PLAN NOTE
Continue octreotide as ordered.  We will have our outpatient IBD team work on prior authorization for coverage upon discharge.    Continue current diet as ordered.   Continue to maintain a large bore IV.  Continue to monitor hemoglobin and transfuse as per protocol.   Continue with serial abdominal exams.   Continue to monitor stool output for any overt signs of GI bleeding.   Continue PPI as ordered.   Continue PRN antiemetics as ordered.

## 2025-04-02 NOTE — PROGRESS NOTES
Progress Note - Gastroenterology   Name: Ean Young 69 y.o. male I MRN: [id number]  Unit/Bed#: 403-01 I Date of Admission: 3/28/2025   Date of Service: 4/2/2025 I Hospital Day: 4    Assessment & Plan  Symptomatic anemia  Continue octreotide as ordered.  We will have our outpatient IBD team work on prior authorization for coverage upon discharge.    Continue current diet as ordered.   Continue to maintain a large bore IV.  Continue to monitor hemoglobin and transfuse as per protocol.   Continue with serial abdominal exams.   Continue to monitor stool output for any overt signs of GI bleeding.   Continue PPI as ordered.   Continue PRN antiemetics as ordered.   Chronic blood loss anemia  Proceed with repeat outpatient capsule endoscopy as scheduled on 4/7/25.  Lightheadedness  Improved/Stable  Essential hypertension  As per primary team.  Hypokalemia  As per primary team.  Pancytopenia (HCC)  As per primary team.  Hypomagnesemia  As per primary team.    Continue rest of medications as per primary team.   Continue supportive care.     I have discussed the above management plan in detail with the primary service.   Gastroenterology service will follow.    Subjective   The patient is currently being seen for his symptomatic anemia with chronic blood loss anemia and is status post an EGD with push yesterday with a single small angioectasia in the proximal jejunum, without bleeding, with induced coagulation and hemostasis achieved with 1 application of APC.  His hemoglobin did again decreased today and is at 7.3. The patient currently denies any nausea, vomiting, GERD symptoms, or abdominal pain.  The patient had a small bowel movement last night without any evidence of blood or melena.  He is currently tolerating a regular diet without any issues.    Objective :  Temp:  [97.6 °F (36.4 °C)-98.4 °F (36.9 °C)] 98.2 °F (36.8 °C)  HR:  [63-83] 63  BP: ()/(47-76) 128/63  Resp:  [15-22] 18  SpO2:  [90 %-100 %] 98 %  O2  Device: None (Room air)    Physical Exam  Exam: Pt was sitting out of bed in the chair during his exam today, A&O x 3, pleasant and cooperative. Abdomen is soft, mildly distended, nontender, with hypoactive bowel sounds x 4. Skin is non-icteric.  No edema noted of the b/l lower extremities upon exam today.       Lab Results: I have reviewed the following results:CBC/BMP:   .     04/02/25  0450   WBC 2.49*   HGB 7.3*   HCT 22.9*   *   SODIUM 140   K 3.8      CO2 30   BUN 12   CREATININE 0.63   GLUC 115    , LFTs:   .     04/02/25  0450   AST 13   ALT 9   ALB 3.2*   TBILI 0.65   ALKPHOS 71        Imaging Results Review: No pertinent imaging studies reviewed.  Other Study Results Review: No additional pertinent studies reviewed.

## 2025-04-02 NOTE — ASSESSMENT & PLAN NOTE
70 yo male PMHx HTN, mod AS, prior UGI this past February presents with positional dizziness and lightheadedness. Associated decreased oral intake. Noted with positive orthostats.    With lightheadedness with positive orthostats suggestive of volume depletion. Also suspecting symptomatic anemia 2/2  chemotherapy induced vs rule out GI bleed  VS with low-normal BP not tachycardiac. Normal Cr and BUN.   Did have recurrence of melena on 3/30 consistent with his prior episodes of melena in the past    Did have multiple EGD in February noted small angiectasia in 2nd part of duodenum with stigmata of recent hemorrhage s/p hemostasis. C scope with internal hemorrhoid, small and medium sigmoid & rectosigmoid diverticulum & old blood suggestive of proximal foci of bleeding. Did have PillCam capsule endoscopy on 2/17 & 2/19 both limited to 2-3 hours rather than the 8 but GI believes most of small bowel seen with evidence of non-bleeding angiectasias throughout small bowel  S/p 2 units pRBCs  Continue to trend H&H  q12   Remains without further signs of overt GIB   IV PPI bid   Stool record  Hold pharm DVT ppx  Appreciate GI consult, s/p EGD with push enteroscopy revealing AVMs. Recommending octreotide 100mg bid which should be continued at discharge. GI team working on prior auth   Note, does have outpatient capsule endoscopy scheduled for 4/7

## 2025-04-03 ENCOUNTER — HOME HEALTH ADMISSION (OUTPATIENT)
Dept: HOME HEALTH SERVICES | Facility: HOME HEALTHCARE | Age: 70
End: 2025-04-03
Payer: COMMERCIAL

## 2025-04-03 ENCOUNTER — TRANSITIONAL CARE MANAGEMENT (OUTPATIENT)
Dept: FAMILY MEDICINE CLINIC | Facility: CLINIC | Age: 70
End: 2025-04-03

## 2025-04-03 VITALS
HEART RATE: 61 BPM | RESPIRATION RATE: 18 BRPM | TEMPERATURE: 97.4 F | BODY MASS INDEX: 33.12 KG/M2 | OXYGEN SATURATION: 93 % | DIASTOLIC BLOOD PRESSURE: 76 MMHG | SYSTOLIC BLOOD PRESSURE: 141 MMHG | HEIGHT: 71 IN | WEIGHT: 236.55 LBS

## 2025-04-03 LAB
ALBUMIN SERPL BCG-MCNC: 3.8 G/DL (ref 3.5–5)
ALP SERPL-CCNC: 84 U/L (ref 34–104)
ALT SERPL W P-5'-P-CCNC: 12 U/L (ref 7–52)
ANION GAP SERPL CALCULATED.3IONS-SCNC: 9 MMOL/L (ref 4–13)
AST SERPL W P-5'-P-CCNC: 16 U/L (ref 13–39)
BASOPHILS # BLD AUTO: 0.02 THOUSANDS/ÂΜL (ref 0–0.1)
BASOPHILS NFR BLD AUTO: 1 % (ref 0–1)
BILIRUB SERPL-MCNC: 0.94 MG/DL (ref 0.2–1)
BUN SERPL-MCNC: 11 MG/DL (ref 5–25)
CALCIUM SERPL-MCNC: 8.8 MG/DL (ref 8.4–10.2)
CHLORIDE SERPL-SCNC: 105 MMOL/L (ref 96–108)
CO2 SERPL-SCNC: 27 MMOL/L (ref 21–32)
CREAT SERPL-MCNC: 0.55 MG/DL (ref 0.6–1.3)
EOSINOPHIL # BLD AUTO: 0.01 THOUSAND/ÂΜL (ref 0–0.61)
EOSINOPHIL NFR BLD AUTO: 1 % (ref 0–6)
ERYTHROCYTE [DISTWIDTH] IN BLOOD BY AUTOMATED COUNT: 17.4 % (ref 11.6–15.1)
GFR SERPL CREATININE-BSD FRML MDRD: 107 ML/MIN/1.73SQ M
GLUCOSE SERPL-MCNC: 110 MG/DL (ref 65–140)
HCT VFR BLD AUTO: 26.7 % (ref 36.5–49.3)
HGB BLD-MCNC: 8.6 G/DL (ref 12–17)
IMM GRANULOCYTES # BLD AUTO: 0.01 THOUSAND/UL (ref 0–0.2)
IMM GRANULOCYTES NFR BLD AUTO: 1 % (ref 0–2)
LYMPHOCYTES # BLD AUTO: 0.31 THOUSANDS/ÂΜL (ref 0.6–4.47)
LYMPHOCYTES NFR BLD AUTO: 14 % (ref 14–44)
MCH RBC QN AUTO: 31.7 PG (ref 26.8–34.3)
MCHC RBC AUTO-ENTMCNC: 32.2 G/DL (ref 31.4–37.4)
MCV RBC AUTO: 99 FL (ref 82–98)
MONOCYTES # BLD AUTO: 0.17 THOUSAND/ÂΜL (ref 0.17–1.22)
MONOCYTES NFR BLD AUTO: 8 % (ref 4–12)
NEUTROPHILS # BLD AUTO: 1.63 THOUSANDS/ÂΜL (ref 1.85–7.62)
NEUTS SEG NFR BLD AUTO: 75 % (ref 43–75)
NRBC BLD AUTO-RTO: 0 /100 WBCS
PLATELET # BLD AUTO: 141 THOUSANDS/UL (ref 149–390)
PMV BLD AUTO: 10.4 FL (ref 8.9–12.7)
POTASSIUM SERPL-SCNC: 3.9 MMOL/L (ref 3.5–5.3)
PROT SERPL-MCNC: 6.3 G/DL (ref 6.4–8.4)
RBC # BLD AUTO: 2.71 MILLION/UL (ref 3.88–5.62)
SODIUM SERPL-SCNC: 141 MMOL/L (ref 135–147)
WBC # BLD AUTO: 2.15 THOUSAND/UL (ref 4.31–10.16)

## 2025-04-03 PROCEDURE — 99239 HOSP IP/OBS DSCHRG MGMT >30: CPT | Performed by: PHYSICIAN ASSISTANT

## 2025-04-03 PROCEDURE — 80053 COMPREHEN METABOLIC PANEL: CPT

## 2025-04-03 PROCEDURE — 85025 COMPLETE CBC W/AUTO DIFF WBC: CPT

## 2025-04-03 RX ADMIN — OMEGA-3 FATTY ACIDS CAP 1000 MG 1000 MG: 1000 CAP at 08:45

## 2025-04-03 RX ADMIN — FERROUS SULFATE TAB 325 MG (65 MG ELEMENTAL FE) 325 MG: 325 (65 FE) TAB at 08:45

## 2025-04-03 RX ADMIN — OCTREOTIDE ACETATE 100 MCG: 100 INJECTION, SOLUTION INTRAVENOUS; SUBCUTANEOUS at 00:13

## 2025-04-03 RX ADMIN — OCTREOTIDE ACETATE 100 MCG: 100 INJECTION, SOLUTION INTRAVENOUS; SUBCUTANEOUS at 11:47

## 2025-04-03 RX ADMIN — PANTOPRAZOLE SODIUM 40 MG: 40 INJECTION, POWDER, FOR SOLUTION INTRAVENOUS at 09:11

## 2025-04-03 RX ADMIN — FOLIC ACID 1 MG: 1 TABLET ORAL at 08:45

## 2025-04-03 RX ADMIN — METOPROLOL SUCCINATE 12.5 MG: 25 TABLET, EXTENDED RELEASE ORAL at 08:45

## 2025-04-03 RX ADMIN — AZELASTINE HYDROCHLORIDE 2 SPRAY: 137 SPRAY, METERED NASAL at 08:46

## 2025-04-03 RX ADMIN — HYDROXYZINE HYDROCHLORIDE 25 MG: 25 TABLET, FILM COATED ORAL at 08:45

## 2025-04-03 RX ADMIN — ATORVASTATIN CALCIUM 40 MG: 40 TABLET, FILM COATED ORAL at 08:45

## 2025-04-03 RX ADMIN — BRIMONIDINE TARTRATE 1 DROP: 2 SOLUTION/ DROPS OPHTHALMIC at 08:46

## 2025-04-03 NOTE — CASE MANAGEMENT
Case Management Discharge Planning Note    Patient name Ean Young  Location /403-01 MRN [id number]  : 1955 Date 4/3/2025       Current Admission Date: 3/28/2025  Current Admission Diagnosis:Symptomatic anemia   Patient Active Problem List    Diagnosis Date Noted Date Diagnosed    Symptomatic anemia 2025     Hypomagnesemia 2025     Lightheadedness 2025     Chronic blood loss anemia 2025     Type 2 diabetes mellitus with hyperglycemia, without long-term current use of insulin ([facility]) 2025     Acute blood loss anemia 2025     Severe protein-calorie malnutrition ([facility]) 2025     Pancytopenia ([facility]) 2025     Gastric ulcer 2025     Upper GI bleed 2025     JM (obstructive sleep apnea) 2025     Hypokalemia 2025     Intractable episodic headache 2024     Anxiety about treatment 2024     Other insomnia 2024     Head and neck cancer ([facility]) 2024     Benign prostatic hyperplasia 2024     Chronic obstructive pulmonary disease ([facility]) 2024     Class 2 severe obesity due to excess calories with serious comorbidity and body mass index (BMI) of 37.0 to 37.9 in adult ([facility]) 2024     Laryngopharyngeal reflux (LPR) 2024     Cervical spondylosis 10/06/2023     Bilateral carpal tunnel syndrome 10/06/2023     Cervical radiculopathy 2023     Moderate aortic stenosis 2022     Decreased hearing of both ears 2022     Urinary frequency 2022     Type 2 diabetes mellitus without complication, without long-term current use of insulin ([facility]) 2022     Essential hypertension 2022     Dyslipidemia 2022     Gastroesophageal reflux disease without esophagitis 2022     Obstructive sleep apnea 2022       LOS (days): 5  Geometric Mean LOS (GMLOS) (days): 3.4  Days to GMLOS:-1.6     OBJECTIVE:  Risk of Unplanned Readmission Score: 24.85         Current admission status:  Inpatient   Preferred Pharmacy:   [id number] [place] - [address]  [address] [facility] [address]  Phone: [phone] Fax: [phone]    Primary Care Provider: Timmy Chapman DO    Primary Insurance: ANGELLA ALVARADO  Secondary Insurance: [place] [employer] [facility]    DISCHARGE DETAILS:    Discharge planning discussed with:: patient  Freedom of Choice: Yes  Comments - Freedom of Choice: CM discussed level 3 therapy patient in agreement with [facility] and [facility] is his preference.  CM contacted family/caregiver?: No- see comments  Were Treatment Team discharge recommendations reviewed with patient/caregiver?: Yes  Did patient/caregiver verbalize understanding of patient care needs?: Yes  Were patient/caregiver advised of the risks associated with not following Treatment Team discharge recommendations?: Yes         Requested Home Health Care         Is the patient interested in HHC at discharge?: Yes  Home Health Discipline requested:: Nursing, Occupational Therapy, Physical Therapy  Home Health Agency Name:: [facility]  [facility]HA External Referral Reason (only applicable if external HHA name selected):  (SN ASSESSMENT AND MEDICATION MANAGEMENT)  Home Health Follow-Up Provider:: PCP  Home Health Services Needed:: Evaluate Functional Status and Safety, Gait/ADL Training, Strengthening/Theraputic Exercises to Improve Function, Other (comment)  Homebound Criteria Met:: Requires the Assistance of Another Person for Safe Ambulation or to Leave the Home, Uses an Assist Device (i.e. cane, walker, etc)  Supporting Clincal Findings:: Limited Endurance, Fatigues Easliy in Short Distances    DME Referral Provided  Referral made for DME?: No         Would you like to participate in our [facility] service program?  : No - Declined    Treatment Team Recommendation: Home with Home Health Care  Discharge Destination Plan:: Home with Home Health Care ([facility])             Patient stable  for discharge home today with [facility]   [facility] updated in aidin of discharge.    GI dept is setting up the Octreotide medication for patient on discharge.    Transport secured for 1430 via  [employer].face sheet provided for transport.    Discussed with Patient  there may be an out of pocket expense for transport.     Patient stated he lives alone and has a key to the home with him.     Follow up providers listed on avs

## 2025-04-03 NOTE — DISCHARGE SUMMARY
Discharge Summary - Hospitalist   Name: Ean Young 69 y.o. male I MRN: [id number]  Unit/Bed#: 403-01 I Date of Admission: 3/28/2025   Date of Service: 4/3/2025 I Hospital Day: 5     Assessment & Plan  Symptomatic anemia  70 yo male PMHx HTN, mod AS, prior UGI this past February presents with positional dizziness and lightheadedness. Associated decreased oral intake. Noted with positive orthostats.    With lightheadedness with positive orthostats suggestive of volume depletion. Also suspecting symptomatic anemia 2/2  chemotherapy induced vs rule out GI bleed  VS with low-normal BP not tachycardiac. Normal Cr and BUN.   Did have recurrence of melena on 3/30 consistent with his prior episodes of melena in the past    Did have multiple EGD in February noted small angiectasia in 2nd part of duodenum with stigmata of recent hemorrhage s/p hemostasis. C scope with internal hemorrhoid, small and medium sigmoid & rectosigmoid diverticulum & old blood suggestive of proximal foci of bleeding. Did have PillCam capsule endoscopy on 2/17 & 2/19 both limited to 2-3 hours rather than the 8 but GI believes most of small bowel seen with evidence of non-bleeding angiectasias throughout small bowel  S/p 2 units pRBCs  Remains without further signs of overt GIB   IV PPI bid - switch to oral PPI on discharge  Appreciate GI consult, s/p EGD with push enteroscopy revealing AVMs. Recommending octreotide 100mg bid which should be continued at discharge. GI team working on prior auth - they state he can be cleared for discharge today and they will follow up with him  Note, does have outpatient capsule endoscopy scheduled for 4/7  Lightheadedness  Patient presented to the ER for evaluation of lightheadedness  Likely secondary to dehydration & acute anemia  Orthostatics positive on admission, monitor daily   S/p IV fluids and 2pRBCs as above. Monitor off further fluids & transfuse as indicated  Reports no dizziness on day of  discharge  PT/OT  Pancytopenia (HCC)  Suspected chemotherapy induced with leukopenia, thrombocytopenia & decreased ANC   At least contributing to acute anemia if not full cause   Transfuse as indicated   No indication for Granix at this time   Remains afebrile   Trend - stable on day of discharge  Hypokalemia  Wnl on day of discharge  Hypomagnesemia  Monitor & replete as indicated   Chronic blood loss anemia  Hgb stable at 8.6 on day of discharge  As above, with multiple AVMs  Essential hypertension  Hold HCTZ  Continue BB with holding parameters  Pressure 120-140 systolic in the last 24 hours, can resume HCTZ outpatient  Dyslipidemia  Continue statin  Moderate aortic stenosis  Outpatient cardiology follow up  JM (obstructive sleep apnea)  Continue CPAP hs at home     Medical Problems       Resolved Problems  Date Reviewed: 3/28/2025   None       Discharging Physician / Practitioner: Chastity Ladd PA-C  PCP: Timmy Chapman DO  Admission Date:   Admission Orders (From admission, onward)       Ordered        03/29/25 0950  INPATIENT ADMISSION  Once            03/28/25 1907  Place in Observation  Once                          Discharge Date: 04/03/25    Consultations During Hospital Stay:  GI    Procedures Performed:   none    Significant Findings / Test Results:   No orders to display         Incidental Findings:   none    Test Results Pending at Discharge (will require follow up):   none     Outpatient Tests Requested:  none    Complications:  none    Reason for Admission: anemia    Hospital Course:   Ean Young is a 69 y.o. male patient who originally presented to the hospital on 3/28/2025 due to symptomatic anemia. Specifically dizziness/lightheadedness especially with standing/walking. Patient has a history of AVMs and just had a capsule endoscopy completed. He did note dark colored stools but also had recently been initiated on iron supplements. Did require 2 units of PRBC transfusion here during  "inpatient stay. Dizziness improved with stabilized hgb levels. Did get initiated on octreotide by GI. They are pending authorization for this but given patient is stable and asymptomatic at present, he was cleared for discharge. Has repeat capsule study scheduled for 4/7.        Please see above list of diagnoses and related plan for additional information.     Condition at Discharge: good    Discharge Day Visit / Exam:   Subjective:  patient denies any complaints today. States no melena noted. Denies any dizziness with ambulation or positional changes.   Vitals: Blood Pressure: 141/76 (04/03/25 0618)  Pulse: 61 (04/03/25 0618)  Temperature: (!) 97.4 °F (36.3 °C) (04/03/25 0618)  Temp Source: Oral (04/02/25 0713)  Respirations: 18 (04/02/25 0713)  Height: 5' 11\" (180.3 cm) (03/28/25 1950)  Weight - Scale: 107 kg (236 lb 8.9 oz) (03/28/25 1950)  SpO2: 93 % (04/03/25 0618)  Physical Exam  Vitals and nursing note reviewed.   Constitutional:       General: He is not in acute distress.     Appearance: He is obese.   Cardiovascular:      Rate and Rhythm: Normal rate and regular rhythm.      Pulses: Normal pulses.      Heart sounds: Normal heart sounds. No murmur heard.     No gallop.   Pulmonary:      Effort: Pulmonary effort is normal.      Breath sounds: Normal breath sounds.   Abdominal:      General: Bowel sounds are normal.      Palpations: Abdomen is soft.      Tenderness: There is no abdominal tenderness.   Musculoskeletal:      Right lower leg: No edema.      Left lower leg: No edema.   Neurological:      Mental Status: He is alert and oriented to person, place, and time. Mental status is at baseline.   Psychiatric:         Mood and Affect: Mood normal.         Behavior: Behavior normal.          Discussion with Family: Patient declined call to .     Discharge instructions/Information to patient and family:   See after visit summary for information provided to patient and family.      Provisions for " Follow-Up Care:  See after visit summary for information related to follow-up care and any pertinent home health orders.      Mobility at time of Discharge:   Basic Mobility Inpatient Raw Score: 20  JH-HLM Goal: 6: Walk 10 steps or more  JH-HLM Achieved: 6: Walk 10 steps or more  HLM Goal achieved. Continue to encourage appropriate mobility.     Disposition:   Home    Planned Readmission: none    Discharge Medications:  See after visit summary for reconciled discharge medications provided to patient and/or family.      Administrative Statements   Discharge Statement:  I have spent a total time of 45 minutes in caring for this patient on the day of the visit/encounter. >30 minutes of time was spent on: Risks and benefits of tx options, Instructions for management, Patient and family education, Importance of tx compliance, Risk factor reductions, Counseling / Coordination of care, Documenting in the medical record, Reviewing / ordering tests, medicine, procedures  , and Communicating with other healthcare professionals .    **Please Note: This note may have been constructed using a voice recognition system**

## 2025-04-03 NOTE — ASSESSMENT & PLAN NOTE
Patient presented to the ER for evaluation of lightheadedness  Likely secondary to dehydration & acute anemia  Orthostatics positive on admission, monitor daily   S/p IV fluids and 2pRBCs as above. Monitor off further fluids & transfuse as indicated  Reports no dizziness on day of discharge  PT/OT

## 2025-04-03 NOTE — RESPIRATORY THERAPY NOTE
04/02/25 7240   Respiratory Assessment   Resp Comments (S)  Patient declines HS CPAP. Said he is having a bad day and still needs to have an IV placed. He would rather just use the NC tonight. He said he will call if he changes huis mind.   Non-Invasive Information   Non-Invasive Ventilation Mode CPAP   SpO2 93 %

## 2025-04-03 NOTE — ASSESSMENT & PLAN NOTE
Suspected chemotherapy induced with leukopenia, thrombocytopenia & decreased ANC   At least contributing to acute anemia if not full cause   Transfuse as indicated   No indication for Granix at this time   Remains afebrile   Trend - stable on day of discharge

## 2025-04-03 NOTE — ASSESSMENT & PLAN NOTE
Hold HCTZ  Continue BB with holding parameters  Pressure 120-140 systolic in the last 24 hours, can resume HCTZ outpatient

## 2025-04-04 ENCOUNTER — HOME CARE VISIT (OUTPATIENT)
Dept: HOME HEALTH SERVICES | Facility: HOME HEALTHCARE | Age: 70
End: 2025-04-04
Payer: COMMERCIAL

## 2025-04-04 DIAGNOSIS — Z59.82 TRANSPORTATION INSECURITY: ICD-10-CM

## 2025-04-04 DIAGNOSIS — Z59.41 FOOD INSECURITY: Primary | ICD-10-CM

## 2025-04-04 PROCEDURE — 400013 VN SOC

## 2025-04-04 PROCEDURE — G0299 HHS/HOSPICE OF RN EA 15 MIN: HCPCS

## 2025-04-04 SDOH — ECONOMIC STABILITY - TRANSPORTATION SECURITY: TRANSPORTATION INSECURITY: Z59.82

## 2025-04-04 SDOH — ECONOMIC STABILITY - FOOD INSECURITY: FOOD INSECURITY: Z59.41

## 2025-04-04 NOTE — UTILIZATION REVIEW
NOTIFICATION OF ADMISSION DISCHARGE   This is a Notification of Discharge from [facility]. Please be advised that this patient has been discharge from our facility. Below you will find the admission and discharge date and time including the patient’s disposition.   UTILIZATION REVIEW CONTACT:  Utilization Review Assistants  Network Utilization Review Department  Phone: [phone] x carefully listen to the prompts. All voicemails are confidential.  Email: [email or web address]     ADMISSION INFORMATION  PRESENTATION DATE: 3/28/2025  4:43 PM  OBERVATION ADMISSION DATE: 03/28/2025 1907  INPATIENT ADMISSION DATE: 3/29/25  9:50 AM   DISCHARGE DATE: 4/3/2025  2:49 PM   DISPOSITION:Home/Self Care    Network Utilization Review Department  ATTENTION: Please call with any questions or concerns to [phone] and carefully listen to the prompts so that you are directed to the right person. All voicemails are confidential.   For Discharge needs, contact Care Management DC Support Team at [phone] opt. 2  Send all requests for admission clinical reviews, approved or denied determinations and any other requests to dedicated fax number below belonging to the campus where the patient is receiving treatment. List of dedicated fax numbers for the Facilities:  FACILITY NAME UR FAX NUMBER   ADMISSION DENIALS (Administrative/Medical Necessity) [phone]   DISCHARGE SUPPORT TEAM ([facility]) [phone]   PARENT CHILD HEALTH (Maternity/NICU/Pediatrics) [phone]   [facility] [phone]   [facility] [phone]   [facility] [phone]   [facility] [phone]   [facility] [phone]   [facility] [phone]   [facility] [phone]   [facility] [phone]   [facility]  [facility] [phone]   [facility] [phone]   [facility] [phone]   [facility] [phone]

## 2025-04-07 ENCOUNTER — HOME CARE VISIT (OUTPATIENT)
Dept: HOME HEALTH SERVICES | Facility: HOME HEALTHCARE | Age: 70
End: 2025-04-07
Payer: COMMERCIAL

## 2025-04-07 ENCOUNTER — TELEPHONE (OUTPATIENT)
Dept: OTHER | Facility: OTHER | Age: 70
End: 2025-04-07

## 2025-04-07 VITALS
SYSTOLIC BLOOD PRESSURE: 122 MMHG | OXYGEN SATURATION: 98 % | TEMPERATURE: 98.3 F | DIASTOLIC BLOOD PRESSURE: 78 MMHG | HEART RATE: 76 BPM | RESPIRATION RATE: 18 BRPM

## 2025-04-07 NOTE — TELEPHONE ENCOUNTER
LVM for pt to call back so that we can assist pt with rescheduling the Capsule Endoscopy at the [facility]. Pt stated that there were no Lyft drivers available this morning to pick him up.

## 2025-04-07 NOTE — TELEPHONE ENCOUNTER
Ean is calling in regards to his appointment this morning, 4/7. He was supposed to arrive at 730AM but his Lyft was cancelled because there are no drivers available. Patient has no other way to get to his appointment. He is not sure what he should do. Confirmed on [employer] reason for ride cancellation: No drivers available.    Please advise and return call to patient. Thank you.

## 2025-04-08 ENCOUNTER — HOME CARE VISIT (OUTPATIENT)
Dept: HOME HEALTH SERVICES | Facility: HOME HEALTHCARE | Age: 70
End: 2025-04-08
Payer: COMMERCIAL

## 2025-04-08 VITALS — OXYGEN SATURATION: 97 % | DIASTOLIC BLOOD PRESSURE: 62 MMHG | HEART RATE: 70 BPM | SYSTOLIC BLOOD PRESSURE: 112 MMHG

## 2025-04-08 VITALS
HEART RATE: 66 BPM | RESPIRATION RATE: 18 BRPM | TEMPERATURE: 98 F | DIASTOLIC BLOOD PRESSURE: 60 MMHG | OXYGEN SATURATION: 98 % | SYSTOLIC BLOOD PRESSURE: 102 MMHG

## 2025-04-08 DIAGNOSIS — J30.9 ALLERGIC RHINITIS, UNSPECIFIED SEASONALITY, UNSPECIFIED TRIGGER: ICD-10-CM

## 2025-04-08 PROCEDURE — G0151 HHCP-SERV OF PT,EA 15 MIN: HCPCS

## 2025-04-08 PROCEDURE — G0299 HHS/HOSPICE OF RN EA 15 MIN: HCPCS

## 2025-04-08 RX ORDER — OCTREOTIDE ACETATE 100 UG/ML
100 INJECTION, SOLUTION INTRAVENOUS; SUBCUTANEOUS 2 TIMES DAILY
Qty: 60 ML | Refills: 3 | Status: SHIPPED | OUTPATIENT
Start: 2025-04-08

## 2025-04-08 RX ORDER — AZELASTINE 1 MG/ML
2 SPRAY, METERED NASAL 2 TIMES DAILY
Qty: 1 ML | Refills: 1 | Status: SHIPPED | OUTPATIENT
Start: 2025-04-08

## 2025-04-08 NOTE — TELEPHONE ENCOUNTER
Can you please let the patient know that we were able to get his new medication, the octreotide that he is to inject subcutaneously twice a day to help with the chronic anemia and bleeding approved through his insurance.  I did send a prescription to his pharmacy.  If he is getting home health care services which it appears he is they should be able to help teach him how to do the injection.   There are no Wet Read(s) to document.

## 2025-04-08 NOTE — CASE COMMUNICATION
Medication discrepancies or Major drug interactions none noted  Abnormal clinical findings weakness  This report is informational only, no response is needed  [facility] has Admitted your patient to Home Health service with the following disciplines: SN, PT and OT  Patient stated goals of care to get stronger  Potential barriers to goal achievement weakness and DM  Primary focus of home health care:Cardiac Circulatory  Anticipated v isit pattern and next visit date 1w1 2w1 1w2 next visit 4/7  Thank you for allowing us to participate in the care of your patient.      Deloris Cabral Rn

## 2025-04-08 NOTE — TELEPHONE ENCOUNTER
Octreotide 100mcg/ml has been approved.     Valid 4/7/2025 - 12/31/2025    Letter in Media      Please send script to pharmacy

## 2025-04-09 ENCOUNTER — PATIENT OUTREACH (OUTPATIENT)
Dept: CASE MANAGEMENT | Facility: OTHER | Age: 70
End: 2025-04-09

## 2025-04-09 NOTE — PROGRESS NOTES
CELINA CUEVAS received referral for patient from Renée Rios, Admin Coordinator, for food and transportation insecurity via [facility] report. Patient was hospitalized at [facility] from 3/28-4/3. Patient was discharged home with MAI MACKAY.    Patient has prior OP CELINA CUEVAS outreach for assistance with finding a dental office patient's insurance transportation benefit could take him to.    CELINA CUEVAS placed initial outreach call to patient. CELINA CUEVAS introduced self, explained role and reasoning for outreach. Patient was present on the phone with his niece, Destinee, listed on patient's Medical Communication Consent form. Patient and niece stated that patient primarily has a concern with transportation as he has transportation for local appointments, but that it can be difficult when the appointments are further out, such as [place] and patient's niece stated that she does not live locally.     CELINA CUEVAS stated that there are not many resources for transportation other than the public bus system, and an out-of-county form if needed, transportation benefit through insurance and that [facility] offers five round-trip rides per year. Patient and niece stated that they were aware of all of this, but patient's appointment this week was cancelled because there was not an available  in his area for patient to get to his appointment.    CELINA CUEVAS reviewed that there are other options, that usually have a cost, that CELINA CUEVAS can send if interested. Patient's niece was agreeable to this and provided her email as [email or web address].    Information Provided:  [place]  [employer]  [employer]    Patient did stated that he is waiting on paperwork from [facility] to complete, but that once submitted they should be able to provide patient with whatever transportation he needs to get to his appointments.    Patient also stated that he is in need of a nasal spray prescription that he needs PCP or order. CELINA CUEVAS stated that CELINA CUEVAS can  send message to PCP regarding this and that PCP office can follow-up with patient. Patient appreciative.    No other needs identified. CELINA CUEVAS routed note to PCP regarding patient request for nasal spray prescription. Referral closed.

## 2025-04-10 ENCOUNTER — HOME CARE VISIT (OUTPATIENT)
Dept: HOME HEALTH SERVICES | Facility: HOME HEALTHCARE | Age: 70
End: 2025-04-10
Payer: COMMERCIAL

## 2025-04-10 ENCOUNTER — TELEPHONE (OUTPATIENT)
Age: 70
End: 2025-04-10

## 2025-04-10 VITALS
OXYGEN SATURATION: 98 % | RESPIRATION RATE: 18 BRPM | DIASTOLIC BLOOD PRESSURE: 66 MMHG | SYSTOLIC BLOOD PRESSURE: 122 MMHG | HEART RATE: 66 BPM | TEMPERATURE: 98 F

## 2025-04-10 VITALS — HEART RATE: 74 BPM | SYSTOLIC BLOOD PRESSURE: 126 MMHG | OXYGEN SATURATION: 98 % | DIASTOLIC BLOOD PRESSURE: 62 MMHG

## 2025-04-10 DIAGNOSIS — D50.0 CHRONIC BLOOD LOSS ANEMIA: ICD-10-CM

## 2025-04-10 DIAGNOSIS — K92.2 UPPER GI BLEED: Primary | ICD-10-CM

## 2025-04-10 PROCEDURE — G0299 HHS/HOSPICE OF RN EA 15 MIN: HCPCS

## 2025-04-10 PROCEDURE — G0151 HHCP-SERV OF PT,EA 15 MIN: HCPCS

## 2025-04-10 NOTE — CASE COMMUNICATION
Into see patient today.  Patient reports black loose stools today.  He is going for EGD tomorrow but wanted to make you aware.

## 2025-04-10 NOTE — TELEPHONE ENCOUNTER
Patient calling regarding octreotide.  Did not realize it was an injection and does not know how to do.  Visiting nurse will not be back until Wednesday.  Please advise.

## 2025-04-10 NOTE — TELEPHONE ENCOUNTER
Pt. Calling back, asking for script for needles for his Octreotide injection, insurance will cover needles and pt. Is asking for script to be sent to pharmacy on file, thank you

## 2025-04-10 NOTE — TELEPHONE ENCOUNTER
At this point then the best we can do is just have him wait to start the octreotide until the visiting nurse comes on Wednesday and then they can teach him how to do the injection and then he would do it twice a day from there forward and the visiting nurses can check in with him on his progress.    The other option is to see if I can squeeze him in or have him come into the office on Monday and I can show him how to do it as I am in the [place] office this week.

## 2025-04-11 ENCOUNTER — HOSPITAL ENCOUNTER (OUTPATIENT)
Dept: GASTROENTEROLOGY | Facility: HOSPITAL | Age: 70
End: 2025-04-11
Payer: COMMERCIAL

## 2025-04-11 DIAGNOSIS — R19.5 OCCULT GI BLEEDING: ICD-10-CM

## 2025-04-11 DIAGNOSIS — D64.9 ANEMIA, UNSPECIFIED TYPE: ICD-10-CM

## 2025-04-11 PROCEDURE — 91110 GI TRC IMG INTRAL ESOPH-ILE: CPT

## 2025-04-11 NOTE — TELEPHONE ENCOUNTER
Spoke with pharmacy, medication comes in vial only, no syringes were given. Syringes they use are insulin/1ml*1/2 in  Medication has been filled, pt has not yet picked up.

## 2025-04-11 NOTE — TELEPHONE ENCOUNTER
Can you please call the patient's pharmacy and see if the octreotide that was dispensed was just a vial that the medication has to be drawn out of, prefilled syringes, or an autoinjector pen.  If it is a vial or prefilled syringes, where syringes given?  Were needles given?  If not what order or size needles do they need for the subcu injections for the octreotide so I can send an order?

## 2025-04-13 DIAGNOSIS — K25.9 GASTRIC ULCER: ICD-10-CM

## 2025-04-13 DIAGNOSIS — K92.2 GI BLEED: ICD-10-CM

## 2025-04-14 ENCOUNTER — HOME CARE VISIT (OUTPATIENT)
Dept: HOME HEALTH SERVICES | Facility: HOME HEALTHCARE | Age: 70
End: 2025-04-14
Payer: COMMERCIAL

## 2025-04-14 VITALS — HEART RATE: 74 BPM | OXYGEN SATURATION: 97 % | DIASTOLIC BLOOD PRESSURE: 58 MMHG | SYSTOLIC BLOOD PRESSURE: 108 MMHG

## 2025-04-14 VITALS — SYSTOLIC BLOOD PRESSURE: 122 MMHG | OXYGEN SATURATION: 98 % | DIASTOLIC BLOOD PRESSURE: 62 MMHG | HEART RATE: 73 BPM

## 2025-04-14 PROCEDURE — G0180 MD CERTIFICATION HHA PATIENT: HCPCS | Performed by: INTERNAL MEDICINE

## 2025-04-14 PROCEDURE — G0151 HHCP-SERV OF PT,EA 15 MIN: HCPCS

## 2025-04-14 PROCEDURE — G0152 HHCP-SERV OF OT,EA 15 MIN: HCPCS

## 2025-04-14 RX ORDER — FAMOTIDINE 20 MG/1
20 TABLET, FILM COATED ORAL
Qty: 30 TABLET | Refills: 5 | Status: SHIPPED | OUTPATIENT
Start: 2025-04-14 | End: 2025-04-21 | Stop reason: SDUPTHER

## 2025-04-14 NOTE — CASE COMMUNICATION
OT eval completed on 4/14/25.  No further OT services indicated at this time.  Pt is near baseline with ADL's and IADL's.  OT 1 x 1.  Pt in agreement with OT POC.

## 2025-04-15 ENCOUNTER — OFFICE VISIT (OUTPATIENT)
Dept: FAMILY MEDICINE CLINIC | Facility: CLINIC | Age: 70
End: 2025-04-15
Payer: COMMERCIAL

## 2025-04-15 VITALS
HEIGHT: 71 IN | BODY MASS INDEX: 32.62 KG/M2 | DIASTOLIC BLOOD PRESSURE: 72 MMHG | HEART RATE: 78 BPM | TEMPERATURE: 97.8 F | SYSTOLIC BLOOD PRESSURE: 120 MMHG | WEIGHT: 233 LBS | OXYGEN SATURATION: 98 %

## 2025-04-15 DIAGNOSIS — D61.818 PANCYTOPENIA (HCC): ICD-10-CM

## 2025-04-15 DIAGNOSIS — D50.0 CHRONIC BLOOD LOSS ANEMIA: ICD-10-CM

## 2025-04-15 DIAGNOSIS — I10 ESSENTIAL HYPERTENSION: Chronic | ICD-10-CM

## 2025-04-15 DIAGNOSIS — G47.33 OBSTRUCTIVE SLEEP APNEA: ICD-10-CM

## 2025-04-15 DIAGNOSIS — Z78.9 TRANSITION OF CARE: Primary | ICD-10-CM

## 2025-04-15 DIAGNOSIS — I35.0 MODERATE AORTIC STENOSIS: ICD-10-CM

## 2025-04-15 DIAGNOSIS — C76.0 HEAD AND NECK CANCER (HCC): Chronic | ICD-10-CM

## 2025-04-15 DIAGNOSIS — D64.9 SYMPTOMATIC ANEMIA: ICD-10-CM

## 2025-04-15 PROCEDURE — 99495 TRANSJ CARE MGMT MOD F2F 14D: CPT | Performed by: FAMILY MEDICINE

## 2025-04-15 NOTE — ASSESSMENT & PLAN NOTE
Suspected chemotherapy induced with leukopenia, thrombocytopenia and decreased absolute neutrophil count.

## 2025-04-15 NOTE — PROGRESS NOTES
Name: Ean Young      : 1955      MRN: [id number]  Encounter Provider: Timmy Chapman DO  Encounter Date: 4/15/2025   Encounter department: [facility]  :  Assessment & Plan  Transition of care  The patient is a pleasant 70-year-old male who was admitted to the hospital on 2025.  He has a past medical history significant for hypertension, moderate aortic stenosis, prior upper GI bleed this past February.  He presented with positional dizziness and lightheadedness.  He did have orthostatic vital signs.    Patient has had multiple EGDs in February which noted small angioectasia in the second part of the duodenum with stigmata of recent hemorrhage status post hemostasis.  Was also old blood suggestive of proximal foci of bleeding.  Patient did have [facility]Cam capsule endoscopy on 2025 and 2025.  Both of these were limited to 2 to 3 hours rather than the 8 but GI believe that most of the small bowel was seen with evidence of bleeding angiectasia's throughout the small bowel.  The patient received 2 units of packed red blood cells.    Gastroenterology was consulted on this admission.  Patient had an EGD with push enteroscopy revealing AV malformations.  They recommended octreotide 100 mg twice a day which should be continued at discharge.  The patient does have outpatient capsule endoscopy on .       Symptomatic anemia    See above summary.         Pancytopenia (HCC)    Suspected chemotherapy induced with leukopenia, thrombocytopenia and decreased absolute neutrophil count.         Chronic blood loss anemia      Hemoglobin was stable at 8.6 on the day of discharge.       Essential hypertension  HCTZ was placed on hold.  Continue beta-blocker with holding parameters.       Moderate aortic stenosis  Outpatient cardiology following.       Obstructive sleep apnea  Continue CPAP.       Head and neck cancer (HCC)  Oncology is following and monitoring.              History of  Present Illness   The patient is a pleasant 70-year-old male who was admitted to the hospital on March 28, 2025.  He has a past medical history significant for hypertension, moderate aortic stenosis, prior upper GI bleed this past February.  He presented with positional dizziness and lightheadedness.  He did have orthostatic vital signs.    Patient has had multiple EGDs in February which noted small angioectasia in the second part of the duodenum with stigmata of recent hemorrhage status post hemostasis.  Was also old blood suggestive of proximal foci of bleeding.  Patient did have PillCam capsule endoscopy on 2/17/2025 and 2/19/2025.  Both of these were limited to 2 to 3 hours rather than the 8 but GI believe that most of the small bowel was seen with evidence of bleeding angiectasia's throughout the small bowel.  The patient received 2 units of packed red blood cells.    Gastroenterology was consulted on this admission.  Patient had an EGD with push enteroscopy revealing AV malformations.  They recommended octreotide 100 mg twice a day which should be continued at discharge.  The patient does have outpatient capsule endoscopy on 4 7.    Patient does have home aides coming to the house.        Lab work reviewed from 4/3/2025.  Electrolytes normal.  LFTs normal.  GFR normal.  White blood cell count 2.15, hemoglobin 8.6, hematocrit 26.7 and platelets 141    Review of Systems   Constitutional:  Negative for chills and fever.   HENT:  Negative for ear pain and sore throat.    Eyes:  Negative for pain and visual disturbance.   Respiratory:  Negative for cough and shortness of breath.    Cardiovascular:  Negative for chest pain and palpitations.   Gastrointestinal:  Negative for abdominal pain and vomiting.   Genitourinary:  Negative for dysuria and hematuria.   Musculoskeletal:  Negative for arthralgias and back pain.   Skin:  Negative for color change and rash.   Neurological:  Negative for seizures and syncope.   All  "other systems reviewed and are negative.      Objective   /72 (BP Location: Left arm, Patient Position: Sitting, Cuff Size: Standard)   Pulse 78   Temp 97.8 °F (36.6 °C) (Temporal)   Ht 5' 11\" (1.803 m)   Wt 106 kg (233 lb)   SpO2 98%   BMI 32.50 kg/m²      Physical Exam  Vitals and nursing note reviewed.   Constitutional:       General: He is not in acute distress.     Appearance: Normal appearance. He is well-developed.   HENT:      Head: Normocephalic and atraumatic.      Right Ear: Tympanic membrane normal.      Left Ear: Tympanic membrane normal.      Mouth/Throat:      Mouth: Mucous membranes are moist.      Pharynx: Oropharynx is clear.   Eyes:      Extraocular Movements: Extraocular movements intact.      Conjunctiva/sclera: Conjunctivae normal.      Pupils: Pupils are equal, round, and reactive to light.   Cardiovascular:      Rate and Rhythm: Normal rate and regular rhythm.      Heart sounds: Normal heart sounds. No murmur heard.     No friction rub.   Pulmonary:      Effort: Pulmonary effort is normal. No respiratory distress.      Breath sounds: Normal breath sounds.   Abdominal:      General: Bowel sounds are normal.      Palpations: Abdomen is soft.      Tenderness: There is no abdominal tenderness. There is no guarding or rebound.      Hernia: No hernia is present.   Musculoskeletal:         General: No swelling.      Cervical back: Neck supple.   Skin:     General: Skin is warm and dry.      Capillary Refill: Capillary refill takes less than 2 seconds.   Neurological:      General: No focal deficit present.      Mental Status: He is alert and oriented to person, place, and time.      Gait: Gait normal.   Psychiatric:         Mood and Affect: Mood normal.         Behavior: Behavior normal.         Thought Content: Thought content normal.         "

## 2025-04-16 ENCOUNTER — HOME CARE VISIT (OUTPATIENT)
Dept: HOME HEALTH SERVICES | Facility: HOME HEALTHCARE | Age: 70
End: 2025-04-16
Payer: COMMERCIAL

## 2025-04-16 VITALS — SYSTOLIC BLOOD PRESSURE: 110 MMHG | DIASTOLIC BLOOD PRESSURE: 60 MMHG | HEART RATE: 60 BPM | OXYGEN SATURATION: 98 %

## 2025-04-16 DIAGNOSIS — K92.2 UPPER GI BLEED: ICD-10-CM

## 2025-04-16 PROCEDURE — G0299 HHS/HOSPICE OF RN EA 15 MIN: HCPCS

## 2025-04-16 PROCEDURE — G0151 HHCP-SERV OF PT,EA 15 MIN: HCPCS

## 2025-04-17 RX ORDER — FOLIC ACID 1 MG/1
1000 TABLET ORAL DAILY
Qty: 30 TABLET | Refills: 0 | Status: SHIPPED | OUTPATIENT
Start: 2025-04-17 | End: 2025-04-21 | Stop reason: SDUPTHER

## 2025-04-18 ENCOUNTER — TELEPHONE (OUTPATIENT)
Age: 70
End: 2025-04-18

## 2025-04-18 NOTE — TELEPHONE ENCOUNTER
Patients GI provider:  Dr. STANLEY     Number to return call: ( [phone]     Reason for call: Pt returning office call recall Repeat colonoscopy in 6 months, due: 8/10/2025  -Inadequate bowel preparation he will call back to [facility]       Scheduled procedure/appointment date if applicable: Apt/procedure  2/10 [place]

## 2025-04-21 ENCOUNTER — HOME CARE VISIT (OUTPATIENT)
Dept: HOME HEALTH SERVICES | Facility: HOME HEALTHCARE | Age: 70
End: 2025-04-21
Payer: COMMERCIAL

## 2025-04-21 VITALS
TEMPERATURE: 98 F | OXYGEN SATURATION: 99 % | SYSTOLIC BLOOD PRESSURE: 132 MMHG | RESPIRATION RATE: 18 BRPM | HEART RATE: 72 BPM | DIASTOLIC BLOOD PRESSURE: 76 MMHG

## 2025-04-21 VITALS — HEART RATE: 60 BPM | DIASTOLIC BLOOD PRESSURE: 64 MMHG | SYSTOLIC BLOOD PRESSURE: 122 MMHG | OXYGEN SATURATION: 98 %

## 2025-04-21 DIAGNOSIS — J30.9 ALLERGIC RHINITIS, UNSPECIFIED SEASONALITY, UNSPECIFIED TRIGGER: ICD-10-CM

## 2025-04-21 DIAGNOSIS — K92.2 UPPER GI BLEED: ICD-10-CM

## 2025-04-21 DIAGNOSIS — K25.9 GASTRIC ULCER: ICD-10-CM

## 2025-04-21 DIAGNOSIS — N40.1 BENIGN PROSTATIC HYPERPLASIA WITH NOCTURIA: ICD-10-CM

## 2025-04-21 DIAGNOSIS — R35.1 BENIGN PROSTATIC HYPERPLASIA WITH NOCTURIA: ICD-10-CM

## 2025-04-21 DIAGNOSIS — K92.2 GI BLEED: ICD-10-CM

## 2025-04-21 DIAGNOSIS — I10 ESSENTIAL HYPERTENSION: Chronic | ICD-10-CM

## 2025-04-21 PROCEDURE — G0151 HHCP-SERV OF PT,EA 15 MIN: HCPCS

## 2025-04-21 RX ORDER — AZELASTINE 1 MG/ML
2 SPRAY, METERED NASAL 2 TIMES DAILY
Qty: 90 ML | Refills: 3 | Status: SHIPPED | OUTPATIENT
Start: 2025-04-21

## 2025-04-21 RX ORDER — HYDROCHLOROTHIAZIDE 12.5 MG/1
12.5 TABLET ORAL DAILY
Qty: 90 TABLET | Refills: 3 | Status: SHIPPED | OUTPATIENT
Start: 2025-04-21

## 2025-04-21 RX ORDER — FOLIC ACID 1 MG/1
1000 TABLET ORAL DAILY
Qty: 90 TABLET | Refills: 3 | Status: SHIPPED | OUTPATIENT
Start: 2025-04-21

## 2025-04-21 RX ORDER — ATORVASTATIN CALCIUM 40 MG/1
40 TABLET, FILM COATED ORAL DAILY
Qty: 90 TABLET | Refills: 3 | Status: SHIPPED | OUTPATIENT
Start: 2025-04-21

## 2025-04-21 RX ORDER — FAMOTIDINE 20 MG/1
20 TABLET, FILM COATED ORAL
Qty: 90 TABLET | Refills: 3 | Status: SHIPPED | OUTPATIENT
Start: 2025-04-21

## 2025-04-21 RX ORDER — PANTOPRAZOLE SODIUM 40 MG/1
40 TABLET, DELAYED RELEASE ORAL 2 TIMES DAILY
Qty: 180 TABLET | Refills: 3 | Status: SHIPPED | OUTPATIENT
Start: 2025-04-21

## 2025-04-21 RX ORDER — METOPROLOL SUCCINATE 25 MG/1
12.5 TABLET, EXTENDED RELEASE ORAL DAILY
Qty: 90 TABLET | Refills: 3 | Status: SHIPPED | OUTPATIENT
Start: 2025-04-21

## 2025-04-21 RX ORDER — TAMSULOSIN HYDROCHLORIDE 0.4 MG/1
0.4 CAPSULE ORAL
Qty: 90 CAPSULE | Refills: 3 | Status: SHIPPED | OUTPATIENT
Start: 2025-04-21

## 2025-04-21 RX ORDER — FERROUS SULFATE 325(65) MG
325 TABLET ORAL
Qty: 90 TABLET | Refills: 3 | Status: SHIPPED | OUTPATIENT
Start: 2025-04-21

## 2025-04-23 ENCOUNTER — HOME CARE VISIT (OUTPATIENT)
Dept: HOME HEALTH SERVICES | Facility: HOME HEALTHCARE | Age: 70
End: 2025-04-23
Payer: COMMERCIAL

## 2025-04-23 VITALS
DIASTOLIC BLOOD PRESSURE: 72 MMHG | SYSTOLIC BLOOD PRESSURE: 124 MMHG | RESPIRATION RATE: 18 BRPM | HEART RATE: 66 BPM | TEMPERATURE: 98.2 F | OXYGEN SATURATION: 98 %

## 2025-04-23 VITALS — SYSTOLIC BLOOD PRESSURE: 126 MMHG | DIASTOLIC BLOOD PRESSURE: 64 MMHG | OXYGEN SATURATION: 98 % | HEART RATE: 75 BPM

## 2025-04-23 PROCEDURE — G0151 HHCP-SERV OF PT,EA 15 MIN: HCPCS

## 2025-04-23 PROCEDURE — G0299 HHS/HOSPICE OF RN EA 15 MIN: HCPCS

## 2025-04-25 ENCOUNTER — TELEPHONE (OUTPATIENT)
Age: 70
End: 2025-04-25

## 2025-04-25 NOTE — TELEPHONE ENCOUNTER
Marcella has been trying to reach patient with no success.  Calling to see if patient needs any coordination of care.  If we need anything please call [phone]

## 2025-04-29 ENCOUNTER — TELEPHONE (OUTPATIENT)
Age: 70
End: 2025-04-29

## 2025-04-29 NOTE — TELEPHONE ENCOUNTER
Patient called back to schedule Colon/EGD for on or after 8/10/25. We discuss transportation and he was going to have a service. Advised he needed someone with him when he leaves the hospital.  He states he will have to call his niece and/or friend to see if they can be with him.  He will call back, prefers a Monday in August.

## 2025-04-29 NOTE — TELEPHONE ENCOUNTER
"Per Dr Cary \"ok\" to schedule colon/egd in Aug.  Follow up in office after, which is already scheduled.   "

## 2025-04-29 NOTE — TELEPHONE ENCOUNTER
Patients GI provider:  Dr. Cary    Number to return call: [phone]    Reason for call: Pt called in to schedule colonoscopy. Patient was hospitalized in March. Patient would like to know if he can proceed with scheduling colonoscopy for Aug or if he would need to be seen at the office. Patient also stated he is not bleeding  and everything is going well. Please reach out to patient, thank you.    Scheduled procedure/appointment date if applicable: Apt/procedure   9/9/25

## 2025-04-30 ENCOUNTER — PREP FOR PROCEDURE (OUTPATIENT)
Age: 70
End: 2025-04-30

## 2025-04-30 ENCOUNTER — TELEPHONE (OUTPATIENT)
Age: 70
End: 2025-04-30

## 2025-04-30 DIAGNOSIS — Z12.11 SCREENING FOR COLON CANCER: Primary | ICD-10-CM

## 2025-04-30 NOTE — TELEPHONE ENCOUNTER
Pt called in to schedule his colonoscopy stated the lady yesterday told him he can schedule on 8/11, 8/18 or 8/25 but nothing was available in Aug advised to schedule on 9/9/25 he got upset and start cursing said yesterday  was saying aug dates are available and now not available he was keep on cursing and he hung up the ph.

## 2025-05-09 ENCOUNTER — TELEPHONE (OUTPATIENT)
Age: 70
End: 2025-05-09

## 2025-05-09 NOTE — TELEPHONE ENCOUNTER
Pt called in stating that he had a missed call from us. Spoke to Ashley and she was not aware if anyone from the office called the pt. She advised that it may be to schedule his repeat his colonoscopy. Relayed this to pt and he stated that he is to wait until he is seen in sept to schedule his procedure.

## 2025-05-11 ENCOUNTER — OFFICE VISIT (OUTPATIENT)
Dept: URGENT CARE | Facility: MEDICAL CENTER | Age: 70
End: 2025-05-11
Payer: COMMERCIAL

## 2025-05-11 VITALS
DIASTOLIC BLOOD PRESSURE: 62 MMHG | BODY MASS INDEX: 32.73 KG/M2 | WEIGHT: 233.8 LBS | OXYGEN SATURATION: 99 % | HEART RATE: 88 BPM | RESPIRATION RATE: 18 BRPM | HEIGHT: 71 IN | SYSTOLIC BLOOD PRESSURE: 120 MMHG | TEMPERATURE: 98.1 F

## 2025-05-11 DIAGNOSIS — K13.79 ORAL PAIN: Primary | ICD-10-CM

## 2025-05-11 PROCEDURE — G0463 HOSPITAL OUTPT CLINIC VISIT: HCPCS

## 2025-05-11 PROCEDURE — 99213 OFFICE O/P EST LOW 20 MIN: CPT

## 2025-05-11 NOTE — PATIENT INSTRUCTIONS
Follow up with cancer doctor tomorrow.  Follow up with PCP, Dr. Chapman, tomorrow.  Proceed to ER if symptoms worsen.

## 2025-05-12 NOTE — PROGRESS NOTES
Name: Ean Young      : 1955      MRN: [id number]  Encounter Provider: ESTELA Beaver  Encounter Date: 2025   Encounter department: [facility] [place]  :  Assessment & Plan  Oral pain    In the s/o recent completion of chemo & radiation for neck ca per pt  States he saw oncologist on Friday  Advised f/u with their office tomorrow  Call PCP as well  If sympt worsen, proceed to ER for further evaluation  Pt verbalized understanding             History of Present Illness     Ean Young is a 70 y.o. male who presents with oral/neck pain. States he recently completed chemo & radiation for neck cancer.    Denies fevers or chills. No trouble swallowing per pt. Denies CP or SOB.    History obtained from: patient    Review of Systems   Constitutional:  Negative for chills, fatigue and fever.   HENT:  Negative for congestion, ear discharge, ear pain, facial swelling, rhinorrhea, sinus pressure, sinus pain, sore throat and trouble swallowing.         Oral pain   Eyes:  Negative for photophobia, pain and visual disturbance.   Respiratory:  Negative for cough, chest tightness, shortness of breath and wheezing.    Cardiovascular:  Negative for chest pain, palpitations and leg swelling.   Gastrointestinal:  Negative for abdominal pain, diarrhea, nausea and vomiting.   Genitourinary:  Negative for dysuria, flank pain and hematuria.   Musculoskeletal:  Negative for arthralgias, back pain, myalgias and neck pain.   Skin:  Negative for pallor and wound.   Neurological:  Negative for dizziness, syncope, weakness, numbness and headaches.   Psychiatric/Behavioral:  Negative for confusion and sleep disturbance.    All other systems reviewed and are negative.    Medical History Reviewed by provider this encounter:  Tobacco  Allergies  Meds  Problems  Med Hx  Surg Hx  Fam Hx     .  Current Outpatient Medications on File Prior to Visit   Medication Sig Dispense Refill    atorvastatin  "(LIPITOR) 40 mg tablet Take 1 tablet (40 mg total) by mouth daily 90 tablet 3    azelastine (ASTELIN) 0.1 % nasal spray 2 sprays into each nostril 2 (two) times a day 90 mL 3    brimonidine tartrate 0.2 % ophthalmic solution INSTILL 1 DROP INTO EACH EYE TWICE DAILY      famotidine (PEPCID) 20 mg tablet Take 1 tablet (20 mg total) by mouth daily at bedtime 90 tablet 3    ferrous sulfate 325 (65 Fe) mg tablet Take 1 tablet (325 mg total) by mouth daily with breakfast 90 tablet 3    folic acid (FOLVITE) 1 mg tablet Take 1 tablet (1,000 mcg total) by mouth daily 90 tablet 3    hydroCHLOROthiazide 12.5 mg tablet Take 1 tablet (12.5 mg total) by mouth daily 90 tablet 3    hydrOXYzine pamoate (VISTARIL) 25 mg capsule Take 1 capsule (25 mg total) by mouth 3 (three) times a day as needed for anxiety (And headache) 30 capsule 1    Insulin Syringe-Needle U-100 26G X 1/2\" 1 ML MISC Use 2 (two) times a day 60 each 3    metoprolol succinate (TOPROL-XL) 25 mg 24 hr tablet Take 0.5 tablets (12.5 mg total) by mouth daily 90 tablet 3    octreotide (SandoSTATIN) 100 mcg/mL Inject 1 mL (100 mcg total) under the skin 2 (two) times a day 60 mL 5    Omega-3 Fatty Acids (Fish Oil) 1200 MG CAPS Take by mouth 2 (two) times a day      pantoprazole (PROTONIX) 40 mg tablet Take 1 tablet (40 mg total) by mouth 2 (two) times a day 180 tablet 3    tamsulosin (FLOMAX) 0.4 mg Take 1 capsule (0.4 mg total) by mouth daily with dinner 90 capsule 3     No current facility-administered medications on file prior to visit.      Social History     Tobacco Use    Smoking status: Never     Passive exposure: Never    Smokeless tobacco: Never   Vaping Use    Vaping status: Never Used   Substance and Sexual Activity    Alcohol use: Yes     Comment: rare    Drug use: Never    Sexual activity: Not Currently        Objective   /62   Pulse 88   Temp 98.1 °F (36.7 °C)   Resp 18   Ht 5' 11\" (1.803 m)   Wt 106 kg (233 lb 12.8 oz)   SpO2 99%   BMI 32.61 " kg/m²      Physical Exam  Vitals and nursing note reviewed.   Constitutional:       General: He is not in acute distress.     Appearance: Normal appearance. He is well-developed. He is not ill-appearing.      Comments: Speech alteration - patient states this is baseline  R facial droop - pt states this is from the '70s when he had the mumps   HENT:      Head: Normocephalic and atraumatic.      Jaw: There is normal jaw occlusion.      Right Ear: Tympanic membrane normal.      Left Ear: Tympanic membrane normal.      Nose: Nose normal.      Mouth/Throat:      Pharynx: Oropharynx is clear. No oropharyngeal exudate or posterior oropharyngeal erythema.      Comments:   No oral lesions noted  No tongue swelling  CTAB  Eyes:      Extraocular Movements: Extraocular movements intact.      Conjunctiva/sclera: Conjunctivae normal.   Neck:      Thyroid: No thyroid mass.      Vascular: No carotid bruit or JVD.      Trachea: Trachea and phonation normal.   Cardiovascular:      Rate and Rhythm: Normal rate and regular rhythm.      Heart sounds: S1 normal and S2 normal. No murmur heard.  Pulmonary:      Effort: Pulmonary effort is normal. No tachypnea or respiratory distress.      Breath sounds: Normal breath sounds and air entry. No stridor. No decreased breath sounds.   Chest:      Chest wall: No tenderness.   Abdominal:      General: Bowel sounds are normal. There is no distension.      Palpations: Abdomen is soft.      Tenderness: There is no abdominal tenderness. There is no right CVA tenderness or left CVA tenderness.   Musculoskeletal:         General: No swelling.      Cervical back: Normal range of motion and neck supple.      Right lower leg: No edema.      Left lower leg: No edema.   Lymphadenopathy:      Cervical: No cervical adenopathy.   Skin:     General: Skin is warm and dry.      Capillary Refill: Capillary refill takes less than 2 seconds.      Findings: No rash.   Neurological:      General: No focal deficit  present.      Mental Status: He is alert and oriented to person, place, and time.      Cranial Nerves: Cranial nerves 2-12 are intact.      Sensory: Sensation is intact.      Motor: Motor function is intact.      Coordination: Coordination is intact.      Gait: Gait is intact.   Psychiatric:         Mood and Affect: Mood normal.         Behavior: Behavior is cooperative.

## 2025-06-20 ENCOUNTER — DOCTOR'S OFFICE (OUTPATIENT)
Dept: URBAN - NONMETROPOLITAN AREA CLINIC 1 | Facility: CLINIC | Age: 70
Setting detail: OPHTHALMOLOGY
End: 2025-06-20
Payer: COMMERCIAL

## 2025-06-20 DIAGNOSIS — H26.493: ICD-10-CM

## 2025-06-20 DIAGNOSIS — H40.023: ICD-10-CM

## 2025-06-20 PROCEDURE — 99213 OFFICE O/P EST LOW 20 MIN: CPT | Performed by: OPHTHALMOLOGY

## 2025-06-20 PROCEDURE — 92083 EXTENDED VISUAL FIELD XM: CPT | Performed by: OPHTHALMOLOGY

## 2025-06-20 ASSESSMENT — REFRACTION_CURRENTRX
OD_OVR_VA: 20/
OD_AXIS: 094
OD_CYLINDER: -1.75
OS_ADD: +2.50
OD_ADD: +2.50
OD_VPRISM_DIRECTION: BF
OS_SPHERE: +1.25
OS_AXIS: 099
OS_VPRISM_DIRECTION: BF
OD_SPHERE: +1.00
OS_CYLINDER: -2.00
OS_OVR_VA: 20/

## 2025-06-20 ASSESSMENT — REFRACTION_MANIFEST
OS_AXIS: 100
OD_VA2: 20/30
OD_AXIS: 090
OS_ADD: +2.50
OS_VA1: 20/30-2
OS_VA2: 20/30-2
OS_SPHERE: +1.25
OD_CYLINDER: -1.75
OD_ADD: +2.50
OD_VA1: 20/30
OD_SPHERE: +1.00
OS_CYLINDER: -2.00

## 2025-06-20 ASSESSMENT — KERATOMETRY
OS_K2POWER_DIOPTERS: 43.75
OD_K2POWER_DIOPTERS: 44.50
OS_AXISANGLE_DEGREES: 024
OS_K1POWER_DIOPTERS: 42.25
OD_AXISANGLE_DEGREES: 004
OD_K1POWER_DIOPTERS: 43.00

## 2025-06-20 ASSESSMENT — PACHYMETRY
OS_CT_UM: 541
OS_CT_CORRECTION: 0
OD_CT_UM: 541
OD_CT_CORRECTION: 0

## 2025-06-20 ASSESSMENT — TONOMETRY
OS_IOP_MMHG: 13
OD_IOP_MMHG: 11

## 2025-06-20 ASSESSMENT — REFRACTION_AUTOREFRACTION
OS_CYLINDER: -1.75
OS_SPHERE: +1.00
OS_AXIS: 103

## 2025-06-20 ASSESSMENT — VISUAL ACUITY
OD_BCVA: 20/60+2
OS_BCVA: 20/40+1